# Patient Record
Sex: MALE | Employment: OTHER | URBAN - METROPOLITAN AREA
[De-identification: names, ages, dates, MRNs, and addresses within clinical notes are randomized per-mention and may not be internally consistent; named-entity substitution may affect disease eponyms.]

---

## 2017-02-19 PROBLEM — I10 ESSENTIAL HYPERTENSION: Status: ACTIVE | Noted: 2017-02-19

## 2017-02-19 PROBLEM — Z86.69 HX OF BELL'S PALSY: Status: ACTIVE | Noted: 2017-02-19

## 2017-02-20 ENCOUNTER — OFFICE VISIT (OUTPATIENT)
Dept: FAMILY MEDICINE CLINIC | Age: 67
End: 2017-02-20

## 2017-02-20 VITALS
HEART RATE: 89 BPM | BODY MASS INDEX: 33.56 KG/M2 | OXYGEN SATURATION: 96 % | DIASTOLIC BLOOD PRESSURE: 84 MMHG | HEIGHT: 66 IN | TEMPERATURE: 98.8 F | RESPIRATION RATE: 28 BRPM | WEIGHT: 208.8 LBS | SYSTOLIC BLOOD PRESSURE: 144 MMHG

## 2017-02-20 DIAGNOSIS — Z86.69 HX OF BELL'S PALSY: ICD-10-CM

## 2017-02-20 DIAGNOSIS — I10 ESSENTIAL HYPERTENSION: Primary | ICD-10-CM

## 2017-02-20 RX ORDER — CETIRIZINE HCL 10 MG
TABLET ORAL
COMMUNITY

## 2017-02-20 NOTE — PROGRESS NOTES
This is a Subsequent Medicare Annual Wellness Visit providing Personalized Prevention Plan Services (PPPS) (Performed 12 months after initial AWV and PPPS )    I have reviewed the patient's medical history in detail and updated the computerized patient record. History     Past Medical History   Diagnosis Date    Allergic rhinitis, cause unspecified 12/11/2013    Environmental allergies       Past Surgical History   Procedure Laterality Date    Hx wisdom teeth extraction      Hx tonsillectomy       Current Outpatient Prescriptions   Medication Sig Dispense Refill    cetirizine (ZYRTEC) 10 mg tablet Take  by mouth.  naproxen sodium (ALEVE) 220 mg cap Take  by mouth.  CETIRIZINE HCL/PSEUDOEPHEDRINE (ZYRTEC-D PO) Take  by mouth. Allergies   Allergen Reactions    Codeine Other (comments)     Pt states unknown reaction.  Pcn [Penicillins] Rash     Family History   Problem Relation Age of Onset    Hypertension Mother     Hypertension Father     Heart Disease Father     Alcohol abuse Father     Hypertension Brother     No Known Problems Brother      Social History   Substance Use Topics    Smoking status: Former Smoker     Quit date: 12/12/1966    Smokeless tobacco: Never Used    Alcohol use No     Patient Active Problem List   Diagnosis Code    Allergic rhinitis, cause unspecified J30.9    Hx of Bell's palsy Z86.69    Essential hypertension I10       Depression Risk Factor Screening:     PHQ 2 / 9, over the last two weeks 2/20/2017   Little interest or pleasure in doing things Not at all   Feeling down, depressed or hopeless Not at all   Total Score PHQ 2 0     Alcohol Risk Factor Screening: On any occasion during the past 3 months, have you had more than 3 drinks containing alcohol? No    Do you average more than 7 drinks per week? No      Functional Ability and Level of Safety:     Hearing Loss   mild    Activities of Daily Living   Self-care.    Requires assistance with: no ADLs    Fall Risk     Fall Risk Assessment, last 12 mths 2/20/2017   Able to walk? Yes   Fall in past 12 months? No     Abuse Screen   Patient is not abused    Review of Systems   Constitutional: negative  Eyes: negative  Ears, nose, mouth, throat, and face: negative  Respiratory: negative  Cardiovascular: negative  Gastrointestinal: negative  Genitourinary:negative  Integument/breast: negative  Hematologic/lymphatic: negative    Physical Examination     Evaluation of Cognitive Function:  Mood/affect:  neutral  Appearance: age appropriate  Family member/caregiver input: 0    Visit Vitals    /84 (BP 1 Location: Left arm, BP Patient Position: Sitting)    Pulse 89    Temp 98.8 °F (37.1 °C) (Oral)    Resp 28    Ht 5' 6\" (1.676 m)    Wt 208 lb 12.8 oz (94.7 kg)    SpO2 96%    BMI 33.7 kg/m2     General:  Alert, cooperative, no distress, appears stated age. Head:  Normocephalic, without obvious abnormality, atraumatic. Eyes:  Conjunctivae/corneas clear. PERRL, EOMs intact. Fundi benign   Ears:  Normal TMs and external ear canals both ears. Nose: Nares normal. Septum midline. Mucosa normal. No drainage or sinus tenderness. Throat: Lips, mucosa, and tongue normal. Teeth and gums normal.   Neck: Supple, symmetrical, trachea midline, no adenopathy, thyroid: no enlargement/tenderness/nodules, no carotid bruit and no JVD. Lungs:   Clear to auscultation bilaterally. Heart:  Regular rate and rhythm, S1, S2 normal, no murmur, click, rub or gallop. Abdomen:   Soft, non-tender. Bowel sounds normal. No masses,  No organomegaly. Extremities: Extremities normal, atraumatic, no cyanosis or edema. Neurologic: Minimal rt facial weakness. Normal strength, sensation and reflexes throughout.        Patient Care Team:  Liza Arredondo MD as PCP - [facility] [place])    Advice/Referrals/Counseling   Education and counseling provided:  Are appropriate based on today's review and evaluation    Assessment/Plan   Diagnoses and all orders for this visit:    Essential hypertension  -     Cancel: LIPID PANEL    Hx of Bell's palsy      Follow-up Disposition:  Return in about 3 months (around 5/20/2017). John Beck

## 2017-02-20 NOTE — MR AVS SNAPSHOT
Visit Information Date & Time Provider Department Dept. Phone Encounter #  
 2/20/2017  9:00 AM Adrián Bishop [phone] [id number] Follow-up Instructions Return in about 3 months (around 5/20/2017). Upcoming Health Maintenance Date Due FOBT Q 1 YEAR AGE 50-75 11/6/1999 ZOSTER VACCINE AGE 60> 11/6/2009 GLAUCOMA SCREENING Q2Y 11/6/2014 MEDICARE YEARLY EXAM 11/6/2014 Pneumococcal 65+ Low/Medium Risk (2 of 2 - PPSV23) 12/19/2017 DTaP/Tdap/Td series (2 - Td) 12/19/2026 Allergies as of 2/20/2017  Review Complete On: 2/20/2017 By: Koko Barakat MD  
  
 Severity Noted Reaction Type Reactions Codeine  12/11/2013    Other (comments) Pt states unknown reaction. Pcn [Penicillins]  12/11/2013    Rash Current Immunizations  Never Reviewed No immunizations on file. Not reviewed this visit You Were Diagnosed With   
  
 Codes Comments Essential hypertension    -  Primary ICD-10-CM: I10 
ICD-9-CM: 401.9 Hx of Bell's palsy     ICD-10-CM: Z86.69 
ICD-9-CM: V12.49 Vitals BP Pulse Temp Resp Height(growth percentile) Weight(growth percentile) 144/84 (BP 1 Location: Left arm, BP Patient Position: Sitting) 89 98.8 °F (37.1 °C) (Oral) 28 5' 6\" (1.676 m) 208 lb 12.8 oz (94.7 kg) SpO2 BMI Smoking Status 96% 33.7 kg/m2 Former Smoker Vitals History BMI and BSA Data Body Mass Index Body Surface Area 33.7 kg/m 2 2.1 m 2 Preferred Pharmacy Pharmacy Name Phone [facility] [address] , [address] [facility] [phone] Your Updated Medication List  
  
   
This list is accurate as of: 2/20/17  9:28 AM.  Always use your most recent med list.  
  
  
  
  
 ALEVE 220 mg Cap Generic drug:  naproxen sodium Take  by mouth. ZyrTEC 10 mg tablet Generic drug:  cetirizine Take  by mouth. ZYRTEC-D PO Take  by mouth. Follow-up Instructions Return in about 3 months (around 5/20/2017). Introducing [facility]! Justina Mullins introduces [employer]el patient portal. Now you can access parts of your medical record, email your doctor's office, and request medication refills online. 1. In your internet browser, go to [email or web address] 2. Click on the First Time User? Click Here link in the Sign In box. You will see the New Member Sign Up page. 3. Enter your P[employer]el Access Code exactly as it appears below. You will not need to use this code after youve completed the sign-up process. If you do not sign up before the expiration date, you must request a new code. · P[employer]el Access Code: [email or web address] Expires: 3/12/2017  2:43 PM 
 
4. Enter the last four digits of your Social Security Number (xxxx) and Date of Birth (mm/dd/yyyy) as indicated and click Submit. You will be taken to the next sign-up page. 5. Create a Poxel ID. This will be your Poxel login ID and cannot be changed, so think of one that is secure and easy to remember. 6. Create a Poxel password. You can change your password at any time. 7. Enter your Password Reset Question and Answer. This can be used at a later time if you forget your password. 8. Enter your e-mail address. You will receive e-mail notification when new information is available in [address] 9. Click Sign Up. You can now view and download portions of your medical record. 10. Click the Download Summary menu link to download a portable copy of your medical information. If you have questions, please visit the Frequently Asked Questions section of the P[employer]el website. Remember, [employer]el is NOT to be used for urgent needs. For medical emergencies, dial 911. Now available from your iPhone and Android! Please provide this summary of care documentation to your next provider. Your primary care clinician is listed as Ray Grande. If you have any questions after today's visit, please call [phone].

## 2020-05-18 ENCOUNTER — TELEPHONE (OUTPATIENT)
Dept: FAMILY MEDICINE CLINIC | Age: 70
End: 2020-05-18

## 2020-05-18 DIAGNOSIS — I10 ESSENTIAL HYPERTENSION: Primary | ICD-10-CM

## 2020-05-18 RX ORDER — HYDROCHLOROTHIAZIDE 25 MG/1
25 TABLET ORAL DAILY
Qty: 30 TAB | Refills: 0 | Status: SHIPPED | OUTPATIENT
Start: 2020-05-18 | End: 2020-06-19

## 2020-05-18 NOTE — TELEPHONE ENCOUNTER
Patient wife states that his face is flushed, and he got dizzy when he was on the step stool looking up, his BP is 175/88 and 172/82.  Please call to advise [phone]

## 2020-05-18 NOTE — TELEPHONE ENCOUNTER
----- Message from mAbrose Hernandes sent at 5/18/2020  3:28 PM EDT -----  Regarding: Dr. Odilia Bennett Message/Vendor Calls    Caller's first and last name:Lidia [address] [place] [address] wife      Reason for call:blood pressure readings      Callback required yes/no and why:yes      Best contact number(s):[phone]      Details to clarify the request:Patient's wife wants to give the b/p readings.  175/88 172-82      Ambrose Hernandes

## 2020-05-19 NOTE — TELEPHONE ENCOUNTER
Pt wife wants another call back     States he may need another note for work due to strenuous activity     [phone]

## 2020-05-19 NOTE — TELEPHONE ENCOUNTER
Patient is requesting another work note she have concerns about his BP, age and obesity and his strenuous activity.  [phone]

## 2020-06-19 ENCOUNTER — OFFICE VISIT (OUTPATIENT)
Dept: FAMILY MEDICINE CLINIC | Age: 70
End: 2020-06-19

## 2020-06-19 ENCOUNTER — TELEPHONE (OUTPATIENT)
Dept: FAMILY MEDICINE CLINIC | Age: 70
End: 2020-06-19

## 2020-06-19 VITALS
TEMPERATURE: 98 F | HEIGHT: 66 IN | RESPIRATION RATE: 18 BRPM | SYSTOLIC BLOOD PRESSURE: 156 MMHG | WEIGHT: 196.4 LBS | DIASTOLIC BLOOD PRESSURE: 90 MMHG | HEART RATE: 86 BPM | BODY MASS INDEX: 31.57 KG/M2 | OXYGEN SATURATION: 98 %

## 2020-06-19 DIAGNOSIS — I10 ESSENTIAL HYPERTENSION: Primary | ICD-10-CM

## 2020-06-19 DIAGNOSIS — R35.1 NOCTURIA: ICD-10-CM

## 2020-06-19 RX ORDER — AMLODIPINE BESYLATE 2.5 MG/1
2.5 TABLET ORAL DAILY
Qty: 30 TAB | Refills: 6 | Status: SHIPPED | OUTPATIENT
Start: 2020-06-19 | End: 2020-07-17 | Stop reason: DRUGHIGH

## 2020-06-19 NOTE — PROGRESS NOTES
HISTORY OF PRESENT ILLNESS  Andrew Short is a 78 y.o. male. f/u hbp started on hctz 2 weeks ago. Feeling well. Only taking zyrtec d and hctz    Hypertension    The history is provided by the patient. This is a new problem. The current episode started more than 1 week ago. The problem has been gradually improving. Pertinent negatives include no chest pain, no orthopnea, no malaise/fatigue, no headaches, no peripheral edema, no dizziness and no nausea. Review of Systems   Constitutional: Negative for fever and malaise/fatigue. Respiratory: Negative for cough, hemoptysis and sputum production. Cardiovascular: Negative for chest pain, orthopnea and claudication. Gastrointestinal: Negative for blood in stool, constipation, heartburn, melena and nausea. Musculoskeletal: Positive for joint pain. Skin: Negative for rash. Neurological: Negative for dizziness and headaches. Endo/Heme/Allergies: Does not bruise/bleed easily. Psychiatric/Behavioral: Negative for depression. Physical Exam  Vitals signs reviewed. Constitutional:       Appearance: He is well-developed. HENT:      Head: Normocephalic and atraumatic. Right Ear: External ear normal.      Left Ear: External ear normal.      Nose: Nose normal.   Eyes:      Conjunctiva/sclera: Conjunctivae normal.      Pupils: Pupils are equal, round, and reactive to light. Neck:      Musculoskeletal: Normal range of motion and neck supple. Thyroid: No thyromegaly. Trachea: No tracheal deviation. Cardiovascular:      Rate and Rhythm: Normal rate and regular rhythm. Heart sounds: Normal heart sounds. No murmur. No gallop. Pulmonary:      Effort: Pulmonary effort is normal. No respiratory distress. Breath sounds: Normal breath sounds. Abdominal:      General: Bowel sounds are normal.      Palpations: Abdomen is soft. Genitourinary:     Prostate: Normal.      Rectum: Normal. Guaiac result negative.    Musculoskeletal: Normal range of motion. Skin:     General: Skin is warm and dry. Neurological:      General: No focal deficit present. Mental Status: He is alert. Psychiatric:         Mood and Affect: Mood normal.         ASSESSMENT and PLAN  Diagnoses and all orders for this visit:    1. Essential hypertension  -     amLODIPine (NORVASC) 2.5 mg tablet; Take 1 Tab by mouth daily.  -     METABOLIC PANEL, COMPREHENSIVE  -     LIPID PANEL  -     CBC WITH AUTOMATED DIFF    2. Nocturia  -     PSA, DIAGNOSTIC (PROSTATE SPECIFIC AG)      Follow-up and Dispositions    · Return in about 4 weeks (around 7/17/2020).

## 2020-06-19 NOTE — TELEPHONE ENCOUNTER
----- Message from Magdalene Carlin sent at 6/19/2020 11:50 AM EDT -----  Regarding: Dr. Mary Ann Larson  Per wife, Skyler Cao, stated pts blood pressure is being monitored per Dr. Brock Opitz and pt needs in office appt. 07/17.     Preference: Mornings    Clement Hopping best contact  [phone]

## 2020-06-19 NOTE — PROGRESS NOTES
Chief Complaint   Patient presents with    Hypertension     F/U on BP. 1. Have you been to the ER, urgent care clinic since your last visit? Hospitalized since your last visit? No    2. Have you seen or consulted any other health care providers outside of the [address] [place] [address] since your last visit? Include any pap smears or colon screening.  No

## 2020-06-23 LAB
ALBUMIN SERPL-MCNC: 4.6 G/DL (ref 3.8–4.8)
ALBUMIN/GLOB SERPL: 1.7 {RATIO} (ref 1.2–2.2)
ALP SERPL-CCNC: 76 IU/L (ref 39–117)
ALT SERPL-CCNC: 20 IU/L (ref 0–44)
AST SERPL-CCNC: 19 IU/L (ref 0–40)
BASOPHILS # BLD AUTO: 0 X10E3/UL (ref 0–0.2)
BASOPHILS NFR BLD AUTO: 1 %
BILIRUB SERPL-MCNC: 0.3 MG/DL (ref 0–1.2)
BUN SERPL-MCNC: 21 MG/DL (ref 8–27)
BUN/CREAT SERPL: 18 (ref 10–24)
CALCIUM SERPL-MCNC: 9.8 MG/DL (ref 8.6–10.2)
CHLORIDE SERPL-SCNC: 95 MMOL/L (ref 96–106)
CHOLEST SERPL-MCNC: 216 MG/DL (ref 100–199)
CO2 SERPL-SCNC: 25 MMOL/L (ref 20–29)
CREAT SERPL-MCNC: 1.17 MG/DL (ref 0.76–1.27)
EOSINOPHIL # BLD AUTO: 0.2 X10E3/UL (ref 0–0.4)
EOSINOPHIL NFR BLD AUTO: 3 %
ERYTHROCYTE [DISTWIDTH] IN BLOOD BY AUTOMATED COUNT: 13.1 % (ref 11.6–15.4)
GLOBULIN SER CALC-MCNC: 2.7 G/DL (ref 1.5–4.5)
GLUCOSE SERPL-MCNC: 124 MG/DL (ref 65–99)
HCT VFR BLD AUTO: 38.6 % (ref 37.5–51)
HDLC SERPL-MCNC: 45 MG/DL
HGB BLD-MCNC: 12.9 G/DL (ref 13–17.7)
IMM GRANULOCYTES # BLD AUTO: 0 X10E3/UL (ref 0–0.1)
IMM GRANULOCYTES NFR BLD AUTO: 0 %
INTERPRETATION, 910389: NORMAL
LDLC SERPL CALC-MCNC: 138 MG/DL (ref 0–99)
LYMPHOCYTES # BLD AUTO: 1.6 X10E3/UL (ref 0.7–3.1)
LYMPHOCYTES NFR BLD AUTO: 23 %
MCH RBC QN AUTO: 30 PG (ref 26.6–33)
MCHC RBC AUTO-ENTMCNC: 33.4 G/DL (ref 31.5–35.7)
MCV RBC AUTO: 90 FL (ref 79–97)
MONOCYTES # BLD AUTO: 0.5 X10E3/UL (ref 0.1–0.9)
MONOCYTES NFR BLD AUTO: 7 %
NEUTROPHILS # BLD AUTO: 4.6 X10E3/UL (ref 1.4–7)
NEUTROPHILS NFR BLD AUTO: 66 %
PLATELET # BLD AUTO: 288 X10E3/UL (ref 150–450)
POTASSIUM SERPL-SCNC: 4 MMOL/L (ref 3.5–5.2)
PROT SERPL-MCNC: 7.3 G/DL (ref 6–8.5)
PSA SERPL-MCNC: 0.4 NG/ML (ref 0–4)
RBC # BLD AUTO: 4.3 X10E6/UL (ref 4.14–5.8)
SODIUM SERPL-SCNC: 137 MMOL/L (ref 134–144)
TRIGL SERPL-MCNC: 163 MG/DL (ref 0–149)
VLDLC SERPL CALC-MCNC: 33 MG/DL (ref 5–40)
WBC # BLD AUTO: 7.1 X10E3/UL (ref 3.4–10.8)

## 2020-07-17 ENCOUNTER — OFFICE VISIT (OUTPATIENT)
Dept: FAMILY MEDICINE CLINIC | Age: 70
End: 2020-07-17

## 2020-07-17 VITALS
OXYGEN SATURATION: 98 % | BODY MASS INDEX: 31.63 KG/M2 | TEMPERATURE: 98.9 F | WEIGHT: 196.8 LBS | SYSTOLIC BLOOD PRESSURE: 160 MMHG | DIASTOLIC BLOOD PRESSURE: 80 MMHG | RESPIRATION RATE: 18 BRPM | HEART RATE: 90 BPM | HEIGHT: 66 IN

## 2020-07-17 DIAGNOSIS — I10 ESSENTIAL HYPERTENSION: ICD-10-CM

## 2020-07-17 DIAGNOSIS — Z00.00 MEDICARE ANNUAL WELLNESS VISIT, SUBSEQUENT: Primary | ICD-10-CM

## 2020-07-17 RX ORDER — AMLODIPINE BESYLATE 5 MG/1
5 TABLET ORAL DAILY
Qty: 30 TAB | Refills: 11 | Status: SHIPPED | OUTPATIENT
Start: 2020-07-17 | End: 2021-06-08 | Stop reason: SDUPTHER

## 2020-07-17 NOTE — PROGRESS NOTES
This is the Subsequent Medicare Annual Wellness Exam, performed 12 months or more after the Initial AWV or the last Subsequent AWV    I have reviewed the patient's medical history in detail and updated the computerized patient record. History     Patient Active Problem List   Diagnosis Code    Allergic rhinitis, cause unspecified J30.9    Hx of Bell's palsy Z86.69    Essential hypertension I10     Past Medical History:   Diagnosis Date    Allergic rhinitis, cause unspecified 2013    Environmental allergies       Past Surgical History:   Procedure Laterality Date    HX TONSILLECTOMY      HX WISDOM TEETH EXTRACTION       Current Outpatient Medications   Medication Sig Dispense Refill    amLODIPine (NORVASC) 2.5 mg tablet Take 1 Tab by mouth daily. 30 Tab 6    hydroCHLOROthiazide (HYDRODIURIL) 25 mg tablet TAKE ONE TABLET BY MOUTH ONE TIME DAILY 30 Tab 5    CETIRIZINE HCL/PSEUDOEPHEDRINE (ZYRTEC-D PO) Take  by mouth.  cetirizine (ZYRTEC) 10 mg tablet Take  by mouth.  naproxen sodium (ALEVE) 220 mg cap Take  by mouth. Allergies   Allergen Reactions    Codeine Other (comments)     Pt states unknown reaction.  Pcn [Penicillins] Rash       Family History   Problem Relation Age of Onset    Hypertension Mother     Hypertension Father     Heart Disease Father     Alcohol abuse Father     Hypertension Brother     No Known Problems Brother      Social History     Tobacco Use    Smoking status: Former Smoker     Last attempt to quit: 1966     Years since quittin.6    Smokeless tobacco: Never Used   Substance Use Topics    Alcohol use: No       Depression Risk Factor Screening:     3 most recent PHQ Screens 2020   Little interest or pleasure in doing things Not at all   Feeling down, depressed, irritable, or hopeless Not at all   Total Score PHQ 2 0       Alcohol Risk Factor Screening (MALE > 65):    Do you average more 1 drink per night or more than 7 drinks a week: No    In the past three months have you have had more than 4 drinks containing alcohol on one occasion: No      Functional Ability and Level of Safety:   Hearing: The patient needs further evaluation. Activities of Daily Living: The home contains: handrails and smoke alarm  Patient does total self care     Ambulation: with no difficulty     Fall Risk:  Fall Risk Assessment, last 12 mths 2020   Able to walk? Yes   Fall in past 12 months? No     Abuse Screen:  Patient is not abused       Cognitive Screening   Has your family/caregiver stated any concerns about your memory: no     Cognitive Screenin    Patient Care Team   Patient Care Team:  Mehdi Turner MD as PCP - General (Family Practice)  Mehdi Turner MD as PCP - [facility] Provider    Assessment/Plan   Education and counseling provided:  Are appropriate based on today's review and evaluation    Diagnoses and all orders for this visit:    1. Medicare annual wellness visit, subsequent    2.  Essential hypertension        Health Maintenance Due   Topic Date Due    Shingrix Vaccine Age 49> (1 of 2) 1999    FOBT Q1Y Age 50-75  1999    GLAUCOMA SCREENING Q2Y  2014    Pneumococcal 65+ years (1 of 1 - PPSV23) 2014    Medicare Yearly Exam  2020

## 2020-07-17 NOTE — PROGRESS NOTES
Chief Complaint   Patient presents with   [facility] Annual Wellness Visit     medicare wellness     1. Have you been to the ER, urgent care clinic since your last visit? Hospitalized since your last visit?no    2. Have you seen or consulted any other health care providers outside of the [address] [place] [address] since your last visit? Include any pap smears or colon screening.  no

## 2020-07-17 NOTE — PROGRESS NOTES
HISTORY OF PRESENT ILLNESS  Joanna Howell is a 78 y.o. male. f/u hbp,tolerating hctz,amlodipine 2. 5. Feeling well. Labs reviewed  Hypertension    The history is provided by the patient. This is a chronic problem. The problem has been gradually improving. Pertinent negatives include no chest pain, no peripheral edema and no dizziness. Review of Systems   HENT: Positive for congestion. Cardiovascular: Negative for chest pain. Neurological: Negative for dizziness. Physical Exam  Constitutional:       Appearance: Normal appearance. HENT:      Head: Normocephalic and atraumatic. Right Ear: Tympanic membrane normal.      Left Ear: Tympanic membrane normal.      Nose: Nose normal.      Mouth/Throat:      Mouth: Mucous membranes are moist.   Cardiovascular:      Rate and Rhythm: Normal rate and regular rhythm. Pulses: Normal pulses. Heart sounds: Normal heart sounds. Pulmonary:      Effort: Pulmonary effort is normal.      Breath sounds: Normal breath sounds. Abdominal:      General: Abdomen is flat. Palpations: Abdomen is soft. Genitourinary:     Rectum: Normal. Guaiac result negative. Skin:     General: Skin is warm and dry. Neurological:      General: No focal deficit present. Mental Status: He is alert and oriented to person, place, and time. ASSESSMENT and PLAN  Diagnoses and all orders for this visit:    1. Medicare annual wellness visit, subsequent    2. Essential hypertension,inadequately controlled,will increase amlodipine,rtc 1 mo    Other orders  -     amLODIPine (NORVASC) 5 mg tablet; Take 1 Tab by mouth daily. Follow-up and Dispositions    · Return in about 4 weeks (around 8/14/2020).

## 2020-07-21 ENCOUNTER — TELEPHONE (OUTPATIENT)
Dept: FAMILY MEDICINE CLINIC | Age: 70
End: 2020-07-21
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
## 2020-07-21 DIAGNOSIS — S33.5XXA LUMBAR SPRAIN, INITIAL ENCOUNTER: ICD-10-CM

## 2020-07-21 RX ORDER — CYCLOBENZAPRINE HCL 10 MG
10 TABLET ORAL
Qty: 30 TAB | Refills: 1 | Status: SHIPPED | OUTPATIENT
Start: 2020-07-21

## 2020-07-21 RX ORDER — DOXYCYCLINE 100 MG/1
100 CAPSULE ORAL 2 TIMES DAILY WITH MEALS
Qty: 14 CAP | Refills: 0 | Status: SHIPPED | OUTPATIENT
Start: 2020-07-21 | End: 2020-07-28

## 2020-07-21 NOTE — TELEPHONE ENCOUNTER
----- Message from Naomi Acharya sent at 7/21/2020 10:54 AM EDT -----  Regarding: Dr. Murphy Schreiber  General Message/Vendor Calls    Caller's first and last name: Ant Paez      Reason for call:tick bite on Friday      Callback required yes/no and why: yes      Best contact number(s):[phone]      Details to clarify the request:Patient's wife needs to know what to do with the tick bite she removed from his leg and he is having severe problems with his back.     Naomi Acharya

## 2020-07-21 NOTE — TELEPHONE ENCOUNTER
Patient states he pulled a tick off and now area is red and a streak is going down his leg    Also pulled muscle in back, in pain, can't move around much.

## 2020-08-14 ENCOUNTER — OFFICE VISIT (OUTPATIENT)
Dept: FAMILY MEDICINE CLINIC | Age: 70
End: 2020-08-14
Payer: MEDICARE

## 2020-08-14 VITALS
OXYGEN SATURATION: 98 % | HEART RATE: 75 BPM | HEIGHT: 66 IN | TEMPERATURE: 97.1 F | WEIGHT: 200 LBS | BODY MASS INDEX: 32.14 KG/M2 | RESPIRATION RATE: 20 BRPM | SYSTOLIC BLOOD PRESSURE: 138 MMHG | DIASTOLIC BLOOD PRESSURE: 78 MMHG

## 2020-08-14 DIAGNOSIS — I10 ESSENTIAL HYPERTENSION: Primary | ICD-10-CM

## 2020-08-14 PROCEDURE — G8754 DIAS BP LESS 90: HCPCS | Performed by: FAMILY MEDICINE

## 2020-08-14 PROCEDURE — 99212 OFFICE O/P EST SF 10 MIN: CPT | Performed by: FAMILY MEDICINE

## 2020-08-14 PROCEDURE — G8417 CALC BMI ABV UP PARAM F/U: HCPCS | Performed by: FAMILY MEDICINE

## 2020-08-14 PROCEDURE — G8427 DOCREV CUR MEDS BY ELIG CLIN: HCPCS | Performed by: FAMILY MEDICINE

## 2020-08-14 PROCEDURE — G8510 SCR DEP NEG, NO PLAN REQD: HCPCS | Performed by: FAMILY MEDICINE

## 2020-08-14 PROCEDURE — 1101F PT FALLS ASSESS-DOCD LE1/YR: CPT | Performed by: FAMILY MEDICINE

## 2020-08-14 PROCEDURE — G8536 NO DOC ELDER MAL SCRN: HCPCS | Performed by: FAMILY MEDICINE

## 2020-08-14 PROCEDURE — G8752 SYS BP LESS 140: HCPCS | Performed by: FAMILY MEDICINE

## 2020-08-14 PROCEDURE — 3017F COLORECTAL CA SCREEN DOC REV: CPT | Performed by: FAMILY MEDICINE

## 2020-08-14 RX ORDER — CETIRIZINE HYDROCHLORIDE, PSEUDOEPHEDRINE HYDROCHLORIDE 5; 120 MG/1; MG/1
1 TABLET, FILM COATED, EXTENDED RELEASE ORAL 2 TIMES DAILY
Qty: 60 TAB | Refills: 1 | Status: SHIPPED | OUTPATIENT
Start: 2020-08-14

## 2020-08-14 NOTE — PROGRESS NOTES
HISTORY OF PRESENT ILLNESS  Gera Sarkar is a 78 y.o. male. f/u hbp,tolerating amlodipine 5 mg well,no c/o. Declined pneumovax  Hypertension    The history is provided by the patient. This is a chronic problem. The problem has been resolved. Pertinent negatives include no chest pain, no palpitations, no PND, no peripheral edema and no dizziness. Review of Systems   Respiratory: Negative for cough. Cardiovascular: Negative for chest pain, palpitations and PND. Genitourinary: Negative for frequency. Musculoskeletal: Negative for myalgias. Skin: Negative for rash. Neurological: Negative for dizziness. Physical Exam  Constitutional:       Appearance: Normal appearance. He is obese. HENT:      Head: Normocephalic. Right Ear: Tympanic membrane normal.      Left Ear: Tympanic membrane normal.      Nose: Nose normal.   Cardiovascular:      Rate and Rhythm: Normal rate and regular rhythm. Heart sounds: Normal heart sounds. Pulmonary:      Effort: Pulmonary effort is normal.      Breath sounds: Normal breath sounds. Neurological:      Mental Status: He is alert. ASSESSMENT and PLAN  Diagnoses and all orders for this visit:    1. Essential hypertension,controlled . Continue current meds and treatments. Follow-up and Dispositions    · Return in about 1 year (around 8/14/2021).

## 2020-08-14 NOTE — PROGRESS NOTES
Chief Complaint   Patient presents with    Hypertension     follow up     1. Have you been to the ER, urgent care clinic since your last visit? Hospitalized since your last visit?no    2. Have you seen or consulted any other health care providers outside of the [address] [place] [address] since your last visit? Include any pap smears or colon screening.  no

## 2020-08-14 NOTE — TELEPHONE ENCOUNTER
Patient wants to get Zyrtec d called in.  If any questions please give him a call @  Phone [phone]

## 2020-12-10 DIAGNOSIS — J32.0 MAXILLARY SINUSITIS, UNSPECIFIED CHRONICITY: Primary | ICD-10-CM

## 2020-12-10 RX ORDER — AZITHROMYCIN 250 MG/1
TABLET, FILM COATED ORAL
Qty: 6 TAB | Refills: 0 | Status: SHIPPED | OUTPATIENT
Start: 2020-12-10 | End: 2021-06-08 | Stop reason: ALTCHOICE

## 2021-05-14 RX ORDER — SULFAMETHOXAZOLE AND TRIMETHOPRIM 800; 160 MG/1; MG/1
1 TABLET ORAL 2 TIMES DAILY
Qty: 14 TAB | Refills: 0 | Status: SHIPPED | OUTPATIENT
Start: 2021-05-14 | End: 2021-05-21

## 2021-05-14 NOTE — TELEPHONE ENCOUNTER
Patient is requesting medication for right ear pain, states that a message was left on yesterday. He has used Zithromax and bactrim.  [phone]

## 2021-05-14 NOTE — TELEPHONE ENCOUNTER
----- Message from Ashley Romero sent at 5/14/2021 10:00 AM EDT -----  Regarding: Dr Shila San  Contact: [phone]  General Message/Vendor Calls    Caller's first and last name:Lidia/wife      Reason for call:wife wants to discuss getting medication for a painful ear infection in his right ear. Pt has been taking Motrin but not working.       Callback required yes/no and why:yes      Best contact number(s):[phone]      Details to clarify the request:2nd request      Ashley Romero

## 2021-06-08 ENCOUNTER — OFFICE VISIT (OUTPATIENT)
Dept: FAMILY MEDICINE CLINIC | Age: 71
End: 2021-06-08
Payer: MEDICARE

## 2021-06-08 VITALS
BODY MASS INDEX: 30.67 KG/M2 | DIASTOLIC BLOOD PRESSURE: 78 MMHG | SYSTOLIC BLOOD PRESSURE: 126 MMHG | TEMPERATURE: 98 F | RESPIRATION RATE: 18 BRPM | HEART RATE: 75 BPM | OXYGEN SATURATION: 99 % | HEIGHT: 66 IN | WEIGHT: 190.8 LBS

## 2021-06-08 DIAGNOSIS — R35.1 NOCTURIA: ICD-10-CM

## 2021-06-08 DIAGNOSIS — Z00.00 MEDICARE ANNUAL WELLNESS VISIT, SUBSEQUENT: Primary | ICD-10-CM

## 2021-06-08 DIAGNOSIS — I10 ESSENTIAL HYPERTENSION: ICD-10-CM

## 2021-06-08 LAB
ALBUMIN SERPL-MCNC: 4.2 G/DL (ref 3.5–5)
ALBUMIN/GLOB SERPL: 1 {RATIO} (ref 1.1–2.2)
ALP SERPL-CCNC: 85 U/L (ref 45–117)
ALT SERPL-CCNC: 30 U/L (ref 12–78)
ANION GAP SERPL CALC-SCNC: 6 MMOL/L (ref 5–15)
AST SERPL-CCNC: 19 U/L (ref 15–37)
BASOPHILS # BLD: 0.1 K/UL (ref 0–0.1)
BASOPHILS NFR BLD: 1 % (ref 0–1)
BILIRUB SERPL-MCNC: 0.3 MG/DL (ref 0.2–1)
BILIRUB UR QL STRIP: NEGATIVE
BUN SERPL-MCNC: 24 MG/DL (ref 6–20)
BUN/CREAT SERPL: 23 (ref 12–20)
CALCIUM SERPL-MCNC: 9.8 MG/DL (ref 8.5–10.1)
CHLORIDE SERPL-SCNC: 100 MMOL/L (ref 97–108)
CHOLEST SERPL-MCNC: 220 MG/DL
CO2 SERPL-SCNC: 31 MMOL/L (ref 21–32)
CREAT SERPL-MCNC: 1.06 MG/DL (ref 0.7–1.3)
DIFFERENTIAL METHOD BLD: ABNORMAL
EOSINOPHIL # BLD: 0.5 K/UL (ref 0–0.4)
EOSINOPHIL NFR BLD: 6 % (ref 0–7)
ERYTHROCYTE [DISTWIDTH] IN BLOOD BY AUTOMATED COUNT: 13.5 % (ref 11.5–14.5)
GLOBULIN SER CALC-MCNC: 4.2 G/DL (ref 2–4)
GLUCOSE SERPL-MCNC: 112 MG/DL (ref 65–100)
GLUCOSE UR-MCNC: NEGATIVE MG/DL
HCT VFR BLD AUTO: 36.4 % (ref 36.6–50.3)
HDLC SERPL-MCNC: 49 MG/DL
HDLC SERPL: 4.5 {RATIO} (ref 0–5)
HGB BLD-MCNC: 11.6 G/DL (ref 12.1–17)
IMM GRANULOCYTES # BLD AUTO: 0 K/UL (ref 0–0.04)
IMM GRANULOCYTES NFR BLD AUTO: 0 % (ref 0–0.5)
KETONES P FAST UR STRIP-MCNC: NEGATIVE MG/DL
LDLC SERPL CALC-MCNC: 139 MG/DL (ref 0–100)
LYMPHOCYTES # BLD: 1.8 K/UL (ref 0.8–3.5)
LYMPHOCYTES NFR BLD: 25 % (ref 12–49)
MCH RBC QN AUTO: 28.8 PG (ref 26–34)
MCHC RBC AUTO-ENTMCNC: 31.9 G/DL (ref 30–36.5)
MCV RBC AUTO: 90.3 FL (ref 80–99)
MONOCYTES # BLD: 0.8 K/UL (ref 0–1)
MONOCYTES NFR BLD: 10 % (ref 5–13)
NEUTS SEG # BLD: 4.4 K/UL (ref 1.8–8)
NEUTS SEG NFR BLD: 58 % (ref 32–75)
NRBC # BLD: 0 K/UL (ref 0–0.01)
NRBC BLD-RTO: 0 PER 100 WBC
PH UR STRIP: 5.5 [PH] (ref 4.6–8)
PLATELET # BLD AUTO: 321 K/UL (ref 150–400)
PMV BLD AUTO: 9.5 FL (ref 8.9–12.9)
POTASSIUM SERPL-SCNC: 3.7 MMOL/L (ref 3.5–5.1)
PROT SERPL-MCNC: 8.4 G/DL (ref 6.4–8.2)
PROT UR QL STRIP: NEGATIVE
PSA SERPL-MCNC: 0.5 NG/ML (ref 0.01–4)
RBC # BLD AUTO: 4.03 M/UL (ref 4.1–5.7)
SODIUM SERPL-SCNC: 137 MMOL/L (ref 136–145)
SP GR UR STRIP: 1.02 (ref 1–1.03)
TRIGL SERPL-MCNC: 160 MG/DL (ref ?–150)
UA UROBILINOGEN AMB POC: NORMAL (ref 0.2–1)
URINALYSIS CLARITY POC: CLEAR
URINALYSIS COLOR POC: YELLOW
URINE BLOOD POC: NEGATIVE
URINE LEUKOCYTES POC: NEGATIVE
URINE NITRITES POC: NEGATIVE
VLDLC SERPL CALC-MCNC: 32 MG/DL
WBC # BLD AUTO: 7.5 K/UL (ref 4.1–11.1)

## 2021-06-08 PROCEDURE — G8417 CALC BMI ABV UP PARAM F/U: HCPCS | Performed by: FAMILY MEDICINE

## 2021-06-08 PROCEDURE — G8510 SCR DEP NEG, NO PLAN REQD: HCPCS | Performed by: FAMILY MEDICINE

## 2021-06-08 PROCEDURE — 3017F COLORECTAL CA SCREEN DOC REV: CPT | Performed by: FAMILY MEDICINE

## 2021-06-08 PROCEDURE — 81003 URINALYSIS AUTO W/O SCOPE: CPT | Performed by: FAMILY MEDICINE

## 2021-06-08 PROCEDURE — G8752 SYS BP LESS 140: HCPCS | Performed by: FAMILY MEDICINE

## 2021-06-08 PROCEDURE — 1101F PT FALLS ASSESS-DOCD LE1/YR: CPT | Performed by: FAMILY MEDICINE

## 2021-06-08 PROCEDURE — G8427 DOCREV CUR MEDS BY ELIG CLIN: HCPCS | Performed by: FAMILY MEDICINE

## 2021-06-08 PROCEDURE — G8536 NO DOC ELDER MAL SCRN: HCPCS | Performed by: FAMILY MEDICINE

## 2021-06-08 PROCEDURE — G0439 PPPS, SUBSEQ VISIT: HCPCS | Performed by: FAMILY MEDICINE

## 2021-06-08 PROCEDURE — G8754 DIAS BP LESS 90: HCPCS | Performed by: FAMILY MEDICINE

## 2021-06-08 PROCEDURE — 99212 OFFICE O/P EST SF 10 MIN: CPT | Performed by: FAMILY MEDICINE

## 2021-06-08 RX ORDER — AMLODIPINE BESYLATE 5 MG/1
5 TABLET ORAL DAILY
Qty: 90 TABLET | Refills: 3 | Status: SHIPPED | OUTPATIENT
Start: 2021-06-08 | End: 2022-07-24

## 2021-06-08 RX ORDER — HYDROCHLOROTHIAZIDE 25 MG/1
25 TABLET ORAL DAILY
Qty: 90 TABLET | Refills: 3 | Status: SHIPPED | OUTPATIENT
Start: 2021-06-08

## 2021-06-08 NOTE — PROGRESS NOTES
Chief Complaint   Patient presents with    Hypertension     F/U on BP.  Cholesterol Problem     F/U on cholesterol. 1. Have you been to the ER, urgent care clinic since your last visit? Hospitalized since your last visit? No    2. Have you seen or consulted any other health care providers outside of the [address] [place] [address] since your last visit? Include any pap smears or colon screening.  No

## 2021-06-08 NOTE — PROGRESS NOTES
This is the Subsequent Medicare Annual Wellness Exam, performed 12 months or more after the Initial AWV or the last Subsequent AWV    I have reviewed the patient's medical history in detail and updated the computerized patient record. Assessment/Plan   Education and counseling provided:  Are appropriate based on today's review and evaluation    1. Medicare annual wellness visit, subsequent  2. Essential hypertension  -     METABOLIC PANEL, COMPREHENSIVE; Future  -     LIPID PANEL; Future  -     CBC WITH AUTOMATED DIFF; Future  -     AMB POC URINALYSIS DIP STICK AUTO W/O MICRO  -     hydroCHLOROthiazide (HYDRODIURIL) 25 mg tablet; Take 1 Tablet by mouth daily. , Normal, Disp-90 Tablet, R-3  3. Nocturia  -     PSA, DIAGNOSTIC (PROSTATE SPECIFIC AG); Future       Depression Risk Factor Screening     3 most recent PHQ Screens 2021   Little interest or pleasure in doing things Not at all   Feeling down, depressed, irritable, or hopeless Not at all   Total Score PHQ 2 0       Alcohol Risk Screen    Do you average more than 1 drink per night or more than 7 drinks a week: No    In the past three months have you have had more than 4 drinks containing alcohol on one occasion: No        Functional Ability and Level of Safety    Hearing: Hearing is good. Activities of Daily Living: The home contains: handrails and grab bars  Patient does total self care      Ambulation: with no difficulty     Fall Risk:  Fall Risk Assessment, last 12 mths 2021   Able to walk? Yes   Fall in past 12 months? 0   Do you feel unsteady?  0   Are you worried about falling 0      Abuse Screen:  Patient is not abused       Cognitive Screening    Has your family/caregiver stated any concerns about your memory: no     Cognitive Screenin    Health Maintenance Due     Health Maintenance Due   Topic Date Due    COVID-19 Vaccine (1) Never done    Shingrix Vaccine Age 50> (1 of 2) Never done    Colorectal Cancer Screening Combo  Never done       Review of Systems - General ROS: negative  Ophthalmic ROS: negative  Respiratory ROS: no cough, shortness of breath, or wheezing  Cardiovascular ROS: no chest pain or dyspnea on exertion  Gastrointestinal ROS: no abdominal pain, change in bowel habits, or black or bloody stools  Genito-Urinary ROS: no dysuria, trouble voiding, or hematuria  Musculoskeletal ROS: negative  Neurological ROS: no TIA or stroke symptoms      General appearance - alert, well appearing, and in no distress  Mental status - alert, oriented to person, place, and time  Eyes - pupils equal and reactive, extraocular eye movements intact  Ears - bilateral TM's and external ear canals normal  Nose - normal and patent, no erythema, discharge or polyps  Mouth - mucous membranes moist, pharynx normal without lesions  Neck - supple, no significant adenopathy, carotids upstroke normal bilaterally, no bruits, thyroid exam: thyroid is normal in size without nodules or tenderness  Chest - clear to auscultation, no wheezes, rales or rhonchi, symmetric air entry  Heart - normal rate, regular rhythm, normal S1, S2, no murmurs, rubs, clicks or gallops  Abdomen - soft, nontender, nondistended, no masses or organomegaly  Rectal - negative without mass, lesions or tenderness,stool guiac negative,prostate wnl  Musculoskeletal - no joint tenderness, deformity or swelling  Extremities - peripheral pulses normal, no pedal edema, no clubbing or cyanosis  Skin - normal coloration and turgor, no rashes, no suspicious skin lesions noted        Patient Care Team   Patient Care Team:  Galina Mendez MD as PCP - General (Family Medicine)  Galina Mendez MD as PCP - REHABILITATION [facility] Empaneled Provider    History     Patient Active Problem List   Diagnosis Code    Allergic rhinitis, cause unspecified J30.9    Hx of Bell's palsy Z86.69    Essential hypertension I10     Past Medical History:   Diagnosis Date    Allergic rhinitis, cause unspecified 2013    Environmental allergies       Past Surgical History:   Procedure Laterality Date    HX TONSILLECTOMY      HX WISDOM TEETH EXTRACTION       Current Outpatient Medications   Medication Sig Dispense Refill    amLODIPine (NORVASC) 5 mg tablet Take 1 Tablet by mouth daily. 90 Tablet 3    hydroCHLOROthiazide (HYDRODIURIL) 25 mg tablet Take 1 Tablet by mouth daily. 90 Tablet 3    cetirizine-pseudoePHEDrine (ZyrTEC-D) 5-120 mg Tb12 Take 1 Tab by mouth two (2) times a day. (Patient taking differently: Take 1 Tablet by mouth daily.) 60 Tab 1    cyclobenzaprine (FLEXERIL) 10 mg tablet Take 1 Tab by mouth three (3) times daily as needed for Muscle Spasm(s). (Patient not taking: Reported on 2021) 30 Tab 1    cetirizine (ZYRTEC) 10 mg tablet Take  by mouth. (Patient not taking: Reported on 2021)      naproxen sodium (ALEVE) 220 mg cap Take  by mouth. (Patient not taking: Reported on 2021)       Allergies   Allergen Reactions    Codeine Other (comments)     Pt states unknown reaction.     Pcn [Penicillins] Rash       Family History   Problem Relation Age of Onset   Buren Neer Hypertension Mother     Hypertension Father     Heart Disease Father     Alcohol abuse Father     Hypertension Brother     No Known Problems Brother      Social History     Tobacco Use    Smoking status: Former Smoker     Quit date: 1966     Years since quittin.6    Smokeless tobacco: Never Used   Substance Use Topics    Alcohol use: No         June Cifuentes MD

## 2021-07-14 ENCOUNTER — OFFICE VISIT (OUTPATIENT)
Dept: FAMILY MEDICINE CLINIC | Age: 71
End: 2021-07-14
Payer: MEDICARE

## 2021-07-14 VITALS
OXYGEN SATURATION: 98 % | BODY MASS INDEX: 30.57 KG/M2 | TEMPERATURE: 98.5 F | RESPIRATION RATE: 20 BRPM | SYSTOLIC BLOOD PRESSURE: 138 MMHG | HEART RATE: 78 BPM | HEIGHT: 66 IN | DIASTOLIC BLOOD PRESSURE: 78 MMHG | WEIGHT: 190.2 LBS

## 2021-07-14 DIAGNOSIS — D64.9 NORMOCHROMIC NORMOCYTIC ANEMIA: Primary | ICD-10-CM

## 2021-07-14 DIAGNOSIS — I10 ESSENTIAL HYPERTENSION: ICD-10-CM

## 2021-07-14 DIAGNOSIS — D53.9 DEFICIENCY ANEMIA: ICD-10-CM

## 2021-07-14 PROCEDURE — G8536 NO DOC ELDER MAL SCRN: HCPCS | Performed by: FAMILY MEDICINE

## 2021-07-14 PROCEDURE — G8752 SYS BP LESS 140: HCPCS | Performed by: FAMILY MEDICINE

## 2021-07-14 PROCEDURE — 1101F PT FALLS ASSESS-DOCD LE1/YR: CPT | Performed by: FAMILY MEDICINE

## 2021-07-14 PROCEDURE — G8417 CALC BMI ABV UP PARAM F/U: HCPCS | Performed by: FAMILY MEDICINE

## 2021-07-14 PROCEDURE — G8427 DOCREV CUR MEDS BY ELIG CLIN: HCPCS | Performed by: FAMILY MEDICINE

## 2021-07-14 PROCEDURE — 99212 OFFICE O/P EST SF 10 MIN: CPT | Performed by: FAMILY MEDICINE

## 2021-07-14 PROCEDURE — G8432 DEP SCR NOT DOC, RNG: HCPCS | Performed by: FAMILY MEDICINE

## 2021-07-14 PROCEDURE — 3017F COLORECTAL CA SCREEN DOC REV: CPT | Performed by: FAMILY MEDICINE

## 2021-07-14 PROCEDURE — G8754 DIAS BP LESS 90: HCPCS | Performed by: FAMILY MEDICINE

## 2021-07-14 NOTE — PROGRESS NOTES
Chief Complaint   Patient presents with    Anemia     F/U on anemia. 1. Have you been to the ER, urgent care clinic since your last visit? Hospitalized since your last visit? No    2. Have you seen or consulted any other health care providers outside of the [address] [place] [address] since your last visit? Include any pap smears or colon screening.  No

## 2021-07-14 NOTE — PROGRESS NOTES
HISTORY OF PRESENT ILLNESS  Sam Singh is a 69 y.o. male. F/u  Mild nc,nc anemia,. Asymptomatic,no melena ,brbpr,fevers or wt loss   Anemia  The history is provided by the patient. This is a new problem. The problem has not changed since onset. Pertinent negatives include no chest pain, no abdominal pain and no shortness of breath. Review of Systems   Constitutional: Negative for malaise/fatigue. Respiratory: Negative for shortness of breath. Cardiovascular: Negative for chest pain. Gastrointestinal: Negative for abdominal pain, blood in stool and melena. Physical Exam  Constitutional:       Appearance: Normal appearance. Cardiovascular:      Rate and Rhythm: Normal rate and regular rhythm. Pulses: Normal pulses. Heart sounds: Normal heart sounds. Pulmonary:      Effort: Pulmonary effort is normal.      Breath sounds: Normal breath sounds. Abdominal:      General: Abdomen is flat. Palpations: Abdomen is soft. Skin:     General: Skin is warm and dry. Neurological:      Mental Status: He is alert. ASSESSMENT and PLAN  Diagnoses and all orders for this visit:    1. Normochromic normocytic anemia  -     CBC WITH AUTOMATED DIFF; Future  -     FERRITIN; Future  -     VITAMIN B12; Future  -     FOLATE; Future  -     RETICULOCYTE COUNT; Future    2. Essential hypertension    3. Deficiency anemia  -     VITAMIN B12; Future  -     FOLATE; Future      Follow-up and Dispositions    · Return in about 6 months (around 1/14/2022).

## 2021-07-15 LAB
BASOPHILS # BLD AUTO: 0 X10E3/UL (ref 0–0.2)
BASOPHILS NFR BLD AUTO: 1 %
EOSINOPHIL # BLD AUTO: 0.3 X10E3/UL (ref 0–0.4)
EOSINOPHIL NFR BLD AUTO: 4 %
ERYTHROCYTE [DISTWIDTH] IN BLOOD BY AUTOMATED COUNT: 13.6 % (ref 11.6–15.4)
FERRITIN SERPL-MCNC: 373 NG/ML (ref 30–400)
FOLATE SERPL-MCNC: 11.6 NG/ML
HCT VFR BLD AUTO: 34.6 % (ref 37.5–51)
HGB BLD-MCNC: 11.6 G/DL (ref 13–17.7)
IMM GRANULOCYTES # BLD AUTO: 0 X10E3/UL (ref 0–0.1)
IMM GRANULOCYTES NFR BLD AUTO: 0 %
LYMPHOCYTES # BLD AUTO: 1.4 X10E3/UL (ref 0.7–3.1)
LYMPHOCYTES NFR BLD AUTO: 18 %
MCH RBC QN AUTO: 29.2 PG (ref 26.6–33)
MCHC RBC AUTO-ENTMCNC: 33.5 G/DL (ref 31.5–35.7)
MCV RBC AUTO: 87 FL (ref 79–97)
MONOCYTES # BLD AUTO: 0.8 X10E3/UL (ref 0.1–0.9)
MONOCYTES NFR BLD AUTO: 11 %
NEUTROPHILS # BLD AUTO: 5.2 X10E3/UL (ref 1.4–7)
NEUTROPHILS NFR BLD AUTO: 66 %
PLATELET # BLD AUTO: 333 X10E3/UL (ref 150–450)
RBC # BLD AUTO: 3.97 X10E6/UL (ref 4.14–5.8)
RETICS/RBC NFR AUTO: 1.2 % (ref 0.6–2.6)
VIT B12 SERPL-MCNC: 486 PG/ML (ref 232–1245)
WBC # BLD AUTO: 7.8 X10E3/UL (ref 3.4–10.8)

## 2021-12-15 ENCOUNTER — OFFICE VISIT (OUTPATIENT)
Dept: FAMILY MEDICINE CLINIC | Age: 71
End: 2021-12-15
Payer: MEDICARE

## 2021-12-15 VITALS
HEART RATE: 103 BPM | TEMPERATURE: 98.4 F | DIASTOLIC BLOOD PRESSURE: 80 MMHG | SYSTOLIC BLOOD PRESSURE: 142 MMHG | HEIGHT: 66 IN | BODY MASS INDEX: 30.31 KG/M2 | OXYGEN SATURATION: 96 % | WEIGHT: 188.6 LBS | RESPIRATION RATE: 20 BRPM

## 2021-12-15 DIAGNOSIS — L23.9 ALLERGIC DERMATITIS: Primary | ICD-10-CM

## 2021-12-15 DIAGNOSIS — I10 ESSENTIAL HYPERTENSION: ICD-10-CM

## 2021-12-15 PROCEDURE — G8417 CALC BMI ABV UP PARAM F/U: HCPCS | Performed by: FAMILY MEDICINE

## 2021-12-15 PROCEDURE — 99213 OFFICE O/P EST LOW 20 MIN: CPT | Performed by: FAMILY MEDICINE

## 2021-12-15 PROCEDURE — G8427 DOCREV CUR MEDS BY ELIG CLIN: HCPCS | Performed by: FAMILY MEDICINE

## 2021-12-15 PROCEDURE — G8753 SYS BP > OR = 140: HCPCS | Performed by: FAMILY MEDICINE

## 2021-12-15 PROCEDURE — G8536 NO DOC ELDER MAL SCRN: HCPCS | Performed by: FAMILY MEDICINE

## 2021-12-15 PROCEDURE — G8510 SCR DEP NEG, NO PLAN REQD: HCPCS | Performed by: FAMILY MEDICINE

## 2021-12-15 PROCEDURE — 1101F PT FALLS ASSESS-DOCD LE1/YR: CPT | Performed by: FAMILY MEDICINE

## 2021-12-15 PROCEDURE — G8754 DIAS BP LESS 90: HCPCS | Performed by: FAMILY MEDICINE

## 2021-12-15 PROCEDURE — 3017F COLORECTAL CA SCREEN DOC REV: CPT | Performed by: FAMILY MEDICINE

## 2021-12-15 RX ORDER — PREDNISONE 10 MG/1
10 TABLET ORAL 2 TIMES DAILY
Qty: 14 TABLET | Refills: 0 | Status: SHIPPED | OUTPATIENT
Start: 2021-12-15 | End: 2022-01-12 | Stop reason: ALTCHOICE

## 2021-12-15 RX ORDER — TRIAMCINOLONE ACETONIDE 1 MG/G
OINTMENT TOPICAL 2 TIMES DAILY
Qty: 80 G | Refills: 2 | Status: SHIPPED | OUTPATIENT
Start: 2021-12-15 | End: 2022-07-07 | Stop reason: SDUPTHER

## 2021-12-15 NOTE — PROGRESS NOTES
Chief Complaint   Patient presents with    Rash     arms,legs, feet      1. Have you been to the ER, urgent care clinic since your last visit? Hospitalized since your last visit?no    2. Have you seen or consulted any other health care providers outside of the [address] [place] [address] since your last visit? Include any pap smears or colon screening.  no

## 2021-12-16 NOTE — PROGRESS NOTES
HISTORY OF PRESENT ILLNESS  Damian Paredes is a 66 y.o. male. pruritic rash bilateral extensor forearms onset 3 days ago after gutter cleaning and yard work  Rash   The history is provided by the patient. This is a new problem. The current episode started more than 2 days ago. The problem has been gradually worsening. The problem is associated with plant contact. The rash is present on the right arm, right wrist, left wrist and left arm. Associated symptoms include blisters and itching. Review of Systems   Skin: Positive for itching and rash. Physical Exam  Constitutional:       Appearance: Normal appearance. HENT:      Head: Normocephalic. Right Ear: Tympanic membrane normal.      Left Ear: Tympanic membrane normal.   Cardiovascular:      Rate and Rhythm: Normal rate and regular rhythm. Pulses: Normal pulses. Heart sounds: Normal heart sounds. Skin:     General: Skin is warm and dry. Comments: Multiple maculopapules over forearmsand wrist,excoriated and ulcerated   Neurological:      Mental Status: He is alert. ASSESSMENT and PLAN  Diagnoses and all orders for this visit:    1. Allergic dermatitis cleanse with soap and water,apply trian=mcinalone,call prn    2. Essential hypertension  -     predniSONE (DELTASONE) 10 mg tablet; Take 10 mg by mouth two (2) times a day. -     triamcinolone acetonide (KENALOG) 0.1 % ointment; Apply  to affected area two (2) times a day.  use thin layer

## 2022-01-12 ENCOUNTER — OFFICE VISIT (OUTPATIENT)
Dept: FAMILY MEDICINE CLINIC | Age: 72
End: 2022-01-12
Payer: MEDICARE

## 2022-01-12 VITALS
DIASTOLIC BLOOD PRESSURE: 72 MMHG | HEIGHT: 66 IN | RESPIRATION RATE: 18 BRPM | OXYGEN SATURATION: 96 % | HEART RATE: 105 BPM | SYSTOLIC BLOOD PRESSURE: 138 MMHG | BODY MASS INDEX: 30.79 KG/M2 | TEMPERATURE: 97.8 F | WEIGHT: 191.6 LBS

## 2022-01-12 DIAGNOSIS — L02.92 FURUNCULOSIS: Primary | ICD-10-CM

## 2022-01-12 PROCEDURE — G8754 DIAS BP LESS 90: HCPCS | Performed by: FAMILY MEDICINE

## 2022-01-12 PROCEDURE — G8417 CALC BMI ABV UP PARAM F/U: HCPCS | Performed by: FAMILY MEDICINE

## 2022-01-12 PROCEDURE — G8427 DOCREV CUR MEDS BY ELIG CLIN: HCPCS | Performed by: FAMILY MEDICINE

## 2022-01-12 PROCEDURE — G8510 SCR DEP NEG, NO PLAN REQD: HCPCS | Performed by: FAMILY MEDICINE

## 2022-01-12 PROCEDURE — 99213 OFFICE O/P EST LOW 20 MIN: CPT | Performed by: FAMILY MEDICINE

## 2022-01-12 PROCEDURE — 3017F COLORECTAL CA SCREEN DOC REV: CPT | Performed by: FAMILY MEDICINE

## 2022-01-12 PROCEDURE — G8536 NO DOC ELDER MAL SCRN: HCPCS | Performed by: FAMILY MEDICINE

## 2022-01-12 PROCEDURE — 1101F PT FALLS ASSESS-DOCD LE1/YR: CPT | Performed by: FAMILY MEDICINE

## 2022-01-12 PROCEDURE — G8752 SYS BP LESS 140: HCPCS | Performed by: FAMILY MEDICINE

## 2022-01-12 RX ORDER — MUPIROCIN 20 MG/G
OINTMENT TOPICAL DAILY
Qty: 22 G | Refills: 2 | Status: SHIPPED | OUTPATIENT
Start: 2022-01-12

## 2022-01-12 RX ORDER — DOXYCYCLINE 100 MG/1
100 CAPSULE ORAL 2 TIMES DAILY
Qty: 14 CAPSULE | Refills: 0 | Status: SHIPPED | OUTPATIENT
Start: 2022-01-12 | End: 2022-01-19

## 2022-01-12 NOTE — PROGRESS NOTES
Chief Complaint   Patient presents with    Rash     Pt has rash on R arm and L leg. 1. Have you been to the ER, urgent care clinic since your last visit? Hospitalized since your last visit? No    2. Have you seen or consulted any other health care providers outside of the [address] [place] [address] since your last visit? Include any pap smears or colon screening.  No

## 2022-01-12 NOTE — PROGRESS NOTES
HISTORY OF PRESENT ILLNESS  Stephen Ojeda is a 66 y.o. male. Rash much improved though pt has small pimple like lesion rt forearm and reddened patch below l knee  Skin Problem  The history is provided by the patient. This is a new problem. The problem occurs daily. The problem has been gradually worsening. Review of Systems   Constitutional: Negative for chills and fever. Skin: Positive for itching and rash. Physical Exam  Constitutional:       Appearance: Normal appearance. HENT:      Head: Normocephalic and atraumatic. Right Ear: Tympanic membrane normal.      Left Ear: Tympanic membrane normal.   Skin:     General: Skin is warm and dry. Comments: 2x2 mpustule rt forearm with surrounding erythema,5 mm pustule l anterior leg with 6 cm diameter erythma surrounding;scant drainage from ether    Wounds cleaned and dressed   Neurological:      Mental Status: He is alert and oriented to person, place, and time. ASSESSMENT and PLAN  Diagnoses and all orders for this visit:    1. Furunculosisarm and leg,wound care discussed,call prn  -     doxycycline (VIBRAMYCIN) 100 mg capsule; Take 1 Capsule by mouth two (2) times a day for 7 days.  -     mupirocin (BACTROBAN) 2 % ointment; Apply  to affected area daily.

## 2022-03-18 PROBLEM — Z86.69 HX OF BELL'S PALSY: Status: ACTIVE | Noted: 2017-02-19

## 2022-03-18 PROBLEM — I10 ESSENTIAL HYPERTENSION: Status: ACTIVE | Noted: 2017-02-19

## 2022-03-19 PROBLEM — L02.92 FURUNCULOSIS: Status: ACTIVE | Noted: 2022-01-12

## 2022-07-05 ENCOUNTER — NURSE TRIAGE (OUTPATIENT)
Dept: OTHER | Facility: CLINIC | Age: 72
End: 2022-07-05

## 2022-07-05 NOTE — TELEPHONE ENCOUNTER
Received call from [facility] with Red Flag Complaint. Subjective: Caller states \"Fatigue\"     Current Symptoms: Tired and pale; Takes b/p meds and HCTZ; Denies N/V/D or headache;  Sleeping now;  Memory issues; Onset: several days ago; worsening    Associated Symptoms: reduced activity    Pain Severity: 0/10; Temperature: denies     What has been tried: takes b/p meds and antihistamines    Recommended disposition: Go to [facility]    Care advice provided, patient verbalizes understanding; denies any other questions or concerns; instructed to call back for any new or worsening symptoms. Patient/Caller agrees with recommended disposition; writer provided warm transfer to [employer] [facility] for appointment scheduling    Attention Provider: Thank you for allowing me to participate in the care of your patient. The patient was connected to triage in response to information provided to the [facility]. Please do not respond through this encounter as the response is not directed to a shared pool.     Reason for Disposition   Pale skin (pallor)    Protocols used: WEAKNESS (GENERALIZED) AND FATIGUE-ADULT-OH

## 2022-07-07 ENCOUNTER — OFFICE VISIT (OUTPATIENT)
Dept: FAMILY MEDICINE CLINIC | Age: 72
End: 2022-07-07
Payer: MEDICARE

## 2022-07-07 VITALS
BODY MASS INDEX: 29.8 KG/M2 | SYSTOLIC BLOOD PRESSURE: 143 MMHG | HEART RATE: 106 BPM | DIASTOLIC BLOOD PRESSURE: 76 MMHG | WEIGHT: 184.6 LBS | OXYGEN SATURATION: 96 %

## 2022-07-07 DIAGNOSIS — Z00.00 MEDICARE ANNUAL WELLNESS VISIT, SUBSEQUENT: Primary | ICD-10-CM

## 2022-07-07 DIAGNOSIS — Z12.11 SCREEN FOR COLON CANCER: ICD-10-CM

## 2022-07-07 DIAGNOSIS — D64.9 NORMOCHROMIC NORMOCYTIC ANEMIA: ICD-10-CM

## 2022-07-07 DIAGNOSIS — I10 ESSENTIAL HYPERTENSION: ICD-10-CM

## 2022-07-07 DIAGNOSIS — R53.83 OTHER FATIGUE: ICD-10-CM

## 2022-07-07 DIAGNOSIS — L23.9 ALLERGIC DERMATITIS: ICD-10-CM

## 2022-07-07 DIAGNOSIS — R35.1 NOCTURIA: ICD-10-CM

## 2022-07-07 PROCEDURE — G8536 NO DOC ELDER MAL SCRN: HCPCS | Performed by: FAMILY MEDICINE

## 2022-07-07 PROCEDURE — G8417 CALC BMI ABV UP PARAM F/U: HCPCS | Performed by: FAMILY MEDICINE

## 2022-07-07 PROCEDURE — G8754 DIAS BP LESS 90: HCPCS | Performed by: FAMILY MEDICINE

## 2022-07-07 PROCEDURE — 99213 OFFICE O/P EST LOW 20 MIN: CPT | Performed by: FAMILY MEDICINE

## 2022-07-07 PROCEDURE — 3017F COLORECTAL CA SCREEN DOC REV: CPT | Performed by: FAMILY MEDICINE

## 2022-07-07 PROCEDURE — G8753 SYS BP > OR = 140: HCPCS | Performed by: FAMILY MEDICINE

## 2022-07-07 PROCEDURE — 1101F PT FALLS ASSESS-DOCD LE1/YR: CPT | Performed by: FAMILY MEDICINE

## 2022-07-07 PROCEDURE — G8432 DEP SCR NOT DOC, RNG: HCPCS | Performed by: FAMILY MEDICINE

## 2022-07-07 PROCEDURE — G8427 DOCREV CUR MEDS BY ELIG CLIN: HCPCS | Performed by: FAMILY MEDICINE

## 2022-07-07 PROCEDURE — G0439 PPPS, SUBSEQ VISIT: HCPCS | Performed by: FAMILY MEDICINE

## 2022-07-07 PROCEDURE — 1123F ACP DISCUSS/DSCN MKR DOCD: CPT | Performed by: FAMILY MEDICINE

## 2022-07-07 RX ORDER — TRIAMCINOLONE ACETONIDE 1 MG/G
OINTMENT TOPICAL 2 TIMES DAILY
Qty: 80 G | Refills: 2 | Status: SHIPPED | OUTPATIENT
Start: 2022-07-07

## 2022-07-07 NOTE — PROGRESS NOTES
This is the Subsequent Medicare Annual Wellness Exam, performed 12 months or more after the Initial AWV or the last Subsequent AWV    I have reviewed the patient's medical history in detail and updated the computerized patient record. Assessment/Plan   Education and counseling provided:  Are appropriate based on today's review and evaluation    1. Medicare annual wellness visit, subsequent  2. Essential hypertension  3. Other fatigue  4. Screen for colon cancer  5. Normochromic normocytic anemia  6. Nocturia       Depression Risk Factor Screening     3 most recent PHQ Screens 2022   Little interest or pleasure in doing things Not at all   Feeling down, depressed, irritable, or hopeless Not at all   Total Score PHQ 2 0       Alcohol & Drug Abuse Risk Screen    Do you average more than 1 drink per night or more than 7 drinks a week: No    In the past three months have you have had more than 4 drinks containing alcohol on one occasion: No          Functional Ability and Level of Safety    Hearing: The patient wears hearing aids. Activities of Daily Living: The home contains: handrails  Patient does total self care      Ambulation: with no difficulty     Fall Risk:  Fall Risk Assessment, last 12 mths 2022   Able to walk? Yes   Fall in past 12 months? 0   Do you feel unsteady?  0   Are you worried about falling 0      Abuse Screen:  Patient is not abused       Cognitive Screening    Has your family/caregiver stated any concerns about your memory: no     Cognitive Screenin    Health Maintenance Due     Health Maintenance Due   Topic Date Due    COVID-19 Vaccine (1) Never done    Colorectal Cancer Screening Combo  Never done    Shingrix Vaccine Age 50> (1 of 2) Never done    Pneumococcal 65+ years (1 - PCV) Never done    Medicare Yearly Exam  2022       Patient Care Team   Patient Care Team:  Marshal Tomlin MD as PCP - General (Family Medicine)  Marshal Tomlin MD as PCP [facility] Empaneled Provider    History     Patient Active Problem List   Diagnosis Code    Allergic rhinitis, cause unspecified J30.9    Hx of Bell's palsy Z86.69    Essential hypertension I10    Furunculosis L02.92     Past Medical History:   Diagnosis Date    Allergic rhinitis, cause unspecified 2013    Environmental allergies       Past Surgical History:   Procedure Laterality Date    HX TONSILLECTOMY      HX WISDOM TEETH EXTRACTION       Current Outpatient Medications   Medication Sig Dispense Refill    mupirocin (BACTROBAN) 2 % ointment Apply  to affected area daily. 22 g 2    triamcinolone acetonide (KENALOG) 0.1 % ointment Apply  to affected area two (2) times a day. use thin layer (Patient not taking: Reported on 2022) 80 g 2    amLODIPine (NORVASC) 5 mg tablet Take 1 Tablet by mouth daily. 90 Tablet 3    hydroCHLOROthiazide (HYDRODIURIL) 25 mg tablet Take 1 Tablet by mouth daily. 90 Tablet 3    cetirizine-pseudoePHEDrine (ZyrTEC-D) 5-120 mg Tb12 Take 1 Tab by mouth two (2) times a day. (Patient taking differently: Take 1 Tablet by mouth daily.) 60 Tab 1    cyclobenzaprine (FLEXERIL) 10 mg tablet Take 1 Tab by mouth three (3) times daily as needed for Muscle Spasm(s). (Patient not taking: Reported on 2021) 30 Tab 1    cetirizine (ZYRTEC) 10 mg tablet Take  by mouth. (Patient not taking: Reported on 2021)      naproxen sodium (ALEVE) 220 mg cap Take  by mouth. (Patient not taking: Reported on 2021)       Allergies   Allergen Reactions    Codeine Other (comments)     Pt states unknown reaction.     Pcn [Penicillins] Rash       Family History   Problem Relation Age of Onset   Chaya Hdz Hypertension Mother     Hypertension Father     Heart Disease Father     Alcohol abuse Father     Hypertension Brother     No Known Problems Brother      Social History     Tobacco Use    Smoking status: Former Smoker     Quit date: 1966     Years since quittin.8    Smokeless tobacco: Never Used   Substance Use Topics    Alcohol use: No         Kait Sanchez MD

## 2022-07-07 NOTE — PROGRESS NOTES
Patient is accompanied by patient I have received verbal consent from Mirna Mckenzie to discuss any/all medical information while they are present in the room    Chief Complaint   Patient presents with    Blood Pressure Check     Vitals:    07/07/22 1308   BP: (!) 141/74   BP 1 Location: Right arm   BP Patient Position: Sitting   BP Cuff Size: Adult   Pulse: (!) 106   Weight: 184 lb 9.6 oz (83.7 kg)   SpO2: 96%   .

## 2022-07-07 NOTE — PROGRESS NOTES
Subjective:     Marilyn Fltecher is a 66 y.o. male presenting for annual exam and complete physical.Having worsening insomnia,fatigue and anxiety secondary to unemployment benefits being requested to be returned    Patient Active Problem List    Diagnosis Date Noted    Furunculosis 01/12/2022    Hx of Bell's palsy 02/19/2017    Essential hypertension 02/19/2017    Allergic rhinitis, cause unspecified 12/11/2013     Current Outpatient Medications   Medication Sig Dispense Refill    mupirocin (BACTROBAN) 2 % ointment Apply  to affected area daily. 22 g 2    triamcinolone acetonide (KENALOG) 0.1 % ointment Apply  to affected area two (2) times a day. use thin layer (Patient not taking: Reported on 1/12/2022) 80 g 2    amLODIPine (NORVASC) 5 mg tablet Take 1 Tablet by mouth daily. 90 Tablet 3    hydroCHLOROthiazide (HYDRODIURIL) 25 mg tablet Take 1 Tablet by mouth daily. 90 Tablet 3    cetirizine-pseudoePHEDrine (ZyrTEC-D) 5-120 mg Tb12 Take 1 Tab by mouth two (2) times a day. (Patient taking differently: Take 1 Tablet by mouth daily.) 60 Tab 1    cyclobenzaprine (FLEXERIL) 10 mg tablet Take 1 Tab by mouth three (3) times daily as needed for Muscle Spasm(s). (Patient not taking: Reported on 6/8/2021) 30 Tab 1    cetirizine (ZYRTEC) 10 mg tablet Take  by mouth. (Patient not taking: Reported on 6/8/2021)      naproxen sodium (ALEVE) 220 mg cap Take  by mouth. (Patient not taking: Reported on 6/8/2021)       Allergies   Allergen Reactions    Codeine Other (comments)     Pt states unknown reaction.     Pcn [Penicillins] Rash     Past Medical History:   Diagnosis Date    Allergic rhinitis, cause unspecified 12/11/2013    Environmental allergies      Past Surgical History:   Procedure Laterality Date    HX TONSILLECTOMY      HX WISDOM TEETH EXTRACTION       Family History   Problem Relation Age of Onset    Hypertension Mother     Hypertension Father     Heart Disease Father     Alcohol abuse Father    Leonor Roy Hypertension Brother     No Known Problems Brother      Social History     Tobacco Use    Smoking status: Former Smoker     Quit date: 1966     Years since quittin.8    Smokeless tobacco: Never Used   Substance Use Topics    Alcohol use:  No             Review of Systems  Constitutional: negative  Eyes: negative  Ears, nose, mouth, throat, and face: negative  Respiratory: positive for cough  Cardiovascular: negative  Gastrointestinal: negative  Genitourinary:negative  Integument/breast: positive for rash  Musculoskeletal:negative  Neurological: negative  Behavioral/Psych: negative    Objective:     Visit Vitals  BP (!) 143/76 (BP 1 Location: Left arm, BP Patient Position: Sitting, BP Cuff Size: Adult)   Pulse (!) 106   Wt 184 lb 9.6 oz (83.7 kg)   SpO2 96%   BMI 29.80 kg/m²     Physical exam:   General appearance - alert, well appearing, and in no distress  Mental status - alert, oriented to person, place, and time  Eyes - pupils equal and reactive, extraocular eye movements intact  Ears - bilateral TM's and external ear canals normal  Nose - normal and patent, no erythema, discharge or polyps  Mouth - mucous membranes moist, pharynx normal without lesions  Neck - supple, no significant adenopathy, carotids upstroke normal bilaterally, no bruits, thyroid exam: thyroid is normal in size without nodules or tenderness  Chest - clear to auscultation, no wheezes, rales or rhonchi, symmetric air entry  Heart - normal rate, regular rhythm, normal S1, S2, no murmurs, rubs, clicks or gallops  Abdomen - soft, nontender, nondistended, no masses or organomegaly  Rectal - negative without mass, lesions or tenderness, stool guaiac negative  Neurological - alert, oriented, normal speech, no focal findings or movement disorder noted  Musculoskeletal - no joint tenderness, deformity or swelling  Skin - scattered patches of dry scaly erythemetous skin on trunk and limbs     Assessment/Plan:       continue present plan, routine labs ordered. Diagnoses and all orders for this visit:    1. Medicare annual wellness visit, subsequent    2. Essential hypertension  -     METABOLIC PANEL, COMPREHENSIVE; Future  -     LIPID PANEL; Future  -     CBC WITH AUTOMATED DIFF; Future    3. Other fatigue    4. Screen for colon cancer  -     OCCULT BLOOD IMMUNOASSAY,DIAGNOSTIC; Future    5. Normochromic normocytic anemia    6. Nocturia  -     PSA, DIAGNOSTIC (PROSTATE SPECIFIC AG); Future    7. Allergic dermatitis  -     triamcinolone acetonide (KENALOG) 0.1 % ointment; Apply  to affected area two (2) times a day. use thin layer      Follow-up and Dispositions    · Return in about 4 weeks (around 8/4/2022).      Ambreen York

## 2022-07-08 LAB
ALBUMIN SERPL-MCNC: 3.6 G/DL (ref 3.5–5)
ALBUMIN/GLOB SERPL: 0.8 {RATIO} (ref 1.1–2.2)
ALP SERPL-CCNC: 119 U/L (ref 45–117)
ALT SERPL-CCNC: 18 U/L (ref 12–78)
ANION GAP SERPL CALC-SCNC: 7 MMOL/L (ref 5–15)
AST SERPL-CCNC: 15 U/L (ref 15–37)
BASOPHILS # BLD: 0.1 K/UL (ref 0–0.1)
BASOPHILS NFR BLD: 1 % (ref 0–1)
BILIRUB SERPL-MCNC: 0.3 MG/DL (ref 0.2–1)
BUN SERPL-MCNC: 24 MG/DL (ref 6–20)
BUN/CREAT SERPL: 22 (ref 12–20)
CALCIUM SERPL-MCNC: 9.6 MG/DL (ref 8.5–10.1)
CHLORIDE SERPL-SCNC: 105 MMOL/L (ref 97–108)
CHOLEST SERPL-MCNC: 167 MG/DL
CO2 SERPL-SCNC: 26 MMOL/L (ref 21–32)
CREAT SERPL-MCNC: 1.1 MG/DL (ref 0.7–1.3)
DIFFERENTIAL METHOD BLD: ABNORMAL
EOSINOPHIL # BLD: 0.3 K/UL (ref 0–0.4)
EOSINOPHIL NFR BLD: 5 % (ref 0–7)
ERYTHROCYTE [DISTWIDTH] IN BLOOD BY AUTOMATED COUNT: 14.9 % (ref 11.5–14.5)
GLOBULIN SER CALC-MCNC: 4.3 G/DL (ref 2–4)
GLUCOSE SERPL-MCNC: 110 MG/DL (ref 65–100)
HCT VFR BLD AUTO: 31.9 % (ref 36.6–50.3)
HDLC SERPL-MCNC: 41 MG/DL
HDLC SERPL: 4.1 {RATIO} (ref 0–5)
HGB BLD-MCNC: 9.9 G/DL (ref 12.1–17)
IMM GRANULOCYTES # BLD AUTO: 0 K/UL (ref 0–0.04)
IMM GRANULOCYTES NFR BLD AUTO: 0 % (ref 0–0.5)
LDLC SERPL CALC-MCNC: 104 MG/DL (ref 0–100)
LYMPHOCYTES # BLD: 1.2 K/UL (ref 0.8–3.5)
LYMPHOCYTES NFR BLD: 19 % (ref 12–49)
MCH RBC QN AUTO: 27.3 PG (ref 26–34)
MCHC RBC AUTO-ENTMCNC: 31 G/DL (ref 30–36.5)
MCV RBC AUTO: 88.1 FL (ref 80–99)
MONOCYTES # BLD: 0.8 K/UL (ref 0–1)
MONOCYTES NFR BLD: 12 % (ref 5–13)
NEUTS SEG # BLD: 4.2 K/UL (ref 1.8–8)
NEUTS SEG NFR BLD: 63 % (ref 32–75)
NRBC # BLD: 0 K/UL (ref 0–0.01)
NRBC BLD-RTO: 0 PER 100 WBC
PLATELET # BLD AUTO: 287 K/UL (ref 150–400)
PMV BLD AUTO: 9.6 FL (ref 8.9–12.9)
POTASSIUM SERPL-SCNC: 3.9 MMOL/L (ref 3.5–5.1)
PROT SERPL-MCNC: 7.9 G/DL (ref 6.4–8.2)
PSA SERPL-MCNC: 0.2 NG/ML (ref 0.01–4)
RBC # BLD AUTO: 3.62 M/UL (ref 4.1–5.7)
SODIUM SERPL-SCNC: 138 MMOL/L (ref 136–145)
TRIGL SERPL-MCNC: 110 MG/DL (ref ?–150)
VLDLC SERPL CALC-MCNC: 22 MG/DL
WBC # BLD AUTO: 6.5 K/UL (ref 4.1–11.1)

## 2022-07-12 ENCOUNTER — OFFICE VISIT (OUTPATIENT)
Dept: FAMILY MEDICINE CLINIC | Age: 72
End: 2022-07-12
Payer: MEDICARE

## 2022-07-12 VITALS
SYSTOLIC BLOOD PRESSURE: 138 MMHG | HEART RATE: 102 BPM | OXYGEN SATURATION: 97 % | RESPIRATION RATE: 20 BRPM | DIASTOLIC BLOOD PRESSURE: 78 MMHG | HEIGHT: 66 IN | BODY MASS INDEX: 29.6 KG/M2 | TEMPERATURE: 99.1 F | WEIGHT: 184.2 LBS

## 2022-07-12 DIAGNOSIS — E56.9 VITAMIN DEFICIENCY: ICD-10-CM

## 2022-07-12 DIAGNOSIS — D53.9 DEFICIENCY ANEMIA: ICD-10-CM

## 2022-07-12 DIAGNOSIS — D64.9 NORMOCHROMIC NORMOCYTIC ANEMIA: Primary | ICD-10-CM

## 2022-07-12 PROCEDURE — G8510 SCR DEP NEG, NO PLAN REQD: HCPCS | Performed by: FAMILY MEDICINE

## 2022-07-12 PROCEDURE — G8417 CALC BMI ABV UP PARAM F/U: HCPCS | Performed by: FAMILY MEDICINE

## 2022-07-12 PROCEDURE — 99213 OFFICE O/P EST LOW 20 MIN: CPT | Performed by: FAMILY MEDICINE

## 2022-07-12 PROCEDURE — 3017F COLORECTAL CA SCREEN DOC REV: CPT | Performed by: FAMILY MEDICINE

## 2022-07-12 PROCEDURE — G8752 SYS BP LESS 140: HCPCS | Performed by: FAMILY MEDICINE

## 2022-07-12 PROCEDURE — 1123F ACP DISCUSS/DSCN MKR DOCD: CPT | Performed by: FAMILY MEDICINE

## 2022-07-12 PROCEDURE — G8427 DOCREV CUR MEDS BY ELIG CLIN: HCPCS | Performed by: FAMILY MEDICINE

## 2022-07-12 PROCEDURE — G8754 DIAS BP LESS 90: HCPCS | Performed by: FAMILY MEDICINE

## 2022-07-12 PROCEDURE — G8536 NO DOC ELDER MAL SCRN: HCPCS | Performed by: FAMILY MEDICINE

## 2022-07-12 PROCEDURE — 1101F PT FALLS ASSESS-DOCD LE1/YR: CPT | Performed by: FAMILY MEDICINE

## 2022-07-12 NOTE — PROGRESS NOTES
HISTORY OF PRESENT ILLNESS  David Gorman is a 66 y.o. male. f/u anemia,nc,cc. Stool guic negative 1 week ago,no melena or brbpr  Follow-up  The history is provided by the patient. This is a new problem. The problem occurs daily. The problem has not changed since onset. Pertinent negatives include no abdominal pain and no shortness of breath. Abnormal Lab Results  The history is provided by the patient. This is a new problem. The current episode started more than 1 week ago. The problem occurs daily. The problem has not changed since onset. Pertinent negatives include no abdominal pain and no shortness of breath. Review of Systems   Constitutional: Positive for malaise/fatigue. Respiratory: Negative for shortness of breath. Gastrointestinal: Negative for abdominal pain, blood in stool and melena. Endo/Heme/Allergies: Does not bruise/bleed easily. Physical Exam  Constitutional:       Appearance: Normal appearance. HENT:      Head: Normocephalic and atraumatic. Right Ear: Tympanic membrane normal.      Left Ear: Tympanic membrane normal.      Nose: Nose normal.      Mouth/Throat:      Mouth: Mucous membranes are moist.   Cardiovascular:      Rate and Rhythm: Normal rate and regular rhythm. Pulses: Normal pulses. Heart sounds: Normal heart sounds. Pulmonary:      Effort: Pulmonary effort is normal.      Breath sounds: Normal breath sounds. Abdominal:      Palpations: Abdomen is soft. Neurological:      Mental Status: He is alert and oriented to person, place, and time. ASSESSMENT and PLAN  Diagnoses and all orders for this visit:    1. Normochromic normocytic anemia  -     CBC WITH AUTOMATED DIFF; Future  -     RETICULOCYTE COUNT; Future  -     FERRITIN; Future    2. Vitamin deficiency    3. Deficiency anemia  -     VITAMIN B12; Future  -     FOLATE;  Future

## 2022-07-12 NOTE — PROGRESS NOTES
Patient is accompanied by patient I have received verbal consent from [place] to discuss any/all medical information while they are present in the room. Chief Complaint   Patient presents with    Follow-up    Labs     Visit Vitals  /78   Pulse (!) 102   Temp 99.1 °F (37.3 °C) (Oral)   Resp 20   Ht 5' 6\" (1.676 m)   Wt 184 lb 3.2 oz (83.6 kg)   SpO2 97%   BMI 29.73 kg/m²     1. Have you been to the ER, urgent care clinic since your last visit? Hospitalized since your last visit? No     2. Have you seen or consulted any other health care providers outside of the [address] [place] [address] since your last visit? Include any pap smears or colon screening.  No

## 2022-07-13 LAB
BASOPHILS # BLD: 0 K/UL (ref 0–0.1)
BASOPHILS NFR BLD: 1 % (ref 0–1)
DIFFERENTIAL METHOD BLD: ABNORMAL
EOSINOPHIL # BLD: 0.3 K/UL (ref 0–0.4)
EOSINOPHIL NFR BLD: 4 % (ref 0–7)
ERYTHROCYTE [DISTWIDTH] IN BLOOD BY AUTOMATED COUNT: 15.4 % (ref 11.5–14.5)
FERRITIN SERPL-MCNC: 295 NG/ML (ref 26–388)
FOLATE SERPL-MCNC: 12.1 NG/ML (ref 5–21)
HCT VFR BLD AUTO: 32.1 % (ref 36.6–50.3)
HGB BLD-MCNC: 9.8 G/DL (ref 12.1–17)
IMM GRANULOCYTES # BLD AUTO: 0 K/UL (ref 0–0.04)
IMM GRANULOCYTES NFR BLD AUTO: 0 % (ref 0–0.5)
LYMPHOCYTES # BLD: 1.6 K/UL (ref 0.8–3.5)
LYMPHOCYTES NFR BLD: 24 % (ref 12–49)
MCH RBC QN AUTO: 27.3 PG (ref 26–34)
MCHC RBC AUTO-ENTMCNC: 30.5 G/DL (ref 30–36.5)
MCV RBC AUTO: 89.4 FL (ref 80–99)
MONOCYTES # BLD: 0.7 K/UL (ref 0–1)
MONOCYTES NFR BLD: 11 % (ref 5–13)
NEUTS SEG # BLD: 4 K/UL (ref 1.8–8)
NEUTS SEG NFR BLD: 60 % (ref 32–75)
NRBC # BLD: 0 K/UL (ref 0–0.01)
NRBC BLD-RTO: 0 PER 100 WBC
PLATELET # BLD AUTO: 287 K/UL (ref 150–400)
PMV BLD AUTO: 9.5 FL (ref 8.9–12.9)
RBC # BLD AUTO: 3.59 M/UL (ref 4.1–5.7)
RETICS # AUTO: 0.05 M/UL (ref 0.03–0.1)
RETICS/RBC NFR AUTO: 1.4 % (ref 0.7–2.1)
VIT B12 SERPL-MCNC: 532 PG/ML (ref 193–986)
WBC # BLD AUTO: 6.7 K/UL (ref 4.1–11.1)

## 2022-07-18 DIAGNOSIS — D64.9 NORMOCHROMIC NORMOCYTIC ANEMIA: Primary | ICD-10-CM

## 2022-07-20 ENCOUNTER — TELEPHONE (OUTPATIENT)
Dept: ONCOLOGY | Age: 72
End: 2022-07-20

## 2022-07-20 NOTE — TELEPHONE ENCOUNTER
Called pt and left him a VM to let him know we received his referral from Dr. Kwasi Montes office and we were able to add him on the schedule for 7/26/22 at 1pm per Jeni Yañez. I asked him to give us a call back to let us know if this day and time worked for him as well.

## 2022-07-24 RX ORDER — AMLODIPINE BESYLATE 5 MG/1
TABLET ORAL
Qty: 90 TABLET | Refills: 3 | Status: SHIPPED | OUTPATIENT
Start: 2022-07-24

## 2022-07-25 ENCOUNTER — TELEPHONE (OUTPATIENT)
Dept: FAMILY MEDICINE CLINIC | Age: 72
End: 2022-07-25

## 2022-07-25 NOTE — TELEPHONE ENCOUNTER
Patient requesting that Dr. Jt Mcfarland removes the order for the Occult Blood Immunoassay that was ordered in error on 07/07/22, and enter the FOBT that was done in office on 07/07/22 so that when he goes to his appointment tomorrow with hematology they will see that it has been done and was negative.

## 2022-07-25 NOTE — TELEPHONE ENCOUNTER
----- Message from Hailey Carl sent at 7/25/2022 10:58 AM EDT -----  Subject: Results Request    QUESTIONS  Results: OCCULT BLOOD IMMUNOASSAY(need for appt on 7/26 for hemotologist); Ordered by:  Mazin Hendricks   Date Performed: 2022-07-07  ---------------------------------------------------------------------------  --------------  [facility] INFO    [phone]; OK to leave message on voicemail  ---------------------------------------------------------------------------  --------------

## 2022-07-26 ENCOUNTER — OFFICE VISIT (OUTPATIENT)
Dept: ONCOLOGY | Age: 72
End: 2022-07-26
Payer: MEDICARE

## 2022-07-26 VITALS
WEIGHT: 182 LBS | HEART RATE: 102 BPM | OXYGEN SATURATION: 98 % | HEIGHT: 66 IN | BODY MASS INDEX: 29.25 KG/M2 | TEMPERATURE: 98.4 F | RESPIRATION RATE: 17 BRPM | SYSTOLIC BLOOD PRESSURE: 154 MMHG | DIASTOLIC BLOOD PRESSURE: 82 MMHG

## 2022-07-26 DIAGNOSIS — D64.9 NORMOCYTIC ANEMIA: Primary | ICD-10-CM

## 2022-07-26 PROCEDURE — G8427 DOCREV CUR MEDS BY ELIG CLIN: HCPCS | Performed by: STUDENT IN AN ORGANIZED HEALTH CARE EDUCATION/TRAINING PROGRAM

## 2022-07-26 PROCEDURE — G8753 SYS BP > OR = 140: HCPCS | Performed by: STUDENT IN AN ORGANIZED HEALTH CARE EDUCATION/TRAINING PROGRAM

## 2022-07-26 PROCEDURE — G8432 DEP SCR NOT DOC, RNG: HCPCS | Performed by: STUDENT IN AN ORGANIZED HEALTH CARE EDUCATION/TRAINING PROGRAM

## 2022-07-26 PROCEDURE — 1101F PT FALLS ASSESS-DOCD LE1/YR: CPT | Performed by: STUDENT IN AN ORGANIZED HEALTH CARE EDUCATION/TRAINING PROGRAM

## 2022-07-26 PROCEDURE — 99205 OFFICE O/P NEW HI 60 MIN: CPT | Performed by: STUDENT IN AN ORGANIZED HEALTH CARE EDUCATION/TRAINING PROGRAM

## 2022-07-26 PROCEDURE — G8536 NO DOC ELDER MAL SCRN: HCPCS | Performed by: STUDENT IN AN ORGANIZED HEALTH CARE EDUCATION/TRAINING PROGRAM

## 2022-07-26 PROCEDURE — 1123F ACP DISCUSS/DSCN MKR DOCD: CPT | Performed by: STUDENT IN AN ORGANIZED HEALTH CARE EDUCATION/TRAINING PROGRAM

## 2022-07-26 PROCEDURE — 3017F COLORECTAL CA SCREEN DOC REV: CPT | Performed by: STUDENT IN AN ORGANIZED HEALTH CARE EDUCATION/TRAINING PROGRAM

## 2022-07-26 PROCEDURE — G8754 DIAS BP LESS 90: HCPCS | Performed by: STUDENT IN AN ORGANIZED HEALTH CARE EDUCATION/TRAINING PROGRAM

## 2022-07-26 PROCEDURE — G8417 CALC BMI ABV UP PARAM F/U: HCPCS | Performed by: STUDENT IN AN ORGANIZED HEALTH CARE EDUCATION/TRAINING PROGRAM

## 2022-07-26 NOTE — PROGRESS NOTES
[facility] [place] at [address] [facility] [address] [facility]  W: [phone] F: [phone]      Reason for Visit:   Parviz Appiah is a 66 y.o. male who is seen in consultation at the request of Dr. Anette Ponce for evaluation of normocytic anemia. Hematology / Oncology Treatment History:     Hematological/Oncological Diagnosis: Normocytic Anemia    Date of Diagnosis:     Treatment course: surveillance       History of Present Illness:     66year old with hx of hypertension, seasonal allergies, presents with worsening normocytic aneima of unclear etiology. Hg trend below:       No other cytopenias. MCV is 89. Iron studies are normal.  B12, folate are normal. Reticulocyte count is low/normal at 1.4. Creatinine is normal at 1.10. Calcium is normal at 9.6. LFTs are normal. Per notes from PCP, stool guiac we negative in July. Alkaline phosphatase is elevated. Constitutional symptoms positive for easy bruising only. No night sweats, bone pain, unintentional weight loss. Family history reviewed, non contributory  Social history reviewed, non contributory. No alcohol use      Review of Systems: A complete review of systems was obtained, negative except as described above.     Past Medical History:   Diagnosis Date    Allergic rhinitis, cause unspecified 2013    Environmental allergies       Past Surgical History:   Procedure Laterality Date    HX TONSILLECTOMY      HX WISDOM TEETH EXTRACTION        Social History     Tobacco Use    Smoking status: Former     Types: Cigarettes     Quit date: 1966     Years since quittin.6    Smokeless tobacco: Never   Substance Use Topics    Alcohol use: No      Family History   Problem Relation Age of Onset    Hypertension Mother     Hypertension Father     Heart Disease Father     Alcohol abuse Father     Hypertension Brother     No Known Problems Brother      Current Outpatient Medications   Medication Sig    amLODIPine (NORVASC) 5 mg tablet TAKE ONE TABLET BY MOUTH ONE TIME DAILY    triamcinolone acetonide (KENALOG) 0.1 % ointment Apply  to affected area two (2) times a day. use thin layer    mupirocin (BACTROBAN) 2 % ointment Apply  to affected area daily. hydroCHLOROthiazide (HYDRODIURIL) 25 mg tablet Take 1 Tablet by mouth daily. cetirizine-pseudoePHEDrine (ZyrTEC-D) 5-120 mg Tb12 Take 1 Tab by mouth two (2) times a day. (Patient taking differently: Take 1 Tablet by mouth daily.)    cyclobenzaprine (FLEXERIL) 10 mg tablet Take 1 Tab by mouth three (3) times daily as needed for Muscle Spasm(s). cetirizine (ZYRTEC) 10 mg tablet Take  by mouth. naproxen sodium (ALEVE) 220 mg cap Take  by mouth. (Patient not taking: Reported on 6/8/2021)     No current facility-administered medications for this visit. Allergies   Allergen Reactions    Codeine Other (comments)     Pt states unknown reaction. Pcn [Penicillins] Rash            Physical Exam:   Visit Vitals  BP (!) 154/82 (BP 1 Location: Left upper arm, BP Patient Position: Sitting)   Pulse (!) 102   Temp 98.4 °F (36.9 °C) (Oral)   Resp 17   Ht 5' 6\" (1.676 m)   Wt 182 lb (82.6 kg)   SpO2 98%   BMI 29.38 kg/m²     ECOG PS: 0  General: No distress  Eyes: PERRLA, anicteric sclerae  HENT: Atraumatic with normal appearance of ears and nose; OP clear  Neck: Supple; no visualized JVD  Lymphatic: No cervical, or supraclavicular lymphadenopathy. Respiratory: CTAB, normal respiratory effort  CV: Normal rate, regular rhythm, no murmurs, no peripheral edema  GI: Soft, nontender, nondistended, no palpable masses, no hepatomegaly, no splenomegaly  MS: Normal gait and station. Digits without clubbing or cyanosis. Skin: No rashes, ecchymoses, or petechiae. Normal temperature, turgor, and texture.   Neuro/Psych: Alert, oriented, appropriate affect, normal judgment/insight      Results:     Lab Results   Component Value Date/Time    WBC 6.7 07/12/2022 01:10 PM    HGB 9.8 (L) 07/12/2022 01:10 PM    HCT 32.1 (L) 07/12/2022 01:10 PM    PLATELET 258 75/39/0501 01:10 PM    MCV 89.4 07/12/2022 01:10 PM    ABS. NEUTROPHILS 4.0 07/12/2022 01:10 PM     Lab Results   Component Value Date/Time    Sodium 138 07/07/2022 01:36 PM    Potassium 3.9 07/07/2022 01:36 PM    Chloride 105 07/07/2022 01:36 PM    CO2 26 07/07/2022 01:36 PM    Glucose 110 (H) 07/07/2022 01:36 PM    BUN 24 (H) 07/07/2022 01:36 PM    Creatinine 1.10 07/07/2022 01:36 PM    GFR est AA >60 07/07/2022 01:36 PM    GFR est non-AA >60 07/07/2022 01:36 PM    Calcium 9.6 07/07/2022 01:36 PM    Glucose (POC) 109 (H) 12/12/2016 11:58 AM     Lab Results   Component Value Date/Time    Bilirubin, total 0.3 07/07/2022 01:36 PM    ALT (SGPT) 18 07/07/2022 01:36 PM    Alk. phosphatase 119 (H) 07/07/2022 01:36 PM    Protein, total 7.9 07/07/2022 01:36 PM    Albumin 3.6 07/07/2022 01:36 PM    Globulin 4.3 (H) 07/07/2022 01:36 PM     CT Results (most recent):  Results from Hospital Encounter encounter on 12/12/16    CT HEAD WO CONT    Narrative  EXAM:  CT HEAD WO CONT  INDICATION:  facial droop, patient presents with right-sided facial droop since  Thursday 4 days ago. Also history of headache the day before. TECHNIQUE:  Thin axial images were obtained through the calvarium and saved with standard  and bone window algorithm. Coronal and sagittal reconstructions were generated. CT dose reduction was achieved through use of a standardized protocol tailored  for this examination and automatic exposure control for dose modulation. COMPARISON: None available. FINDINGS:  The ventricular size and configuration are normal.  The cerebral density is normal throughout. No evidence of intracranial hemorrhage, infarct, mass or abnormal extra-axial  fluid collections.   Visualized osseous structures of the calvarium and skull base including  paranasal sinuses are unremarkable. Impression  IMPRESSION: Normal head CT. No imaging of spleen    Assessment and Recommendations:     # Normocytic Anemia     - The differential diagnosis for a normocytic anemia is broad, and includes blood loss, anemia of chronic disease, chronic renal insufficiency, iron deficiency, hypothyroidism, hemolysis, and bone marrow suppression. MDS, B12 deficiency, folate deficiency, and alcohol abuse can also lead to anemia, though it is generally macrocytic. Workup thus far shows normal B12, normal Folate, normal TSH, normal ferritin, low/normal reticulocyte count    - Low clinical suspicion for malignancy      - I will obtain some additional labwork today to further investigate, including CBC with differential, peripheral smear review, iron profile, haptoglobin, LDH, SPEP,      Plan for follow in about 2 weeks to review results. He may require a bone marrow biopsy if the above workup does not yield an explanation.          Signed By: Odilia Lara MD      Attending Medical Oncologist   [facility]

## 2022-07-26 NOTE — PROGRESS NOTES
Karolina Ring is a 66 y.o. male new pt here today for anemia. pt reports easily being bruised but denies fatigue, visual changes sob or weight loss. pt denies constipation or bloody stools.         Chief Complaint   Patient presents with    New Patient     Anemia

## 2022-08-02 ENCOUNTER — TELEPHONE (OUTPATIENT)
Dept: ONCOLOGY | Age: 72
End: 2022-08-02

## 2022-08-02 NOTE — TELEPHONE ENCOUNTER
Patients wife Donald Sayjonathan called. Donald Bonilla stated that she has received pts results & is very concerned if pt needs to be seen sooner. Donald Bonilla wants to know if there is anything you need to prepare her for before talking with pt. Please call Donald Bonilla @ [phone].

## 2022-08-03 ENCOUNTER — TELEPHONE (OUTPATIENT)
Dept: ONCOLOGY | Age: 72
End: 2022-08-03

## 2022-08-03 NOTE — TELEPHONE ENCOUNTER
Patient's wife is calling. Said she sees that his results have come back. Noticed that there was a note that there could be an underlying infection. Patient was treated for a tick bite last year and wanted to make sure the provider was aware of this information.

## 2022-08-03 NOTE — TELEPHONE ENCOUNTER
Return call placed to ; number not in service. Return call placed to [phone]; V/M message left.

## 2022-08-05 LAB — SPECIMEN STATUS REPORT, ROLRST: NORMAL

## 2022-08-08 PROBLEM — D50.9 IRON DEFICIENCY ANEMIA: Status: ACTIVE | Noted: 2022-08-08

## 2022-08-08 NOTE — TELEPHONE ENCOUNTER
Return call placed to pt. HIPAA verified by two patient identifiers. Pt's spouse informed pt needs IV iron per Dr. Mateusz Miguel. Pt's spouse also informed scheduling will call her to schedule IV iron.

## 2022-08-09 ENCOUNTER — OFFICE VISIT (OUTPATIENT)
Dept: ONCOLOGY | Age: 72
End: 2022-08-09
Payer: MEDICARE

## 2022-08-09 VITALS
HEART RATE: 88 BPM | OXYGEN SATURATION: 96 % | TEMPERATURE: 98.1 F | RESPIRATION RATE: 16 BRPM | SYSTOLIC BLOOD PRESSURE: 130 MMHG | BODY MASS INDEX: 29.09 KG/M2 | WEIGHT: 181 LBS | HEIGHT: 66 IN | DIASTOLIC BLOOD PRESSURE: 68 MMHG

## 2022-08-09 DIAGNOSIS — D64.9 NORMOCYTIC ANEMIA: Primary | ICD-10-CM

## 2022-08-09 PROCEDURE — G8752 SYS BP LESS 140: HCPCS | Performed by: STUDENT IN AN ORGANIZED HEALTH CARE EDUCATION/TRAINING PROGRAM

## 2022-08-09 PROCEDURE — G8427 DOCREV CUR MEDS BY ELIG CLIN: HCPCS | Performed by: STUDENT IN AN ORGANIZED HEALTH CARE EDUCATION/TRAINING PROGRAM

## 2022-08-09 PROCEDURE — 1101F PT FALLS ASSESS-DOCD LE1/YR: CPT | Performed by: STUDENT IN AN ORGANIZED HEALTH CARE EDUCATION/TRAINING PROGRAM

## 2022-08-09 PROCEDURE — G8754 DIAS BP LESS 90: HCPCS | Performed by: STUDENT IN AN ORGANIZED HEALTH CARE EDUCATION/TRAINING PROGRAM

## 2022-08-09 PROCEDURE — 99214 OFFICE O/P EST MOD 30 MIN: CPT | Performed by: STUDENT IN AN ORGANIZED HEALTH CARE EDUCATION/TRAINING PROGRAM

## 2022-08-09 PROCEDURE — G8417 CALC BMI ABV UP PARAM F/U: HCPCS | Performed by: STUDENT IN AN ORGANIZED HEALTH CARE EDUCATION/TRAINING PROGRAM

## 2022-08-09 PROCEDURE — G8536 NO DOC ELDER MAL SCRN: HCPCS | Performed by: STUDENT IN AN ORGANIZED HEALTH CARE EDUCATION/TRAINING PROGRAM

## 2022-08-09 PROCEDURE — G8510 SCR DEP NEG, NO PLAN REQD: HCPCS | Performed by: STUDENT IN AN ORGANIZED HEALTH CARE EDUCATION/TRAINING PROGRAM

## 2022-08-09 PROCEDURE — 3017F COLORECTAL CA SCREEN DOC REV: CPT | Performed by: STUDENT IN AN ORGANIZED HEALTH CARE EDUCATION/TRAINING PROGRAM

## 2022-08-09 PROCEDURE — 1123F ACP DISCUSS/DSCN MKR DOCD: CPT | Performed by: STUDENT IN AN ORGANIZED HEALTH CARE EDUCATION/TRAINING PROGRAM

## 2022-08-09 NOTE — PROGRESS NOTES
Called pt regarding orders for her upcoming pre-op visit. Pt states she has a copy of the orders and will bring them in at the time of her visit. Erick Benitez is a 66 y.o. male   Here today for anemia follow up pt reports rash on abdomen legs and under arms pt also reports easy bruising. Chief Complaint   Patient presents with    Follow-up    Anemia     1. Have you been to the ER, urgent care clinic since your last visit? Hospitalized since your last visit? No    2. Have you seen or consulted any other health care providers outside of the [address] [place] [address] since your last visit? Include any pap smears or colon screening.  No

## 2022-08-09 NOTE — PROGRESS NOTES
[facility] [place] at [address] [facility], [address] [facility]  W: [phone] F: [phone]      Reason for Visit:   Carey Medina is a 66 y.o. male who is seen in consultation at the request of  for evaluation of normocytic anemia. Hematology / Oncology Treatment History:     Hematological/Oncological Diagnosis: Normocytic Anemia    Date of Diagnosis:     Treatment course: surveillance       History of Present Illness:     66year old with hx of hypertension, seasonal allergies, presents with worsening normocytic aneima of unclear etiology. Hg trend below:       No other cytopenias. MCV is 89. Iron studies are normal.  B12, folate are normal. Reticulocyte count is low/normal at 1.4. Creatinine is normal at 1.10. Calcium is normal at 9.6. LFTs are normal. Per notes from PCP, stool guiac we negative in July. Alkaline phosphatase is elevated. Constitutional symptoms positive for easy bruising only. No night sweats, bone pain, unintentional weight loss. Family history reviewed, non contributory  Social history reviewed, non contributory. No alcohol use      Review of Systems: A complete review of systems was obtained, negative except as described above.     Past Medical History:   Diagnosis Date    Allergic rhinitis, cause unspecified 2013    Environmental allergies       Past Surgical History:   Procedure Laterality Date    HX TONSILLECTOMY      HX WISDOM TEETH EXTRACTION        Social History     Tobacco Use    Smoking status: Former     Types: Cigarettes     Quit date: 1966     Years since quittin.6    Smokeless tobacco: Never   Substance Use Topics    Alcohol use: No      Family History   Problem Relation Age of Onset    Hypertension Mother     Hypertension Father     Heart Disease Father     Alcohol abuse Father     Hypertension Brother     No Known Problems Brother      Current Outpatient Medications   Medication Sig    amLODIPine (NORVASC) 5 mg tablet TAKE ONE TABLET BY MOUTH ONE TIME DAILY    triamcinolone acetonide (KENALOG) 0.1 % ointment Apply  to affected area two (2) times a day. use thin layer    mupirocin (BACTROBAN) 2 % ointment Apply  to affected area daily. cetirizine-pseudoePHEDrine (ZyrTEC-D) 5-120 mg Tb12 Take 1 Tab by mouth two (2) times a day. (Patient taking differently: Take 1 Tablet by mouth in the morning.)    cetirizine (ZYRTEC) 10 mg tablet Take  by mouth. hydroCHLOROthiazide (HYDRODIURIL) 25 mg tablet Take 1 Tablet by mouth daily. (Patient not taking: Reported on 8/9/2022)    cyclobenzaprine (FLEXERIL) 10 mg tablet Take 1 Tab by mouth three (3) times daily as needed for Muscle Spasm(s). (Patient not taking: No sig reported)    naproxen sodium 220 mg cap Take  by mouth. (Patient not taking: Reported on 8/9/2022)     No current facility-administered medications for this visit. Allergies   Allergen Reactions    Codeine Other (comments)     Pt states unknown reaction. Pcn [Penicillins] Rash            Physical Exam:   Visit Vitals  /68 (BP 1 Location: Left upper arm, BP Patient Position: Sitting)   Pulse 88   Temp 98.1 °F (36.7 °C) (Oral)   Resp 16   Ht 5' 6\" (1.676 m)   Wt 181 lb (82.1 kg)   SpO2 96%   BMI 29.21 kg/m²     ECOG PS: 0  General: No distress  Eyes: PERRLA, anicteric sclerae  HENT: Atraumatic with normal appearance of ears and nose; OP clear  Neck: Supple; no visualized JVD  Lymphatic: No cervical, or supraclavicular lymphadenopathy. Respiratory: CTAB, normal respiratory effort  CV: Normal rate, regular rhythm, no murmurs, no peripheral edema  GI: Soft, nontender, nondistended, no palpable masses, no hepatomegaly, no splenomegaly  MS: Normal gait and station. Digits without clubbing or cyanosis. Skin: No rashes, ecchymoses, or petechiae. Normal temperature, turgor, and texture.   Neuro/Psych: Alert, oriented, appropriate affect, normal judgment/insight      Results:     Lab Results   Component Value Date/Time    WBC 7.5 07/26/2022 01:45 PM    HGB 10.0 (L) 07/26/2022 01:45 PM    HCT 32.6 (L) 07/26/2022 01:45 PM    PLATELET 728 06/67/1507 01:45 PM    MCV 90.1 07/26/2022 01:45 PM    ABS. NEUTROPHILS 4.8 07/26/2022 01:45 PM     Lab Results   Component Value Date/Time    Sodium 139 07/26/2022 01:45 PM    Potassium 3.9 07/26/2022 01:45 PM    Chloride 104 07/26/2022 01:45 PM    CO2 27 07/26/2022 01:45 PM    Glucose 122 (H) 07/26/2022 01:45 PM    BUN 22 (H) 07/26/2022 01:45 PM    Creatinine 1.15 07/26/2022 01:45 PM    GFR est AA >60 07/26/2022 01:45 PM    GFR est non-AA >60 07/26/2022 01:45 PM    Calcium 10.1 07/26/2022 01:45 PM    Glucose (POC) 109 (H) 12/12/2016 11:58 AM     Lab Results   Component Value Date/Time    Bilirubin, total 0.3 07/26/2022 01:45 PM    ALT (SGPT) 26 07/26/2022 01:45 PM    Alk. phosphatase 152 (H) 07/26/2022 01:45 PM    Protein, total 8.0 07/26/2022 01:45 PM    Protein, total 8.2 07/26/2022 01:45 PM    Albumin 3.8 07/26/2022 01:45 PM    Globulin 4.4 (H) 07/26/2022 01:45 PM     CT Results (most recent):  Results from [place] encounter on 12/12/16    CT HEAD WO CONT    Narrative  EXAM:  CT HEAD WO CONT  INDICATION:  facial droop, patient presents with right-sided facial droop since  Thursday 4 days ago. Also history of headache the day before. TECHNIQUE:  Thin axial images were obtained through the calvarium and saved with standard  and bone window algorithm. Coronal and sagittal reconstructions were generated. CT dose reduction was achieved through use of a standardized protocol tailored  for this examination and automatic exposure control for dose modulation. COMPARISON: None available. FINDINGS:  The ventricular size and configuration are normal.  The cerebral density is normal throughout. No evidence of intracranial hemorrhage, infarct, mass or abnormal extra-axial  fluid collections.   Visualized osseous structures of the calvarium and skull base including  paranasal sinuses are unremarkable. Impression  IMPRESSION: Normal head CT. No imaging of spleen    Assessment and Recommendations:     # Normocytic Anemia     - The differential diagnosis for a normocytic anemia is broad, and includes blood loss, anemia of chronic disease, chronic renal insufficiency, iron deficiency, hypothyroidism, hemolysis, and bone marrow suppression. MDS, B12 deficiency, folate deficiency, and alcohol abuse can also lead to anemia, though it is generally macrocytic. Workup thus far shows normal B12, normal Folate, normal TSH, normal ferritin, low/normal reticulocyte count    - Labs shows concern for both anemia of inflammation as well as iron deficiency anemia.      Plan for IV iron with venofer for treatment of iron deficiency anemia      Signed By: Belen Proctor MD      Attending Medical Oncologist   [facility]

## 2022-08-16 ENCOUNTER — OFFICE VISIT (OUTPATIENT)
Dept: FAMILY MEDICINE CLINIC | Age: 72
End: 2022-08-16
Payer: MEDICARE

## 2022-08-16 VITALS
DIASTOLIC BLOOD PRESSURE: 76 MMHG | HEART RATE: 113 BPM | OXYGEN SATURATION: 98 % | TEMPERATURE: 98.7 F | BODY MASS INDEX: 28.9 KG/M2 | WEIGHT: 179.8 LBS | SYSTOLIC BLOOD PRESSURE: 129 MMHG | RESPIRATION RATE: 18 BRPM | HEIGHT: 66 IN

## 2022-08-16 DIAGNOSIS — L30.9 ECZEMA, UNSPECIFIED TYPE: ICD-10-CM

## 2022-08-16 DIAGNOSIS — I10 ESSENTIAL HYPERTENSION: Primary | ICD-10-CM

## 2022-08-16 DIAGNOSIS — D64.9 NORMOCHROMIC NORMOCYTIC ANEMIA: ICD-10-CM

## 2022-08-16 PROCEDURE — 3017F COLORECTAL CA SCREEN DOC REV: CPT | Performed by: FAMILY MEDICINE

## 2022-08-16 PROCEDURE — 1101F PT FALLS ASSESS-DOCD LE1/YR: CPT | Performed by: FAMILY MEDICINE

## 2022-08-16 PROCEDURE — G8427 DOCREV CUR MEDS BY ELIG CLIN: HCPCS | Performed by: FAMILY MEDICINE

## 2022-08-16 PROCEDURE — G8536 NO DOC ELDER MAL SCRN: HCPCS | Performed by: FAMILY MEDICINE

## 2022-08-16 PROCEDURE — 1123F ACP DISCUSS/DSCN MKR DOCD: CPT | Performed by: FAMILY MEDICINE

## 2022-08-16 PROCEDURE — G8752 SYS BP LESS 140: HCPCS | Performed by: FAMILY MEDICINE

## 2022-08-16 PROCEDURE — G8510 SCR DEP NEG, NO PLAN REQD: HCPCS | Performed by: FAMILY MEDICINE

## 2022-08-16 PROCEDURE — G8417 CALC BMI ABV UP PARAM F/U: HCPCS | Performed by: FAMILY MEDICINE

## 2022-08-16 PROCEDURE — G8754 DIAS BP LESS 90: HCPCS | Performed by: FAMILY MEDICINE

## 2022-08-16 PROCEDURE — 99213 OFFICE O/P EST LOW 20 MIN: CPT | Performed by: FAMILY MEDICINE

## 2022-08-16 RX ORDER — TRIAMCINOLONE ACETONIDE 1 MG/G
CREAM TOPICAL
Qty: 85 G | Refills: 5 | Status: SHIPPED | OUTPATIENT
Start: 2022-08-16 | End: 2022-10-11 | Stop reason: SDUPTHER

## 2022-08-16 NOTE — PROGRESS NOTES
HISTORY OF PRESENT ILLNESS  Landon Sharma is a 66 y.o. male. f/u ncnc anemia,seen by Hematology,benny to start parenteral iron . Feeling somewhat fatigued,bruising easy. ,no melena or brbpr  Anemia  The history is provided by the Patient. This is a chronic problem. The problem has not changed since onset. Pertinent negatives include no abdominal pain. Fatigue  The history is provided by the Patient. This is a chronic problem. The problem occurs daily. Pertinent negatives include no abdominal pain. Bleeding/Bruising  The history is provided by the Patient. This is a chronic problem. The problem occurs daily. The problem has not changed since onset. Pertinent negatives include no abdominal pain. Review of Systems   Constitutional:  Positive for fatigue and malaise/fatigue. Negative for chills and fever. Respiratory:  Negative for cough. Gastrointestinal:  Negative for abdominal pain, blood in stool and melena. Endo/Heme/Allergies:  Bruises/bleeds easily. Physical Exam  Constitutional:       Appearance: Normal appearance. He is normal weight. HENT:      Right Ear: Tympanic membrane normal.      Left Ear: Tympanic membrane normal.      Nose: Nose normal.      Mouth/Throat:      Mouth: Mucous membranes are moist.   Cardiovascular:      Rate and Rhythm: Normal rate and regular rhythm. Pulses: Normal pulses. Heart sounds: Normal heart sounds. Pulmonary:      Effort: Pulmonary effort is normal.      Breath sounds: Normal breath sounds. Abdominal:      General: There is no distension. Palpations: Abdomen is soft. There is no mass. Tenderness: There is no abdominal tenderness. Skin:     General: Skin is warm and dry. Findings: Bruising present. Neurological:      Mental Status: He is alert. Psychiatric:         Mood and Affect: Mood normal.     ASSESSMENT and PLAN  Diagnoses and all orders for this visit:    1. Essential hypertension,controlled    2.  Normochromic normocytic anemia,stable,to start iron replacement    3. Eczema, unspecified type  -     triamcinolone acetonide (KENALOG) 0.1 % topical cream; Apply  to affected area two (2) times daily as needed for Skin Irritation.  use thin layer

## 2022-08-16 NOTE — PROGRESS NOTES
Chief Complaint   Patient presents with    Anemia     Follow up       1. \"Have you been to the ER, urgent care clinic since your last visit? Hospitalized since your last visit? \" No    2. \"Have you seen or consulted any other health care providers outside of the [address] [place] [address] since your last visit? \" Hematologist    3. For patients aged 39-70: Has the patient had a colonoscopy / FIT/ Cologuard? Yes - no Care Gap present      If the patient is female:    4. For patients aged 41-77: Has the patient had a mammogram within the past 2 years? NA - based on age or sex      11. For patients aged 21-65: Has the patient had a pap smear?  NA - based on age or sex    Visit Vitals  /76 (BP 1 Location: Right arm, BP Patient Position: Sitting)   Pulse (!) 113   Temp 98.7 °F (37.1 °C) (Oral)   Resp 18   Ht 5' 6\" (1.676 m)   Wt 179 lb 12.8 oz (81.6 kg)   SpO2 98%   BMI 29.02 kg/m²

## 2022-08-19 ENCOUNTER — HOSPITAL ENCOUNTER (OUTPATIENT)
Dept: INFUSION THERAPY | Age: 72
Discharge: HOME | End: 2022-08-19
Payer: MEDICARE

## 2022-08-19 VITALS
DIASTOLIC BLOOD PRESSURE: 72 MMHG | RESPIRATION RATE: 18 BRPM | HEART RATE: 99 BPM | SYSTOLIC BLOOD PRESSURE: 129 MMHG | TEMPERATURE: 97.3 F

## 2022-08-19 DIAGNOSIS — D50.9 IRON DEFICIENCY ANEMIA, UNSPECIFIED IRON DEFICIENCY ANEMIA TYPE: Primary | ICD-10-CM

## 2022-08-19 PROCEDURE — 74011250636 HC RX REV CODE- 250/636

## 2022-08-19 PROCEDURE — 96374 THER/PROPH/DIAG INJ IV PUSH: CPT

## 2022-08-19 RX ORDER — SODIUM CHLORIDE 9 MG/ML
5-250 INJECTION, SOLUTION INTRAVENOUS AS NEEDED
Status: DISPENSED | OUTPATIENT
Start: 2022-08-19 | End: 2022-08-19

## 2022-08-19 RX ORDER — DIPHENHYDRAMINE HYDROCHLORIDE 50 MG/ML
25 INJECTION, SOLUTION INTRAMUSCULAR; INTRAVENOUS AS NEEDED
Status: ACTIVE | OUTPATIENT
Start: 2022-08-19 | End: 2022-08-19

## 2022-08-19 RX ORDER — SODIUM CHLORIDE 9 MG/ML
5-40 INJECTION INTRAMUSCULAR; INTRAVENOUS; SUBCUTANEOUS AS NEEDED
Status: ACTIVE | OUTPATIENT
Start: 2022-08-19 | End: 2022-08-19

## 2022-08-19 RX ORDER — SODIUM CHLORIDE 0.9 % (FLUSH) 0.9 %
5-40 SYRINGE (ML) INJECTION AS NEEDED
Status: DISPENSED | OUTPATIENT
Start: 2022-08-19 | End: 2022-08-19

## 2022-08-19 RX ORDER — ONDANSETRON 2 MG/ML
8 INJECTION INTRAMUSCULAR; INTRAVENOUS AS NEEDED
Status: ACTIVE | OUTPATIENT
Start: 2022-08-19 | End: 2022-08-19

## 2022-08-19 RX ORDER — ACETAMINOPHEN 325 MG/1
650 TABLET ORAL AS NEEDED
Status: ACTIVE | OUTPATIENT
Start: 2022-08-19 | End: 2022-08-19

## 2022-08-19 RX ORDER — HEPARIN 100 UNIT/ML
500 SYRINGE INTRAVENOUS AS NEEDED
Status: ACTIVE | OUTPATIENT
Start: 2022-08-19 | End: 2022-08-19

## 2022-08-19 RX ADMIN — IRON SUCROSE 200 MG: 20 INJECTION, SOLUTION INTRAVENOUS at 12:40

## 2022-08-19 RX ADMIN — SODIUM CHLORIDE 25 ML/HR: 9 INJECTION, SOLUTION INTRAVENOUS at 12:42

## 2022-08-19 NOTE — PROGRESS NOTES
OPIC Short Note                       Date: 2022    Name: Marilyn Fletcher    MRN: [id number]         : 1950    1130 Pt admit to [facility] for Venofer ambulatory in stable condition. Assessment completed. No new concerns voiced. Mr. Linette Villasenor vitals were reviewed prior to treatment. Patient Vitals for the past 12 hrs:   Temp Pulse Resp BP   22 1337 -- 99 -- 129/72   22 1132 97.3 °F (36.3 °C) 92 18 (!) 143/72       PIV with positive blood return flushed, heparinized and de-accessed per protocol. Medications given:   Medications Administered       0.9% sodium chloride infusion       Admin Date  2022 Action  New Bag Dose  25 mL/hr Rate  25 mL/hr Route  IntraVENous Administered By  Emmanuel Jolley, STARR              iron sucrose (VENOFER) injection 200 mg       Admin Date  2022 Action  Given Dose  200 mg Route  IntraVENous Administered By  Emmanuel Jolley, STARR                       1340 Pt tolerated treatment well. Patient stayed post infusion with no signs of reaction noted. D/c home ambulatory in no distress.    Future Appointments   Date Time Provider Missael Foster   2022  9:00 AM H2 [facility]IELA FASTRACK [facility]   9/3/2022 10:00 AM H2 [facility]IELA FASTRACK [facility]   9/10/2022 10:00 AM H2 [facility]ELA FASTRACK [facility]   2022  9:15 AM H2 [facility]ACK [facility]   11/15/2022 10:00 AM Marcos Zabala MD [facility] Jeong RN  2022  3:08 PM

## 2022-08-26 ENCOUNTER — HOSPITAL ENCOUNTER (OUTPATIENT)
Dept: INFUSION THERAPY | Age: 72
Discharge: HOME | End: 2022-08-26
Payer: MEDICARE

## 2022-08-26 VITALS
SYSTOLIC BLOOD PRESSURE: 111 MMHG | RESPIRATION RATE: 18 BRPM | WEIGHT: 179 LBS | BODY MASS INDEX: 28.89 KG/M2 | HEART RATE: 81 BPM | TEMPERATURE: 97.2 F | DIASTOLIC BLOOD PRESSURE: 70 MMHG

## 2022-08-26 DIAGNOSIS — D50.9 IRON DEFICIENCY ANEMIA, UNSPECIFIED IRON DEFICIENCY ANEMIA TYPE: Primary | ICD-10-CM

## 2022-08-26 PROCEDURE — 96374 THER/PROPH/DIAG INJ IV PUSH: CPT

## 2022-08-26 PROCEDURE — 74011250636 HC RX REV CODE- 250/636: Performed by: STUDENT IN AN ORGANIZED HEALTH CARE EDUCATION/TRAINING PROGRAM

## 2022-08-26 PROCEDURE — 74011250636 HC RX REV CODE- 250/636

## 2022-08-26 PROCEDURE — 74011000250 HC RX REV CODE- 250

## 2022-08-26 RX ORDER — EPINEPHRINE 1 MG/ML
0.3 INJECTION, SOLUTION, CONCENTRATE INTRAVENOUS AS NEEDED
Status: ACTIVE | OUTPATIENT
Start: 2022-08-26 | End: 2022-08-26

## 2022-08-26 RX ORDER — SODIUM CHLORIDE 9 MG/ML
5-40 INJECTION INTRAMUSCULAR; INTRAVENOUS; SUBCUTANEOUS AS NEEDED
Status: ACTIVE | OUTPATIENT
Start: 2022-08-26 | End: 2022-08-26

## 2022-08-26 RX ORDER — DIPHENHYDRAMINE HYDROCHLORIDE 50 MG/ML
50 INJECTION, SOLUTION INTRAMUSCULAR; INTRAVENOUS AS NEEDED
Status: ACTIVE | OUTPATIENT
Start: 2022-08-26 | End: 2022-08-26

## 2022-08-26 RX ORDER — HYDROCORTISONE SODIUM SUCCINATE 100 MG/2ML
100 INJECTION, POWDER, FOR SOLUTION INTRAMUSCULAR; INTRAVENOUS AS NEEDED
Status: ACTIVE | OUTPATIENT
Start: 2022-08-26 | End: 2022-08-26

## 2022-08-26 RX ORDER — HEPARIN 100 UNIT/ML
500 SYRINGE INTRAVENOUS AS NEEDED
Status: ACTIVE | OUTPATIENT
Start: 2022-08-26 | End: 2022-08-26

## 2022-08-26 RX ORDER — ONDANSETRON 2 MG/ML
8 INJECTION INTRAMUSCULAR; INTRAVENOUS AS NEEDED
Status: ACTIVE | OUTPATIENT
Start: 2022-08-26 | End: 2022-08-26

## 2022-08-26 RX ORDER — SODIUM CHLORIDE 0.9 % (FLUSH) 0.9 %
5-40 SYRINGE (ML) INJECTION AS NEEDED
Status: DISPENSED | OUTPATIENT
Start: 2022-08-26 | End: 2022-08-26

## 2022-08-26 RX ORDER — SODIUM CHLORIDE 9 MG/ML
5-250 INJECTION, SOLUTION INTRAVENOUS AS NEEDED
Status: DISPENSED | OUTPATIENT
Start: 2022-08-26 | End: 2022-08-26

## 2022-08-26 RX ORDER — ALBUTEROL SULFATE 0.83 MG/ML
2.5 SOLUTION RESPIRATORY (INHALATION) AS NEEDED
Status: ACTIVE | OUTPATIENT
Start: 2022-08-26 | End: 2022-08-26

## 2022-08-26 RX ORDER — ACETAMINOPHEN 325 MG/1
650 TABLET ORAL AS NEEDED
Status: ACTIVE | OUTPATIENT
Start: 2022-08-26 | End: 2022-08-26

## 2022-08-26 RX ORDER — DIPHENHYDRAMINE HYDROCHLORIDE 50 MG/ML
25 INJECTION, SOLUTION INTRAMUSCULAR; INTRAVENOUS AS NEEDED
Status: ACTIVE | OUTPATIENT
Start: 2022-08-26 | End: 2022-08-26

## 2022-08-26 RX ADMIN — SODIUM CHLORIDE 25 ML/HR: 9 INJECTION, SOLUTION INTRAVENOUS at 11:20

## 2022-08-26 RX ADMIN — SODIUM CHLORIDE, PRESERVATIVE FREE 10 ML: 5 INJECTION INTRAVENOUS at 12:00

## 2022-08-26 RX ADMIN — IRON SUCROSE 200 MG: 20 INJECTION, SOLUTION INTRAVENOUS at 11:19

## 2022-08-26 NOTE — PROGRESS NOTES
[facility] Short Note                       Date: 2022    Name: Zhane Santillan    MRN: [id number]         : 1950      Pt admit to [facility] for [address] ambulatory in stable condition. Assessment completed and documented in flowsheets. No acute concerns at this time. Mr. Jacob Hall vitals were reviewed prior to and after treatment. Patient Vitals for the past 12 hrs:   Temp Pulse Resp BP   22 1202 97.2 °F (36.2 °C) 81 18 111/70   22 1042 97.8 °F (36.6 °C) (!) 112 18 (!) 141/85         Medications given:   Medications Administered       0.9% sodium chloride infusion       Admin Date  2022 Action  New Bag Dose  25 mL/hr Rate  25 mL/hr Route  IntraVENous Administered By  Cherelle Erazo RN              iron sucrose (VENOFER) injection 200 mg       Admin Date  2022 Action  Given Dose  200 mg Route  IntraVENous Administered By  Cherelle Erazo RN                    PIV successfully placed, + BR. Medication infused and PIV removed prior to discharge. Patient waited in [facility] for 30 minutes after venofer completed per protocol. VS stable. Mr. Marybel Pinon tolerated the infusion, and had no complaints. Mr. Marybel Pinon was discharged from [place] [address] in stable condition. Patient is aware if future appointments.     Future Appointments   Date Time Provider Missael Foster   9/3/2022 10:00 AM H2 [facility]ELA FASTRACK [facility]   9/10/2022 10:00 AM H2 [facility]EL[facility]ACK [facility]   2022  9:15 AM H2 [facility]ELA [facility]ACK [facility]   11/15/2022 10:00 AM Pili Rebollar MD [facility] Chan RN  2022  12:03 PM    Problem: Knowledge Deficit  Goal: *Verbalizes understanding of procedures and medications  Outcome: Progressing Towards Goal

## 2022-09-02 ENCOUNTER — APPOINTMENT (OUTPATIENT)
Dept: INFUSION THERAPY | Age: 72
End: 2022-09-02

## 2022-09-03 ENCOUNTER — HOSPITAL ENCOUNTER (OUTPATIENT)
Dept: INFUSION THERAPY | Age: 72
Discharge: HOME | End: 2022-09-03
Payer: MEDICARE

## 2022-09-03 VITALS
TEMPERATURE: 98 F | OXYGEN SATURATION: 99 % | RESPIRATION RATE: 16 BRPM | SYSTOLIC BLOOD PRESSURE: 104 MMHG | HEART RATE: 71 BPM | DIASTOLIC BLOOD PRESSURE: 61 MMHG

## 2022-09-03 DIAGNOSIS — D50.9 IRON DEFICIENCY ANEMIA, UNSPECIFIED IRON DEFICIENCY ANEMIA TYPE: Primary | ICD-10-CM

## 2022-09-03 PROCEDURE — 74011250636 HC RX REV CODE- 250/636: Performed by: STUDENT IN AN ORGANIZED HEALTH CARE EDUCATION/TRAINING PROGRAM

## 2022-09-03 PROCEDURE — 96374 THER/PROPH/DIAG INJ IV PUSH: CPT

## 2022-09-03 RX ADMIN — IRON SUCROSE 200 MG: 20 INJECTION, SOLUTION INTRAVENOUS at 10:04

## 2022-09-03 NOTE — PROGRESS NOTES
Outpatient Infusion Center Short Visit Progress Note    1000 Patient admitted to [facility] for Venofer 3/5 ambulatory in stable condition. Assessment completed. No new concerns voiced. Covid Screening      1. Do you have any symptoms of COVID-19? SOB, coughing, fever, or generally not feeling well ? NO  2. Have you been exposed to COVID-19 recently? NO  3. Have you had any recent contact with family/friend that has a pending COVID test? NO    Vital Signs:  Patient Vitals for the past 12 hrs:   Temp Pulse Resp BP SpO2   09/03/22 1049 -- 71 16 104/61 --   09/03/22 1000 98 °F (36.7 °C) 86 16 136/71 99 %             Peripheral IV 09/03/22 Left Antecubital (Active)   Site Assessment Clean, dry, & intact 09/03/22 1002   Phlebitis Assessment 0 09/03/22 1002   Infiltration Assessment 0 09/03/22 1002   Dressing Status New 09/03/22 1002   Dressing Type Transparent 09/03/22 1002   Hub Color/Line Status Yellow; Flushed; Infusing 09/03/22 1002   Alcohol Cap Used Yes 09/03/22 1002       Medications:  Medications Administered       iron sucrose (VENOFER) injection 200 mg       Admin Date  09/03/2022 Action  Given Dose  200 mg Route  IntraVENous Administered By  Berta PAIGE                      Patient monitored for 30 mins post infusion. No adverse reactions. Patient educated on infusion reaction and verbalized understanding. Patient PIV flushed and removed, bandage placed over site. Patient tolerated treatment well. Patient discharged from [facility] ambulatory in no distress at 1100. Patient aware of next appointment.     Future Appointments   Date Time Provider Missael Foster   9/12/2022  8:30 AM H2 MARIELA FASTRACK [facility]   9/16/2022  9:00 AM G1 MARIELA FASTRACK [facility]   11/15/2022 10:00 AM Asia Liriano MD [facility]

## 2022-09-06 ENCOUNTER — TELEPHONE (OUTPATIENT)
Dept: FAMILY MEDICINE CLINIC | Age: 72
End: 2022-09-06

## 2022-09-06 NOTE — TELEPHONE ENCOUNTER
----- Message from Ramez Joseph sent at 9/6/2022  8:34 AM EDT -----  Subject: Message to Provider    QUESTIONS  Information for Provider? Wife states patient seems to not be with it. Wants to know if maybe it is because of the infusion treatments he is   getting. Please call Ron Blanca Nidhi  ---------------------------------------------------------------------------  --------------  Harpreet Regalado INFO  [phone]; OK to leave message on voicemail  ---------------------------------------------------------------------------  --------------  SCRIPT ANSWERS  Relationship to Patient? Other  Representative Name? Wife Laury Conde)  Is the Representative on the appropriate HIPAA document in Epic?  Yes

## 2022-09-06 NOTE — TELEPHONE ENCOUNTER
----- Message from Neha Singh sent at 9/6/2022 12:30 PM EDT -----  Subject: Message to Provider    QUESTIONS  Information for Provider? Pt wife called in and stated pt has a   appointment for 9/7 at 12:00 that needs to be canceled but i did not see a   appointment with pcp .   ---------------------------------------------------------------------------  --------------  [id number] [employer]  [phone]; OK to leave message on voicemail  ---------------------------------------------------------------------------  --------------  SCRIPT ANSWERS  Relationship to Patient? Other  Representative Name? Anabela Doan  Is the Representative on the appropriate HIPAA document in Epic?  Yes

## 2022-09-06 NOTE — TELEPHONE ENCOUNTER
I scheduled the pt per Dr. Nnamdi Ozuna but the pt cancelled the appointment.      ___________________________________________        ----- Message from Brett Pope sent at 9/6/2022 12:30 PM EDT -----  Subject: Message to Provider    QUESTIONS  Information for Provider? Pt wife called in and stated pt has a   appointment for 9/7 at 12:00 that needs to be canceled but i did not see a   appointment with pcp .   ---------------------------------------------------------------------------  --------------  [employer] [employer]  [phone]; OK to leave message on voicemail  ---------------------------------------------------------------------------  --------------  SCRIPT ANSWERS  Relationship to Patient? Other  Representative Name? Marguerite Shirley  Is the Representative on the appropriate HIPAA document in Epic?  Yes

## 2022-09-09 ENCOUNTER — APPOINTMENT (OUTPATIENT)
Dept: INFUSION THERAPY | Age: 72
End: 2022-09-09

## 2022-09-10 ENCOUNTER — APPOINTMENT (OUTPATIENT)
Dept: INFUSION THERAPY | Age: 72
End: 2022-09-10

## 2022-09-12 ENCOUNTER — HOSPITAL ENCOUNTER (OUTPATIENT)
Dept: INFUSION THERAPY | Age: 72
Discharge: HOME | End: 2022-09-12
Payer: MEDICARE

## 2022-09-12 VITALS
OXYGEN SATURATION: 96 % | SYSTOLIC BLOOD PRESSURE: 128 MMHG | RESPIRATION RATE: 18 BRPM | DIASTOLIC BLOOD PRESSURE: 72 MMHG | TEMPERATURE: 97.9 F | HEART RATE: 86 BPM

## 2022-09-12 DIAGNOSIS — D50.9 IRON DEFICIENCY ANEMIA, UNSPECIFIED IRON DEFICIENCY ANEMIA TYPE: Primary | ICD-10-CM

## 2022-09-12 PROCEDURE — 96374 THER/PROPH/DIAG INJ IV PUSH: CPT

## 2022-09-12 PROCEDURE — 74011250636 HC RX REV CODE- 250/636: Performed by: STUDENT IN AN ORGANIZED HEALTH CARE EDUCATION/TRAINING PROGRAM

## 2022-09-12 RX ADMIN — IRON SUCROSE 200 MG: 20 INJECTION, SOLUTION INTRAVENOUS at 09:05

## 2022-09-12 NOTE — PROGRESS NOTES
[facility] Progress Note    Date: September 12, 2022        0830: Pt arrived ambulatory to [facility] for Venofer in stable condition. Assessment completed. PIV placed to the left antecubital with positive blood return. Patient Vitals for the past 12 hrs:   Temp Pulse Resp BP SpO2   09/12/22 0828 97.9 °F (36.6 °C) 86 18 128/72 96 %         Medications Administered       iron sucrose (VENOFER) injection 200 mg       Admin Date  09/12/2022 Action  Given Dose  200 mg Route  IntraVENous Administered By  Viviane Lieberman RN                       Mr. Ambrose Greene tolerated the infusion, and had no complaints. PIV flushed and removed. 2x2 and coban placed    Mr. Ambrose Greene was discharged from [place] [address] in stable condition. Patient is aware of next scheduled [facility] appointment.          Future Appointments   Date Time Provider Missael Foster   9/16/2022  9:00 AM G1 MARIELA [address]   11/15/2022 10:00 AM Amisha Mathur MD [facility]/SOLEDAD Hester RN  September 12, 2022

## 2022-09-16 ENCOUNTER — HOSPITAL ENCOUNTER (OUTPATIENT)
Dept: INFUSION THERAPY | Age: 72
Discharge: HOME | End: 2022-09-16
Payer: MEDICARE

## 2022-09-16 ENCOUNTER — APPOINTMENT (OUTPATIENT)
Dept: INFUSION THERAPY | Age: 72
End: 2022-09-16

## 2022-09-16 VITALS
SYSTOLIC BLOOD PRESSURE: 104 MMHG | TEMPERATURE: 97.5 F | RESPIRATION RATE: 16 BRPM | DIASTOLIC BLOOD PRESSURE: 68 MMHG | HEART RATE: 100 BPM

## 2022-09-16 DIAGNOSIS — D50.9 IRON DEFICIENCY ANEMIA, UNSPECIFIED IRON DEFICIENCY ANEMIA TYPE: Primary | ICD-10-CM

## 2022-09-16 PROCEDURE — 74011250636 HC RX REV CODE- 250/636: Performed by: STUDENT IN AN ORGANIZED HEALTH CARE EDUCATION/TRAINING PROGRAM

## 2022-09-16 PROCEDURE — 96374 THER/PROPH/DIAG INJ IV PUSH: CPT

## 2022-09-16 RX ORDER — SODIUM CHLORIDE 9 MG/ML
25 INJECTION, SOLUTION INTRAVENOUS AS NEEDED
Status: DISCONTINUED | OUTPATIENT
Start: 2022-09-16 | End: 2022-09-17 | Stop reason: HOSPADM

## 2022-09-16 RX ORDER — SODIUM CHLORIDE 9 MG/ML
5-250 INJECTION, SOLUTION INTRAVENOUS AS NEEDED
Status: DISPENSED | OUTPATIENT
Start: 2022-09-16 | End: 2022-09-16

## 2022-09-16 RX ADMIN — IRON SUCROSE 200 MG: 20 INJECTION, SOLUTION INTRAVENOUS at 09:42

## 2022-09-16 RX ADMIN — SODIUM CHLORIDE 25 ML/HR: 900 INJECTION, SOLUTION INTRAVENOUS at 09:30

## 2022-09-16 NOTE — PROGRESS NOTES
Outpatient Infusion Center - Chemotherapy Progress Note    0900 Pt admit to [facility] for Venofer IV push 5/5 ambulatory in stable condition accompanied by spouse. Assessment completed. No new concerns voiced. PIV access established in R arm with positive blood return. Line flushed, NS infusing. Visit Vitals  /68   Pulse 100   Temp 97.5 °F (36.4 °C)   Resp 16       Medications Administered       0.9% sodium chloride infusion       Admin Date  09/16/2022 Action  New Bag Dose  25 mL/hr Rate  25 mL/hr Route  IntraVENous Administered By  Meggan Radford RN              iron sucrose (VENOFER) injection 200 mg       Admin Date  09/16/2022 Action  Given Dose  200 mg Route  IntraVENous Administered By  Meggan Radford, RN                        [id number] Pt tolerated treatment well. Pt remained in [facility] x20 minutes for observation; remaining stay uneventful. PIV removed at discharge.  Pt aware to follow up with MD.

## 2022-09-17 ENCOUNTER — APPOINTMENT (OUTPATIENT)
Dept: INFUSION THERAPY | Age: 72
End: 2022-09-17
Payer: MEDICARE

## 2022-09-23 ENCOUNTER — APPOINTMENT (OUTPATIENT)
Dept: INFUSION THERAPY | Age: 72
End: 2022-09-23

## 2022-09-30 ENCOUNTER — APPOINTMENT (OUTPATIENT)
Dept: INFUSION THERAPY | Age: 72
End: 2022-09-30

## 2022-10-06 ENCOUNTER — NURSE TRIAGE (OUTPATIENT)
Dept: OTHER | Facility: CLINIC | Age: 72
End: 2022-10-06

## 2022-10-06 ENCOUNTER — TELEPHONE (OUTPATIENT)
Dept: FAMILY MEDICINE CLINIC | Age: 72
End: 2022-10-06

## 2022-10-06 NOTE — TELEPHONE ENCOUNTER
Received call from [place] at [facility] with Red Flag Complaint. Subjective: Caller states \"he looks pale and jaundiced. He is confused.-sometimes unable to comprehend what is being said. Has been more forgetful I. He is very fatigued, sleeping more than usual. He is short of breath with exertion. Stomach feels unsettled. Complaining of pain under right shoulder    It has taken his wife 2 weeks to get him to agree to go to PCP but he will not go to [facility] or ER  Wife would like for him to be evaluated, EKG  Has gotten 5 iron infusions for anemia\"   Wife would also like him to have his cognizance evaluated but without stating it is being done because it will upset him  Current Symptoms:     Other symptoms 3 weeks  Onset:  shortness of breath 3 days ago; worsening    Associated Symptoms:     Pain Severity: KAN ; He will not say how bad pain is    Temperature: denies fever     What has been tried:     LMP: NA Pregnant: NA    Recommended disposition: See in Office Today or Tomorrow    Care advice provided, patient verbalizes understanding; denies any other questions or concerns; instructed to call back for any new or worsening symptoms. Patient/Caller agrees with recommended disposition; writer provided warm transfer to [place] at [facility] for appointment scheduling    Attention Provider: Thank you for allowing me to participate in the care of your patient. The patient was connected to triage in response to information provided to the [facility]. Please do not respond through this encounter as the response is not directed to a shared pool.       Reason for Disposition   Longstanding confusion (e.g., dementia, stroke) and worsening    Protocols used: Confusion - Delirium-ADULT-[place]

## 2022-10-06 NOTE — TELEPHONE ENCOUNTER
----- Message from Brad Stevenson sent at 10/6/2022  2:38 PM EDT -----  Subject: Message to Provider    QUESTIONS  Information for Provider? Patient wife wanted Dr. Jannie Maldonado to know the   patient is going to ER at [facility] tomorrow morning.  ---------------------------------------------------------------------------  --------------  Shellie RUSSO  [phone]; OK to leave message on voicemail  ---------------------------------------------------------------------------  --------------  SCRIPT ANSWERS  Relationship to Patient? Other  Representative Name?  Jodi eLonard  Is the Representative on the appropriate HIPAA document in Epic?  Yes

## 2022-10-07 ENCOUNTER — APPOINTMENT (OUTPATIENT)
Dept: CT IMAGING | Age: 72
End: 2022-10-07
Attending: STUDENT IN AN ORGANIZED HEALTH CARE EDUCATION/TRAINING PROGRAM
Payer: MEDICARE

## 2022-10-07 ENCOUNTER — HOSPITAL ENCOUNTER (EMERGENCY)
Age: 72
Discharge: HOME | End: 2022-10-07
Attending: STUDENT IN AN ORGANIZED HEALTH CARE EDUCATION/TRAINING PROGRAM
Payer: MEDICARE

## 2022-10-07 ENCOUNTER — APPOINTMENT (OUTPATIENT)
Dept: GENERAL RADIOLOGY | Age: 72
End: 2022-10-07
Attending: STUDENT IN AN ORGANIZED HEALTH CARE EDUCATION/TRAINING PROGRAM
Payer: MEDICARE

## 2022-10-07 VITALS
DIASTOLIC BLOOD PRESSURE: 66 MMHG | WEIGHT: 166.45 LBS | SYSTOLIC BLOOD PRESSURE: 129 MMHG | BODY MASS INDEX: 26.75 KG/M2 | RESPIRATION RATE: 20 BRPM | HEART RATE: 91 BPM | HEIGHT: 66 IN | TEMPERATURE: 98.2 F | OXYGEN SATURATION: 100 %

## 2022-10-07 DIAGNOSIS — D50.9 IRON DEFICIENCY ANEMIA, UNSPECIFIED IRON DEFICIENCY ANEMIA TYPE: ICD-10-CM

## 2022-10-07 DIAGNOSIS — R06.02 SOB (SHORTNESS OF BREATH): Primary | ICD-10-CM

## 2022-10-07 DIAGNOSIS — R53.83 FATIGUE, UNSPECIFIED TYPE: ICD-10-CM

## 2022-10-07 LAB
ABO + RH BLD: NORMAL
ALBUMIN SERPL-MCNC: 3.4 G/DL (ref 3.5–5)
ALBUMIN/GLOB SERPL: 0.6 {RATIO} (ref 1.1–2.2)
ALP SERPL-CCNC: 254 U/L (ref 45–117)
ALT SERPL-CCNC: 27 U/L (ref 12–78)
ANION GAP SERPL CALC-SCNC: 6 MMOL/L (ref 5–15)
APPEARANCE UR: CLEAR
AST SERPL-CCNC: 37 U/L (ref 15–37)
BACTERIA URNS QL MICRO: NEGATIVE /HPF
BASE EXCESS BLD CALC-SCNC: 1.9 MMOL/L
BASOPHILS # BLD: 0.1 K/UL (ref 0–0.1)
BASOPHILS NFR BLD: 1 % (ref 0–1)
BILIRUB SERPL-MCNC: 0.6 MG/DL (ref 0.2–1)
BILIRUB UR QL: NEGATIVE
BLOOD GROUP ANTIBODIES SERPL: NORMAL
BNP SERPL-MCNC: 246 PG/ML
BUN SERPL-MCNC: 19 MG/DL (ref 6–20)
BUN/CREAT SERPL: 15 (ref 12–20)
CA-I BLD-MCNC: 1.22 MMOL/L (ref 1.12–1.32)
CALCIUM SERPL-MCNC: 9.9 MG/DL (ref 8.5–10.1)
CHLORIDE BLD-SCNC: 102 MMOL/L (ref 100–108)
CHLORIDE SERPL-SCNC: 102 MMOL/L (ref 97–108)
CO2 BLD-SCNC: 28 MMOL/L (ref 19–24)
CO2 SERPL-SCNC: 28 MMOL/L (ref 21–32)
COLOR UR: ABNORMAL
CREAT SERPL-MCNC: 1.31 MG/DL (ref 0.7–1.3)
CREAT UR-MCNC: 1.1 MG/DL (ref 0.6–1.3)
DIFFERENTIAL METHOD BLD: ABNORMAL
EOSINOPHIL # BLD: 0.5 K/UL (ref 0–0.4)
EOSINOPHIL NFR BLD: 7 % (ref 0–7)
EPITH CASTS URNS QL MICRO: ABNORMAL /LPF
ERYTHROCYTE [DISTWIDTH] IN BLOOD BY AUTOMATED COUNT: 16.1 % (ref 11.5–14.5)
GLOBULIN SER CALC-MCNC: 5.3 G/DL (ref 2–4)
GLUCOSE BLD STRIP.AUTO-MCNC: 134 MG/DL (ref 74–106)
GLUCOSE SERPL-MCNC: 131 MG/DL (ref 65–100)
GLUCOSE UR STRIP.AUTO-MCNC: NEGATIVE MG/DL
HCO3 BLDA-SCNC: 27 MMOL/L
HCT VFR BLD AUTO: 32.3 % (ref 36.6–50.3)
HGB BLD-MCNC: 10.2 G/DL (ref 12.1–17)
HGB UR QL STRIP: NEGATIVE
HYALINE CASTS URNS QL MICRO: ABNORMAL /LPF (ref 0–2)
IMM GRANULOCYTES # BLD AUTO: 0 K/UL (ref 0–0.04)
IMM GRANULOCYTES NFR BLD AUTO: 0 % (ref 0–0.5)
KETONES UR QL STRIP.AUTO: ABNORMAL MG/DL
LACTATE BLD-SCNC: 1.21 MMOL/L (ref 0.4–2)
LEUKOCYTE ESTERASE UR QL STRIP.AUTO: NEGATIVE
LYMPHOCYTES # BLD: 1.5 K/UL (ref 0.8–3.5)
LYMPHOCYTES NFR BLD: 20 % (ref 12–49)
MAGNESIUM SERPL-MCNC: 2.1 MG/DL (ref 1.6–2.4)
MCH RBC QN AUTO: 28.7 PG (ref 26–34)
MCHC RBC AUTO-ENTMCNC: 31.6 G/DL (ref 30–36.5)
MCV RBC AUTO: 90.7 FL (ref 80–99)
MONOCYTES # BLD: 0.9 K/UL (ref 0–1)
MONOCYTES NFR BLD: 11 % (ref 5–13)
NEUTS SEG # BLD: 4.6 K/UL (ref 1.8–8)
NEUTS SEG NFR BLD: 61 % (ref 32–75)
NITRITE UR QL STRIP.AUTO: NEGATIVE
NRBC # BLD: 0 K/UL (ref 0–0.01)
NRBC BLD-RTO: 0 PER 100 WBC
PCO2 BLDV: 45.4 MMHG (ref 41–51)
PH BLDV: 7.39 [PH] (ref 7.32–7.42)
PH UR STRIP: 5.5 [PH] (ref 5–8)
PLATELET # BLD AUTO: 189 K/UL (ref 150–400)
PMV BLD AUTO: 9.7 FL (ref 8.9–12.9)
PO2 BLDV: 37 MMHG (ref 25–40)
POTASSIUM BLD-SCNC: 3.9 MMOL/L (ref 3.5–5.5)
POTASSIUM SERPL-SCNC: 4.2 MMOL/L (ref 3.5–5.1)
PROT SERPL-MCNC: 8.7 G/DL (ref 6.4–8.2)
PROT UR STRIP-MCNC: 30 MG/DL
RBC # BLD AUTO: 3.56 M/UL (ref 4.1–5.7)
RBC #/AREA URNS HPF: ABNORMAL /HPF (ref 0–5)
SODIUM BLD-SCNC: 141 MMOL/L (ref 136–145)
SODIUM SERPL-SCNC: 136 MMOL/L (ref 136–145)
SP GR UR REFRACTOMETRY: 1.02
SPECIMEN EXP DATE BLD: NORMAL
SPECIMEN SITE: ABNORMAL
TROPONIN-HIGH SENSITIVITY: 6 NG/L (ref 0–76)
UA: UC IF INDICATED,UAUC: ABNORMAL
UROBILINOGEN UR QL STRIP.AUTO: 1 EU/DL (ref 0.2–1)
WBC # BLD AUTO: 7.5 K/UL (ref 4.1–11.1)
WBC URNS QL MICRO: ABNORMAL /HPF (ref 0–4)

## 2022-10-07 PROCEDURE — 86900 BLOOD TYPING SEROLOGIC ABO: CPT

## 2022-10-07 PROCEDURE — 70450 CT HEAD/BRAIN W/O DYE: CPT

## 2022-10-07 PROCEDURE — 82947 ASSAY GLUCOSE BLOOD QUANT: CPT

## 2022-10-07 PROCEDURE — 84484 ASSAY OF TROPONIN QUANT: CPT

## 2022-10-07 PROCEDURE — 71045 X-RAY EXAM CHEST 1 VIEW: CPT

## 2022-10-07 PROCEDURE — 93005 ELECTROCARDIOGRAM TRACING: CPT

## 2022-10-07 PROCEDURE — 83880 ASSAY OF NATRIURETIC PEPTIDE: CPT

## 2022-10-07 PROCEDURE — 85025 COMPLETE CBC W/AUTO DIFF WBC: CPT

## 2022-10-07 PROCEDURE — 80053 COMPREHEN METABOLIC PANEL: CPT

## 2022-10-07 PROCEDURE — 83735 ASSAY OF MAGNESIUM: CPT

## 2022-10-07 PROCEDURE — 36415 COLL VENOUS BLD VENIPUNCTURE: CPT

## 2022-10-07 PROCEDURE — 81001 URINALYSIS AUTO W/SCOPE: CPT

## 2022-10-07 PROCEDURE — 99285 EMERGENCY DEPT VISIT HI MDM: CPT

## 2022-10-07 NOTE — DISCHARGE INSTRUCTIONS
Your lab results for today were mostly unremarkable. Your head CT and chest x-ray were normal.  Your BNP was very mildly elevated. Is important follow-up with your primary care to schedule an ultrasound of the heart.

## 2022-10-07 NOTE — ED PROVIDER NOTES
EMERGENCY DEPARTMENT HISTORY AND PHYSICAL EXAM      Date: 10/7/2022  Patient Name: Juvencio Pizano    History of Presenting Illness     Chief Complaint   Patient presents with    Shortness of Breath     Pt ambulatory into triage with a cc of shortness of breath, weakness and confusion x 3-4 days; PCP referred pt to ED for further evaluation        History Provided By: Patient and Patient's Wife    HPI: Juvencio Pizano, 66 y.o. male with a past medical history significant for htn presents to the ED with cc of shortness of breath. He notes for the past 3 to 4 days he has been more dyspneic with exertion. He went to the primary care doctor yesterday and notes he is more referred to the ED. They were unable to get to the ED last night and came this morning. History is provided by patient and his wife no notably patient is quite stoic about his situation and denies any issues. His only complaint is that he feels \"weird\" because he does not like being in hospitals. He denies any chest pain, shortness of breath, confusion, nausea, vomiting. For his wife he has been much more tired recently and more pale than normal.  He has dyspnea with exertion and is not able to walk as far as he normally can. He has been sleeping more and even falling asleep at the dinner table. She notes a large weight loss over the past few weeks as well as a rash across his chest that he has been treated with steroid cream he does have a history of a tick bite a while back and was treated with antibiotics. She notes his stomach hurts after he eats too much. He has a history of anemia that is being worked up by hematologist.  He does get iron transfusions    There are no associated symptoms. No other exacerbating or ameliorating factors. PCP: Arsenio Snider MD    No current facility-administered medications on file prior to encounter.      Current Outpatient Medications on File Prior to Encounter   Medication Sig Dispense Refill    triamcinolone acetonide (KENALOG) 0.1 % topical cream Apply  to affected area two (2) times daily as needed for Skin Irritation. use thin layer 85 g 5    amLODIPine (NORVASC) 5 mg tablet TAKE ONE TABLET BY MOUTH ONE TIME DAILY 90 Tablet 3    triamcinolone acetonide (KENALOG) 0.1 % ointment Apply  to affected area two (2) times a day. use thin layer (Patient not taking: Reported on 2022) 80 g 2    mupirocin (BACTROBAN) 2 % ointment Apply  to affected area daily. 22 g 2    hydroCHLOROthiazide (HYDRODIURIL) 25 mg tablet Take 1 Tablet by mouth daily. (Patient not taking: No sig reported) 90 Tablet 3    cetirizine-pseudoePHEDrine (ZyrTEC-D) 5-120 mg Tb12 Take 1 Tab by mouth two (2) times a day. (Patient taking differently: Take 1 Tablet by mouth in the morning.) 60 Tab 1    cyclobenzaprine (FLEXERIL) 10 mg tablet Take 1 Tab by mouth three (3) times daily as needed for Muscle Spasm(s). (Patient not taking: No sig reported) 30 Tab 1    cetirizine (ZYRTEC) 10 mg tablet Take  by mouth. naproxen sodium 220 mg cap Take  by mouth. (Patient not taking: No sig reported)         Past History     Past Medical History:  Past Medical History:   Diagnosis Date    Allergic rhinitis, cause unspecified 2013    Environmental allergies        Past Surgical History:  Past Surgical History:   Procedure Laterality Date    HX TONSILLECTOMY      HX WISDOM TEETH EXTRACTION         Family History:  Family History   Problem Relation Age of Onset    Hypertension Mother     Hypertension Father     Heart Disease Father     Alcohol abuse Father     Hypertension Brother     No Known Problems Brother        Social History:  Social History     Tobacco Use    Smoking status: Former     Types: Cigarettes     Quit date: 1966     Years since quittin.8    Smokeless tobacco: Never   Vaping Use    Vaping Use: Never used   Substance Use Topics    Alcohol use: No    Drug use: No       Allergies:   Allergies   Allergen Reactions Codeine Other (comments)     Pt states unknown reaction. Pcn [Penicillins] Rash         Review of Systems   Review of Systems   Constitutional:  Positive for unexpected weight change. Negative for chills and fever. HENT:  Negative for congestion and trouble swallowing. Eyes:  Negative for visual disturbance. Respiratory:  Positive for shortness of breath. Negative for cough and wheezing. Cardiovascular:  Negative for chest pain, palpitations and leg swelling. Gastrointestinal:  Negative for abdominal distention, abdominal pain, constipation, diarrhea, nausea and vomiting. Genitourinary:  Negative for dysuria. Musculoskeletal:  Negative for back pain. Skin:  Positive for rash. Negative for wound. Neurological:  Negative for dizziness and headaches. Psychiatric/Behavioral:  Positive for confusion. Physical Exam   Physical Exam  Constitutional:       Appearance: He is well-developed. HENT:      Head: Normocephalic and atraumatic. Eyes:      Extraocular Movements: Extraocular movements intact. Pupils: Pupils are equal, round, and reactive to light. Cardiovascular:      Rate and Rhythm: Normal rate and regular rhythm. Pulmonary:      Effort: Pulmonary effort is normal.      Breath sounds: Normal breath sounds. Chest:      Chest wall: No mass, deformity, tenderness or crepitus. Abdominal:      Palpations: Abdomen is soft. There is no splenomegaly or mass. Tenderness: There is no abdominal tenderness. There is no guarding or rebound. Musculoskeletal:      Right lower leg: Edema present. Left lower leg: Edema present. Skin:     Capillary Refill: Capillary refill takes less than 2 seconds. Comments: Scattered maculopapular rash across chest   Neurological:      General: No focal deficit present. Mental Status: He is alert and oriented to person, place, and time.    Psychiatric:         Mood and Affect: Mood normal.         Behavior: Behavior normal. Diagnostic Study Results     Labs -     Recent Results (from the past 24 hour(s))   EKG, 12 LEAD, INITIAL    Collection Time: 10/07/22  9:54 AM   Result Value Ref Range    Ventricular Rate 102 BPM    Atrial Rate 102 BPM    P-R Interval 142 ms    QRS Duration 98 ms    Q-T Interval 350 ms    QTC Calculation (Bezet) 456 ms    Calculated P Axis 24 degrees    Calculated R Axis -30 degrees    Calculated T Axis 94 degrees    Diagnosis       Sinus tachycardia  Left axis deviation  Possible Anterior infarct , age undetermined  T wave abnormality, consider lateral ischemia  When compared with ECG of 12-DEC-2016 13:50,  Nonspecific T wave abnormality no longer evident in Inferior leads  T wave inversion now evident in Lateral leads     CBC WITH AUTOMATED DIFF    Collection Time: 10/07/22 10:10 AM   Result Value Ref Range    WBC 7.5 4.1 - 11.1 K/uL    RBC 3.56 (L) 4.10 - 5.70 M/uL    HGB 10.2 (L) 12.1 - 17.0 g/dL    HCT 32.3 (L) 36.6 - 50.3 %    MCV 90.7 80.0 - 99.0 FL    MCH 28.7 26.0 - 34.0 PG    MCHC 31.6 30.0 - 36.5 g/dL    RDW 16.1 (H) 11.5 - 14.5 %    PLATELET 176 409 - 433 K/uL    MPV 9.7 8.9 - 12.9 FL    NRBC 0.0 0  WBC    ABSOLUTE NRBC 0.00 0.00 - 0.01 K/uL    NEUTROPHILS 61 32 - 75 %    LYMPHOCYTES 20 12 - 49 %    MONOCYTES 11 5 - 13 %    EOSINOPHILS 7 0 - 7 %    BASOPHILS 1 0 - 1 %    IMMATURE GRANULOCYTES 0 0.0 - 0.5 %    ABS. NEUTROPHILS 4.6 1.8 - 8.0 K/UL    ABS. LYMPHOCYTES 1.5 0.8 - 3.5 K/UL    ABS. MONOCYTES 0.9 0.0 - 1.0 K/UL    ABS. EOSINOPHILS 0.5 (H) 0.0 - 0.4 K/UL    ABS. BASOPHILS 0.1 0.0 - 0.1 K/UL    ABS. IMM.  GRANS. 0.0 0.00 - 0.04 K/UL    DF AUTOMATED     METABOLIC PANEL, COMPREHENSIVE    Collection Time: 10/07/22 10:10 AM   Result Value Ref Range    Sodium 136 136 - 145 mmol/L    Potassium 4.2 3.5 - 5.1 mmol/L    Chloride 102 97 - 108 mmol/L    CO2 28 21 - 32 mmol/L    Anion gap 6 5 - 15 mmol/L    Glucose 131 (H) 65 - 100 mg/dL    BUN 19 6 - 20 MG/DL    Creatinine 1.31 (H) 0.70 - 1.30 MG/DL    BUN/Creatinine ratio 15 12 - 20      eGFR 58 (L) >60 ml/min/1.73m2    Calcium 9.9 8.5 - 10.1 MG/DL    Bilirubin, total 0.6 0.2 - 1.0 MG/DL    ALT (SGPT) 27 12 - 78 U/L    AST (SGOT) 37 15 - 37 U/L    Alk.  phosphatase 254 (H) 45 - 117 U/L    Protein, total 8.7 (H) 6.4 - 8.2 g/dL    Albumin 3.4 (L) 3.5 - 5.0 g/dL    Globulin 5.3 (H) 2.0 - 4.0 g/dL    A-G Ratio 0.6 (L) 1.1 - 2.2     TYPE & SCREEN    Collection Time: 10/07/22 10:10 AM   Result Value Ref Range    Crossmatch Expiration 10/10/2022,2359     ABO/Rh(D) A POSITIVE     Antibody screen NEG    MAGNESIUM    Collection Time: 10/07/22 10:10 AM   Result Value Ref Range    Magnesium 2.1 1.6 - 2.4 mg/dL   NT-PRO BNP    Collection Time: 10/07/22 10:10 AM   Result Value Ref Range    NT pro- (H) <125 PG/ML   TROPONIN-HIGH SENSITIVITY    Collection Time: 10/07/22 10:14 AM   Result Value Ref Range    Troponin-High Sensitivity 6 0 - 76 ng/L   BLOOD GAS,CHEM8,LACTIC ACID POC    Collection Time: 10/07/22 10:16 AM   Result Value Ref Range    Calcium, ionized (POC) 1.22 1.12 - 1.32 mmol/L    BICARBONATE 27 mmol/L    Base excess (POC) 1.9 mmol/L    Sample source VENOUS BLOOD      CO2, POC 28 (H) 19 - 24 MMOL/L    Sodium,  136 - 145 MMOL/L    Potassium, POC 3.9 3.5 - 5.5 MMOL/L    Chloride,  100 - 108 MMOL/L    Glucose,  (H) 74 - 106 MG/DL    Creatinine, POC 1.1 0.6 - 1.3 MG/DL    Lactic Acid (POC) 1.21 0.40 - 2.00 mmol/L    pH, venous (POC) 7.39 7.32 - 7.42      pCO2, venous (POC) 45.4 41 - 51 MMHG    pO2, venous (POC) 37 25 - 40 mmHg   URINALYSIS W/ REFLEX CULTURE    Collection Time: 10/07/22 12:58 PM    Specimen: Urine   Result Value Ref Range    Color DARK YELLOW      Appearance CLEAR CLEAR      Specific gravity 1.023      pH (UA) 5.5 5.0 - 8.0      Protein 30 (A) NEG mg/dL    Glucose Negative NEG mg/dL    Ketone TRACE (A) NEG mg/dL    Bilirubin Negative NEG      Blood Negative NEG      Urobilinogen 1.0 0.2 - 1.0 EU/dL    Nitrites Negative NEG      Leukocyte Esterase Negative NEG      UA:UC IF INDICATED CULTURE NOT INDICATED BY UA RESULT      WBC 0-4 0 - 4 /hpf    RBC 0-5 0 - 5 /hpf    Epithelial cells FEW FEW /lpf    Bacteria Negative NEG /hpf    Hyaline cast 2-5 0 - 2 /lpf       Radiologic Studies -   CT HEAD WO CONT   Final Result         No change or acute abnormality      XR CHEST PORT   Final Result   1. No acute disease            CT Results  (Last 48 hours)                 10/07/22 1054  CT HEAD WO CONT Final result    Impression:          No change or acute abnormality       Narrative:  EXAM: CT HEAD WO CONT       INDICATION: ams       COMPARISON: 12/12/2016. CONTRAST: None. TECHNIQUE: Unenhanced CT of the head was performed using 5 mm images. Brain and   bone windows were generated. Coronal and sagittal reformats. CT dose reduction   was achieved through use of a standardized protocol tailored for this   examination and automatic exposure control for dose modulation. FINDINGS:   The ventricles and sulci are normal in size, shape and configuration. . Chronic   low density in the right basal ganglia unchanged. . There is no intracranial   hemorrhage, extra-axial collection, or mass effect. The basilar cisterns are   open. No CT evidence of acute infarct. The bone windows demonstrate no abnormalities. The visualized portions of the   paranasal sinuses and mastoid air cells are clear. CXR Results  (Last 48 hours)                 10/07/22 1031  XR CHEST PORT Final result    Impression:  1. No acute disease           Narrative:  INDICATION:  sob        Exam: Portable chest 1028. Comparison: None. Findings: Cardiomediastinal silhouette is within normal limits. Pulmonary   vasculature is not engorged. There are no focal parenchymal opacities,   effusions, or pneumothorax. Medical Decision Making   I am the first provider for this patient.     I reviewed the vital signs, available nursing notes, past medical history, past surgical history, family history and social history. Vital Signs-Reviewed the patient's vital signs. Patient Vitals for the past 12 hrs:   Temp Pulse Resp BP SpO2   10/07/22 0949 98.2 °F (36.8 °C) 91 20 129/66 100 %       Records Reviewed: Nursing records and medical records reviewed    MDM:  DDx includes heart failure, ACS, pneumonia, MI    Provider Notes (Medical Decision Making):   69-year-old male with history of hypertension presents with shortness of breath. Vital signs are stable upon arrival.  He appears well on exam mild 1+ lower extremity edema and maculopapular rash across his chest.  His lower extremity edema as well as shortness of breath concerning for heart failure and will work-up with BNP and chest x-ray for fluid overload. He is also been more confused recently. Together with the shortness of breath possible hypercapnia so obtain venous blood gas. Will obtain head CT for his confusion as well as basic labs. Does have a history of anemia so perhaps he could be more anemic than normal.    ED Course:   Initial assessment performed. The patients presenting problems have been discussed, and they are in agreement with the care plan formulated and outlined with them. I have encouraged them to ask questions as they arise throughout their visit. Disposition:  Discharge Note:  1:55 PM  The patient has been re-evaluated and is ready for discharge. Reviewed available results with patient. Counseled patient on diagnosis and care plan. Patient has expressed understanding, and all questions have been answered. Patient agrees with plan and agrees to follow up as recommended, or to return to the ED if their symptoms worsen. Discharge instructions have been provided and explained to the patient, along with reasons to return to the ED. DISCHARGE PLAN:  1. Current Discharge Medication List        2.    Follow-up Information       Follow up With Specialties Details Why Contact Info    Colleen Gilliland MD Family Medicine In 1 week  [address] [place] [address] [place] [address]  [phone]      [facility] EMERGENCY DEPT Emergency Medicine  If symptoms worsen [address]  [address]   [address] [address] [phone]          3. Return to ED if worse     Diagnosis     Clinical Impression:   1. SOB (shortness of breath)    2. Iron deficiency anemia, unspecified iron deficiency anemia type    3. Fatigue, unspecified type        Attestations:    Antonio Rodriguez MD    Please note that this dictation was completed with [employer], the 3Touch voice recognition software. Quite often unanticipated grammatical, syntax, homophones, and other interpretive errors are inadvertently transcribed by the computer software. Please disregard these errors. Please excuse any errors that have escaped final proofreading. Thank you.

## 2022-10-08 LAB
ATRIAL RATE: 102 BPM
CALCULATED P AXIS, ECG09: 24 DEGREES
CALCULATED R AXIS, ECG10: -30 DEGREES
CALCULATED T AXIS, ECG11: 94 DEGREES
DIAGNOSIS, 93000: NORMAL
P-R INTERVAL, ECG05: 142 MS
Q-T INTERVAL, ECG07: 350 MS
QRS DURATION, ECG06: 98 MS
QTC CALCULATION (BEZET), ECG08: 456 MS
VENTRICULAR RATE, ECG03: 102 BPM

## 2022-10-10 ENCOUNTER — TELEPHONE (OUTPATIENT)
Dept: FAMILY MEDICINE CLINIC | Age: 72
End: 2022-10-10

## 2022-10-10 NOTE — TELEPHONE ENCOUNTER
----- Message from Victoria Antoni sent at 10/7/2022  4:00 PM EDT -----  Subject: Appointment Request    Reason for Call: Established Patient Appointment needed: Urgent (Patient   Request) ED Follow Up Visit    QUESTIONS    Reason for appointment request? Available appointments did not meet   patient need     Additional Information for Provider? Pt calling as needs to schedule an   appt for ED to be seen in 1 week due to early stages of heart failure. Pt   needs referral for a cardiologist per the advise of the ED. Wife has an   appt on 10/21 and is willing to give pt that appt and schedule another   appt for herself.  Please call with instructions.  ---------------------------------------------------------------------------  --------------  Elva RUSSO  [phone]; OK to leave message on voicemail  ---------------------------------------------------------------------------  --------------  SCRIPT ANSWERS  COVID Screen: Lavinia Morris

## 2022-10-11 ENCOUNTER — OFFICE VISIT (OUTPATIENT)
Dept: FAMILY MEDICINE CLINIC | Age: 72
End: 2022-10-11
Payer: MEDICARE

## 2022-10-11 ENCOUNTER — TELEPHONE (OUTPATIENT)
Dept: FAMILY MEDICINE CLINIC | Age: 72
End: 2022-10-11

## 2022-10-11 VITALS
RESPIRATION RATE: 18 BRPM | WEIGHT: 165.4 LBS | BODY MASS INDEX: 26.58 KG/M2 | DIASTOLIC BLOOD PRESSURE: 77 MMHG | SYSTOLIC BLOOD PRESSURE: 129 MMHG | HEART RATE: 92 BPM | TEMPERATURE: 98 F | OXYGEN SATURATION: 99 % | HEIGHT: 66 IN

## 2022-10-11 DIAGNOSIS — I10 ESSENTIAL HYPERTENSION: ICD-10-CM

## 2022-10-11 DIAGNOSIS — D64.9 NORMOCHROMIC NORMOCYTIC ANEMIA: ICD-10-CM

## 2022-10-11 DIAGNOSIS — R06.09 DYSPNEA ON EXERTION: Primary | ICD-10-CM

## 2022-10-11 DIAGNOSIS — F32.0 CURRENT MILD EPISODE OF MAJOR DEPRESSIVE DISORDER WITHOUT PRIOR EPISODE (HCC): ICD-10-CM

## 2022-10-11 DIAGNOSIS — L30.9 ECZEMA, UNSPECIFIED TYPE: ICD-10-CM

## 2022-10-11 DIAGNOSIS — D50.8 OTHER IRON DEFICIENCY ANEMIA: ICD-10-CM

## 2022-10-11 PROCEDURE — 1123F ACP DISCUSS/DSCN MKR DOCD: CPT | Performed by: FAMILY MEDICINE

## 2022-10-11 PROCEDURE — G8510 SCR DEP NEG, NO PLAN REQD: HCPCS | Performed by: FAMILY MEDICINE

## 2022-10-11 PROCEDURE — G8754 DIAS BP LESS 90: HCPCS | Performed by: FAMILY MEDICINE

## 2022-10-11 PROCEDURE — 1101F PT FALLS ASSESS-DOCD LE1/YR: CPT | Performed by: FAMILY MEDICINE

## 2022-10-11 PROCEDURE — G8427 DOCREV CUR MEDS BY ELIG CLIN: HCPCS | Performed by: FAMILY MEDICINE

## 2022-10-11 PROCEDURE — 99213 OFFICE O/P EST LOW 20 MIN: CPT | Performed by: FAMILY MEDICINE

## 2022-10-11 PROCEDURE — G8752 SYS BP LESS 140: HCPCS | Performed by: FAMILY MEDICINE

## 2022-10-11 PROCEDURE — G8417 CALC BMI ABV UP PARAM F/U: HCPCS | Performed by: FAMILY MEDICINE

## 2022-10-11 PROCEDURE — G8536 NO DOC ELDER MAL SCRN: HCPCS | Performed by: FAMILY MEDICINE

## 2022-10-11 PROCEDURE — 3017F COLORECTAL CA SCREEN DOC REV: CPT | Performed by: FAMILY MEDICINE

## 2022-10-11 RX ORDER — TRIAMCINOLONE ACETONIDE 1 MG/G
CREAM TOPICAL
Qty: 453 G | Refills: 1 | Status: SHIPPED | OUTPATIENT
Start: 2022-10-11

## 2022-10-11 RX ORDER — CITALOPRAM 10 MG/1
10 TABLET ORAL DAILY
Qty: 30 TABLET | Refills: 3 | Status: SHIPPED | OUTPATIENT
Start: 2022-10-11

## 2022-10-11 NOTE — TELEPHONE ENCOUNTER
Spoke to the pt wife and she was given the number to central scheduling to call and schedule the Echo for her .

## 2022-10-11 NOTE — TELEPHONE ENCOUNTER
----- Message from Mauri Nesbitt sent at 10/11/2022  2:21 PM EDT -----  Subject: Message to Provider    QUESTIONS  Information for Provider? Patients wife Rock Will is returning a phone call   that they had missed, they did not get VM. She would like a call back. ---------------------------------------------------------------------------  --------------  Cassie PUGH  [phone];  Do not leave any message, patient will call back for answer  ---------------------------------------------------------------------------  --------------  SCRIPT ANSWERS  undefined

## 2022-10-11 NOTE — PROGRESS NOTES
HISTORY OF PRESENT ILLNESS  Henrique Machado is a 66 y.o. male. f/u ncnc anemia,seen by Hematology,has started parenteral iron . Feeling somewhat fatigued,dyspneic with exertion,having improving  diffuse eczemetous rash Wife notes mild memory impairment,forgetting appts,dates,phone no,etc.Increasingly preoccupied with [facility] efforts to make him repay covid dolores  Anemia  The history is provided by the Patient. This is a chronic problem. The problem has been gradually improving. Pertinent negatives include no chest pain. Fatigue  The history is provided by the Patient. This is a chronic problem. The problem occurs daily. Pertinent negatives include no chest pain. Breathing Problem  The history is provided by the Patient. This is a chronic problem. The problem occurs frequently. The problem has not changed since onset. Pertinent negatives include no fever, no rhinorrhea, no cough, no PND, no orthopnea, no chest pain, no syncope and no leg swelling. Depression  This is a chronic problem. The problem occurs daily. The problem has been gradually worsening. Pertinent negatives include no chest pain. Review of Systems   Constitutional:  Positive for malaise/fatigue. Negative for chills and fever. HENT:  Positive for hearing loss. Negative for rhinorrhea. Respiratory:  Negative for cough. Cardiovascular:  Negative for chest pain, orthopnea, leg swelling, syncope and PND. Gastrointestinal:  Negative for blood in stool, heartburn, melena and nausea. Genitourinary:  Negative for frequency. Musculoskeletal:  Negative for back pain and joint pain. Endo/Heme/Allergies:  Bruises/bleeds easily. Psychiatric/Behavioral:  Positive for depression. Physical Exam  Constitutional:       Appearance: Normal appearance. He is normal weight.    HENT:      Right Ear: Tympanic membrane normal.      Left Ear: Tympanic membrane normal.      Nose: Nose normal.      Mouth/Throat:      Mouth: Mucous membranes are moist. Cardiovascular:      Rate and Rhythm: Normal rate and regular rhythm. Pulses: Normal pulses. Heart sounds: Normal heart sounds. Pulmonary:      Effort: Pulmonary effort is normal.      Breath sounds: Normal breath sounds. Abdominal:      General: There is no distension. Palpations: Abdomen is soft. There is no mass. Tenderness: There is no abdominal tenderness. Musculoskeletal:      Cervical back: Normal range of motion and neck supple. Skin:     General: Skin is warm and dry. Findings: No bruising. Comments: Scattered excemetous patches,healing   Neurological:      Mental Status: He is alert. Psychiatric:         Mood and Affect: Mood normal.     Diagnoses and all orders for this visit:    1. Dyspnea on exertion  -     ECHO ADULT COMPLETE; Future    2. Essential hypertension    3. Normochromic normocytic anemia  -     SED RATE (ESR); Future  -     KILEY, DIRECT, W/REFLEX; Future    4. Other iron deficiency anemia    5. Eczema, unspecified type  -     triamcinolone acetonide (KENALOG) 0.1 % topical cream; Apply  to affected area two (2) times daily as needed for Skin Irritation. use thin layer    6. Current mild episode of major depressive disorder without prior episode (HCC)  -     citalopram (CELEXA) 10 mg tablet; Take 1 Tablet by mouth daily.

## 2022-10-11 NOTE — PROGRESS NOTES
Patient identified with two identification factors, Name and Date of Birth. Chief Complaint   Patient presents with    Hospital Follow Up     Pt c/o of muscle cramps in both legs and feet. Awaken out of sleep of pain. Rash present on legs, back, abdomen and waist.       Visit Vitals  /77 (BP 1 Location: Right arm, BP Patient Position: Sitting, BP Cuff Size: Adult)   Pulse 92   Temp 98 °F (36.7 °C) (Oral)   Resp 18   Ht 5' 6\" (1.676 m)   Wt 165 lb 6.4 oz (75 kg)   SpO2 99%   BMI 26.70 kg/m²       Health Maintenance Due   Topic    COVID-19 Vaccine (1)    Colorectal Cancer Screening Combo     Shingrix Vaccine Age 50> (1 of 2)    Pneumococcal 65+ years (1 - PCV)    Flu Vaccine (1)        1. \"Have you been to the ER, urgent care clinic since your last visit? Hospitalized since your last visit? \"  Yes. ED [facility] ED   10/07/22 ( fatigue, Shortness of breath)    2. \"Have you seen or consulted any other health care providers outside of the [address] [place] [address] since your last visit? \" No

## 2022-10-12 LAB — ERYTHROCYTE [SEDIMENTATION RATE] IN BLOOD: 64 MM/HR (ref 0–20)

## 2022-10-13 LAB
ANA SER QL: POSITIVE
CENTROMERE B AB SER-ACNC: <0.2 AI (ref 0–0.9)
CHROMATIN AB SERPL-ACNC: <0.2 AI (ref 0–0.9)
DSDNA AB SER-ACNC: 1 IU/ML (ref 0–9)
ENA JO1 AB SER-ACNC: <0.2 AI (ref 0–0.9)
ENA RNP AB SER-ACNC: <0.2 AI (ref 0–0.9)
ENA SCL70 AB SER-ACNC: <0.2 AI (ref 0–0.9)
ENA SM AB SER-ACNC: <0.2 AI (ref 0–0.9)
ENA SS-A AB SER-ACNC: 1 AI (ref 0–0.9)
ENA SS-B AB SER-ACNC: <0.2 AI (ref 0–0.9)
SEE BELOW, 164869: ABNORMAL

## 2022-10-14 ENCOUNTER — TELEPHONE (OUTPATIENT)
Dept: FAMILY MEDICINE CLINIC | Age: 72
End: 2022-10-14

## 2022-10-14 DIAGNOSIS — R06.09 DYSPNEA ON EXERTION: ICD-10-CM

## 2022-10-14 DIAGNOSIS — M32.19 SYSTEMIC LUPUS ERYTHEMATOSUS WITH OTHER ORGAN INVOLVEMENT, UNSPECIFIED SLE TYPE (HCC): Primary | ICD-10-CM

## 2022-10-14 DIAGNOSIS — D64.9 NORMOCHROMIC NORMOCYTIC ANEMIA: ICD-10-CM

## 2022-10-14 NOTE — TELEPHONE ENCOUNTER
----- Message from Beata Almodovar sent at 10/13/2022  4:46 PM EDT -----  Regarding: Autoimmune panel results   The [facility] results indicate there is an autoimmune disease present, and if so, what disease?     [phone]

## 2022-10-14 NOTE — TELEPHONE ENCOUNTER
----- Message from Jimbo Mohan sent at 10/14/2022  3:07 PM EDT -----  Regarding: Autoimmune panel results   [facility]  needs an authorization from Dr Marilou Mckeon as soon as possible for Jimbo Mohan. OrHayde says, if necessary additional blood tests can be performed at [facility] instead. [facility]  needs an authorization from Dr Marilou Mckeon as soon as possible for Jimbo Mohan. Or, Hayde says, if necessary additional blood tests can be performed at [facility] instead.      [phone]

## 2022-11-02 ENCOUNTER — HOSPITAL ENCOUNTER (OUTPATIENT)
Dept: NON INVASIVE DIAGNOSTICS | Age: 72
Discharge: HOME | End: 2022-11-02
Attending: FAMILY MEDICINE
Payer: MEDICARE

## 2022-11-02 ENCOUNTER — TELEPHONE (OUTPATIENT)
Dept: FAMILY MEDICINE CLINIC | Age: 72
End: 2022-11-02

## 2022-11-02 VITALS
WEIGHT: 165.34 LBS | BODY MASS INDEX: 26.57 KG/M2 | DIASTOLIC BLOOD PRESSURE: 77 MMHG | SYSTOLIC BLOOD PRESSURE: 129 MMHG | HEIGHT: 66 IN

## 2022-11-02 DIAGNOSIS — R06.09 DYSPNEA ON EXERTION: ICD-10-CM

## 2022-11-02 LAB
ECHO AV AREA PEAK VELOCITY: 2.8 CM2
ECHO AV AREA/BSA PEAK VELOCITY: 1.5 CM2/M2
ECHO AV PEAK GRADIENT: 7 MMHG
ECHO AV PEAK VELOCITY: 1.3 M/S
ECHO AV VELOCITY RATIO: 0.77
ECHO LA DIAMETER INDEX: 1.96 CM/M2
ECHO LA DIAMETER: 3.6 CM
ECHO LA VOL 2C: 44 ML (ref 18–58)
ECHO LA VOL 4C: 46 ML (ref 18–58)
ECHO LA VOL BP: 45 ML (ref 18–58)
ECHO LA VOL/BSA BIPLANE: 24 ML/M2 (ref 16–34)
ECHO LA VOLUME AREA LENGTH: 48 ML
ECHO LA VOLUME INDEX A2C: 24 ML/M2 (ref 16–34)
ECHO LA VOLUME INDEX A4C: 25 ML/M2 (ref 16–34)
ECHO LA VOLUME INDEX AREA LENGTH: 26 ML/M2 (ref 16–34)
ECHO LV E' LATERAL VELOCITY: 9 CM/S
ECHO LV E' SEPTAL VELOCITY: 8 CM/S
ECHO LV FRACTIONAL SHORTENING: 31 % (ref 28–44)
ECHO LV INTERNAL DIMENSION DIASTOLE INDEX: 1.9 CM/M2
ECHO LV INTERNAL DIMENSION DIASTOLIC: 3.5 CM (ref 4.2–5.9)
ECHO LV INTERNAL DIMENSION SYSTOLIC INDEX: 1.3 CM/M2
ECHO LV INTERNAL DIMENSION SYSTOLIC: 2.4 CM
ECHO LV IVSD: 1.4 CM (ref 0.6–1)
ECHO LV MASS 2D: 173 G (ref 88–224)
ECHO LV MASS INDEX 2D: 94 G/M2 (ref 49–115)
ECHO LV POSTERIOR WALL DIASTOLIC: 1.4 CM (ref 0.6–1)
ECHO LV RELATIVE WALL THICKNESS RATIO: 0.8
ECHO LVOT AREA: 3.5 CM2
ECHO LVOT DIAM: 2.1 CM
ECHO LVOT PEAK GRADIENT: 4 MMHG
ECHO LVOT PEAK VELOCITY: 1 M/S
ECHO MV A VELOCITY: 0.97 M/S
ECHO MV AREA PHT: 4.6 CM2
ECHO MV E DECELERATION TIME (DT): 163.3 MS
ECHO MV E VELOCITY: 0.74 M/S
ECHO MV E/A RATIO: 0.76
ECHO MV E/E' LATERAL: 8.22
ECHO MV E/E' RATIO (AVERAGED): 8.74
ECHO MV E/E' SEPTAL: 9.25
ECHO MV PRESSURE HALF TIME (PHT): 47.4 MS
ECHO PV MAX VELOCITY: 0.9 M/S
ECHO PV PEAK GRADIENT: 3 MMHG
ECHO RV FREE WALL PEAK S': 13 CM/S
ECHO RV TAPSE: 2 CM (ref 1.7–?)
ECHO TV REGURGITANT MAX VELOCITY: 2.36 M/S
ECHO TV REGURGITANT PEAK GRADIENT: 22 MMHG

## 2022-11-02 PROCEDURE — 93306 TTE W/DOPPLER COMPLETE: CPT

## 2022-11-02 NOTE — TELEPHONE ENCOUNTER
----- Message from Titus Michele sent at 11/2/2022  1:31 PM EDT -----  Regarding: Autoimmune panel results   Dr. Dominique Fletcher please let us know the results of Gosia Lindsay Echocardiogram and if he will need to see a Cardiologist.  His Dermatologist appointment is November 14. If I should need to change or add appointments, please let me know. Im  very concerned about his changing taste! Nothing is appealing to him! Hes eating very little. He says nothing tastes good and hes sleeping  even more.       Thank you for all of your help and understanding!!    [phone]

## 2022-11-08 ENCOUNTER — TELEPHONE (OUTPATIENT)
Dept: FAMILY MEDICINE CLINIC | Age: 72
End: 2022-11-08

## 2022-11-08 NOTE — TELEPHONE ENCOUNTER
----- Message from Marimar Raines sent at 11/8/2022 12:34 PM EST -----  Regarding: Autoimmune panel results   Hi, Sarahy. Thank you for sending us the copies. We so appreciate it. Could you please forward  a  message to Dr Katie Albert: In any or all of Rosi Mullet test results ; were there any abnormalities or any indications of Cancer in his Liver, Kidneys, Stomach or Pancreas? He is still losing weight and still so weak. His autoimmune panel is back. We have not, as yet, made an  appointment to see Dr Nolan Yeung for those results ! If you see any concerns with those ; please let us know.     Thank you for the letter and for all of your continued help and understanding!!   [phone]

## 2022-11-23 ENCOUNTER — TELEPHONE (OUTPATIENT)
Dept: FAMILY MEDICINE CLINIC | Age: 72
End: 2022-11-23

## 2022-11-23 NOTE — TELEPHONE ENCOUNTER
----- Message from Chery Sandoval sent at 11/23/2022  9:46 AM EST -----  Subject: Message to Provider    QUESTIONS  Information for Provider? Dr. Cesario Duverney, [facility] called this am and wants to   speak with Dr. Hedy Soto, Hematologists. Can Dr. Marlyn Andersen call and find out what   is going on. They will not tell patient what is going on. Wife is very   upset and crying.   ---------------------------------------------------------------------------  --------------  Tiana VERGARA  [phone]; OK to leave message on voicemail  ---------------------------------------------------------------------------  --------------  SCRIPT ANSWERS  Relationship to Patient? Other  Representative Name? Janet Colvin Wife  Is the Representative on the appropriate HIPAA document in Epic?  Yes

## 2022-11-30 ENCOUNTER — TELEPHONE (OUTPATIENT)
Dept: FAMILY MEDICINE CLINIC | Age: 72
End: 2022-11-30

## 2022-11-30 NOTE — TELEPHONE ENCOUNTER
----- Message from Izabel Sierra sent at 11/30/2022  3:38 PM EST -----  Subject: Message to Provider    QUESTIONS  Information for Provider? Dr Delia Hickey Rheumatology Legs feet, & ankles are   swelling due to being placed on prednisone. Patient has an appointment   already 12/2 with the dermatologist & Rheumatolgist. Patient does not need   a call back unless provider wants to discuss biopsy results. ---------------------------------------------------------------------------  --------------  Cleopatra ALFRED  [phone]; OK to leave message on voicemail  ---------------------------------------------------------------------------  --------------  SCRIPT ANSWERS  Relationship to Patient? Other  Representative Name? Valentin Valle  Is the Representative on the appropriate HIPAA document in Epic?  Yes

## 2022-12-05 ENCOUNTER — TELEPHONE (OUTPATIENT)
Dept: FAMILY MEDICINE CLINIC | Age: 72
End: 2022-12-05

## 2022-12-05 DIAGNOSIS — N81.89 OTHER FEMALE GENITAL PROLAPSE: Primary | ICD-10-CM

## 2022-12-05 NOTE — TELEPHONE ENCOUNTER
Patients wife, Israel George called stating patient has been experiencing swelling in his feet and legs and has purple spots on his ankles. She requested a call back in regards to what to do. She can be reached at [phone].

## 2022-12-05 NOTE — TELEPHONE ENCOUNTER
Pt wife state he has been weening off the prednisone and he now has swelling in isabela legs and ankles with purple spots on legs, abdominal area and genitals. She is requesting a call back at [phone].

## 2022-12-09 ENCOUNTER — NURSE TRIAGE (OUTPATIENT)
Dept: OTHER | Facility: CLINIC | Age: 72
End: 2022-12-09

## 2022-12-09 ENCOUNTER — TELEPHONE (OUTPATIENT)
Dept: FAMILY MEDICINE CLINIC | Age: 72
End: 2022-12-09

## 2022-12-09 NOTE — TELEPHONE ENCOUNTER
Location of patient: [place]     Received call from Shiva at [facility] with Red Flag Complaint. Subjective: Caller(wife) states left leg feels warmer today  referred to derm and rheumatology for leg swelling,   started on prednisone Nov 15th had more swelling,after prednisone, open areas on lower legs,derm prescribed clobetasol cream,right leg looking better, left looks rough and red, feels warmer     Tapering prednisone,was down to 2.5mg every other day,had few doses left but did not finish, was due for dose yesterday    Current Symptoms: leg swelling same. swelling is from knees down and equal, red spots on left calf,raw areas top of left foot,right leg looks like healing better than left, left lower leg feels warmer than right     Onset: this morning     Associated Symptoms: NA    Pain Severity: Denies at present     Temperature: Denies    What has been tried:     Recommended disposition: See in Office Today    Care advice provided, patient verbalizes understanding; denies any other questions or concerns; instructed to call back for any new or worsening symptoms. Patient/Caller agrees with recommended disposition; writer provided warm transfer to Malachi Duarte at [facility] for appointment scheduling    Attention Provider: Thank you for allowing me to participate in the care of your patient. The patient was connected to triage in response to information provided to the [facility]. Please do not respond through this encounter as the response is not directed to a shared pool.     Reason for Disposition   Patient wants to be seen    Protocols used: Leg Swelling and Edema-ADULT-[place]

## 2022-12-09 NOTE — TELEPHONE ENCOUNTER
Spoke to the pt wife and he was scheduled for Monday, 12/12/22 at 12 noon with Dr. Heidi Echols.   Confirmed by Katelynn Treviño

## 2022-12-09 NOTE — TELEPHONE ENCOUNTER
Patients wife, Arminda Elena (listed on permission to release information) called stating the patients left leg is warmer than the right and there are some red patches. She requested a call back and can be reached at [phone].

## 2022-12-12 ENCOUNTER — OFFICE VISIT (OUTPATIENT)
Dept: FAMILY MEDICINE CLINIC | Age: 72
End: 2022-12-12
Payer: MEDICARE

## 2022-12-12 VITALS
OXYGEN SATURATION: 100 % | DIASTOLIC BLOOD PRESSURE: 75 MMHG | TEMPERATURE: 97.8 F | SYSTOLIC BLOOD PRESSURE: 129 MMHG | HEIGHT: 66 IN | RESPIRATION RATE: 16 BRPM | WEIGHT: 175.8 LBS | HEART RATE: 90 BPM | BODY MASS INDEX: 28.25 KG/M2

## 2022-12-12 DIAGNOSIS — E11.9 CONTROLLED TYPE 2 DIABETES MELLITUS WITHOUT COMPLICATION, WITHOUT LONG-TERM CURRENT USE OF INSULIN (HCC): ICD-10-CM

## 2022-12-12 DIAGNOSIS — R60.1 ANASARCA: Primary | ICD-10-CM

## 2022-12-12 PROCEDURE — 99213 OFFICE O/P EST LOW 20 MIN: CPT | Performed by: FAMILY MEDICINE

## 2022-12-12 PROCEDURE — 2022F DILAT RTA XM EVC RTNOPTHY: CPT | Performed by: FAMILY MEDICINE

## 2022-12-12 PROCEDURE — 1101F PT FALLS ASSESS-DOCD LE1/YR: CPT | Performed by: FAMILY MEDICINE

## 2022-12-12 PROCEDURE — 3074F SYST BP LT 130 MM HG: CPT | Performed by: FAMILY MEDICINE

## 2022-12-12 PROCEDURE — G8536 NO DOC ELDER MAL SCRN: HCPCS | Performed by: FAMILY MEDICINE

## 2022-12-12 PROCEDURE — 1123F ACP DISCUSS/DSCN MKR DOCD: CPT | Performed by: FAMILY MEDICINE

## 2022-12-12 PROCEDURE — 3017F COLORECTAL CA SCREEN DOC REV: CPT | Performed by: FAMILY MEDICINE

## 2022-12-12 PROCEDURE — G8427 DOCREV CUR MEDS BY ELIG CLIN: HCPCS | Performed by: FAMILY MEDICINE

## 2022-12-12 PROCEDURE — G8752 SYS BP LESS 140: HCPCS | Performed by: FAMILY MEDICINE

## 2022-12-12 PROCEDURE — 3046F HEMOGLOBIN A1C LEVEL >9.0%: CPT | Performed by: FAMILY MEDICINE

## 2022-12-12 PROCEDURE — G8510 SCR DEP NEG, NO PLAN REQD: HCPCS | Performed by: FAMILY MEDICINE

## 2022-12-12 PROCEDURE — G8754 DIAS BP LESS 90: HCPCS | Performed by: FAMILY MEDICINE

## 2022-12-12 PROCEDURE — G8417 CALC BMI ABV UP PARAM F/U: HCPCS | Performed by: FAMILY MEDICINE

## 2022-12-12 PROCEDURE — 3078F DIAST BP <80 MM HG: CPT | Performed by: FAMILY MEDICINE

## 2022-12-12 RX ORDER — BUMETANIDE 2 MG/1
TABLET ORAL
Qty: 33 TABLET | Refills: 2 | Status: SHIPPED | OUTPATIENT
Start: 2022-12-12

## 2022-12-12 RX ORDER — CLOBETASOL PROPIONATE 0.5 MG/G
OINTMENT TOPICAL
COMMUNITY
Start: 2022-12-03

## 2022-12-12 NOTE — PROGRESS NOTES
HISTORY OF PRESENT ILLNESS  Tavon Ross is a 67 y.o. male. HPI has been on prednisone 20 mg once daily. for arthritis from his rheumatologist, Dr. Taniya Landeros, 11-15. Has had swelling since then. Has had this tapered off , but swelling has persisted. Has gained 10 lbs. No co dyspnea. Thinks that the swelling has gone down a bit. Voiding ok. Some weak stream. No prior hx similar problem. No hx chf, renal or hepatic disease. Has a CT of chest and abdomen scheduled for  later this week. ROS    Physical Exam  Vitals and nursing note reviewed. Constitutional:       Appearance: He is well-developed. HENT:      Right Ear: External ear normal.      Left Ear: External ear normal.   Neck:      Thyroid: No thyromegaly. Cardiovascular:      Rate and Rhythm: Normal rate and regular rhythm. Heart sounds: Normal heart sounds. Pulmonary:      Effort: Pulmonary effort is normal. No respiratory distress. Breath sounds: Normal breath sounds. No wheezing. Abdominal:      General: Bowel sounds are normal. There is no distension. Palpations: Abdomen is soft. There is no mass. Tenderness: There is no abdominal tenderness. There is no guarding. Musculoskeletal:         General: Normal range of motion. Comments: 3+ edema to groin bilaterally. Mild edema of penis   Lymphadenopathy:      Cervical: No cervical adenopathy. ASSESSMENT and PLAN  Orders Placed This Encounter    CBC WITH AUTOMATED DIFF    TSH 3RD GENERATION    T4, FREE    METABOLIC PANEL, COMPREHENSIVE    HEMOGLOBIN A1C WITH EAG    URINALYSIS W/MICROSCOPIC    bumetanide (BUMEX) 2 mg tablet     Diagnoses and all orders for this visit:    1. Anasarca  -     CBC WITH AUTOMATED DIFF; Future  -     TSH 3RD GENERATION; Future  -     T4, FREE; Future  -     METABOLIC PANEL, COMPREHENSIVE; Future  -     URINALYSIS W/MICROSCOPIC; Future    2.  Controlled type 2 diabetes mellitus without complication, without long-term current use of insulin (HCC)  -     HEMOGLOBIN A1C WITH EAG; Future    Other orders  -     bumetanide (BUMEX) 2 mg tablet; 2 tabs po daily for 3 days, then decrease to one daily after that. For fluid      Follow-up and Dispositions    Return in about 8 days (around 12/20/2022), or with Dr. Con Montenegro.

## 2022-12-12 NOTE — PROGRESS NOTES
Patient identified with two identification factors, Name and Date of Birth. Chief Complaint   Patient presents with    Leg Swelling     Pt is accompanied by wife stated since pt started Prednisone 20mg once a day is when the swelling started. Abdomen and groin area swollen. Visit Vitals  /75 (BP 1 Location: Left arm, BP Patient Position: Sitting, BP Cuff Size: Adult)   Pulse 90   Temp 97.8 °F (36.6 °C) (Oral)   Resp 16   Ht 5' 6\" (1.676 m)   Wt 175 lb 12.8 oz (79.7 kg)   SpO2 100%   BMI 28.37 kg/m²            1. \"Have you been to the ER, urgent care clinic since your last visit? Hospitalized since your last visit? \"  Yes. MRMR October 2022 for heavy breathing. 2. \"Have you seen or consulted any other health care providers outside of the [address] [place] [address] since your last visit? \" No

## 2022-12-13 LAB
ALBUMIN SERPL-MCNC: 3.3 G/DL (ref 3.5–5)
ALBUMIN/GLOB SERPL: 0.8 {RATIO} (ref 1.1–2.2)
ALP SERPL-CCNC: 525 U/L (ref 45–117)
ALT SERPL-CCNC: 53 U/L (ref 12–78)
ANION GAP SERPL CALC-SCNC: 7 MMOL/L (ref 5–15)
APPEARANCE UR: ABNORMAL
AST SERPL-CCNC: 38 U/L (ref 15–37)
BACTERIA URNS QL MICRO: ABNORMAL /HPF
BASOPHILS # BLD: 0.1 K/UL (ref 0–0.1)
BASOPHILS NFR BLD: 2 % (ref 0–1)
BILIRUB SERPL-MCNC: 0.7 MG/DL (ref 0.2–1)
BILIRUB UR QL CFM: NEGATIVE
BUN SERPL-MCNC: 21 MG/DL (ref 6–20)
BUN/CREAT SERPL: 19 (ref 12–20)
CALCIUM SERPL-MCNC: 9.2 MG/DL (ref 8.5–10.1)
CHLORIDE SERPL-SCNC: 101 MMOL/L (ref 97–108)
CO2 SERPL-SCNC: 28 MMOL/L (ref 21–32)
COLOR UR: ABNORMAL
CREAT SERPL-MCNC: 1.11 MG/DL (ref 0.7–1.3)
DIFFERENTIAL METHOD BLD: ABNORMAL
EOSINOPHIL # BLD: 0.3 K/UL (ref 0–0.4)
EOSINOPHIL NFR BLD: 4 % (ref 0–7)
EPITH CASTS URNS QL MICRO: ABNORMAL /LPF
ERYTHROCYTE [DISTWIDTH] IN BLOOD BY AUTOMATED COUNT: 17.2 % (ref 11.5–14.5)
EST. AVERAGE GLUCOSE BLD GHB EST-MCNC: 91 MG/DL
GLOBULIN SER CALC-MCNC: 4.3 G/DL (ref 2–4)
GLUCOSE SERPL-MCNC: 106 MG/DL (ref 65–100)
GLUCOSE UR STRIP.AUTO-MCNC: NEGATIVE MG/DL
HBA1C MFR BLD: 4.8 % (ref 4–5.6)
HCT VFR BLD AUTO: 34.8 % (ref 36.6–50.3)
HGB BLD-MCNC: 10.4 G/DL (ref 12.1–17)
HGB UR QL STRIP: NEGATIVE
HYALINE CASTS URNS QL MICRO: ABNORMAL /LPF (ref 0–5)
IMM GRANULOCYTES # BLD AUTO: 0 K/UL
IMM GRANULOCYTES NFR BLD AUTO: 0 %
KETONES UR QL STRIP.AUTO: ABNORMAL MG/DL
LEUKOCYTE ESTERASE UR QL STRIP.AUTO: NEGATIVE
LYMPHOCYTES # BLD: 1.3 K/UL (ref 0.8–3.5)
LYMPHOCYTES NFR BLD: 19 % (ref 12–49)
MCH RBC QN AUTO: 29.5 PG (ref 26–34)
MCHC RBC AUTO-ENTMCNC: 29.9 G/DL (ref 30–36.5)
MCV RBC AUTO: 98.9 FL (ref 80–99)
MONOCYTES # BLD: 0.7 K/UL (ref 0–1)
MONOCYTES NFR BLD: 11 % (ref 5–13)
MYELOCYTES NFR BLD MANUAL: 1 %
NEUTS SEG # BLD: 4.3 K/UL (ref 1.8–8)
NEUTS SEG NFR BLD: 63 % (ref 32–75)
NITRITE UR QL STRIP.AUTO: NEGATIVE
NRBC # BLD: 0 K/UL (ref 0–0.01)
NRBC BLD-RTO: 0 PER 100 WBC
PH UR STRIP: 5 [PH] (ref 5–8)
PLATELET # BLD AUTO: 206 K/UL (ref 150–400)
PMV BLD AUTO: 9.5 FL (ref 8.9–12.9)
POTASSIUM SERPL-SCNC: 3.7 MMOL/L (ref 3.5–5.1)
PROT SERPL-MCNC: 7.6 G/DL (ref 6.4–8.2)
PROT UR STRIP-MCNC: 30 MG/DL
RBC # BLD AUTO: 3.52 M/UL (ref 4.1–5.7)
RBC #/AREA URNS HPF: ABNORMAL /HPF (ref 0–5)
RBC MORPH BLD: ABNORMAL
RBC MORPH BLD: ABNORMAL
SODIUM SERPL-SCNC: 136 MMOL/L (ref 136–145)
SP GR UR REFRACTOMETRY: 1.02 (ref 1–1.03)
T4 FREE SERPL-MCNC: 1.2 NG/DL (ref 0.8–1.5)
TSH SERPL DL<=0.05 MIU/L-ACNC: 3.59 UIU/ML (ref 0.36–3.74)
UROBILINOGEN UR QL STRIP.AUTO: 0.2 EU/DL (ref 0.2–1)
WBC # BLD AUTO: 6.8 K/UL (ref 4.1–11.1)
WBC URNS QL MICRO: ABNORMAL /HPF (ref 0–4)

## 2022-12-16 ENCOUNTER — TELEPHONE (OUTPATIENT)
Dept: FAMILY MEDICINE CLINIC | Age: 72
End: 2022-12-16

## 2022-12-16 NOTE — TELEPHONE ENCOUNTER
----- Message from Raul Carrasco sent at 12/16/2022  1:27 PM EST -----  Subject: Message to Provider    QUESTIONS  Information for Provider? Pts spouse Susie Wade called to let provider know that   both CT scans were completed today 12/16/22 on the chest and   abdominal/pelvic. She also states that blood work was completed on   12/12/2022. She would like to know if provider can review all of this and   contact them to let them know if he needs to schedule an appt to come in   or give results over the phone. Please call Susie Wade to discuss as she is very   concerned about the labs and results. ---------------------------------------------------------------------------  --------------  Tiana Paniagua NWVX  [phone]; OK to leave message on voicemail  ---------------------------------------------------------------------------  --------------  SCRIPT ANSWERS  Relationship to Patient? Other  Representative Name? Susie Sheri  Is the Representative on the appropriate HIPAA document in Epic?  Yes

## 2022-12-20 ENCOUNTER — TELEPHONE (OUTPATIENT)
Dept: FAMILY MEDICINE CLINIC | Age: 72
End: 2022-12-20

## 2022-12-20 ENCOUNTER — TELEPHONE (OUTPATIENT)
Dept: ONCOLOGY | Age: 72
End: 2022-12-20

## 2022-12-20 NOTE — TELEPHONE ENCOUNTER
----- Message from Samia Little sent at 12/20/2022  2:49 PM EST -----  Subject: Message to Provider    QUESTIONS  Information for Provider? Patient is calling with the contact number the   doctor asked for earlier today 12/20/2022 [phone] member services   number and other number provider service [phone] and the patient is   seeing doctor Eddie Kowalski on 12/28/2022 @11 am, and doctor [phone] [facility] surgeon biopsy.   ---------------------------------------------------------------------------  --------------  Ludwig RUSSO  [phone]; OK to leave message on voicemail  ---------------------------------------------------------------------------  --------------  SCRIPT ANSWERS  Relationship to Patient? Other  Representative Name? Susan Castro  Is the Representative on the appropriate HIPAA document in Epic?  Yes

## 2022-12-20 NOTE — TELEPHONE ENCOUNTER
PHI verified and HIPPA verified by two patient identifiers. Return call placed to pt's spouse. Pt's spouse advised to call her insurance company d/t her concerns of if a doctors visit or procedure will be paid by [employer].     Pt's spouse wants to keep the appt for 12/28 at 11am w/ Dr. German Fisher and will also see the surgeon Dr. Yohana Sawant on 12/28 at 11:40am.

## 2022-12-20 NOTE — TELEPHONE ENCOUNTER
Patient was told by Dr. Katie Albert that the patient needs to be seen next week by Dr. Summer Pinzon and Dr. Coreen Taveras. The wife is distraught because she said they have met their benefit for the year.

## 2022-12-20 NOTE — TELEPHONE ENCOUNTER
----- Message from Rachel Dimas sent at 12/20/2022  9:12 AM EST -----  Subject: Message to Provider    QUESTIONS  Information for Provider? the results of MRI was faxed on 12/19/22 by   provider Leona Nicholas and if provider Melanie Hartmann has not received them please call   Meghann Goodman at [phone]. Also provider Mario Sousa would like provider Melanie Hartmann to   sent most recent labs done on 12/12/22 results over  Relationship to Patient? Other  Representative Name? Fariha Perez  Is the Representative on the appropriate HIPAA document in Epic? Yes  Pt wife can be reached at [phone]    ----------------------------------------------------------------------------  The labs from 12/12/22 will be faxed over to Dr. Mario Sousa at fax number [phone].

## 2022-12-22 ENCOUNTER — TELEPHONE (OUTPATIENT)
Dept: FAMILY MEDICINE CLINIC | Age: 72
End: 2022-12-22

## 2022-12-22 NOTE — TELEPHONE ENCOUNTER
----- Message from Riki Chhaya sent at 12/22/2022  1:03 PM EST -----  Regarding: Update on Riki Chhaya  fluid retention   Alexy Smith has  been off of Prednisone since 12/6/22 and on Bumetanide since 12/13/22. The fluid has decreased,  but he still is retaining fluid  and his legs and knees are very sensitive to the touch . His 3 middle toes, on each foot,  are far more red that the surrounding toes. His skin is warm,  but not hot. He has a lot of difficulty in walking. He is weak and  eating , but not a normal amount. He is always tired   He falls asleep at any time. Im afraid  of his falling (with all the rain) or I would  had asked for an appointment . My phone is [phone], if you need to call. Thank you .

## 2022-12-22 NOTE — TELEPHONE ENCOUNTER
----- Message from Odette Flores sent at 12/22/2022  1:03 PM EST -----  Regarding: Update on [employer]  fluid retention   Darrion Bran has  been off of Prednisone since 12/6/22 and on Bumetanide since 12/13/22. The fluid has decreased,  but he still is retaining fluid  and his legs and knees are very sensitive to the touch . His 3 middle toes, on each foot,  are far more red that the surrounding toes. His skin is warm,  but not hot. He has a lot of difficulty in walking. He is weak and  eating , but not a normal amount. He is always tired   He falls asleep at any time. Im afraid  of his falling (with all the rain) or I would  had asked for an appointment . My phone is [phone], if you need to call. Thank you .

## 2022-12-23 ENCOUNTER — OFFICE VISIT (OUTPATIENT)
Dept: FAMILY MEDICINE CLINIC | Age: 72
End: 2022-12-23
Payer: MEDICARE

## 2022-12-23 VITALS
HEIGHT: 66 IN | DIASTOLIC BLOOD PRESSURE: 65 MMHG | SYSTOLIC BLOOD PRESSURE: 101 MMHG | BODY MASS INDEX: 25.65 KG/M2 | HEART RATE: 106 BPM | WEIGHT: 159.6 LBS

## 2022-12-23 DIAGNOSIS — R59.1 LYMPHADENOPATHY: Primary | ICD-10-CM

## 2022-12-23 DIAGNOSIS — E11.9 CONTROLLED TYPE 2 DIABETES MELLITUS WITHOUT COMPLICATION, WITHOUT LONG-TERM CURRENT USE OF INSULIN (HCC): ICD-10-CM

## 2022-12-23 DIAGNOSIS — R60.1 ANASARCA: ICD-10-CM

## 2022-12-23 DIAGNOSIS — I10 ESSENTIAL HYPERTENSION: ICD-10-CM

## 2022-12-23 NOTE — PROGRESS NOTES
Chief Complaint   Patient presents with    Follow-up     Swelling in legs     1. \"Have you been to the ER, urgent care clinic since your last visit? Hospitalized since your last visit? \" No    2. \"Have you seen or consulted any other health care providers outside of the [address] [place] [address] since your last visit? \" No     3. For patients aged 39-70: Has the patient had a colonoscopy / FIT/ Cologuard? No ordered on 7/7/2022      If the patient is female:    4. For patients aged 41-77: Has the patient had a mammogram within the past 2 years? NA - based on age or sex      11. For patients aged 21-65: Has the patient had a pap smear?  NA - based on age or sex

## 2022-12-24 LAB
ALBUMIN SERPL-MCNC: 3.4 G/DL (ref 3.5–5)
ALBUMIN/GLOB SERPL: 0.8 {RATIO} (ref 1.1–2.2)
ALP SERPL-CCNC: 589 U/L (ref 45–117)
ALT SERPL-CCNC: 27 U/L (ref 12–78)
ANION GAP SERPL CALC-SCNC: 6 MMOL/L (ref 5–15)
AST SERPL-CCNC: 37 U/L (ref 15–37)
BASOPHILS # BLD: 0.1 K/UL (ref 0–0.1)
BASOPHILS NFR BLD: 1 % (ref 0–1)
BILIRUB SERPL-MCNC: 1.1 MG/DL (ref 0.2–1)
BUN SERPL-MCNC: 25 MG/DL (ref 6–20)
BUN/CREAT SERPL: 15 (ref 12–20)
CALCIUM SERPL-MCNC: 10 MG/DL (ref 8.5–10.1)
CHLORIDE SERPL-SCNC: 96 MMOL/L (ref 97–108)
CO2 SERPL-SCNC: 32 MMOL/L (ref 21–32)
CREAT SERPL-MCNC: 1.62 MG/DL (ref 0.7–1.3)
DIFFERENTIAL METHOD BLD: ABNORMAL
EOSINOPHIL # BLD: 0.5 K/UL (ref 0–0.4)
EOSINOPHIL NFR BLD: 4 % (ref 0–7)
ERYTHROCYTE [DISTWIDTH] IN BLOOD BY AUTOMATED COUNT: 16.4 % (ref 11.5–14.5)
GLOBULIN SER CALC-MCNC: 4.3 G/DL (ref 2–4)
GLUCOSE SERPL-MCNC: 134 MG/DL (ref 65–100)
HCT VFR BLD AUTO: 35.7 % (ref 36.6–50.3)
HGB BLD-MCNC: 10.7 G/DL (ref 12.1–17)
IMM GRANULOCYTES # BLD AUTO: 0.1 K/UL (ref 0–0.04)
IMM GRANULOCYTES NFR BLD AUTO: 1 % (ref 0–0.5)
LYMPHOCYTES # BLD: 2.6 K/UL (ref 0.8–3.5)
LYMPHOCYTES NFR BLD: 20 % (ref 12–49)
MCH RBC QN AUTO: 29 PG (ref 26–34)
MCHC RBC AUTO-ENTMCNC: 30 G/DL (ref 30–36.5)
MCV RBC AUTO: 96.7 FL (ref 80–99)
MONOCYTES # BLD: 1.3 K/UL (ref 0–1)
MONOCYTES NFR BLD: 11 % (ref 5–13)
NEUTS SEG # BLD: 8.1 K/UL (ref 1.8–8)
NEUTS SEG NFR BLD: 63 % (ref 32–75)
NRBC # BLD: 0 K/UL (ref 0–0.01)
NRBC BLD-RTO: 0 PER 100 WBC
PLATELET # BLD AUTO: 231 K/UL (ref 150–400)
PMV BLD AUTO: 10 FL (ref 8.9–12.9)
POTASSIUM SERPL-SCNC: 4.3 MMOL/L (ref 3.5–5.1)
PROT SERPL-MCNC: 7.7 G/DL (ref 6.4–8.2)
RBC # BLD AUTO: 3.69 M/UL (ref 4.1–5.7)
SODIUM SERPL-SCNC: 134 MMOL/L (ref 136–145)
WBC # BLD AUTO: 12.6 K/UL (ref 4.1–11.1)

## 2022-12-27 ENCOUNTER — TELEPHONE (OUTPATIENT)
Dept: ONCOLOGY | Age: 72
End: 2022-12-27

## 2022-12-27 DIAGNOSIS — R11.2 NAUSEA AND VOMITING, UNSPECIFIED VOMITING TYPE: Primary | ICD-10-CM

## 2022-12-27 RX ORDER — PROCHLORPERAZINE MALEATE 10 MG
10 TABLET ORAL
Qty: 30 TABLET | Refills: 1 | Status: SHIPPED | OUTPATIENT
Start: 2022-12-27 | End: 2022-12-29 | Stop reason: CLARIF

## 2022-12-27 RX ORDER — ONDANSETRON 4 MG/1
4 TABLET, ORALLY DISINTEGRATING ORAL
Qty: 30 TABLET | Refills: 1 | Status: SHIPPED | OUTPATIENT
Start: 2022-12-27

## 2022-12-27 NOTE — TELEPHONE ENCOUNTER
12/27/22 at 1:09 pm - Received a call from the patient's wife, David Rangel, and verified the patient's ID x 2. Janice Marshall stated that the patient sees Dr. Jeff Garland tomorrow, 12/28/22, but is eating so little and she is extremely concerned and that the patient had labs drawn on Friday 12/23/22 and some counts were low. Janice Marshall also stated that the patient was started on Prednisone and gained fluid in his body and some skin is cracked. Asked if the patient is drinking any fluids and Janice Marshall stated that he sips on Pepsi, dinks OJ mostly and his urine is very dark and that his stomach feels unsettled if he eats. Asked about any other symptoms and Janice Marshall stated that the patient is \"cold all the time, his hands are cold\" and his legs \"ache because of the fluid\" and that he is taking a diuretic that was started on 12/15/22. Janice Marshall stated she is worried that he will \"waste away. \"  Informed Janice Marshall that per Dr. Jeff Garland, prescriptions for     Requested Prescriptions     Signed Prescriptions Disp Refills    ondansetron (ZOFRAN ODT) 4 mg disintegrating tablet 30 Tablet 1     Sig: Take 1 Tablet by mouth every eight (8) hours as needed for Nausea or Vomiting. Authorizing Provider: Crow Ngo     Ordering User: Mirtha Sherman    prochlorperazine (Compazine) 10 mg tablet 30 Tablet 1     Sig: Take 1 Tablet by mouth every six (6) hours as needed for Nausea or Vomiting. Authorizing Provider: Crow Ngo     Ordering User: Mirtha Sherman     will be electronically sent to his listed [facility] in [facility]. Janice Marshall verified that is the correct pharmacy and that she will call them to verify that they have the prescriptions and to notify her as soon as they are available.   Informed Janice Marshall that the patient is able to take one and if there is no relief he is able to take the other one and then repeat as instructed on the directions (zofran every 8 hours and Compazine every 6 hours) and that hopefully this will ease the nausea so he can eat and any other concerns can be discussed at his scheduled appointment tomorrow 12/28/22. Zan was very appreciative of the call and denied any further questions or concerns.

## 2022-12-28 ENCOUNTER — OFFICE VISIT (OUTPATIENT)
Dept: SURGERY | Age: 72
End: 2022-12-28
Payer: MEDICARE

## 2022-12-28 ENCOUNTER — OFFICE VISIT (OUTPATIENT)
Dept: ONCOLOGY | Age: 72
End: 2022-12-28

## 2022-12-28 VITALS
OXYGEN SATURATION: 96 % | SYSTOLIC BLOOD PRESSURE: 103 MMHG | RESPIRATION RATE: 20 BRPM | BODY MASS INDEX: 24.65 KG/M2 | TEMPERATURE: 97.8 F | HEIGHT: 66 IN | HEART RATE: 100 BPM | WEIGHT: 153.4 LBS | DIASTOLIC BLOOD PRESSURE: 67 MMHG

## 2022-12-28 VITALS
HEIGHT: 66 IN | RESPIRATION RATE: 18 BRPM | SYSTOLIC BLOOD PRESSURE: 98 MMHG | BODY MASS INDEX: 24.33 KG/M2 | DIASTOLIC BLOOD PRESSURE: 67 MMHG | WEIGHT: 151.4 LBS | OXYGEN SATURATION: 98 % | TEMPERATURE: 99 F | HEART RATE: 103 BPM

## 2022-12-28 DIAGNOSIS — R68.89 OTHER GENERAL SYMPTOMS AND SIGNS: ICD-10-CM

## 2022-12-28 DIAGNOSIS — R79.9 ABNORMAL FINDING OF BLOOD CHEMISTRY, UNSPECIFIED: ICD-10-CM

## 2022-12-28 DIAGNOSIS — R41.0 CONFUSION: ICD-10-CM

## 2022-12-28 DIAGNOSIS — R59.1 LYMPHADENOPATHY: Primary | ICD-10-CM

## 2022-12-28 DIAGNOSIS — R91.8 OTHER NONSPECIFIC ABNORMAL FINDING OF LUNG FIELD: ICD-10-CM

## 2022-12-28 PROBLEM — E11.9 TYPE 2 DIABETES MELLITUS (HCC): Status: ACTIVE | Noted: 2022-12-28

## 2022-12-28 PROCEDURE — 3074F SYST BP LT 130 MM HG: CPT | Performed by: STUDENT IN AN ORGANIZED HEALTH CARE EDUCATION/TRAINING PROGRAM

## 2022-12-28 PROCEDURE — 3078F DIAST BP <80 MM HG: CPT | Performed by: STUDENT IN AN ORGANIZED HEALTH CARE EDUCATION/TRAINING PROGRAM

## 2022-12-28 PROCEDURE — G8420 CALC BMI NORM PARAMETERS: HCPCS | Performed by: STUDENT IN AN ORGANIZED HEALTH CARE EDUCATION/TRAINING PROGRAM

## 2022-12-28 PROCEDURE — 1101F PT FALLS ASSESS-DOCD LE1/YR: CPT | Performed by: STUDENT IN AN ORGANIZED HEALTH CARE EDUCATION/TRAINING PROGRAM

## 2022-12-28 PROCEDURE — G8510 SCR DEP NEG, NO PLAN REQD: HCPCS | Performed by: STUDENT IN AN ORGANIZED HEALTH CARE EDUCATION/TRAINING PROGRAM

## 2022-12-28 PROCEDURE — 3017F COLORECTAL CA SCREEN DOC REV: CPT | Performed by: STUDENT IN AN ORGANIZED HEALTH CARE EDUCATION/TRAINING PROGRAM

## 2022-12-28 PROCEDURE — 1123F ACP DISCUSS/DSCN MKR DOCD: CPT | Performed by: STUDENT IN AN ORGANIZED HEALTH CARE EDUCATION/TRAINING PROGRAM

## 2022-12-28 PROCEDURE — G8427 DOCREV CUR MEDS BY ELIG CLIN: HCPCS | Performed by: STUDENT IN AN ORGANIZED HEALTH CARE EDUCATION/TRAINING PROGRAM

## 2022-12-28 PROCEDURE — 99215 OFFICE O/P EST HI 40 MIN: CPT | Performed by: STUDENT IN AN ORGANIZED HEALTH CARE EDUCATION/TRAINING PROGRAM

## 2022-12-28 PROCEDURE — G8536 NO DOC ELDER MAL SCRN: HCPCS | Performed by: STUDENT IN AN ORGANIZED HEALTH CARE EDUCATION/TRAINING PROGRAM

## 2022-12-28 NOTE — PROGRESS NOTES
Zack Vann is a 67 y.o. male  Chief Complaint   Patient presents with    Follow-up     New diagnosis lymphoma     1. Have you been to the ER, urgent care clinic since your last visit? Hospitalized since your last visit? No    2. Have you seen or consulted any other health care providers outside of the [address] [place] [address] since your last visit? Include any pap smears or colon screening.  No

## 2022-12-28 NOTE — LETTER
12/28/2022    Patient: Titus Michele   YOB: 1950   Date of Visit: 12/28/2022     Amber Ballesteros, [address] [address]  [place] [address]    Dear Amber Ballesteros MD,      Thank you for referring Mr. Titus Michele to [address] [place] [address] for evaluation. My notes for this consultation are attached. If you have questions, please do not hesitate to call me. I look forward to following your patient along with you.       Sincerely,    Ny Harding MD

## 2022-12-28 NOTE — PROGRESS NOTES
Identified pt with two pt identifiers(name and ). Reviewed record in preparation for visit and have obtained necessary documentation. All patient medications has been reviewed. Chief Complaint   Patient presents with    Mass     Seen at the request of Regan Dunlap MD for evaluation of possible lymphoma. Health Maintenance Due   Topic    COVID-19 Vaccine (1)    Pneumococcal 65+ years (1 - PCV)    Foot Exam Q1     Eye Exam Retinal or Dilated     Diabetic Alb to Cr ratio (uACR) test     Colorectal Cancer Screening Combo     Shingles Vaccine (1 of 2)    Flu Vaccine (1)       Vitals:    22 1201   BP: 103/67   Pulse: 100   Resp: 20   Temp: 97.8 °F (36.6 °C)   TempSrc: Temporal   SpO2: 96%   Weight: 153 lb 6.4 oz (69.6 kg)   Height: 5' 6\" (1.676 m)   PainSc:   0 - No pain       4. Have you been to the ER, urgent care clinic since your last visit? Hospitalized since your last visit? No    5. Have you seen or consulted any other health care providers outside of the [address] [place] [address] since your last visit? Include any pap smears or colon screening. No      Patient is accompanied by wife I have received verbal consent from Collin Mccain to discuss any/all medical information while they are present in the room.

## 2022-12-28 NOTE — PROGRESS NOTES
[facility] [place] at [address] [facility] [address] [facility]  W: [phone] F: [phone]      Reason for Visit:   Glory Fitzpatrick is a 67 y.o. male who is seen in consultation at the request of Dr. Thomas Pinzon for evaluation of normocytic anemia. Hematology / Oncology Treatment History:     Hematological/Oncological Diagnosis: Normocytic Anemia    Date of Diagnosis: 2021    Treatment course: surveillance       History of Present Illness:     66year old with hx of hypertension, seasonal allergies, presents with worsening normocytic aneima of unclear etiology. Hg trend below:       No other cytopenias. MCV is 89. Iron studies are normal.  B12, folate are normal. Reticulocyte count is low/normal at 1.4. Creatinine is normal at 1.10. Calcium is normal at 9.6. LFTs are normal. Per notes from PCP, stool guiac we negative in July. Alkaline phosphatase is elevated. Constitutional symptoms positive for easy bruising only. No night sweats, bone pain, unintentional weight loss. Family history reviewed, non contributory  Social history reviewed, non contributory. No alcohol use    Interval History:     12/28/22:      Marked clinical worsening since last evaluation, patient has had anasarca, severe fatigue, weight loss, nausea, vomiting that is worsened over the last few months. The patient was evaluated by his primary care physician who ordered CT chest abdomen pelvis that showed diffuse lymphadenopathy. Results from imaging shown below        Radiographic findings are highly suspicious for lymphoma. Patient is planned for surgical evaluation for excisional lymph node biopsy later on today. Patient's family also reports that he has been confused intermittently over the last few weeks. They are concerned that there might be a lymphoma infiltrating into the CNS.   No focal deficits reported or seizure activity noted.        Review of Systems: A complete review of systems was obtained, negative except as described above. Past Medical History:   Diagnosis Date    Allergic rhinitis, cause unspecified 2013    Environmental allergies       Past Surgical History:   Procedure Laterality Date    HX TONSILLECTOMY      HX WISDOM TEETH EXTRACTION        Social History     Tobacco Use    Smoking status: Former     Packs/day: 1.00     Years: 2.00     Pack years: 2.00     Types: Cigarettes     Quit date: 1966     Years since quittin.0    Smokeless tobacco: Never   Substance Use Topics    Alcohol use: No      Family History   Problem Relation Age of Onset    Hypertension Mother     Hypertension Father     Heart Disease Father     Alcohol abuse Father     Hypertension Brother     No Known Problems Brother      Current Outpatient Medications   Medication Sig    ondansetron (ZOFRAN ODT) 4 mg disintegrating tablet Take 1 Tablet by mouth every eight (8) hours as needed for Nausea or Vomiting. prochlorperazine (Compazine) 10 mg tablet Take 1 Tablet by mouth every six (6) hours as needed for Nausea or Vomiting. (Patient not taking: Reported on 2022)    bumetanide (BUMEX) 2 mg tablet 2 tabs po daily for 3 days, then decrease to one daily after that. For fluid    cetirizine-pseudoePHEDrine (ZyrTEC-D) 5-120 mg Tb12 Take 1 Tab by mouth two (2) times a day. No current facility-administered medications for this visit. Allergies   Allergen Reactions    Codeine Other (comments)     Pt states unknown reaction.     Pcn [Penicillins] Rash            Physical Exam:   Visit Vitals  BP 98/67 (BP 1 Location: Left upper arm, BP Patient Position: Sitting)   Pulse (!) 103   Temp 99 °F (37.2 °C) (Oral)   Resp 18   Ht 5' 6\" (1.676 m)   Wt 151 lb 6.4 oz (68.7 kg)   SpO2 98%   BMI 24.44 kg/m²     ECOG PS: 0  General: alert, cooperative, no distress, appears fatigued   Mental  status: normal mood, behavior, speech, dress, motor activity, and thought processes, able to follow commands   HENT: NCAT   Neck: no visualized mass   Resp: no respiratory distress   Neuro: no gross deficits   Skin: no discoloration or lesions of concern on visible areas   Psychiatric: normal affect, consistent with stated mood, no evidence of hallucinations           Results:     Lab Results   Component Value Date/Time    WBC 12.6 (H) 12/23/2022 10:31 AM    HGB 10.7 (L) 12/23/2022 10:31 AM    HCT 35.7 (L) 12/23/2022 10:31 AM    PLATELET 932 40/69/9516 10:31 AM    MCV 96.7 12/23/2022 10:31 AM    ABS. NEUTROPHILS 8.1 (H) 12/23/2022 10:31 AM     Lab Results   Component Value Date/Time    Sodium 134 (L) 12/23/2022 10:31 AM    Potassium 4.3 12/23/2022 10:31 AM    Chloride 96 (L) 12/23/2022 10:31 AM    CO2 32 12/23/2022 10:31 AM    Glucose 134 (H) 12/23/2022 10:31 AM    BUN 25 (H) 12/23/2022 10:31 AM    Creatinine 1.62 (H) 12/23/2022 10:31 AM    GFR est AA >60 07/26/2022 01:45 PM    GFR est non-AA >60 07/26/2022 01:45 PM    Calcium 10.0 12/23/2022 10:31 AM    Sodium,  10/07/2022 10:16 AM    Potassium, POC 3.9 10/07/2022 10:16 AM    Chloride,  10/07/2022 10:16 AM    Glucose (POC) 109 (H) 12/12/2016 11:58 AM    Glucose,  (H) 10/07/2022 10:16 AM    Creatinine, POC 1.1 10/07/2022 10:16 AM    Calcium, ionized (POC) 1.22 10/07/2022 10:16 AM     Lab Results   Component Value Date/Time    Bilirubin, total 1.1 (H) 12/23/2022 10:31 AM    ALT (SGPT) 27 12/23/2022 10:31 AM    Alk. phosphatase 589 (H) 12/23/2022 10:31 AM    Protein, total 7.7 12/23/2022 10:31 AM    Albumin 3.4 (L) 12/23/2022 10:31 AM    Globulin 4.3 (H) 12/23/2022 10:31 AM     CT Results (most recent):  Results from Hospital Encounter encounter on 10/07/22    CT HEAD WO CONT    Narrative  EXAM: CT HEAD WO CONT    INDICATION: ams    COMPARISON: 12/12/2016. CONTRAST: None. TECHNIQUE: Unenhanced CT of the head was performed using 5 mm images. Brain and  bone windows were generated.  Coronal and sagittal reformats. CT dose reduction  was achieved through use of a standardized protocol tailored for this  examination and automatic exposure control for dose modulation. FINDINGS:  The ventricles and sulci are normal in size, shape and configuration. . Chronic  low density in the right basal ganglia unchanged. . There is no intracranial  hemorrhage, extra-axial collection, or mass effect. The basilar cisterns are  open. No CT evidence of acute infarct. The bone windows demonstrate no abnormalities. The visualized portions of the  paranasal sinuses and mastoid air cells are clear. Impression  No change or acute abnormality    No imaging of spleen    Assessment and Recommendations:     # Widely distributed lymphadenopathy highly concerning for advanced lymphoma    -Today I had a long discussion about treatment options and the likelihood that this patient has advanced lymphoproliferative disease. Plan for tumor marker testing as well as flow cytometry to evaluate for the possibility of CLL. Clinically, the patient is fatigued, has anemia, and reports decreased appetite, nausea, vomiting. We will plan for the following work-up and biopsy    Orders Placed This Encounter    PET/CT TUMOR IMAGE SKULL THIGH W (INI)    MRI BRAIN W WO CONT    CBC WITH AUTOMATED DIFF    PERIPHERAL SMEAR    METABOLIC PANEL, COMPREHENSIVE    IRON PROFILE    FERRITIN    VITAMIN B12    FLOW CYTOMETRY, PERIPHERAL BLD    SED RATE (ESR)    C REACTIVE PROTEIN, QT     - PET scan is required for lymphoma staging    #Nausea, vomiting  -Antiemetics were prescribed prior to visit, these are slowly helping. Follow-up and Dispositions    Return in about 3 weeks (around 1/18/2023) for image review.            Signed By: Jacob Irby MD      Attending Medical Oncologist   [facility]

## 2022-12-28 NOTE — PROGRESS NOTES
Surgery History and Physical    Subjective:      Karyn Samaniego is a 67 y.o. male who presents for evaluation of lymphadenopathy. He lost 46 lbs. She has dyspnea on exertion. . No night sweats. He reports decreased appetite. He also has fatigue. He is accompanied by his wife. He sees Dr. Carlos Dunlap with hematology/oncology. CT Chest: enlarged left supraclavicular node (2.4 x 1.5 cm), left internal mammary, mediastinal ,upper abdominal, and retroperitoneal nodes, splenomegaly. Past Medical History:   Diagnosis Date    Allergic rhinitis, cause unspecified 2013    Environmental allergies      Past Surgical History:   Procedure Laterality Date    HX TONSILLECTOMY      HX WISDOM TEETH EXTRACTION        Family History   Problem Relation Age of Onset    Hypertension Mother     Hypertension Father     Heart Disease Father     Alcohol abuse Father     Hypertension Brother     No Known Problems Brother      Social History     Tobacco Use    Smoking status: Former     Packs/day: 1.00     Years: 2.00     Pack years: 2.00     Types: Cigarettes     Quit date: 1966     Years since quittin.0    Smokeless tobacco: Never   Substance Use Topics    Alcohol use: No      Prior to Admission medications    Medication Sig Start Date End Date Taking? Authorizing Provider   ondansetron (ZOFRAN ODT) 4 mg disintegrating tablet Take 1 Tablet by mouth every eight (8) hours as needed for Nausea or Vomiting. 22  Yes Mike Holder MD   bumetanide (BUMEX) 2 mg tablet 2 tabs po daily for 3 days, then decrease to one daily after that. For fluid 22  Yes Indira Wynne MD   cetirizine-pseudoePHEDrine (ZyrTEC-D) 5-120 mg Tb12 Take 1 Tab by mouth two (2) times a day. 20  Yes May Wharton MD   prochlorperazine (Compazine) 10 mg tablet Take 1 Tablet by mouth every six (6) hours as needed for Nausea or Vomiting.   Patient not taking: Reported on 2022   Mike Holder MD   clobetasoL ([place]) 0.05 % ointment APPLY TO AFFECTED AREA(S) OF LEG ON DAMP SKIN TWO TIMES A DAY AS NEEDED FOR 2 WEEKS MAXIMUM. THEN AS NEEDED FOR FLARES. APPLY THIN LAYER OF  Patient not taking: Reported on 12/28/2022 12/3/22   Alphonse Jacobs   triamcinolone acetonide (KENALOG) 0.1 % topical cream Apply  to affected area two (2) times daily as needed for Skin Irritation. use thin layer  Patient not taking: No sig reported 10/11/22   Colleen Gilliland MD   citalopram (CELEXA) 10 mg tablet Take 1 Tablet by mouth daily. Patient not taking: Reported on 12/23/2022 10/11/22   Colleen Gilliland MD   amLODIPine (NORVASC) 5 mg tablet TAKE ONE TABLET BY MOUTH ONE TIME DAILY  Patient not taking: Reported on 12/28/2022 7/24/22   Colleen Gilliland MD   triamcinolone acetonide (KENALOG) 0.1 % ointment Apply  to affected area two (2) times a day. use thin layer  Patient not taking: Reported on 12/23/2022 7/7/22   Colleen Gilliland MD   mupirocin [facility]) 2 % ointment Apply  to affected area daily. Patient not taking: No sig reported 1/12/22   Colleen Gilliland MD   hydroCHLOROthiazide (HYDRODIURIL) 25 mg tablet Take 1 Tablet by mouth daily. Patient not taking: No sig reported 6/8/21   Colleen Gilliland MD   cyclobenzaprine (FLEXERIL) 10 mg tablet Take 1 Tab by mouth three (3) times daily as needed for Muscle Spasm(s). Patient not taking: No sig reported 7/21/20   Colleen Gilliland MD   naproxen sodium 220 mg cap Take  by mouth. Patient not taking: No sig reported    Other, MD Sugar      Allergies   Allergen Reactions    Codeine Other (comments)     Pt states unknown reaction. Pcn [Penicillins] Rash       Review of Systems:  A comprehensive review of systems was negative except for that written in the History of Present Illness.     Objective:     Visit Vitals  /67 (BP 1 Location: Left upper arm, BP Patient Position: Sitting, BP Cuff Size: Adult)   Pulse 100   Temp 97.8 °F (36.6 °C) (Temporal)   Resp 20   Ht 5' 6\" (1.676 m)   Wt 69.6 kg (153 lb 6.4 oz)   SpO2 96%   BMI 24.76 kg/m²         Physical Exam:  Physical Exam:  General:  Alert, cooperative, no distress, in a wheelchair   Eyes:  Conjunctivae/corneas clear. Ears:  Normal external ear canals both ears. Nose: Nares normal. Septum midline. Mouth/Throat: Lips, mucosa, and tongue normal. Teeth and gums normal.   Neck: Palpable left supraclavicular lymphadenopathy   Back:   Symmetric, no curvature. ROM normal.    Lungs:   Clear to auscultation bilaterally. Heart:  Regular rate and rhythm   Abdomen:   Soft, non-tender. Bowel sounds normal. No masses,  No organomegaly. Extremities: Extremities normal, atraumatic, no cyanosis or edema. Skin: Skin color, texture, turgor normal. No rashes or lesions         Assessment:     67year old male with lymphadenopathy    Plan:     Discussed the risk of surgery excisional biopsy of left supraclavicular lymph node including bleeding, infection, seroma, and lymph leak,  and the risks of general anesthetic. The patient understands the risks; any and all questions were answered to the patient's satisfaction.   -will schedule the patient at their earliest convenience

## 2022-12-29 RX ORDER — TRIAMCINOLONE ACETONIDE 1 MG/G
CREAM TOPICAL
COMMUNITY

## 2022-12-29 NOTE — PERIOP NOTES
Wife instructed that if rash on patients waistband area on patients back gets worse or is around his surgical site, she is to notify Dr Neeru Palomino office immediately. She verbalized understanding. She states that he saw a dermatologist and it was biopsied and came back that is was eczema and pt is taking topical cream as prescribed.

## 2022-12-29 NOTE — PERIOP NOTES
[facility]  Preoperative Instructions        Surgery Date 1/6/23          Time of Arrival will call 01/05/23 between 2-5pm     1. On the day of your surgery, please report to the Surgical Services Registration Desk and sign in at your designated time. The Surgery Center is located to the right of the Emergency Room. 2. You must have someone with you to drive you home. You should not drive a car for 24 hours following surgery. Please make arrangements for a friend or family member to stay with you for the first 24 hours after your surgery. 3. Do not have anything to eat or drink (including water, gum, mints, coffee, juice) after midnight ?01/05/23? Chip Perkins ? This may not apply to medications prescribed by your physician. ?(Please note below the special instructions with medications to take the morning of your procedure.)    4. We recommend you do not drink any alcoholic beverages for 24 hours before and after your surgery. 5. Contact your surgeons office for instructions on the following medications: non-steroidal anti-inflammatory drugs (i.e. Advil, Aleve), vitamins, and supplements. (Some surgeons will want you to stop these medications prior to surgery and others may allow you to take them)  **If you are currently taking Plavix, Coumadin, Aspirin and/or other blood-thinning agents, contact your surgeon for instructions. ** Your surgeon will partner with the physician prescribing these medications to determine if it is safe to stop or if you need to continue taking. Please do not stop taking these medications without instructions from your surgeon    6. Wear comfortable clothes. Wear glasses instead of contacts. Do not bring any money or jewelry. Please bring picture ID, insurance card, and any prearranged co-payment or hospital payment. Do not wear make-up, particularly mascara the morning of your surgery.   Do not wear nail polish, particularly if you are having foot /hand surgery. Wear your hair loose or down, no ponytails, buns, luh pins or clips. All body piercings must be removed. Please shower with antibacterial soap for three consecutive days before and on the morning of surgery, but do not apply any lotions, powders or deodorants after the shower on the day of surgery. Please use a fresh towels after each shower. Please sleep in clean clothes and change bed linens the night before surgery. Please do not shave for 48 hours prior to surgery. Shaving of the face is acceptable. 7. You should understand that if you do not follow these instructions your surgery may be cancelled. If your physical condition changes (I.e. fever, cold or flu) please contact your surgeon as soon as possible. 8. It is important that you be on time. If a situation occurs where you may be late, please call [phone] (OR Holding Area). 9. If you have any questions and or problems, please call [phone] (Pre-admission Testing). 10. Your surgery time may be subject to change. You will receive a phone call the evening prior if your time changes. 11.  If having outpatient surgery, you must have someone to drive you here, stay with you during the duration of your stay, and to drive you home at time of discharge. TAKE ALL MEDICATIONS DAY OF SURGERY EXCEPT: zyrtec D and can take bumex if needed DOS      I understand a pre-operative phone call will be made to verify my surgery time. In the event that I am not available, I give permission for a message to be left on my answering service and/or with another person?   Yes [phone]         ___________________      __________   _________    (Signature of Patient)             (Witness)                (Date and Time)

## 2023-01-03 ENCOUNTER — TELEPHONE (OUTPATIENT)
Dept: ONCOLOGY | Age: 73
End: 2023-01-03

## 2023-01-03 NOTE — TELEPHONE ENCOUNTER
VM left requesting if something can be done to make pt comfortable as possible before surgery. Please call @ [phone].

## 2023-01-05 ENCOUNTER — ANESTHESIA EVENT (OUTPATIENT)
Dept: SURGERY | Age: 73
End: 2023-01-05
Payer: MEDICARE

## 2023-01-06 ENCOUNTER — ANESTHESIA (OUTPATIENT)
Dept: SURGERY | Age: 73
End: 2023-01-06
Payer: MEDICARE

## 2023-01-06 ENCOUNTER — TELEPHONE (OUTPATIENT)
Dept: ONCOLOGY | Age: 73
End: 2023-01-06

## 2023-01-06 ENCOUNTER — TELEPHONE (OUTPATIENT)
Dept: FAMILY MEDICINE CLINIC | Age: 73
End: 2023-01-06

## 2023-01-06 ENCOUNTER — HOSPITAL ENCOUNTER (OUTPATIENT)
Age: 73
Setting detail: OUTPATIENT SURGERY
Discharge: HOME | End: 2023-01-06
Attending: SURGERY | Admitting: SURGERY
Payer: MEDICARE

## 2023-01-06 VITALS
HEIGHT: 65 IN | SYSTOLIC BLOOD PRESSURE: 106 MMHG | DIASTOLIC BLOOD PRESSURE: 66 MMHG | OXYGEN SATURATION: 99 % | RESPIRATION RATE: 16 BRPM | TEMPERATURE: 98 F | HEART RATE: 96 BPM | WEIGHT: 142.2 LBS | BODY MASS INDEX: 23.69 KG/M2

## 2023-01-06 DIAGNOSIS — R59.1 LYMPHADENOPATHY: Primary | ICD-10-CM

## 2023-01-06 LAB
GLUCOSE BLD STRIP.AUTO-MCNC: 94 MG/DL (ref 65–117)
SERVICE CMNT-IMP: NORMAL

## 2023-01-06 PROCEDURE — 74011000250 HC RX REV CODE- 250: Performed by: ANESTHESIOLOGY

## 2023-01-06 PROCEDURE — 88365 INSITU HYBRIDIZATION (FISH): CPT

## 2023-01-06 PROCEDURE — 2709999900 HC NON-CHARGEABLE SUPPLY: Performed by: SURGERY

## 2023-01-06 PROCEDURE — 74011250636 HC RX REV CODE- 250/636: Performed by: ANESTHESIOLOGY

## 2023-01-06 PROCEDURE — 88305 TISSUE EXAM BY PATHOLOGIST: CPT

## 2023-01-06 PROCEDURE — 74011000250 HC RX REV CODE- 250: Performed by: SURGERY

## 2023-01-06 PROCEDURE — 74011250636 HC RX REV CODE- 250/636: Performed by: SURGERY

## 2023-01-06 PROCEDURE — 88342 IMHCHEM/IMCYTCHM 1ST ANTB: CPT

## 2023-01-06 PROCEDURE — 38510 BIOPSY/REMOVAL LYMPH NODES: CPT | Performed by: SURGERY

## 2023-01-06 PROCEDURE — 88184 FLOWCYTOMETRY/ TC 1 MARKER: CPT

## 2023-01-06 PROCEDURE — 77030010507 HC ADH SKN DERMBND J&J -B: Performed by: SURGERY

## 2023-01-06 PROCEDURE — 88185 FLOWCYTOMETRY/TC ADD-ON: CPT

## 2023-01-06 PROCEDURE — 77030002933 HC SUT MCRYL J&J -A: Performed by: SURGERY

## 2023-01-06 PROCEDURE — 77030010509 HC AIRWY LMA MSK TELE -A: Performed by: ANESTHESIOLOGY

## 2023-01-06 PROCEDURE — 76210000063 HC OR PH I REC FIRST 0.5 HR: Performed by: SURGERY

## 2023-01-06 PROCEDURE — 77030010938 HC CLP LIG TELE -A: Performed by: SURGERY

## 2023-01-06 PROCEDURE — 77030011267 HC ELECTRD BLD COVD -A: Performed by: SURGERY

## 2023-01-06 PROCEDURE — 82962 GLUCOSE BLOOD TEST: CPT

## 2023-01-06 PROCEDURE — 88333 PATH CONSLTJ SURG CYTO XM 1: CPT

## 2023-01-06 PROCEDURE — 76060000033 HC ANESTHESIA 1 TO 1.5 HR: Performed by: SURGERY

## 2023-01-06 PROCEDURE — 76210000020 HC REC RM PH II FIRST 0.5 HR: Performed by: SURGERY

## 2023-01-06 PROCEDURE — 76010000149 HC OR TIME 1 TO 1.5 HR: Performed by: SURGERY

## 2023-01-06 PROCEDURE — 88341 IMHCHEM/IMCYTCHM EA ADD ANTB: CPT

## 2023-01-06 PROCEDURE — 74011250637 HC RX REV CODE- 250/637: Performed by: SURGERY

## 2023-01-06 RX ORDER — ONDANSETRON 2 MG/ML
INJECTION INTRAMUSCULAR; INTRAVENOUS AS NEEDED
Status: DISCONTINUED | OUTPATIENT
Start: 2023-01-06 | End: 2023-01-06 | Stop reason: HOSPADM

## 2023-01-06 RX ORDER — SODIUM CHLORIDE, SODIUM LACTATE, POTASSIUM CHLORIDE, CALCIUM CHLORIDE 600; 310; 30; 20 MG/100ML; MG/100ML; MG/100ML; MG/100ML
25 INJECTION, SOLUTION INTRAVENOUS CONTINUOUS
Status: DISCONTINUED | OUTPATIENT
Start: 2023-01-06 | End: 2023-01-06 | Stop reason: HOSPADM

## 2023-01-06 RX ORDER — ESMOLOL HYDROCHLORIDE 10 MG/ML
INJECTION INTRAVENOUS AS NEEDED
Status: DISCONTINUED | OUTPATIENT
Start: 2023-01-06 | End: 2023-01-06 | Stop reason: HOSPADM

## 2023-01-06 RX ORDER — DEXAMETHASONE SODIUM PHOSPHATE 4 MG/ML
INJECTION, SOLUTION INTRA-ARTICULAR; INTRALESIONAL; INTRAMUSCULAR; INTRAVENOUS; SOFT TISSUE AS NEEDED
Status: DISCONTINUED | OUTPATIENT
Start: 2023-01-06 | End: 2023-01-06 | Stop reason: HOSPADM

## 2023-01-06 RX ORDER — FENTANYL CITRATE 50 UG/ML
INJECTION, SOLUTION INTRAMUSCULAR; INTRAVENOUS AS NEEDED
Status: DISCONTINUED | OUTPATIENT
Start: 2023-01-06 | End: 2023-01-06 | Stop reason: HOSPADM

## 2023-01-06 RX ORDER — FENTANYL CITRATE 50 UG/ML
50 INJECTION, SOLUTION INTRAMUSCULAR; INTRAVENOUS AS NEEDED
Status: DISCONTINUED | OUTPATIENT
Start: 2023-01-06 | End: 2023-01-06 | Stop reason: HOSPADM

## 2023-01-06 RX ORDER — MIDAZOLAM HYDROCHLORIDE 1 MG/ML
1 INJECTION, SOLUTION INTRAMUSCULAR; INTRAVENOUS AS NEEDED
Status: DISCONTINUED | OUTPATIENT
Start: 2023-01-06 | End: 2023-01-06 | Stop reason: HOSPADM

## 2023-01-06 RX ORDER — PROPOFOL 10 MG/ML
INJECTION, EMULSION INTRAVENOUS AS NEEDED
Status: DISCONTINUED | OUTPATIENT
Start: 2023-01-06 | End: 2023-01-06 | Stop reason: HOSPADM

## 2023-01-06 RX ORDER — PREDNISONE 10 MG/1
10 TABLET ORAL DAILY
Qty: 7 TABLET | Refills: 0 | Status: SHIPPED | OUTPATIENT
Start: 2023-01-06

## 2023-01-06 RX ORDER — ONDANSETRON 2 MG/ML
4 INJECTION INTRAMUSCULAR; INTRAVENOUS AS NEEDED
Status: DISCONTINUED | OUTPATIENT
Start: 2023-01-06 | End: 2023-01-06 | Stop reason: HOSPADM

## 2023-01-06 RX ORDER — LIDOCAINE HYDROCHLORIDE 10 MG/ML
0.1 INJECTION, SOLUTION EPIDURAL; INFILTRATION; INTRACAUDAL; PERINEURAL AS NEEDED
Status: DISCONTINUED | OUTPATIENT
Start: 2023-01-06 | End: 2023-01-06 | Stop reason: HOSPADM

## 2023-01-06 RX ORDER — HYDROMORPHONE HYDROCHLORIDE 1 MG/ML
.2-.5 INJECTION, SOLUTION INTRAMUSCULAR; INTRAVENOUS; SUBCUTANEOUS
Status: DISCONTINUED | OUTPATIENT
Start: 2023-01-06 | End: 2023-01-06 | Stop reason: HOSPADM

## 2023-01-06 RX ORDER — BUPIVACAINE HYDROCHLORIDE 5 MG/ML
INJECTION, SOLUTION EPIDURAL; INTRACAUDAL AS NEEDED
Status: DISCONTINUED | OUTPATIENT
Start: 2023-01-06 | End: 2023-01-06 | Stop reason: HOSPADM

## 2023-01-06 RX ORDER — FENTANYL CITRATE 50 UG/ML
25 INJECTION, SOLUTION INTRAMUSCULAR; INTRAVENOUS
Status: DISCONTINUED | OUTPATIENT
Start: 2023-01-06 | End: 2023-01-06 | Stop reason: HOSPADM

## 2023-01-06 RX ADMIN — VANCOMYCIN HYDROCHLORIDE 1000 MG: 1 INJECTION, POWDER, LYOPHILIZED, FOR SOLUTION INTRAVENOUS at 06:43

## 2023-01-06 RX ADMIN — DEXAMETHASONE SODIUM PHOSPHATE 4 MG: 4 INJECTION, SOLUTION INTRAMUSCULAR; INTRAVENOUS at 07:41

## 2023-01-06 RX ADMIN — PROPOFOL 50 MCG/KG/MIN: 10 INJECTION, EMULSION INTRAVENOUS at 07:34

## 2023-01-06 RX ADMIN — Medication 3 AMPULE: at 06:42

## 2023-01-06 RX ADMIN — FENTANYL CITRATE 25 MCG: 50 INJECTION, SOLUTION INTRAMUSCULAR; INTRAVENOUS at 07:50

## 2023-01-06 RX ADMIN — ESMOLOL HYDROCHLORIDE 20 MG: 10 INJECTION, SOLUTION INTRAVENOUS at 08:17

## 2023-01-06 RX ADMIN — FENTANYL CITRATE 25 MCG: 50 INJECTION, SOLUTION INTRAMUSCULAR; INTRAVENOUS at 07:41

## 2023-01-06 RX ADMIN — PROPOFOL 80 MG: 10 INJECTION, EMULSION INTRAVENOUS at 07:36

## 2023-01-06 RX ADMIN — SODIUM CHLORIDE, POTASSIUM CHLORIDE, SODIUM LACTATE AND CALCIUM CHLORIDE 25 ML/HR: 600; 310; 30; 20 INJECTION, SOLUTION INTRAVENOUS at 06:40

## 2023-01-06 RX ADMIN — FENTANYL CITRATE 25 MCG: 50 INJECTION, SOLUTION INTRAMUSCULAR; INTRAVENOUS at 07:36

## 2023-01-06 RX ADMIN — ESMOLOL HYDROCHLORIDE 10 MG: 10 INJECTION, SOLUTION INTRAVENOUS at 08:11

## 2023-01-06 RX ADMIN — SODIUM CHLORIDE, POTASSIUM CHLORIDE, SODIUM LACTATE AND CALCIUM CHLORIDE: 600; 310; 30; 20 INJECTION, SOLUTION INTRAVENOUS at 07:30

## 2023-01-06 RX ADMIN — FENTANYL CITRATE 25 MCG: 50 INJECTION, SOLUTION INTRAMUSCULAR; INTRAVENOUS at 07:43

## 2023-01-06 RX ADMIN — PHENYLEPHRINE HYDROCHLORIDE 80 MCG: 10 INJECTION INTRAVENOUS at 07:36

## 2023-01-06 RX ADMIN — ONDANSETRON HYDROCHLORIDE 4 MG: 2 INJECTION, SOLUTION INTRAMUSCULAR; INTRAVENOUS at 07:41

## 2023-01-06 NOTE — DISCHARGE INSTRUCTIONS
Dr. Lance Campos Discharge Instructions for:  Levi Hernandez    MRN: [id number] : 1950    Admitted: 2023  Discharged: 2023       What to do at Home    Recommended diet: Resume your normal diet    Recommended activity: as tolerated    Follow-up with Dr. Lance Campos in 1-2 weeks. Call [phone] for an appointment. Wound Care:  Replace outer gauze with new dry gauze daily starting today to protect the \"glue\". See additional instructions below: How to Care for Your Wound After Its Treated With DERMABOND* Topical Skin Adhesive    DERMABOND* Topical Skin Adhesive (2-octyl cyanoacrylate) is a sterile, liquid skin adhesivethat holds wound edges together. The film will usually remain in place for 5 to 10 days, thennaturally fall off your skin. The following will answer some of your questions and provide instructions for proper care for yourwound while it is healing:    CHECK WOUND APPEARANCE   Some swelling, redness, and pain are common with all wounds and normally will go away as the wound heals. If swelling, redness, or pain increases or if the wound feels warm to the touch, contact a doctor. Also contact a doctor if the wound edges reopen or separate. REPLACE BANDAGES   If your wound is bandaged, keep the bandage dry. Replace the dressing daily until the adhesive film has fallen off or if the bandage should become wet, unless otherwise instructed by your physician. When changing the dressing, do not place tape directly over the DERMABOND adhesive film, because removing the tape later may also remove the film. AVOID TOPICAL MEDICATIONS   Do not apply liquid or ointment medications or any other product to your wound while the DERMABOND adhesive film is in place. These may loosen the film before your wound is healed. KEEP WOUND DRY AND PROTECTED   You may occasionally and briefly wet your wound in the shower or bath.  Do not soak or scrub your wound, do not swim, and avoid periods of heavy perspiration until the DERMABOND adhesive has naturally fallen off. After showering or bathing, gently blot your wound dry with a soft towel. If a protective dressing is being used, apply a fresh, dry bandage, being sure to keep the tape off the DERMABOND adhesive film. Apply a clean, dry bandage over the wound if necessary to protect it. Protect your wound from injury until the skin has had sufficient time to heal.   Do not scratch, rub, or pick at the DERMABOND adhesive film. This may loosen the film before your wound is healed. Protect the wound from prolonged exposure to sunlight or tanning lamps while the film is in place. DISCHARGE SUMMARY from Nurse    PATIENT INSTRUCTIONS:    After general anesthesia or intravenous sedation, for 24 hours or while taking prescription narcotics:    Have someone responsible help you with your care  Limit your activities  Do not drive and operate hazardous machinery  Do not make important personal, legal or business decisions  Do not drink alcoholic beverages  If you have not urinated within 8 hours after discharge, please contact your surgeon on call  Resume your medications unless otherwise instructed    From general anesthesia, intravenous sedation, or while taking prescription narcotics, you may experience:    Drowsiness, dizziness, sleepiness, or confusion  Difficulty remembering or delayed reaction times  Difficulty with your balance, especially while walking, move slowly and carefully, do not make sudden position changes  Difficulty focusing or blurred vision    You may not be aware of slight changes in your behavior and/or your reaction time because of the medication used during and after your procedure.     Report the following to your surgeon:  Excessive pain, swelling, redness or odor of or around the surgical area  Temperature over 100.5  Nausea and vomiting lasting longer than 4 hours or if unable to take medications  Any signs of decreased circulation or nerve impairment to extremity: change in color, persistent numbness, tingling, coldness or increase pain  Any questions or concerns         IF YOU REPORT TO AN EMERGENCY ROOM, DOCTOR'S OFFICE OR HOSPITAL WITHIN 24 HOURS AFTER YOUR PROCEDURE, BRING THIS SHEET AND YOUR AFTER VISIT SUMMARY WITH YOU AND GIVE IT TO THE PHYSICIAN OR NURSE ATTENDING YOU. These are general instructions for a healthy lifestyle (if applicable): No smoking/ No tobacco products/ Avoid exposure to secondhand smoke  Surgeon General's Warning:  Quitting smoking now greatly reduces serious risk to your health. Obesity, smoking, and sedentary lifestyle greatly increases your risk for illness    A healthy diet, regular physical exercise & weight monitoring are important for maintaining a healthy lifestyle    You may be retaining fluid if you have a history of heart failure or if you experience any of the following symptoms:  Weight gain of 3 pounds or more overnight or 5 pounds in a week, increased swelling in our hands or feet or shortness of breath while lying flat in bed. Please call your doctor as soon as you notice any of these symptoms; do not wait until your next office visit. A common side effect of anesthesia following surgery is nausea and/or vomiting. In order to decrease symptoms, it is wise to avoid foods that are high in fat, greasy foods, milk products, and spicy foods for the first 24 hours. Acceptable foods for the first 24 hours following surgery include but are not limited to:    soup  broth  toast   crackers   applesauce  bananas   mashed potatoes,  soft or scrambled eggs  oatmeal  jello    It is important to eat when taking your pain medication. This will help to prevent nausea. If possible, please try to time your meals with your medications. It is very important to stay hydrated following surgery. Sip fluids frequently while awake. Avoid acidic drinks such as citrus juices and soda for 24 hours.  Carbonated beverages may cause bloating and gas. Acceptable fluids include:    water (flavor packets may add variety)  coffee or tea (in moderation)  Gatorade  Riky-Aid  apple juice  cranberry juice    You are encouraged to cough and deep breathe every hour when awake. This will help to prevent respiratory complications following anesthesia. You may want to hug a pillow when coughing and sneezing to add additional support to the surgical area and to decrease discomfort if you had abdominal or chest surgery. If you are discharged home with support stockings, you may remove them after 24 hours. Support stockings are used to help prevent blood clots in the legs following surgery. TO PREVENT AN INFECTION      [address] YOUR HANDS    To prevent infection, good handwashing is the most important thing you or your caregiver can do. Wash your hands with soap and water or use the hand  we gave you before you touch any wounds. SHOWER    Use the antibacterial soap we gave you when you take a shower. Shower with this soap until your wounds are healed. To reach all areas of your body, you may need someone to help you. Dont forget to clean your belly button with every shower. USE CLEAN SHEETS    Use freshly cleaned sheets on your bed after surgery. To keep the surgery site clean, do not allow pets to sleep with you while your wound is still healing. STOP SMOKING    Stop smoking, or at least cut back on smoking    Smoking slows your healing. CONTROL YOUR BLOOD SUGAR    High blood sugars slow wound healing. If you are diabetic, control your blood sugar levels before and after your surgery. Please take time to review all of your Home Care Instructions and Medication Information sheets provided in your discharge packet. If you have any questions, please contact your surgeon's office. Thank you. The discharge information has been reviewed with the patient and instruction recipient.   The patient and instruction recipient verbalized understanding. Discharge medications reviewed with the patient and instruction recipient and appropriate educational materials and side effects teaching were provided. Please provide this summary of care documentation to your next provider. Statement Selected

## 2023-01-06 NOTE — TELEPHONE ENCOUNTER
Patients wife, Skyler Cao called wanting to inform Dr. Brock Opitz that the patient has had an outpatient biopsy surgery to have some lymph nodes removed due to him experiencing jaundice, loss of appetite, red feet, fatigue, and discoloration of urine. She requested a call back in regards to what she should do moving forward. She can be reached at [phone].

## 2023-01-06 NOTE — TELEPHONE ENCOUNTER
Return call placed to pt spouse. PHI verified and HIPPA verified by two patient identifiers. Nurse spoke w/ pt's spouse. Spouse advised to call office 1-2 days prior to appt next Thursday to see if pt's Bx has resulted. Pt's spouse advised to keep next Thursday's appt.

## 2023-01-06 NOTE — ANESTHESIA POSTPROCEDURE EVALUATION
Procedure(s):  EXCISIONAL BIOSPY OF LEFT SUPRA CLAVICULAR LYMPH NODE.    general    Anesthesia Post Evaluation      Multimodal analgesia: multimodal analgesia used between 6 hours prior to anesthesia start to PACU discharge  Patient location during evaluation: PACU  Level of consciousness: sleepy but conscious  Pain management: adequate  Airway patency: patent  Anesthetic complications: no  Cardiovascular status: acceptable  Respiratory status: acceptable  Hydration status: acceptable  Comments: +Post-Anesthesia Evaluation and Assessment    Patient: Evetet Brasher MRN: [id number]  SSN: [id number]   YOB: 1950  Age: 67 y.o. Sex: male      Cardiovascular Function/Vital Signs    BP (!) 109/55 (BP 1 Location: Left upper arm, BP Patient Position: At rest)   Pulse 96   Temp 36.6 °C (97.9 °F)   Resp 15   Ht 5' 5\" (1.651 m)   Wt 64.5 kg (142 lb 3.2 oz)   SpO2 98%   BMI 23.66 kg/m²     Patient is status post Procedure(s):  EXCISIONAL BIOSPY OF LEFT SUPRA CLAVICULAR LYMPH NODE. Nausea/Vomiting: Controlled. Postoperative hydration reviewed and adequate. Pain:  Pain Scale 1: Numeric (0 - 10) (01/06/23 0845)  Pain Intensity 1: 0 (01/06/23 0845)   Managed. Neurological Status:   Neuro (WDL): Exceptions to WDL (01/06/23 0840)   At baseline. Mental Status and Level of Consciousness: Arousable. Pulmonary Status:   O2 Device: None (Room air) (01/06/23 0845)   Adequate oxygenation and airway patent. Complications related to anesthesia: None    Post-anesthesia assessment completed. No concerns.     Signed By: Salima Lopez MD    1/6/2023  Post anesthesia nausea and vomiting:  controlled  Final Post Anesthesia Temperature Assessment:  Normothermia (36.0-37.5 degrees C)      INITIAL Post-op Vital signs:   Vitals Value Taken Time   /55 01/06/23 0845   Temp 36.6 °C (97.9 °F) 01/06/23 0835   Pulse 98 01/06/23 0849   Resp 21 01/06/23 0849   SpO2 98 % 01/06/23 0849   Vitals shown include unvalidated device data.

## 2023-01-06 NOTE — PERIOP NOTES
TRANSFER - OUT REPORT:    Verbal report given to Emiliano(name) on Ghazal Meza  being transferred to Phase2(unit) for routine post - op       Report consisted of patients Situation, Background, Assessment and   Recommendations(SBAR). Information from the following report(s) SBAR, OR Summary, Procedure Summary, Intake/Output, MAR, and Cardiac Rhythm Sinus Tach  was reviewed with the receiving nurse. Opportunity for questions and clarification was provided. Patient transported with:   Registered Nurse    Handoff Report from Operating Room to PACU    Report received from [address] [place] [address] and THOMAS Dodson MD regarding Ghazal Meza. Surgeon(s):  Kaia Rodriguez MD  And Procedure(s) (LRB):  EXCISIONAL BIOSPY OF LEFT SUPRA CLAVICULAR LYMPH NODE (Left)  confirmed   with allergies and dressings discussed. Anesthesia type, drugs, patient history, complications, estimated blood loss, vital signs, intake and output, and last pain medication, lines, and temperature were reviewed.

## 2023-01-06 NOTE — ANESTHESIA PREPROCEDURE EVALUATION
Relevant Problems   CARDIOVASCULAR   (+) Essential hypertension      ENDOCRINE   (+) Type 2 diabetes mellitus      HEMATOLOGY   (+) Iron deficiency anemia       Anesthetic History     PONV          Review of Systems / Medical History  Patient summary reviewed, nursing notes reviewed and pertinent labs reviewed    Pulmonary            Asthma : well controlled       Neuro/Psych         Psychiatric history     Cardiovascular    Hypertension              Exercise tolerance: <4 METS     GI/Hepatic/Renal     GERD: well controlled           Endo/Other    Diabetes: type 2    Arthritis and anemia     Other Findings              Physical Exam    Airway  Mallampati: II  TM Distance: 4 - 6 cm  Neck ROM: normal range of motion   Mouth opening: Normal     Cardiovascular  Regular rate and rhythm,  S1 and S2 normal,  no murmur, click, rub, or gallop  Rhythm: regular  Rate: normal         Dental    Dentition: Upper partial plate     Pulmonary  Breath sounds clear to auscultation               Abdominal  GI exam deferred       Other Findings            Anesthetic Plan    ASA: 3  Anesthesia type: general    Monitoring Plan: BIS      Induction: Intravenous  Anesthetic plan and risks discussed with: Patient
Breath sounds clear and equal bilaterally.

## 2023-01-06 NOTE — OP NOTES
Operative Report    Patient: Jimbo Mohan MRN: [id number]  SSN: [id number]    YOB: 1950  Age: 67 y.o. Sex: male       Date of Surgery: 1/6/2023     Preoperative Diagnosis: LYMPHADENOPATHY     Postoperative Diagnosis: LYMPHADENOPATHY     Surgeon(s) and Role:     * Mohini Lopez MD - Primary    Anesthesia: General     Procedure: Procedure(s):  EXCISIONAL BIOSPY OF LEFT SUPRA CLAVICULAR LYMPH NODE     Procedure in Detail: The patient was brought to the operating room and placed in supine position. General anesthesia was induced. The area was prepped and draped in the usual sterile fashion. A time-out was performed. An incision was made overlying the enlarged left supraclavicular lymph node. The skin was dissected down to the subcutaneous tissue. The sternocleidomastoid was retracted. The large lymph node was identified. This was circumferentially dissected. Lymphatic channels were clipped or ligated with 3-0 silk. The lymph node was sent fresh to pathology. The wound was copiously irrigated. Hemostasis was achieved. Surgical was placed in the dissection bed. The incision was closed in layers with interrupted 3-0 vicryl followed by 4-0 monocryl. Local was injected. Dermabond was applied. The patient tolerated the procedure well. The patient was extubated and transferred to PACU in stable condition. Estimated Blood Loss:  Minimal    Tourniquet Time: * No tourniquets in log *      Implants: * No implants in log *            Specimens:   ID Type Source Tests Collected by Time Destination   1 : left supraclavicular lymph node Fresh Lymph Node  Mohini Lopez MD 1/6/2023 9430 Pathology           Drains: None                Complications: None    Counts: Sponge and needle counts were correct times two.     Signed By:  Sierra Arellano MD     January 6, 2023

## 2023-01-06 NOTE — TELEPHONE ENCOUNTER
Patients wife, Keenan Dallas called wanting to inform Dr. Colby Olivas that the patient has had an outpatient biopsy surgery to have some lymph nodes removed due to him experiencing jaundice, loss of appetite, red feet, fatigue, and discoloration of urine. She requested a call back in regards to what she should do moving forward. She can be reached at [phone].  ------------------------------------------------------------------------  Spoke to the pt wife and offered her an appointment but she declined and wanted to talk to Dr. Colby Olivas.   [phone]

## 2023-01-06 NOTE — PERIOP NOTES
No covid test   no    s/s//        no vaccine     mepiex scrum border preventatively appied  skin intact. Pt noted to have   numerous   scabs   on   trunk  and arms and hands   she  md for this.

## 2023-01-06 NOTE — TELEPHONE ENCOUNTER
Remigio Poisson called & left a vm stating that pt had OP surgery today & everything went ok. Stated that Biopsy results wont be back for a week, asking if apt should be rescheduled next Thursday. Pt requesting to have steroids for itching, increase appetite & help sleep @ night. Please call Remigio Senra @ [phone].

## 2023-01-09 ENCOUNTER — OFFICE VISIT (OUTPATIENT)
Dept: FAMILY MEDICINE CLINIC | Age: 73
End: 2023-01-09

## 2023-01-09 ENCOUNTER — TELEPHONE (OUTPATIENT)
Dept: ONCOLOGY | Age: 73
End: 2023-01-09

## 2023-01-09 ENCOUNTER — TELEPHONE (OUTPATIENT)
Dept: FAMILY MEDICINE CLINIC | Age: 73
End: 2023-01-09

## 2023-01-09 DIAGNOSIS — R59.1 LYMPHADENOPATHY: Primary | ICD-10-CM

## 2023-01-09 DIAGNOSIS — D53.9 DEFICIENCY ANEMIA: ICD-10-CM

## 2023-01-09 DIAGNOSIS — C85.99 LYMPHOMA INVOLVING LUNG (HCC): ICD-10-CM

## 2023-01-09 NOTE — TELEPHONE ENCOUNTER
PHI verified and HIPPA verified by two patient identifiers. Return call placed to pt's spouse. Spouse advised ok to give pt OTC Benadryl. Nurse will call pt's spouse back once she confirm w/ pharmacist if it's ok to take Benadryl and Cetirizine.

## 2023-01-09 NOTE — PROGRESS NOTES
Lab only for Hematology,awaitng recent lymph node bx results. Remains weak,fatigued,anorectic ,Wt 149#

## 2023-01-09 NOTE — TELEPHONE ENCOUNTER
Pt scheduled for today for at 2:20 for lab only per . Pt wife will be bringing lab orders with them.

## 2023-01-09 NOTE — TELEPHONE ENCOUNTER
Patients wife called & left a vm stating that pt has an apt on Thursday & is asking if pt can take Dutorphanol for itching. Please call pt Apurva Laws [phone]. Pharmacy to use is [facility].

## 2023-01-09 NOTE — TELEPHONE ENCOUNTER
----- Message from Beka Rivera sent at 1/9/2023  9:18 AM EST -----  Subject: Message to Provider    QUESTIONS  Information for Provider? Dr. Caleb Renee stated that he could come in   to have labs done that was ordered by his Oncologist; Wife Tyler Hoffman is   checking to making sure that it is ok because Dr. Chela Weeks stated it would be   for them to have the labs done at the office; please call to schedule an   appt   ---------------------------------------------------------------------------  --------------  [phone]  [phone]; OK to leave message on voicemail  ---------------------------------------------------------------------------  --------------  SCRIPT ANSWERS  Relationship to Patient? Other  Representative Name? Tyler Hoffman  Is the Representative on the appropriate HIPAA document in Epic?  Yes

## 2023-01-10 LAB
ALBUMIN SERPL-MCNC: 3 G/DL (ref 3.5–5)
ALBUMIN/GLOB SERPL: 0.6 (ref 1.1–2.2)
ALP SERPL-CCNC: 624 U/L (ref 45–117)
ALT SERPL-CCNC: 27 U/L (ref 12–78)
ANION GAP SERPL CALC-SCNC: 5 MMOL/L (ref 5–15)
AST SERPL-CCNC: 54 U/L (ref 15–37)
BASOPHILS # BLD: 0.1 K/UL (ref 0–0.1)
BASOPHILS NFR BLD: 1 % (ref 0–1)
BILIRUB SERPL-MCNC: 4.6 MG/DL (ref 0.2–1)
BUN SERPL-MCNC: 36 MG/DL (ref 6–20)
BUN/CREAT SERPL: 17 (ref 12–20)
CALCIUM SERPL-MCNC: 10.3 MG/DL (ref 8.5–10.1)
CHLORIDE SERPL-SCNC: 95 MMOL/L (ref 97–108)
CO2 SERPL-SCNC: 32 MMOL/L (ref 21–32)
CREAT SERPL-MCNC: 2.16 MG/DL (ref 0.7–1.3)
CRP SERPL-MCNC: 10.2 MG/DL (ref 0–0.6)
DIFFERENTIAL METHOD BLD: ABNORMAL
EOSINOPHIL # BLD: 0.6 K/UL (ref 0–0.4)
EOSINOPHIL NFR BLD: 4 % (ref 0–7)
ERYTHROCYTE [DISTWIDTH] IN BLOOD BY AUTOMATED COUNT: 19 % (ref 11.5–14.5)
FERRITIN SERPL-MCNC: 1912 NG/ML (ref 26–388)
GLOBULIN SER CALC-MCNC: 4.9 G/DL (ref 2–4)
GLUCOSE SERPL-MCNC: 129 MG/DL (ref 65–100)
HCT VFR BLD AUTO: 39.8 % (ref 36.6–50.3)
HGB BLD-MCNC: 12.3 G/DL (ref 12.1–17)
IMM GRANULOCYTES # BLD AUTO: 0.1 K/UL (ref 0–0.04)
IMM GRANULOCYTES NFR BLD AUTO: 0 % (ref 0–0.5)
IRON SATN MFR SERPL: 21 % (ref 20–50)
IRON SERPL-MCNC: 43 UG/DL (ref 35–150)
LYMPHOCYTES # BLD: 3.4 K/UL (ref 0.8–3.5)
LYMPHOCYTES NFR BLD: 25 % (ref 12–49)
MCH RBC QN AUTO: 29.6 PG (ref 26–34)
MCHC RBC AUTO-ENTMCNC: 30.9 G/DL (ref 30–36.5)
MCV RBC AUTO: 95.9 FL (ref 80–99)
MONOCYTES # BLD: 1.1 K/UL (ref 0–1)
MONOCYTES NFR BLD: 8 % (ref 5–13)
NEUTS SEG # BLD: 8.6 K/UL (ref 1.8–8)
NEUTS SEG NFR BLD: 62 % (ref 32–75)
NRBC # BLD: 0 K/UL (ref 0–0.01)
NRBC BLD-RTO: 0 PER 100 WBC
PLATELET # BLD AUTO: 163 K/UL (ref 150–400)
PMV BLD AUTO: 10.2 FL (ref 8.9–12.9)
POTASSIUM SERPL-SCNC: 4.5 MMOL/L (ref 3.5–5.1)
PROT SERPL-MCNC: 7.9 G/DL (ref 6.4–8.2)
RBC # BLD AUTO: 4.15 M/UL (ref 4.1–5.7)
SODIUM SERPL-SCNC: 132 MMOL/L (ref 136–145)
TIBC SERPL-MCNC: 207 UG/DL (ref 250–450)
VIT B12 SERPL-MCNC: >2000 PG/ML (ref 193–986)
WBC # BLD AUTO: 13.8 K/UL (ref 4.1–11.1)

## 2023-01-11 ENCOUNTER — TELEPHONE (OUTPATIENT)
Dept: ONCOLOGY | Age: 73
End: 2023-01-11

## 2023-01-11 NOTE — TELEPHONE ENCOUNTER
Called HIPAA verified by two patient identifiers. To speak to pt's wife about appointment times for scans along with instructions and confirm appt with provider for tomorrow. Wife verbalized understanding even though she told me it was very hard for her to hear me. Wife was very thankful and looks forward to appts.

## 2023-01-12 ENCOUNTER — OFFICE VISIT (OUTPATIENT)
Dept: ONCOLOGY | Age: 73
End: 2023-01-12
Payer: MEDICARE

## 2023-01-12 ENCOUNTER — TELEPHONE (OUTPATIENT)
Dept: ONCOLOGY | Age: 73
End: 2023-01-12

## 2023-01-12 VITALS
RESPIRATION RATE: 18 BRPM | HEIGHT: 65 IN | OXYGEN SATURATION: 99 % | BODY MASS INDEX: 23.66 KG/M2 | TEMPERATURE: 98.3 F | SYSTOLIC BLOOD PRESSURE: 112 MMHG | DIASTOLIC BLOOD PRESSURE: 65 MMHG | HEART RATE: 98 BPM

## 2023-01-12 DIAGNOSIS — J98.4 CHEMOTHERAPY INDUCED PULMONARY TOXICITY: ICD-10-CM

## 2023-01-12 DIAGNOSIS — C81.90 HODGKIN LYMPHOMA, UNSPECIFIED HODGKIN LYMPHOMA TYPE, UNSPECIFIED BODY REGION (HCC): Primary | ICD-10-CM

## 2023-01-12 DIAGNOSIS — R11.0 CHEMOTHERAPY-INDUCED NAUSEA: ICD-10-CM

## 2023-01-12 DIAGNOSIS — T45.1X5A CHEMOTHERAPY-INDUCED NAUSEA: ICD-10-CM

## 2023-01-12 DIAGNOSIS — T45.1X5A CHEMOTHERAPY INDUCED PULMONARY TOXICITY: ICD-10-CM

## 2023-01-12 PROCEDURE — G8536 NO DOC ELDER MAL SCRN: HCPCS | Performed by: STUDENT IN AN ORGANIZED HEALTH CARE EDUCATION/TRAINING PROGRAM

## 2023-01-12 PROCEDURE — 99215 OFFICE O/P EST HI 40 MIN: CPT | Performed by: STUDENT IN AN ORGANIZED HEALTH CARE EDUCATION/TRAINING PROGRAM

## 2023-01-12 PROCEDURE — G8510 SCR DEP NEG, NO PLAN REQD: HCPCS | Performed by: STUDENT IN AN ORGANIZED HEALTH CARE EDUCATION/TRAINING PROGRAM

## 2023-01-12 PROCEDURE — G8427 DOCREV CUR MEDS BY ELIG CLIN: HCPCS | Performed by: STUDENT IN AN ORGANIZED HEALTH CARE EDUCATION/TRAINING PROGRAM

## 2023-01-12 PROCEDURE — 1101F PT FALLS ASSESS-DOCD LE1/YR: CPT | Performed by: STUDENT IN AN ORGANIZED HEALTH CARE EDUCATION/TRAINING PROGRAM

## 2023-01-12 PROCEDURE — 1123F ACP DISCUSS/DSCN MKR DOCD: CPT | Performed by: STUDENT IN AN ORGANIZED HEALTH CARE EDUCATION/TRAINING PROGRAM

## 2023-01-12 PROCEDURE — G8420 CALC BMI NORM PARAMETERS: HCPCS | Performed by: STUDENT IN AN ORGANIZED HEALTH CARE EDUCATION/TRAINING PROGRAM

## 2023-01-12 PROCEDURE — 3074F SYST BP LT 130 MM HG: CPT | Performed by: STUDENT IN AN ORGANIZED HEALTH CARE EDUCATION/TRAINING PROGRAM

## 2023-01-12 PROCEDURE — 3078F DIAST BP <80 MM HG: CPT | Performed by: STUDENT IN AN ORGANIZED HEALTH CARE EDUCATION/TRAINING PROGRAM

## 2023-01-12 PROCEDURE — 3017F COLORECTAL CA SCREEN DOC REV: CPT | Performed by: STUDENT IN AN ORGANIZED HEALTH CARE EDUCATION/TRAINING PROGRAM

## 2023-01-12 RX ORDER — LIDOCAINE AND PRILOCAINE 25; 25 MG/G; MG/G
CREAM TOPICAL AS NEEDED
Qty: 30 G | Refills: 0 | Status: CANCELLED | OUTPATIENT
Start: 2023-01-12

## 2023-01-12 RX ORDER — PROCHLORPERAZINE MALEATE 10 MG
5 TABLET ORAL
Qty: 60 TABLET | Refills: 3 | Status: CANCELLED | OUTPATIENT
Start: 2023-01-12

## 2023-01-12 NOTE — PROGRESS NOTES
Ayana Lake is a 67 y.o. male  Chief Complaint   Patient presents with    Follow-up    Lymphoma     1. Have you been to the ER, urgent care clinic since your last visit? Hospitalized since your last visit? No    2. Have you seen or consulted any other health care providers outside of the [address] [place] [address] since your last visit? Include any pap smears or colon screening.  No

## 2023-01-12 NOTE — PROGRESS NOTES
[address] [facility] at [address] [facility], [place], [address]  W: [phone] F: [phone]      Reason for Visit:   Collin Mccain is a 67 y.o. male who is seen in consultation at the request of Dr. Saira Schmid for evaluation of Hodgkin lymphoma      Hematology / Oncology Treatment History:     Hematological/Oncological Diagnosis: Classic Hodgkin lymphoma    Date of Diagnosis: 2021    Treatment course: Plan for ABVD chemotherapy      History of Present Illness:     66year old with hx of hypertension, seasonal allergies, presents with worsening normocytic aneima of unclear etiology. Hg trend below:       No other cytopenias. MCV is 89. Iron studies are normal.  B12, folate are normal. Reticulocyte count is low/normal at 1.4. Creatinine is normal at 1.10. Calcium is normal at 9.6. LFTs are normal. Per notes from PCP, stool guiac we negative in July. Alkaline phosphatase is elevated. Constitutional symptoms positive for easy bruising only. No night sweats, bone pain, unintentional weight loss. Family history reviewed, non contributory  Social history reviewed, non contributory. No alcohol use  12/28/22:      Marked clinical worsening since last evaluation, patient has had anasarca, severe fatigue, weight loss, nausea, vomiting that is worsened over the last few months. The patient was evaluated by his primary care physician who ordered CT chest abdomen pelvis that showed diffuse lymphadenopathy. Results from imaging shown below        Radiographic findings are highly suspicious for lymphoma. Patient is planned for surgical evaluation for excisional lymph node biopsy later on today. Patient's family also reports that he has been confused intermittently over the last few weeks. They are concerned that there might be a lymphoma infiltrating into the CNS. No focal deficits reported or seizure activity noted.   Interval History:     23: Since last evaluation, the patient has had clinical deterioration. He reports that he has discoloration of his lower extremities that appears to be vascular in nature. Pulses are intact bilaterally however he has redness and some discoloration of his left foot. No evidence of cellulitis or infection. The patient reports that he has decreased appetite, weight loss, as well as darkening of his urine. He does have scleral icterus. Review of Systems: A complete review of systems was obtained, negative except as described above. Past Medical History:   Diagnosis Date    Allergic rhinitis, cause unspecified 2013    Arthritis     knees    Asthma     as a child    Eczema     on lower back waistline on the back    Environmental allergies     GERD (gastroesophageal reflux disease)     occastionally but resolved since 60 pound weight loss    Hypertension     Psychiatric disorder     anxiety and depression      Past Surgical History:   Procedure Laterality Date    HX TONSILLECTOMY      HX WISDOM TEETH EXTRACTION        Social History     Tobacco Use    Smoking status: Former     Packs/day: 1.00     Years: 2.00     Pack years: 2.00     Types: Cigarettes     Quit date: 1966     Years since quittin.1    Smokeless tobacco: Never   Substance Use Topics    Alcohol use: No      Family History   Problem Relation Age of Onset    Hypertension Mother     Hypertension Father     Heart Disease Father     Alcohol abuse Father     Hypertension Brother     No Known Problems Brother      Current Outpatient Medications   Medication Sig    triamcinolone acetonide (KENALOG) 0.1 % topical cream Apply  to affected area two (2) times daily as needed for Skin Irritation. use thin layer    ondansetron (ZOFRAN ODT) 4 mg disintegrating tablet Take 1 Tablet by mouth every eight (8) hours as needed for Nausea or Vomiting.     cetirizine-pseudoePHEDrine (ZyrTEC-D) 5-120 mg Tb12 Take 1 Tab by mouth two (2) times a day. predniSONE (DELTASONE) 10 mg tablet Take 10 mg by mouth daily. (Patient not taking: Reported on 1/12/2023)    bumetanide (BUMEX) 2 mg tablet 2 tabs po daily for 3 days, then decrease to one daily after that. For fluid (Patient not taking: Reported on 1/12/2023)     No current facility-administered medications for this visit. Allergies   Allergen Reactions    Codeine Other (comments)     Pt states unknown reaction. Pcn [Penicillins] Rash            Physical Exam:   Visit Vitals  /65 (BP 1 Location: Right arm, BP Patient Position: Sitting)   Pulse 98   Temp 98.3 °F (36.8 °C) (Oral)   Resp 18   Ht 5' 5\" (1.651 m)   SpO2 99%   BMI 23.66 kg/m²     ECOG PS: 0  General: alert, cooperative, no distress, appears fatigued   Mental  status: normal mood, behavior, speech, dress, motor activity, and thought processes, able to follow commands   HENT: NCAT   Neck: no visualized mass   Resp: no respiratory distress   Neuro: no gross deficits   Skin: no discoloration or lesions of concern on visible areas   Psychiatric: normal affect, consistent with stated mood, no evidence of hallucinations           Results:     Lab Results   Component Value Date/Time    WBC 13.8 (H) 01/09/2023 02:32 PM    HGB 12.3 01/09/2023 02:32 PM    HCT 39.8 01/09/2023 02:32 PM    PLATELET 789 96/38/5805 02:32 PM    MCV 95.9 01/09/2023 02:32 PM    ABS.  NEUTROPHILS 8.6 (H) 01/09/2023 02:32 PM     Lab Results   Component Value Date/Time    Sodium 132 (L) 01/09/2023 02:32 PM    Potassium 4.5 01/09/2023 02:32 PM    Chloride 95 (L) 01/09/2023 02:32 PM    CO2 32 01/09/2023 02:32 PM    Glucose 129 (H) 01/09/2023 02:32 PM    BUN 36 (H) 01/09/2023 02:32 PM    Creatinine 2.16 (H) 01/09/2023 02:32 PM    GFR est AA >60 07/26/2022 01:45 PM    GFR est non-AA >60 07/26/2022 01:45 PM    Calcium 10.3 (H) 01/09/2023 02:32 PM    Sodium,  10/07/2022 10:16 AM    Potassium, POC 3.9 10/07/2022 10:16 AM    Chloride,  10/07/2022 10:16 AM    Glucose (POC) 94 01/06/2023 07:07 AM    Creatinine, POC 1.1 10/07/2022 10:16 AM    Calcium, ionized (POC) 1.22 10/07/2022 10:16 AM     Lab Results   Component Value Date/Time    Bilirubin, total 4.6 (H) 01/09/2023 02:32 PM    ALT (SGPT) 27 01/09/2023 02:32 PM    Alk. phosphatase 624 (H) 01/09/2023 02:32 PM    Protein, total 7.9 01/09/2023 02:32 PM    Albumin 3.0 (L) 01/09/2023 02:32 PM    Globulin 4.9 (H) 01/09/2023 02:32 PM     CT Results (most recent):  Results from Hospital Encounter encounter on 10/07/22    CT HEAD WO CONT    Narrative  EXAM: CT HEAD WO CONT    INDICATION: ams    COMPARISON: 12/12/2016. CONTRAST: None. TECHNIQUE: Unenhanced CT of the head was performed using 5 mm images. Brain and  bone windows were generated. Coronal and sagittal reformats. CT dose reduction  was achieved through use of a standardized protocol tailored for this  examination and automatic exposure control for dose modulation. FINDINGS:  The ventricles and sulci are normal in size, shape and configuration. . Chronic  low density in the right basal ganglia unchanged. . There is no intracranial  hemorrhage, extra-axial collection, or mass effect. The basilar cisterns are  open. No CT evidence of acute infarct. The bone windows demonstrate no abnormalities. The visualized portions of the  paranasal sinuses and mastoid air cells are clear. Impression  No change or acute abnormality    No imaging of spleen      Pathology:           ==========================================================================   * * *FINAL PATHOLOGIC DIAGNOSIS* * *     <<<<<       Lymph node, left supraclavicular lymph node, excision:        Classic Hodgkin lymphoma. See comment.     >>>>>      * * *Comment* * *     <<<<<  The histologic section has an enlarged lymph node with a thickened capsule   and effacement of normal yady architecture by a vaguely nodular   proliferation with few sclerotic bands.  Numerous Hodgkin/Levi-Esteban cells are present within a background of small lymphocytes and   eosinophils. The Hodgkin Levi-Esteban cells are positive for CD30, CD15   and PAX5 (subset, dim) and are negative for CD45, ALK, CD20, EMA,   Granzyme, MUM1, CD43 and CD3. In situ hybridization for Tania-Barr viral   RNA is negative. The small lymphocytes are comprised of CD3 and CD5   positive T cells with few scattered B cells identified. Assessment and Recommendations:     # Classic Hodgkin lymphoma, advanced    - CD30 positive  - Overall findings are highly concerning for widely distributed Hodgkin lymphoma.    -Current ECOG is a 3    -At present, the patient has clinical symptoms of diarrhea, abdominal discomfort, discoloration of his lower extremities, findings may be related to widely distributed lymphadenopathy. Splenomegaly may be causing liver dysfunction.    -Plan for urgent abdominal ultrasound. Echocardiogram was completed 2 months ago. We discussed the risks and benefits of ABVD chemotherapy. Potential side effects include, but are not limited to:  nausea, vomiting, diarrhea, constipation, taste changes, flu-like symptoms, allergic reaction, myelosuppression, infection, fatigue, alopecia, mucositis, neuropathy, cardiac toxicity, pulmonary toxicity, infertility, and rarely, death. The patient has consented to beginning chemotherapy.      PFTs with DLCO prior to first cycle if possible  Plan for chemotherapy with C#1 of ABVD (Doxorubicin 25mg/m2, Bleomycin 10 units/m2, Vinblastine 6mg/m2, Dacarbazine 375mg/m2 on days 1 and 15 every 28 days), with plans for 6 cycles  Labs: CBC prior to each treatment; CMP, Troponin and proBNP prior to day 1 with each cycle  Antiemetic Prophylaxis: Fosaprepitant, Ondansetron and dexamethasone prior to treatment, dexamethasone at home on days 2 and 3   Infusion reaction prophylaxis: acetaminophen and diphenhydramine prior to bleomycin  PRN Antiemetics: Ondansetron, Compazine  EMLA cream for port    Today I discussed that the patient's overall survival after treatment for Hodgkin lymphoma is quite good, usually greater than 90%. Staging is required with PET scan however I suspect the patient has widespread disease. If this is the case, he may benefit from first cycle of chemotherapy as an inpatient.    -We will check uric acid level today as well as recheck LDH, beta-2 microglobulin, CBC    # Hyperbilirubinemia  -The patient was advised to present to the emergency department for further work-up however after discussion the patient and his wife do not wish to have the exposures to infection that going to the emergency department would entail.    -I suspect that the etiology of his hyperbilirubinemia may be a consequence of impaired liver function as a consequence of lymphoma and splenomegaly.     # Elevated alkaline phosphatase  -The patient likely has disease involvement of the bone          Signed By: Ana Guerrero MD      Attending Medical Oncologist   [facility]

## 2023-01-12 NOTE — TELEPHONE ENCOUNTER
Called pt's son HIPAA verified by two patient identifiers. To inform him of his father's appt for his U/S. Gave pt time and prep instructions of npo 8 hrs before procedure. Son was very thankful and verbalized understanding.

## 2023-01-16 ENCOUNTER — HOSPITAL ENCOUNTER (OUTPATIENT)
Dept: ULTRASOUND IMAGING | Age: 73
Discharge: HOME | DRG: 823 | End: 2023-01-16
Attending: STUDENT IN AN ORGANIZED HEALTH CARE EDUCATION/TRAINING PROGRAM
Payer: MEDICARE

## 2023-01-16 DIAGNOSIS — C81.90 HODGKIN LYMPHOMA, UNSPECIFIED HODGKIN LYMPHOMA TYPE, UNSPECIFIED BODY REGION (HCC): ICD-10-CM

## 2023-01-16 PROCEDURE — 76700 US EXAM ABDOM COMPLETE: CPT

## 2023-01-16 RX ORDER — ALLOPURINOL 100 MG/1
300 TABLET ORAL 2 TIMES DAILY
Qty: 84 TABLET | Refills: 0 | Status: SHIPPED | OUTPATIENT
Start: 2023-01-16 | End: 2023-01-16 | Stop reason: DRUGHIGH

## 2023-01-16 RX ORDER — ALLOPURINOL 100 MG/1
100 TABLET ORAL 2 TIMES DAILY
Qty: 28 TABLET | Refills: 0 | Status: ON HOLD | OUTPATIENT
Start: 2023-01-16 | End: 2023-01-30

## 2023-01-17 ENCOUNTER — HOSPITAL ENCOUNTER (INPATIENT)
Age: 73
LOS: 21 days | Discharge: NURSING, REHAB OR LONG-TERM CARE | DRG: 823 | End: 2023-02-07
Attending: STUDENT IN AN ORGANIZED HEALTH CARE EDUCATION/TRAINING PROGRAM | Admitting: STUDENT IN AN ORGANIZED HEALTH CARE EDUCATION/TRAINING PROGRAM
Payer: MEDICARE

## 2023-01-17 ENCOUNTER — APPOINTMENT (OUTPATIENT)
Dept: CT IMAGING | Age: 73
DRG: 823 | End: 2023-01-17
Attending: NURSE PRACTITIONER
Payer: MEDICARE

## 2023-01-17 ENCOUNTER — APPOINTMENT (OUTPATIENT)
Dept: MRI IMAGING | Age: 73
DRG: 823 | End: 2023-01-17
Attending: NURSE PRACTITIONER
Payer: MEDICARE

## 2023-01-17 ENCOUNTER — APPOINTMENT (OUTPATIENT)
Dept: ULTRASOUND IMAGING | Age: 73
DRG: 823 | End: 2023-01-17
Attending: NURSE PRACTITIONER
Payer: MEDICARE

## 2023-01-17 DIAGNOSIS — E43 SEVERE PROTEIN-CALORIE MALNUTRITION (HCC): Chronic | ICD-10-CM

## 2023-01-17 DIAGNOSIS — C81.70 OTHER CLASSICAL HODGKIN LYMPHOMA, UNSPECIFIED BODY REGION (HCC): Primary | ICD-10-CM

## 2023-01-17 DIAGNOSIS — C81.90 HODGKIN LYMPHOMA, UNSPECIFIED HODGKIN LYMPHOMA TYPE, UNSPECIFIED BODY REGION (HCC): ICD-10-CM

## 2023-01-17 DIAGNOSIS — E88.3 TUMOR LYSIS SYNDROME: ICD-10-CM

## 2023-01-17 DIAGNOSIS — Z51.5 PALLIATIVE CARE ENCOUNTER: ICD-10-CM

## 2023-01-17 DIAGNOSIS — G93.40 ENCEPHALOPATHY: ICD-10-CM

## 2023-01-17 DIAGNOSIS — Z71.89 GOALS OF CARE, COUNSELING/DISCUSSION: ICD-10-CM

## 2023-01-17 DIAGNOSIS — R41.0 DELIRIUM: ICD-10-CM

## 2023-01-17 LAB
ALBUMIN SERPL-MCNC: 2.6 G/DL (ref 3.5–5)
ALBUMIN/GLOB SERPL: 0.5 (ref 1.1–2.2)
ALP SERPL-CCNC: 696 U/L (ref 45–117)
ALT SERPL-CCNC: 24 U/L (ref 12–78)
ANION GAP SERPL CALC-SCNC: 10 MMOL/L (ref 5–15)
APTT PPP: 35.4 SEC (ref 22.1–31)
AST SERPL-CCNC: 38 U/L (ref 15–37)
BASOPHILS # BLD: 0.1 K/UL (ref 0–0.1)
BASOPHILS NFR BLD: 1 % (ref 0–1)
BILIRUB SERPL-MCNC: 4.5 MG/DL (ref 0.2–1)
BUN SERPL-MCNC: 32 MG/DL (ref 6–20)
BUN/CREAT SERPL: 16 (ref 12–20)
CALCIUM SERPL-MCNC: 9.6 MG/DL (ref 8.5–10.1)
CHLORIDE SERPL-SCNC: 97 MMOL/L (ref 97–108)
CO2 SERPL-SCNC: 25 MMOL/L (ref 21–32)
CREAT SERPL-MCNC: 2.04 MG/DL (ref 0.7–1.3)
DIFFERENTIAL METHOD BLD: ABNORMAL
EOSINOPHIL # BLD: 0.5 K/UL (ref 0–0.4)
EOSINOPHIL NFR BLD: 4 % (ref 0–7)
ERYTHROCYTE [DISTWIDTH] IN BLOOD BY AUTOMATED COUNT: 19.4 % (ref 11.5–14.5)
GLOBULIN SER CALC-MCNC: 4.8 G/DL (ref 2–4)
GLUCOSE SERPL-MCNC: 95 MG/DL (ref 65–100)
HCT VFR BLD AUTO: 36.2 % (ref 36.6–50.3)
HGB BLD-MCNC: 11.3 G/DL (ref 12.1–17)
IMM GRANULOCYTES # BLD AUTO: 0.1 K/UL (ref 0–0.04)
IMM GRANULOCYTES NFR BLD AUTO: 1 % (ref 0–0.5)
INR PPP: 1.4 (ref 0.9–1.1)
LDH SERPL L TO P-CCNC: 294 U/L (ref 85–241)
LYMPHOCYTES # BLD: 2.7 K/UL (ref 0.8–3.5)
LYMPHOCYTES NFR BLD: 19 % (ref 12–49)
MCH RBC QN AUTO: 30 PG (ref 26–34)
MCHC RBC AUTO-ENTMCNC: 31.2 G/DL (ref 30–36.5)
MCV RBC AUTO: 96 FL (ref 80–99)
MONOCYTES # BLD: 1.2 K/UL (ref 0–1)
MONOCYTES NFR BLD: 9 % (ref 5–13)
NEUTS SEG # BLD: 9.7 K/UL (ref 1.8–8)
NEUTS SEG NFR BLD: 66 % (ref 32–75)
NRBC # BLD: 0 K/UL (ref 0–0.01)
NRBC BLD-RTO: 0 PER 100 WBC
PLATELET # BLD AUTO: 231 K/UL (ref 150–400)
PMV BLD AUTO: 9.3 FL (ref 8.9–12.9)
POTASSIUM SERPL-SCNC: 4.4 MMOL/L (ref 3.5–5.1)
PROT SERPL-MCNC: 7.4 G/DL (ref 6.4–8.2)
PROTHROMBIN TIME: 14.6 SEC (ref 9–11.1)
RBC # BLD AUTO: 3.77 M/UL (ref 4.1–5.7)
SODIUM SERPL-SCNC: 132 MMOL/L (ref 136–145)
THERAPEUTIC RANGE,PTTT: ABNORMAL SECS (ref 58–77)
URATE SERPL-MCNC: 10 MG/DL (ref 3.5–7.2)
WBC # BLD AUTO: 14.3 K/UL (ref 4.1–11.1)

## 2023-01-17 PROCEDURE — 71260 CT THORAX DX C+: CPT

## 2023-01-17 PROCEDURE — 74011250637 HC RX REV CODE- 250/637: Performed by: NURSE PRACTITIONER

## 2023-01-17 PROCEDURE — 36415 COLL VENOUS BLD VENIPUNCTURE: CPT

## 2023-01-17 PROCEDURE — 65270000029 HC RM PRIVATE

## 2023-01-17 PROCEDURE — 85730 THROMBOPLASTIN TIME PARTIAL: CPT

## 2023-01-17 PROCEDURE — 83615 LACTATE (LD) (LDH) ENZYME: CPT

## 2023-01-17 PROCEDURE — 99233 SBSQ HOSP IP/OBS HIGH 50: CPT | Performed by: STUDENT IN AN ORGANIZED HEALTH CARE EDUCATION/TRAINING PROGRAM

## 2023-01-17 PROCEDURE — 85610 PROTHROMBIN TIME: CPT

## 2023-01-17 PROCEDURE — 93970 EXTREMITY STUDY: CPT

## 2023-01-17 PROCEDURE — 74177 CT ABD & PELVIS W/CONTRAST: CPT

## 2023-01-17 PROCEDURE — 74011250636 HC RX REV CODE- 250/636: Performed by: STUDENT IN AN ORGANIZED HEALTH CARE EDUCATION/TRAINING PROGRAM

## 2023-01-17 PROCEDURE — 77030018786 HC NDL GD F/USND BARD -B

## 2023-01-17 PROCEDURE — C1751 CATH, INF, PER/CENT/MIDLINE: HCPCS

## 2023-01-17 PROCEDURE — 80053 COMPREHEN METABOLIC PANEL: CPT

## 2023-01-17 PROCEDURE — 76937 US GUIDE VASCULAR ACCESS: CPT

## 2023-01-17 PROCEDURE — 84550 ASSAY OF BLOOD/URIC ACID: CPT

## 2023-01-17 PROCEDURE — 85025 COMPLETE CBC W/AUTO DIFF WBC: CPT

## 2023-01-17 PROCEDURE — A9576 INJ PROHANCE MULTIPACK: HCPCS | Performed by: STUDENT IN AN ORGANIZED HEALTH CARE EDUCATION/TRAINING PROGRAM

## 2023-01-17 PROCEDURE — 74011000258 HC RX REV CODE- 258: Performed by: STUDENT IN AN ORGANIZED HEALTH CARE EDUCATION/TRAINING PROGRAM

## 2023-01-17 PROCEDURE — 70553 MRI BRAIN STEM W/O & W/DYE: CPT

## 2023-01-17 PROCEDURE — 74011000636 HC RX REV CODE- 636: Performed by: STUDENT IN AN ORGANIZED HEALTH CARE EDUCATION/TRAINING PROGRAM

## 2023-01-17 PROCEDURE — 99222 1ST HOSP IP/OBS MODERATE 55: CPT | Performed by: SURGERY

## 2023-01-17 RX ORDER — ONDANSETRON 2 MG/ML
4 INJECTION INTRAMUSCULAR; INTRAVENOUS
Status: DISCONTINUED | OUTPATIENT
Start: 2023-01-17 | End: 2023-02-07 | Stop reason: HOSPADM

## 2023-01-17 RX ORDER — POLYETHYLENE GLYCOL 3350 17 G/17G
17 POWDER, FOR SOLUTION ORAL
Status: DISCONTINUED | OUTPATIENT
Start: 2023-01-17 | End: 2023-02-07 | Stop reason: HOSPADM

## 2023-01-17 RX ORDER — ALLOPURINOL 100 MG/1
100 TABLET ORAL 2 TIMES DAILY
Status: DISCONTINUED | OUTPATIENT
Start: 2023-01-17 | End: 2023-02-07 | Stop reason: HOSPADM

## 2023-01-17 RX ORDER — HEPARIN 100 UNIT/ML
300 SYRINGE INTRAVENOUS AS NEEDED
Status: CANCELLED | OUTPATIENT
Start: 2023-01-17

## 2023-01-17 RX ORDER — ACETAMINOPHEN 325 MG/1
650 TABLET ORAL
Status: DISCONTINUED | OUTPATIENT
Start: 2023-01-17 | End: 2023-02-07 | Stop reason: HOSPADM

## 2023-01-17 RX ADMIN — ALLOPURINOL 100 MG: 100 TABLET ORAL at 18:59

## 2023-01-17 RX ADMIN — IOPAMIDOL 100 ML: 755 INJECTION, SOLUTION INTRAVENOUS at 11:40

## 2023-01-17 RX ADMIN — SODIUM CHLORIDE 6 MG: 9 INJECTION, SOLUTION INTRAVENOUS at 13:19

## 2023-01-17 RX ADMIN — GADOTERIDOL 15 ML: 279.3 INJECTION, SOLUTION INTRAVENOUS at 12:14

## 2023-01-17 NOTE — PROGRESS NOTES
Transition of Care Plan  RUR: 15%  DISPOSITION: The disposition plan is home with family assistance  F/U with PCP/Specialist    Transport: family         Reason for Admission:   Hodgkin's lymphoma                    RUR Score:   15%               PCP: First and Last name:   Raffi Villalpando MD     Name of Practice:    Are you a current patient: Yes/No:    Approximate date of last visit:    Can you participate in a virtual visit if needed:     Do you (patient/family) have any concerns for transition/discharge?     none              Plan for utilizing home health:   no recommendations     Current Advanced Directive/Advance Care Plan:  Full Code      Healthcare Decision Maker:   Click here to complete [address] including selection of the Healthcare Decision Maker Relationship (ie \"Primary\")              Transition of Care Plan:            Patient lives with his wife and requires assistance with his ADLs/IADLs. Patient ambulates with a cane. Patient uses [facility]. Care Management Interventions  PCP Verified by CM: Yes  Palliative Care Criteria Met (RRAT>21 & CHF Dx)?: No  Mode of Transport at Discharge:  Other (see comment) (family)  Transition of Care Consult (CM Consult): Discharge Planning  MyChart Signup: No  Discharge Durable Medical Equipment: No  Health Maintenance Reviewed: Yes  Physical Therapy Consult: No  Occupational Therapy Consult: No  Speech Therapy Consult: No  Support Systems: Spouse/Significant Other  Confirm Follow Up Transport: Family  [employer] Information Provided?: No  Discharge Location  Patient Expects to be Discharged to[de-identified] Home with family assistance    2:39 PM  Edmundo Hawkins, EDGAR

## 2023-01-17 NOTE — H&P
[facility] [place] at [address] [facility], [address]  W: [phone] F: [phone]    History and Physical    Patient: Domitila Lucas MRN: [id number]  SSN: [id number]    YOB: 1950  Age: 67 y.o. Sex: male      Subjective:      Domitila Lucas is a 67 y.o. male who is being admitted to [facility] for further evaluation of newly diagnosed classic Hodgkin's lymphoma with a component of tumor lysis syndrome. Plan is to start systemic therapy while inpatient for close monitoring given TLS with elevated uric acid and high risk for further deterioration after systemic therapy. While admitted we plan to obtain bone marrow biopsy, MRI of the brain, CT of the chest abdomen and pelvis, dopplers of the lower extremities, place port for systemic therapy and obtain PFTs. Past Medical History:   Diagnosis Date    Allergic rhinitis, cause unspecified 2013    Arthritis     knees    Asthma     as a child    Eczema     on lower back waistline on the back    Environmental allergies     GERD (gastroesophageal reflux disease)     occastionally but resolved since 60 pound weight loss    Hypertension     Psychiatric disorder     anxiety and depression     Past Surgical History:   Procedure Laterality Date    HX TONSILLECTOMY      HX WISDOM TEETH EXTRACTION        Family History   Problem Relation Age of Onset    Hypertension Mother     Hypertension Father     Heart Disease Father     Alcohol abuse Father     Hypertension Brother     No Known Problems Brother      Social History     Tobacco Use    Smoking status: Former     Packs/day: 1.00     Years: 2.00     Pack years: 2.00     Types: Cigarettes     Quit date: 1966     Years since quittin.1    Smokeless tobacco: Never   Substance Use Topics    Alcohol use: No      Prior to Admission medications    Medication Sig Start Date End Date Taking? Authorizing Provider   allopurinoL (ZYLOPRIM) 100 mg tablet Take 1 Tablet by mouth two (2) times a day for 14 days. Indications: an increase in uric acid due to cancer therapy, treatment to prevent acute gout attack 1/16/23 1/30/23  Yvette Calderon MD   predniSONE (DELTASONE) 10 mg tablet Take 10 mg by mouth daily. Patient not taking: Reported on 1/12/2023 1/6/23   Jane Carl MD   triamcinolone acetonide (KENALOG) 0.1 % topical cream Apply  to affected area two (2) times daily as needed for Skin Irritation. use thin layer    Provider, Historical   ondansetron (ZOFRAN ODT) 4 mg disintegrating tablet Take 1 Tablet by mouth every eight (8) hours as needed for Nausea or Vomiting. 12/27/22   Yvette Calderon MD   bumetanide (BUMEX) 2 mg tablet 2 tabs po daily for 3 days, then decrease to one daily after that. For fluid  Patient not taking: Reported on 1/12/2023 12/12/22   Carmen Villalpando MD   clobetasoL (TEMOVATE) 0.05 % ointment APPLY TO AFFECTED AREA(S) OF LEG ON DAMP SKIN TWO TIMES A DAY AS NEEDED FOR 2 WEEKS MAXIMUM. THEN AS NEEDED FOR FLARES. APPLY THIN LAYER OF  Patient not taking: Reported on 12/28/2022 12/3/22 12/28/22  Provider, Historical   cetirizine-pseudoePHEDrine (ZyrTEC-D) 5-120 mg Tb12 Take 1 Tab by mouth two (2) times a day. 8/14/20   Jane Carl MD        Allergies   Allergen Reactions    Codeine Other (comments)     Pt states unknown reaction. Pcn [Penicillins] Rash       Review of Systems:  Pertinent items are noted in the History of Present Illness. Objective:     Vitals:    01/17/23 0745 01/17/23 1048   BP: 96/71    Pulse: 100    Resp: 17    Temp: 98.6 °F (37 °C)    SpO2: 100%    Weight:  146 lb 9.7 oz (66.5 kg)        Physical Exam:  GENERAL: alert, cooperative, no distress, appears older than stated age  ABDOMEN: soft, non-tender.  Bowel sounds normal. No masses,  no organomegaly  EXTREMITIES:  atraumatic, BLE +1-2 non pitting edema with erythema to bilateral feet SKIN: Normal.  NEUROLOGIC: AOx3. Gait normal. Reflexes and motor strength normal and symmetric. Cranial nerves 2-12 and sensation grossly intact. Assessment:     Hospital Problems  Date Reviewed: 12/28/2022            Codes Class Noted POA    [facility] ICD-10-CM: C81.90  ICD-9-CM: 201.90  1/12/2023 Unknown        Type 2 diabetes mellitus ICD-10-CM: E11.9  ICD-9-CM: 250.00  12/28/2022 Yes        Iron deficiency anemia ICD-10-CM: D50.9  ICD-9-CM: 280.9  8/8/2022 Yes        Essential hypertension (Chronic) ICD-10-CM: I10  ICD-9-CM: 401.9  2/19/2017 Yes           Plan:     Classic Hodgkin lymphoma, advanced  -At present, the patient has clinical symptoms of diarrhea, abdominal discomfort, discoloration of his lower extremities, findings may be related to widely distributed lymphadenopathy. Splenomegaly may be causing liver dysfunction.     -Discussed the risks and benefits of ABVD chemotherapy in the OP setting, please see clinic note for further detail.       Plan for chemotherapy with C#1 of ABVD (Doxorubicin 25mg/m2, Bleomycin 10 units/m2, Vinblastine 6mg/m2, Dacarbazine 375mg/m2 on days 1 and 15 every 28 days), with plans for 6 cycles as IP once imaging completed and port placed, tentatively 1/19  Obtain CT Chest/abd/pelvis   Obtain MRI Brain   Obtain PFTs   Consult to General Surgery for port placement tomorrow in OR  Obtain Bone Marrow Biopsy in AM   NPO after MD      Hyperbilirubinemia  -Etiology of his hyperbilirubinemia may be a consequence of impaired liver function as a consequence of lymphoma and splenomegaly.   -Trend CMP, T bili remains elevated today     Elevated alkaline phosphatase  -The patient likely has disease involvement of the bone    Tumor Lysis Syndrome  -Component of TLS given extensive disease burden  -Continue to trend uric acid, elevated today at 10  -Continue allopurinol   -Give dose of Rasburicase  today  -Close monitoring of CMP and electrolytes for worsening of TLS with plan for systemic therapy    Mild Hypercalcemia   -Related to disease burden  -Corrected Ca 10.3  -Monitor for now     Acute Kidney Injury   -Related to TLS  -Dose of Rasburicase today   -Follow renal function     Anasarca  Bilateral LE Swelling   -Likely r/t to malignancy and malnutrition   -Rule out DVT with BLE duplex   -Consult to Nutrition Services        Signed By: Isai Thompson NP     January 17, 2023

## 2023-01-17 NOTE — PROGRESS NOTES
MRI PENDING      MRI SCREENING SHEET NEEDS TO BE COMPLETED AND SIGNED      CALL [phone] WHEN THIS IS DONE    FAX [phone]

## 2023-01-17 NOTE — PROGRESS NOTES
Problem: Chemotherapy, Day 1  Goal: Off Pathway (Use only if patient is Off Pathway)  Outcome: Progressing Towards Goal  Goal: Activity/Safety  Outcome: Progressing Towards Goal  Goal: Consults, if ordered  Outcome: Progressing Towards Goal  Goal: Diagnostic Test/Procedures  Outcome: Progressing Towards Goal  Goal: Nutrition/Diet  Outcome: Progressing Towards Goal  Goal: Discharge Planning  Outcome: Progressing Towards Goal  Goal: Medications  Outcome: Progressing Towards Goal  Goal: Respiratory  Outcome: Progressing Towards Goal  Goal: Treatments/Interventions/Procedures  Outcome: Progressing Towards Goal  Goal: Psychosocial  Outcome: Progressing Towards Goal  Goal: *Optimal pain control at patient's stated goal  Outcome: Progressing Towards Goal  Goal: *Hemodynamically stable  Outcome: Progressing Towards Goal  Goal: *Adequate oxygenation  Outcome: Progressing Towards Goal  Goal: *Chemotherapy regimen is initiated  Outcome: Progressing Towards Goal  Goal: *Patient and family verbalize understanding of plan of care  Outcome: Progressing Towards Goal

## 2023-01-17 NOTE — PROGRESS NOTES
PICC order acknowledge, Assessed patient with US no viable vessel right upper arm, basilic vein measures 96%, cephalic not compressible, unable to visual ize brachial veins. Left upper arm cephalic basilic veins are too small, brachial vein not compressible the other one is under the nerve bundle. Patient doesn't have viable vein for Midline or PICC. Discuss with primary nurse [facility] SYS WASECA regarding the assessment finding.

## 2023-01-17 NOTE — PROGRESS NOTES
PFT ordered. Talked with Linda Zaragoza RN advising that PFT lab is closed today and asked it we are able to do this test tomorrow. RN advised that tomorrow is ok.

## 2023-01-17 NOTE — CONSULTS
Surgery Consult    Subjective:      Valdemar Mercado is a 67 y.o. male recent diagnosis of Hodgkin's lymphoma. Left supraclavicular node biopsy by Dr. Pham Dao 23. Currently admitted due to worsening symptoms. Will need a Port-A-Cath for treatment. N.p.o. after midnight tonight for bone marrow biopsy. Currently tolerating p.o. In reasonably good spirits consider diagnosis and anxious to start his treatment. The patient reports a history of decreased appetite weight loss. Denies fever, chills, chest pain, shortness of breath, nausea, vomiting.     Past Medical History:   Diagnosis Date    Allergic rhinitis, cause unspecified 2013    Arthritis     knees    Asthma     as a child    Eczema     on lower back waistline on the back    Environmental allergies     GERD (gastroesophageal reflux disease)     occastionally but resolved since 60 pound weight loss    Hypertension     Psychiatric disorder     anxiety and depression     Past Surgical History:   Procedure Laterality Date    HX TONSILLECTOMY      HX WISDOM TEETH EXTRACTION        Family History   Problem Relation Age of Onset    Hypertension Mother     Hypertension Father     Heart Disease Father     Alcohol abuse Father     Hypertension Brother     No Known Problems Brother      Social History     Socioeconomic History    Marital status:    Tobacco Use    Smoking status: Former     Packs/day: 1.00     Years: 2.00     Pack years: 2.00     Types: Cigarettes     Quit date: 1966     Years since quittin.1    Smokeless tobacco: Never   Vaping Use    Vaping Use: Never used   Substance and Sexual Activity    Alcohol use: No    Drug use: No    Sexual activity: Yes     Partners: Female      Current Facility-Administered Medications   Medication Dose Route Frequency    ondansetron (ZOFRAN) injection 4 mg  4 mg IntraVENous Q4H PRN    acetaminophen (TYLENOL) tablet 650 mg  650 mg Oral Q6H PRN    polyethylene glycol (MIRALAX) packet 17 g  17 g Oral QHS PRN    rasburicase (ELITEK) 6 mg in 0.9% sodium chloride 50 mL IVPB  6 mg IntraVENous ONCE    allopurinoL (ZYLOPRIM) tablet 100 mg  100 mg Oral BID    ceFAZolin (ANCEF) 2 g in sterile water (preservative free) 20 mL IV syringe  2 g IntraVENous ON CALL TO OR      Allergies   Allergen Reactions    Codeine Other (comments)     Pt states unknown reaction. Pcn [Penicillins] Rash       Review of Systems:  A comprehensive review of systems was negative except for that written in the History of Present Illness. Objective:      Patient Vitals for the past 8 hrs:   BP Temp Pulse Resp SpO2 Weight   23 1048 -- -- -- -- -- 66.5 kg (146 lb 9.7 oz)   23 0745 96/71 98.6 °F (37 °C) 100 17 100 % --       Temp (24hrs), Av.6 °F (37 °C), Min:98.6 °F (37 °C), Max:98.6 °F (37 °C)      Physical Exam:  GENERAL: alert, cooperative, no distress, appears stated age,  , THROAT & NECK: normal and no erythema or exudates noted. Incision on left neck is healing well., LUNG: clear to auscultation bilaterally, HEART: regular rate and rhythm. ABDOMEN: soft, non-tender. Bowel sounds normal. No masses,  no organomegaly, EXTREMITIES:  extremities normal, atraumatic, no cyanosis or edema, SKIN: Normal., NEUROLOGIC: negative    Assessment:     Hodgkin's lymphoma    Plan:         I discussed with Domitila Lucas and his wife proceeding with a Port A Cath placement. Risks, Benefits, and Alternatives of the procedure wee discussed including:  the risk of the anesthesia, bleeding, infection, port malfunction, and pneumothorax. The patient understands the risks; any and all questions were answered to the patient's satisfaction. Wants to proceed. We will proceed with a Port-A-Cath placement tomorrow. Thank you for this consult.       Signed By: Terra Coffey MD     2023

## 2023-01-18 ENCOUNTER — ANESTHESIA EVENT (OUTPATIENT)
Dept: SURGERY | Age: 73
DRG: 823 | End: 2023-01-18
Payer: MEDICARE

## 2023-01-18 ENCOUNTER — APPOINTMENT (OUTPATIENT)
Dept: GENERAL RADIOLOGY | Age: 73
DRG: 823 | End: 2023-01-18
Attending: SURGERY
Payer: MEDICARE

## 2023-01-18 ENCOUNTER — ANESTHESIA (OUTPATIENT)
Dept: SURGERY | Age: 73
DRG: 823 | End: 2023-01-18
Payer: MEDICARE

## 2023-01-18 ENCOUNTER — APPOINTMENT (OUTPATIENT)
Dept: CT IMAGING | Age: 73
DRG: 823 | End: 2023-01-18
Attending: NURSE PRACTITIONER
Payer: MEDICARE

## 2023-01-18 PROBLEM — E43 SEVERE PROTEIN-CALORIE MALNUTRITION (HCC): Status: ACTIVE | Noted: 2023-01-18

## 2023-01-18 PROBLEM — E43 SEVERE PROTEIN-CALORIE MALNUTRITION (HCC): Chronic | Status: ACTIVE | Noted: 2023-01-18

## 2023-01-18 LAB
ALBUMIN SERPL-MCNC: 2.6 G/DL (ref 3.5–5)
ALBUMIN/GLOB SERPL: 0.6 (ref 1.1–2.2)
ALP SERPL-CCNC: 716 U/L (ref 45–117)
ALT SERPL-CCNC: 25 U/L (ref 12–78)
ANION GAP SERPL CALC-SCNC: 9 MMOL/L (ref 5–15)
AST SERPL-CCNC: 45 U/L (ref 15–37)
BASOPHILS # BLD: 0.1 K/UL (ref 0–0.1)
BASOPHILS NFR BLD: 1 % (ref 0–1)
BILIRUB SERPL-MCNC: 4 MG/DL (ref 0.2–1)
BUN SERPL-MCNC: 30 MG/DL (ref 6–20)
BUN/CREAT SERPL: 16 (ref 12–20)
CALCIUM SERPL-MCNC: 9.7 MG/DL (ref 8.5–10.1)
CHLORIDE SERPL-SCNC: 97 MMOL/L (ref 97–108)
CO2 SERPL-SCNC: 28 MMOL/L (ref 21–32)
CREAT SERPL-MCNC: 1.84 MG/DL (ref 0.7–1.3)
DIFFERENTIAL METHOD BLD: ABNORMAL
EOSINOPHIL # BLD: 0.5 K/UL (ref 0–0.4)
EOSINOPHIL NFR BLD: 4 % (ref 0–7)
ERYTHROCYTE [DISTWIDTH] IN BLOOD BY AUTOMATED COUNT: 19.6 % (ref 11.5–14.5)
GLOBULIN SER CALC-MCNC: 4.5 G/DL (ref 2–4)
GLUCOSE SERPL-MCNC: 95 MG/DL (ref 65–100)
HCT VFR BLD AUTO: 36.3 % (ref 36.6–50.3)
HGB BLD-MCNC: 11.3 G/DL (ref 12.1–17)
IMM GRANULOCYTES # BLD AUTO: 0.1 K/UL (ref 0–0.04)
IMM GRANULOCYTES NFR BLD AUTO: 1 % (ref 0–0.5)
LYMPHOCYTES # BLD: 2.3 K/UL (ref 0.8–3.5)
LYMPHOCYTES NFR BLD: 18 % (ref 12–49)
MCH RBC QN AUTO: 30.3 PG (ref 26–34)
MCHC RBC AUTO-ENTMCNC: 31.1 G/DL (ref 30–36.5)
MCV RBC AUTO: 97.3 FL (ref 80–99)
MONOCYTES # BLD: 1.1 K/UL (ref 0–1)
MONOCYTES NFR BLD: 9 % (ref 5–13)
NEUTS SEG # BLD: 9 K/UL (ref 1.8–8)
NEUTS SEG NFR BLD: 67 % (ref 32–75)
NRBC # BLD: 0 K/UL (ref 0–0.01)
NRBC BLD-RTO: 0 PER 100 WBC
PHOSPHATE SERPL-MCNC: 2.8 MG/DL (ref 2.6–4.7)
PLATELET # BLD AUTO: 216 K/UL (ref 150–400)
PMV BLD AUTO: 9.5 FL (ref 8.9–12.9)
POTASSIUM SERPL-SCNC: 3.9 MMOL/L (ref 3.5–5.1)
PREALB SERPL-MCNC: 3.2 MG/DL (ref 20–40)
PROT SERPL-MCNC: 7.1 G/DL (ref 6.4–8.2)
RBC # BLD AUTO: 3.73 M/UL (ref 4.1–5.7)
SODIUM SERPL-SCNC: 134 MMOL/L (ref 136–145)
URATE SERPL-MCNC: 3 MG/DL (ref 3.5–7.2)
WBC # BLD AUTO: 13 K/UL (ref 4.1–11.1)

## 2023-01-18 PROCEDURE — 76000 FLUOROSCOPY <1 HR PHYS/QHP: CPT

## 2023-01-18 PROCEDURE — 99233 SBSQ HOSP IP/OBS HIGH 50: CPT | Performed by: STUDENT IN AN ORGANIZED HEALTH CARE EDUCATION/TRAINING PROGRAM

## 2023-01-18 PROCEDURE — 97161 PT EVAL LOW COMPLEX 20 MIN: CPT

## 2023-01-18 PROCEDURE — 65270000029 HC RM PRIVATE

## 2023-01-18 PROCEDURE — 36415 COLL VENOUS BLD VENIPUNCTURE: CPT

## 2023-01-18 PROCEDURE — B518YZA FLUOROSCOPY OF SUPERIOR VENA CAVA USING OTHER CONTRAST, GUIDANCE: ICD-10-PCS | Performed by: SURGERY

## 2023-01-18 PROCEDURE — 36561 INSERT TUNNELED CV CATH: CPT | Performed by: SURGERY

## 2023-01-18 PROCEDURE — 71045 X-RAY EXAM CHEST 1 VIEW: CPT

## 2023-01-18 PROCEDURE — 84100 ASSAY OF PHOSPHORUS: CPT

## 2023-01-18 PROCEDURE — 76210000006 HC OR PH I REC 0.5 TO 1 HR: Performed by: SURGERY

## 2023-01-18 PROCEDURE — 97530 THERAPEUTIC ACTIVITIES: CPT

## 2023-01-18 PROCEDURE — 74011250636 HC RX REV CODE- 250/636: Performed by: REGISTERED NURSE

## 2023-01-18 PROCEDURE — 85025 COMPLETE CBC W/AUTO DIFF WBC: CPT

## 2023-01-18 PROCEDURE — 76010000132 HC OR TIME 2.5 TO 3 HR: Performed by: SURGERY

## 2023-01-18 PROCEDURE — 77030010507 HC ADH SKN DERMBND J&J -B: Performed by: SURGERY

## 2023-01-18 PROCEDURE — 77030002986 HC SUT PROL J&J -A: Performed by: SURGERY

## 2023-01-18 PROCEDURE — 76060000036 HC ANESTHESIA 2.5 TO 3 HR: Performed by: SURGERY

## 2023-01-18 PROCEDURE — 74011250636 HC RX REV CODE- 250/636: Performed by: SURGERY

## 2023-01-18 PROCEDURE — 84550 ASSAY OF BLOOD/URIC ACID: CPT

## 2023-01-18 PROCEDURE — 94729 DIFFUSING CAPACITY: CPT

## 2023-01-18 PROCEDURE — 74011000250 HC RX REV CODE- 250: Performed by: SURGERY

## 2023-01-18 PROCEDURE — 02HV33Z INSERTION OF INFUSION DEVICE INTO SUPERIOR VENA CAVA, PERCUTANEOUS APPROACH: ICD-10-PCS | Performed by: SURGERY

## 2023-01-18 PROCEDURE — 74011250637 HC RX REV CODE- 250/637: Performed by: NURSE PRACTITIONER

## 2023-01-18 PROCEDURE — 84134 ASSAY OF PREALBUMIN: CPT

## 2023-01-18 PROCEDURE — 97165 OT EVAL LOW COMPLEX 30 MIN: CPT

## 2023-01-18 PROCEDURE — 94375 RESPIRATORY FLOW VOLUME LOOP: CPT

## 2023-01-18 PROCEDURE — 74011000250 HC RX REV CODE- 250: Performed by: REGISTERED NURSE

## 2023-01-18 PROCEDURE — 2709999900 HC NON-CHARGEABLE SUPPLY: Performed by: SURGERY

## 2023-01-18 PROCEDURE — C1788 PORT, INDWELLING, IMP: HCPCS | Performed by: SURGERY

## 2023-01-18 PROCEDURE — 77030018673: Performed by: SURGERY

## 2023-01-18 PROCEDURE — 74011250637 HC RX REV CODE- 250/637: Performed by: SURGERY

## 2023-01-18 PROCEDURE — 77030003029 HC SUT VCRL J&J -B: Performed by: SURGERY

## 2023-01-18 PROCEDURE — 80053 COMPREHEN METABOLIC PANEL: CPT

## 2023-01-18 PROCEDURE — 0JH60WZ INSERTION OF TOTALLY IMPLANTABLE VASCULAR ACCESS DEVICE INTO CHEST SUBCUTANEOUS TISSUE AND FASCIA, OPEN APPROACH: ICD-10-PCS | Performed by: SURGERY

## 2023-01-18 DEVICE — POWERPORT ISP IMPLANTABLE PORT WITH ATTACHABLE 8F CHRONOFLEX OPEN-ENDED SINGLE-LUMEN VENOUS CATHETER INTERMEDIATE KIT (WITH SUTURE PLUGS)
Type: IMPLANTABLE DEVICE | Site: CHEST | Status: FUNCTIONAL
Brand: POWERPORT, CHRONOFLEX

## 2023-01-18 RX ORDER — PROPOFOL 10 MG/ML
INJECTION, EMULSION INTRAVENOUS AS NEEDED
Status: DISCONTINUED | OUTPATIENT
Start: 2023-01-18 | End: 2023-01-18 | Stop reason: HOSPADM

## 2023-01-18 RX ORDER — DIPHENHYDRAMINE HYDROCHLORIDE 50 MG/ML
12.5 INJECTION, SOLUTION INTRAMUSCULAR; INTRAVENOUS AS NEEDED
Status: DISCONTINUED | OUTPATIENT
Start: 2023-01-18 | End: 2023-01-18 | Stop reason: HOSPADM

## 2023-01-18 RX ORDER — BUPIVACAINE HYDROCHLORIDE AND EPINEPHRINE 5; 5 MG/ML; UG/ML
INJECTION, SOLUTION PERINEURAL AS NEEDED
Status: DISCONTINUED | OUTPATIENT
Start: 2023-01-18 | End: 2023-01-18 | Stop reason: HOSPADM

## 2023-01-18 RX ORDER — LIDOCAINE HYDROCHLORIDE 10 MG/ML
0.1 INJECTION, SOLUTION EPIDURAL; INFILTRATION; INTRACAUDAL; PERINEURAL AS NEEDED
Status: DISCONTINUED | OUTPATIENT
Start: 2023-01-18 | End: 2023-01-18 | Stop reason: HOSPADM

## 2023-01-18 RX ORDER — SODIUM CHLORIDE 0.9 % (FLUSH) 0.9 %
5-40 SYRINGE (ML) INJECTION AS NEEDED
Status: DISCONTINUED | OUTPATIENT
Start: 2023-01-18 | End: 2023-01-18 | Stop reason: HOSPADM

## 2023-01-18 RX ORDER — HYDROMORPHONE HYDROCHLORIDE 1 MG/ML
0.2 INJECTION, SOLUTION INTRAMUSCULAR; INTRAVENOUS; SUBCUTANEOUS
Status: DISCONTINUED | OUTPATIENT
Start: 2023-01-18 | End: 2023-01-18 | Stop reason: HOSPADM

## 2023-01-18 RX ORDER — SODIUM CHLORIDE, SODIUM LACTATE, POTASSIUM CHLORIDE, CALCIUM CHLORIDE 600; 310; 30; 20 MG/100ML; MG/100ML; MG/100ML; MG/100ML
25 INJECTION, SOLUTION INTRAVENOUS CONTINUOUS
Status: DISCONTINUED | OUTPATIENT
Start: 2023-01-18 | End: 2023-01-18 | Stop reason: HOSPADM

## 2023-01-18 RX ORDER — LIDOCAINE HYDROCHLORIDE 20 MG/ML
INJECTION, SOLUTION EPIDURAL; INFILTRATION; INTRACAUDAL; PERINEURAL AS NEEDED
Status: DISCONTINUED | OUTPATIENT
Start: 2023-01-18 | End: 2023-01-18 | Stop reason: HOSPADM

## 2023-01-18 RX ORDER — SODIUM CHLORIDE 0.9 % (FLUSH) 0.9 %
5-40 SYRINGE (ML) INJECTION EVERY 8 HOURS
Status: DISCONTINUED | OUTPATIENT
Start: 2023-01-18 | End: 2023-01-18 | Stop reason: HOSPADM

## 2023-01-18 RX ORDER — HYDROMORPHONE HYDROCHLORIDE 1 MG/ML
.5-1 INJECTION, SOLUTION INTRAMUSCULAR; INTRAVENOUS; SUBCUTANEOUS
Status: DISCONTINUED | OUTPATIENT
Start: 2023-01-18 | End: 2023-01-21

## 2023-01-18 RX ORDER — FENTANYL CITRATE 50 UG/ML
25 INJECTION, SOLUTION INTRAMUSCULAR; INTRAVENOUS
Status: DISCONTINUED | OUTPATIENT
Start: 2023-01-18 | End: 2023-01-18 | Stop reason: HOSPADM

## 2023-01-18 RX ADMIN — SODIUM CHLORIDE, POTASSIUM CHLORIDE, SODIUM LACTATE AND CALCIUM CHLORIDE 25 ML/HR: 600; 310; 30; 20 INJECTION, SOLUTION INTRAVENOUS at 13:17

## 2023-01-18 RX ADMIN — PROPOFOL 40 MG: 10 INJECTION, EMULSION INTRAVENOUS at 15:47

## 2023-01-18 RX ADMIN — WATER 2 G: 1 INJECTION INTRAMUSCULAR; INTRAVENOUS; SUBCUTANEOUS at 15:39

## 2023-01-18 RX ADMIN — Medication 1 AMPULE: at 20:56

## 2023-01-18 RX ADMIN — ALLOPURINOL 100 MG: 100 TABLET ORAL at 18:26

## 2023-01-18 RX ADMIN — ALLOPURINOL 100 MG: 100 TABLET ORAL at 09:20

## 2023-01-18 RX ADMIN — PROPOFOL 75 MCG/KG/MIN: 10 INJECTION, EMULSION INTRAVENOUS at 15:48

## 2023-01-18 RX ADMIN — Medication 3 AMPULE: at 13:17

## 2023-01-18 RX ADMIN — LIDOCAINE HYDROCHLORIDE 40 MG: 20 INJECTION, SOLUTION EPIDURAL; INFILTRATION; INTRACAUDAL; PERINEURAL at 15:49

## 2023-01-18 RX ADMIN — PROPOFOL 40 MG: 10 INJECTION, EMULSION INTRAVENOUS at 15:51

## 2023-01-18 RX ADMIN — PROPOFOL 30 MG: 10 INJECTION, EMULSION INTRAVENOUS at 16:02

## 2023-01-18 NOTE — PERIOP NOTES
1042 -TRANSFER - IN REPORT:    Verbal report received from [facility] RN(name) on Eddie Bolton  being received from 1139(unit) for ordered procedure      Report consisted of patients Situation, Background, Assessment and   Recommendations(SBAR). Information from the following report(s) Pre Procedure Checklist and Procedure Verification was reviewed with the receiving nurse. Opportunity for questions and clarification was provided. Assessment completed upon patients arrival to unit and care assumed. 1243 - PT INTO PRE-OP HOLDING 16. A&OX4, PETERSON SPON AND TO COMMAND. RESP EVEN AND UNLABORED. POX ON RA > 94%. BSB DIMINISHED IN BASES. PT DENIES DISCOMFORT - 0/10 ON PAIN SCALE. RESTING IN NAPS. PRE-OP TCHING DONE - PT VERBALIZES UNDERSTANDING. SR UP X3, CB IN PLACE. WAITING SURGERY.

## 2023-01-18 NOTE — PROGRESS NOTES
Problem: Self Care Deficits Care Plan (Adult)  Goal: *Acute Goals and Plan of Care (Insert Text)  Description: FUNCTIONAL STATUS PRIOR TO ADMISSION: Pt has had recent decline in function, moving to a first floor setup in his home. Patient uses [employer] for functional mobility, wife reports he has a RW but has not been using it. He receives assistance for ADLs as needed from wife. HOME SUPPORT: The patient lived with his wife who provides assistance. Occupational Therapy Goals  Initiated 1/18/2023  1. Patient will perform grooming with supervision/set-up in standing within 7 day(s). 2.  Patient will perform upper body dressing with supervision/set-up within 7 day(s). 3.  Patient will perform toilet transfers with supervision/set-up within 7 day(s). 4.  Patient will perform all aspects of toileting with supervision/set-up within 7 day(s). 5.  Patient will participate in upper extremity therapeutic exercise/activities with independence for 5 minutes within 7 day(s). 6.  Patient will utilize energy conservation techniques during functional activities with verbal cues within 7 day(s). Outcome: Progressing Towards Goal  OCCUPATIONAL THERAPY EVALUATION  Patient: Rea Avila (25 y.o. male)  Date: 1/18/2023  Primary Diagnosis: Hodgkin lymphoma ([facility] [address] [C81.90]  Procedure(s) (LRB):  INFUSAPORT INSERTION (N/A) Day of Surgery   Precautions:       ASSESSMENT  Based on the objective data described below, the patient presents with generalized weakness, decreased activity tolerance and impaired functional mobility following admission for Hodgkin lymphoma and initiation of chemo. At baseline he lives with his wife, uses [employer] for functional mobility and has assistance from wife for ADLs. He was received seated EOB, agreeable to participate, anxious about upcoming bone marrow biopsy. He required total A to don socks.  Using RW he ambulated short distance in room with CGA, then returned to bed to go with transport to procedure. Pt is functioning below his baseline and will benefit from skilled therapy intervention to address the above noted impairments. Recommend HH therapy and family assistance at discharge. Current Level of Function Impacting Discharge (ADLs/self-care): CGA transfers with RW, setup-total A ADLs    Functional Outcome Measure: The patient scored 50/100 on the Barthel Index outcome measure. Other factors to consider for discharge: supportive spouse, new CA diagnosis, below PLOF     PLAN :  Recommendations and Planned Interventions: self care training, functional mobility training, therapeutic exercise, balance training, therapeutic activities, endurance activities, patient education, home safety training, and family training/education    Frequency/Duration: Patient will be followed by occupational therapy 3 times a week to address goals. Recommendation for discharge: (in order for the patient to meet his/her long term goals)  Occupational therapy at least 2 days/week in the home AND ensure assist and/or supervision for safety with ADLs and transfers    This discharge recommendation:  Has been made in collaboration with the attending provider and/or case management    IF patient discharges home will need the following DME: TBD       SUBJECTIVE:   Patient stated I use a cane.     OBJECTIVE DATA SUMMARY:   HISTORY:   Past Medical History:   Diagnosis Date    Allergic rhinitis, cause unspecified 12/11/2013    Arthritis     knees    Asthma     as a child    Eczema     on lower back waistline on the back    Environmental allergies     GERD (gastroesophageal reflux disease)     occastionally but resolved since 60 pound weight loss    Hodgkin's lymphoma ([facility] [address] 2023    Hypertension     Psychiatric disorder     anxiety and depression     Past Surgical History:   Procedure Laterality Date    HX TONSILLECTOMY      HX WISDOM TEETH EXTRACTION         Expanded or extensive additional review of patient history: Home Situation  Home Environment: Private residence  One/Two Story Residence: Two story, live on 1st floor  Living Alone: No (lives with wife)  Support Systems: Spouse/Significant Other, Other Family Member(s)  Patient Expects to be Discharged to[de-identified] Home with family assistance  Current DME Used/Available at Home: [employer], straight, Walker, rolling    Hand dominance: Right    EXAMINATION OF PERFORMANCE DEFICITS:  Cognitive/Behavioral Status:  Neurologic State: Alert; Appropriate for age  Orientation Level: Oriented X4  Cognition: Follows commands; Appropriate decision making           Hearing: Auditory  Auditory Impairment: Hard of hearing, bilateral  Hearing Aids/Status: Does not own    Vision/Perceptual:              Acuity: Within Defined Limits         Range of Motion:  AROM: Generally decreased, functional          Strength:  Strength: Generally decreased, functional       Coordination:  Coordination: Within functional limits  Fine Motor Skills-Upper: Left Intact; Right Intact    Gross Motor Skills-Upper: Left Intact; Right Intact    Tone & Sensation:  Tone: Normal             Balance:  Sitting: Intact  Standing: Impaired  Standing - Static: Good;Constant support  Standing - Dynamic : Fair;Constant support    Functional Mobility and Transfers for ADLs:  Bed Mobility:   Received seated EOB    Transfers:  Sit to Stand: Contact guard assistance  Stand to Sit: Contact guard assistance    ADL Assessment:  Feeding: Independent    Oral Facial Hygiene/Grooming: Setup (seated)    Bathing: Moderate assistance    Type of Bath: Chlorhexidine (CHG)    Upper Body Dressing: Minimum assistance    Lower Body Dressing: Total assistance    Toileting: Maximum assistance            ADL Intervention and task modifications:             Lower Body Dressing Assistance  Socks:  Total assistance (dependent)     Functional Measure:    Barthel Index:  Bathin  Bladder: 5  Bowels: 10  Groomin  Dressin  Feeding: 10  Mobility: 0  Stairs: 0  Toilet Use: 5  Transfer (Bed to Chair and Back): 10  Total: 50/100      The Barthel ADL Index: Guidelines  1. The index should be used as a record of what a patient does, not as a record of what a patient could do. 2. The main aim is to establish degree of independence from any help, physical or verbal, however minor and for whatever reason. 3. The need for supervision renders the patient not independent. 4. A patient's performance should be established using the best available evidence. Asking the patient, friends/relatives and nurses are the usual sources, but direct observation and common sense are also important. However direct testing is not needed. 5. Usually the patient's performance over the preceding 24-48 hours is important, but occasionally longer periods will be relevant. 6. Middle categories imply that the patient supplies over 50 per cent of the effort. 7. Use of aids to be independent is allowed. Score Interpretation (from [address] [facility] 83)    Independent   60-79 Minimally independent   40-59 Partially dependent   20-39 Very dependent   <20 Totally dependent     -Miriam Cabezas., Barthel, D.W. (1965). Functional evaluation: the Barthel Index. [address] [facility] ([address] (1997). The Barthel activities of daily living index: self-reporting versus actual performance in the old (> or = 75 years). Journal of [address] [place] [address] 45(7), [facility], BIENVENIDO, Larry Heartbush., Matthew Salvador. (1999). Measuring the change in disability after inpatient rehabilitation; comparison of the responsiveness of the Barthel Index and Functional [place] Measure. Journal of Neurology, Neurosurgery, and Psychiatry, 66(4), 435-075. Kalyani Elias, N.J.A, TODD Adams, & Giovani Ribeiro, MShielaA. (2004) Assessment of post-stroke quality of life in cost-effectiveness studies: The usefulness of the Barthel Index and the EuroQoL-5D.  Quality of Life Research, 13, 379-21        Occupational Therapy Evaluation Charge Determination   History Examination Decision-Making   LOW Complexity : Brief history review  MEDIUM Complexity : 3-5 performance deficits relating to physical, cognitive , or psychosocial skils that result in activity limitations and / or participation restrictions MEDIUM Complexity : Patient may present with comorbidities that affect occupational performnce. Miniml to moderate modification of tasks or assistance (eg, physical or verbal ) with assesment(s) is necessary to enable patient to complete evaluation       Based on the above components, the patient evaluation is determined to be of the following complexity level: LOW   Pain Rating:  Pt reported no pain during session    Activity Tolerance:   Fair    After treatment patient left in no apparent distress:    Supine in bed and Caregiver / family present    COMMUNICATION/EDUCATION:   The patients plan of care was discussed with: Physical therapist and Registered nurse. Home safety education was provided and the patient/caregiver indicated understanding., Patient/family have participated as able in goal setting and plan of care. , and Patient/family agree to work toward stated goals and plan of care. This patients plan of care is appropriate for delegation to [facility].     Thank you for this referral.  Dale Brink OT  Time Calculation: 17 mins

## 2023-01-18 NOTE — PERIOP NOTES
[facility] from Operating Room to PACU    Report received from KESHAWN De Leon RN and Yue Mix CRNA regarding Mely Reason. Surgeon(s):  London Crawford MD  And Procedure(s) (LRB):  INFUSAPORT INSERTION (N/A)  confirmed   with allergies and dressings discussed. Anesthesia type, drugs, patient history, complications, estimated blood loss, vital signs, intake and output, and last pain medication, lines, and temperature were reviewed. 1721 TRANSFER - OUT REPORT:    Verbal report given to Michael Bourne RN(name) on Mely Reason  being transferred to Onc/Obs(unit) for routine post - op       Report consisted of patients Situation, Background, Assessment and   Recommendations(SBAR). Information from the following report(s) SBAR, Kardex, OR Summary, Intake/Output, MAR, and Cardiac Rhythm SR  was reviewed with the receiving nurse. Lines:   Peripheral IV 01/17/23 Posterior;Right Wrist (Active)   Site Assessment Clean, dry, & intact 01/18/23 1303   Phlebitis Assessment 0 01/18/23 1303   Infiltration Assessment 0 01/18/23 1303   Dressing Status Clean, dry, & intact 01/18/23 1303   Dressing Type Transparent;Tape 01/18/23 1303   Hub Color/Line Status Flushed; Infusing;Pink 01/18/23 1303   Alcohol Cap Used Yes 01/18/23 0814        Opportunity for questions and clarification was provided.       Patient transported with:   [facility]

## 2023-01-18 NOTE — PROGRESS NOTES
Respiratory Care Note    PFT preformed and scanned into . Patient was able to produce acceptable and reproducible DLCO data. Patient was unable to preform and produce acceptable data for Spirometry. Multiple attempts were performed.

## 2023-01-18 NOTE — PROGRESS NOTES
Comprehensive Nutrition Assessment    Type and Reason for Visit: Initial, Consult    Nutrition Recommendations/Plan:   Resume diet as able after procedures  Trying Ensure pudding and Magic cups/Thrive. Pt will not drink Ensure. Family encouraged to bring in food. If pt's appetite does not improve soon, would strongly consider nutrition support (NGT/PEG) d/t severe malnutrition status     Malnutrition Assessment:  Malnutrition Status:  Severe malnutrition (01/18/23 1450)    Context:  Chronic illness     Findings of the 6 clinical characteristics of malnutrition:   Energy Intake:  75% or less est energy requirements for 1 month or longer  Weight Loss:  Greater than 20% over 1 year     Body Fat Loss:  Severe body fat loss,     Muscle Mass Loss:  Severe muscle mass loss,    Fluid Accumulation:  Severe, Extremities   Strength:  Not performed     Nutrition Assessment:     Consult received for poor PO intake. Pt medically noted for new dx of Hodgkin lymphoma, NILTON, anasarca, severe malnutrition, DM2, iron deficiency anemia. NPO for testing today. Pt off the floor for procedure at time of visit, but RD had an extensive talk with his wife and family. He has had profound weight loss related to severely poor intake for at least several months. Wife reports significant fat and muscle wasting with bones protruding. Pt clearly meets criteria for chronic, severe malnutrition. He was already a very picky eater which has only gotten worse, along with early satiety, nausea, and taste changes. He has an increased sensitivity to sweets and absolutely refuses Ensure shakes. We will try the Ensure pudding and Magic cup/Thrive supplements, but they are also quite sweet so I don't have high expectations for him to like these either. I encouraged family to bring in foods they knows he likes. We discussed the possibility of tube feeding which the wife is very agreeable to.  She worries he will be reluctant as he is terrified of any medical procedures and has been experiencing cognitive decline. She worries his capacity to make decisions is deteriorating as well. Discussed NGT with Oncology NP, who states \"Dr. Vandana Waldron expects his appetite to improve quickly bc its Hodgkins lymphoma and will respond quickly and his appetite should improve in the short term. \" We will see how he does over the next few days and re-evaluate. Wt Readings from Last 20 Encounters:   01/18/23 66.5 kg (146 lb 9.7 oz)   01/06/23 64.5 kg (142 lb 3.2 oz)   12/28/22 69.6 kg (153 lb 6.4 oz)   12/28/22 68.7 kg (151 lb 6.4 oz)   12/23/22 72.4 kg (159 lb 9.6 oz)   12/12/22 79.7 kg (175 lb 12.8 oz)   11/02/22 75 kg (165 lb 5.5 oz)   10/11/22 75 kg (165 lb 6.4 oz)   10/07/22 75.5 kg (166 lb 7.2 oz)   08/26/22 81.2 kg (179 lb)   08/16/22 81.6 kg (179 lb 12.8 oz)   08/09/22 82.1 kg (181 lb)   07/26/22 82.6 kg (182 lb)   07/12/22 83.6 kg (184 lb 3.2 oz)   07/07/22 83.7 kg (184 lb 9.6 oz)   01/12/22 86.9 kg (191 lb 9.6 oz)   12/15/21 85.5 kg (188 lb 9.6 oz)   07/14/21 86.3 kg (190 lb 3.2 oz)   06/08/21 86.5 kg (190 lb 12.8 oz)   08/14/20 90.7 kg (200 lb)   ]    Nutrition Related Findings:    Labs: Na 134, Cr 1.84, elevated LFTs. Meds: ancef, LR. Edema: 3+ genital, 2+BLE. BM 1/16. Wound Type: None    Current Nutrition Intake & Therapies:  Average Meal Intake: NPO  Average Supplement Intake: None ordered  DIET NPO  ADULT ORAL NUTRITION SUPPLEMENT Breakfast, Lunch, Dinner, AM Snack, PM Snack; Fortified Pudding    Anthropometric Measures:  Height: 5' 5\" (165.1 cm)  Ideal Body Weight (IBW): 136 lbs (62 kg)     Current Body Wt:  66.5 kg (146 lb 9.7 oz), 107.8 % IBW.  Not specified  Current BMI (kg/m2): 24.4        Weight Adjustment: No adjustment                 BMI Category: Normal weight (BMI 22.0-24.9) age over 72    Estimated Daily Nutrient Needs:  Energy Requirements Based On: Formula  Weight Used for Energy Requirements: Current  Energy (kcal/day): 1740 kcals (BMR x 1.3AF)  Weight Used for Protein Requirements: Current  Protein (g/day): 80-93g (1.2-1.5g/kg)  Method Used for Fluid Requirements: 1 ml/kcal  Fluid (ml/day): 1700mL    Nutrition Diagnosis:   Severe malnutrition, In context of chronic illness related to catabolic illness, inadequate protein-energy intake as evidenced by Criteria as identified in malnutrition assessment, NPO or clear liquid status due to medical condition    Nutrition Interventions:   Food and/or Nutrient Delivery: Start oral diet, Start oral nutrition supplement  Nutrition Education/Counseling: No recommendations at this time  Coordination of Nutrition Care: Continue to monitor while inpatient       Goals:     Goals: Initiate PO diet, PO intake 50% or greater, by next RD assessment       Nutrition Monitoring and Evaluation:   Behavioral-Environmental Outcomes: None identified  Food/Nutrient Intake Outcomes: Diet advancement/tolerance, Food and nutrient intake, Supplement intake  Physical Signs/Symptoms Outcomes: Biochemical data, GI status, Fluid status or edema, Weight, Nutrition focused physical findings    Discharge Planning:     Too soon to determine    Ysabel Mott, [address]  Contact: [phone]

## 2023-01-18 NOTE — ANESTHESIA POSTPROCEDURE EVALUATION
Procedure(s): INFUSAPORT INSERTION. MAC, general - backup    Anesthesia Post Evaluation      Multimodal analgesia: multimodal analgesia used between 6 hours prior to anesthesia start to PACU discharge  Patient location during evaluation: bedside  Patient participation: complete - patient participated  Level of consciousness: awake  Pain management: adequate  Airway patency: patent  Anesthetic complications: no  Cardiovascular status: acceptable  Respiratory status: acceptable  Hydration status: acceptable  Post anesthesia nausea and vomiting:  controlled  Final Post Anesthesia Temperature Assessment:  Normothermia (36.0-37.5 degrees C)      INITIAL Post-op Vital signs:   Vitals Value Taken Time   /70 01/18/23 1700   Temp 36.6 °C (97.8 °F) 01/18/23 1635   Pulse 95 01/18/23 1712   Resp 20 01/18/23 1712   SpO2 97 % 01/18/23 1712   Vitals shown include unvalidated device data.

## 2023-01-18 NOTE — OP NOTES
OPERATIVE REPORT    1/18/2023    Pre-operative Diagnosis: hodgkins lymphoma     Post-operative Diagnosis: hodgkins lymphoma      OPERATIVE PROCEDURE:   Placement of left subclavian tunneled port-a-cath    Surgeon: Michele Prater MD    Anesthesia: Mac plus Local    Estimated Blood Loss: Minimal    PROCEDURE:  After satisfactory induction of IV sedation, the patient was kept in the supine position with a towel roll behind the shoulders, bilateral arms tucked to the side, with appropriate padding. The patient's bilateral neck and bilateral chest were prepped and draped sterilely including the use of Ioban. The patient was given local anesthetic. Access to the subclavian vein was easily obtained with an 18-gauge needle and, using modified Seldinger technique, a wire placed within the vein and confirmed to be in place using intraoperative fluoroscopy. A pocket was created on the chest wall. The catheter was inserted into the vein using the dilator sheath, cut to appropriate size, tunneled under the skin, attached to the port. The port was secured to the chest wall using 3-0 Vicryl suture. The port was noted to draw blood and flush easily. Final flush was performed with heparinized saline, and the final placement confirmed the tip of the port to be in the superior vena cava, without any excessive bending or kinking. The soft tissue was reapproximated with interrupted 3-0 Vicryl. This was followed by closure of the skin with Dermabond. The patient was awoken and transferred to recovery room in stable condition. A chest x-ray will be performed in the recovery room to confirm placement.       Michele Prater MD

## 2023-01-18 NOTE — PROGRESS NOTES
[facility] [place] at [address], [place], [address] [facility]  W: [phone] F: [phone]      Reason for Visit:   Misael Marin is a 67 y.o. male who is seen in consultation at the request of Dr. Bandar Cardenas for evaluation of Hodgkin lymphoma. [facility] [address]. Hematology / Oncology Treatment History:     Hematological/Oncological Diagnosis: Classic Hodgkin lymphoma    Date of Diagnosis: 2021    Treatment course: Plan for ABVD chemotherapy      History of Present Illness:     66year old with hx of hypertension, seasonal allergies, presents with worsening normocytic aneima of unclear etiology. He was diagnosed late December 2022 with Classic Hodgkin Lymphoma. Inpatient pllan is to start systemic therapy while for close monitoring given TLS with elevated uric acid and high risk for further deterioration after systemic therapy. While admitted we plan to obtain bone marrow biopsy, MRI of the brain, CT of the chest abdomen and pelvis, dopplers of the lower extremities, place port for systemic therapy and obtain PFTs. Interval History:     01/18/23 Patient seen at bedside with wife. Discussed doppler results were negative for DVT. Plan for port placement and bone marrow biopsy today. Hopefully will start chemotherapy in AM.     Review of Systems: A complete review of systems was obtained, negative except as described above.     Past Medical History:   Diagnosis Date    Allergic rhinitis, cause unspecified 12/11/2013    Arthritis     knees    Asthma     as a child    Eczema     on lower back waistline on the back    Environmental allergies     GERD (gastroesophageal reflux disease)     occastionally but resolved since 60 pound weight loss    Hodgkin's lymphoma ([facility] [address] 2023    Hypertension     Psychiatric disorder     anxiety and depression      Past Surgical History:   Procedure Laterality Date    HX TONSILLECTOMY      HX WISDOM TEETH EXTRACTION        Social History     Tobacco Use    Smoking status: Former     Packs/day: 1.00     Years: 2.00     Pack years: 2.00     Types: Cigarettes     Quit date: 1966     Years since quittin.1    Smokeless tobacco: Never   Substance Use Topics    Alcohol use: No      Family History   Problem Relation Age of Onset    Hypertension Mother     Hypertension Father     Heart Disease Father     Alcohol abuse Father     Hypertension Brother     No Known Problems Brother      Current Facility-Administered Medications   Medication Dose Route Frequency    ondansetron (ZOFRAN) injection 4 mg  4 mg IntraVENous Q4H PRN    acetaminophen (TYLENOL) tablet 650 mg  650 mg Oral Q6H PRN    polyethylene glycol (MIRALAX) packet 17 g  17 g Oral QHS PRN    allopurinoL (ZYLOPRIM) tablet 100 mg  100 mg Oral BID    ceFAZolin (ANCEF) 2 g in sterile water (preservative free) 20 mL IV syringe  2 g IntraVENous ON CALL TO OR      Allergies   Allergen Reactions    Codeine Other (comments)     Pt states unknown reaction.     Pcn [Penicillins] Rash            Physical Exam:   Visit Vitals  /67 (BP 1 Location: Right upper arm, BP Patient Position: At rest)   Pulse 86   Temp 98.1 °F (36.7 °C)   Resp 17   Wt 146 lb 9.7 oz (66.5 kg)   SpO2 96%   BMI 24.40 kg/m²     ECOG PS: 0  General: alert, cooperative, no distress, appears fatigued   Mental  status: normal mood, behavior, speech, dress, motor activity, and thought processes, able to follow commands   HENT: NCAT   Neck: no visualized mass   Resp: no respiratory distress   Neuro: no gross deficits   Skin: no discoloration or lesions of concern on visible areas   Psychiatric: normal affect, consistent with stated mood, no evidence of hallucinations           Results:     Lab Results   Component Value Date/Time    WBC 13.0 (H) 2023 03:32 AM    HGB 11.3 (L) 2023 03:32 AM    HCT 36.3 (L) 2023 03:32 AM    PLATELET 888 01/18/2023 03:32 AM    MCV 97.3 01/18/2023 03:32 AM    ABS. NEUTROPHILS 9.0 (H) 01/18/2023 03:32 AM     Lab Results   Component Value Date/Time    Sodium 134 (L) 01/18/2023 03:32 AM    Potassium 3.9 01/18/2023 03:32 AM    Chloride 97 01/18/2023 03:32 AM    CO2 28 01/18/2023 03:32 AM    Glucose 95 01/18/2023 03:32 AM    BUN 30 (H) 01/18/2023 03:32 AM    Creatinine 1.84 (H) 01/18/2023 03:32 AM    GFR est AA >60 07/26/2022 01:45 PM    GFR est non-AA >60 07/26/2022 01:45 PM    Calcium 9.7 01/18/2023 03:32 AM    Sodium,  10/07/2022 10:16 AM    Potassium, POC 3.9 10/07/2022 10:16 AM    Chloride,  10/07/2022 10:16 AM    Glucose (POC) 94 01/06/2023 07:07 AM    Creatinine, POC 1.1 10/07/2022 10:16 AM    Calcium, ionized (POC) 1.22 10/07/2022 10:16 AM     Lab Results   Component Value Date/Time    Bilirubin, total 4.0 (H) 01/18/2023 03:32 AM    ALT (SGPT) 25 01/18/2023 03:32 AM    Alk. phosphatase 716 (H) 01/18/2023 03:32 AM    Protein, total 7.1 01/18/2023 03:32 AM    Albumin 2.6 (L) 01/18/2023 03:32 AM    Globulin 4.5 (H) 01/18/2023 03:32 AM     CT Results (most recent):  Results from Hospital Encounter encounter on 01/17/23    CT CHEST W CONT    Narrative  EXAM: CT CHEST W CONT, CT ABD PELV W CONT    DATE: 1/17/2023 11:41 AM    INDICATION: Staging disease    COMPARISON: None    IV CONTRAST: 100 mL Isovue-370    ORAL CONTRAST: None    TECHNIQUE:  Postcontrast thin axial images were obtained through the chest, abdomen and  pelvis. Coronal and sagittal reformats were generated. CT dose reduction was  achieved through use of a standardized protocol tailored for this examination  and automatic exposure control for dose modulation. FINDINGS:    CHEST WALL: No mass or axillary lymphadenopathy. THYROID: No nodule.   MEDIASTINUM: Multiple mediastinal enlarged nodes are seen with representative  examples including 1.2 x 1.7 cm anterior paratracheal node, 2-18; 1.7 x 1.0 cm  AP window node, 2-20; and 2.1 x 2.9 cm subcarinal node, 2-22. 2.8 x 1.6 cm  pericardiophrenic node, 2-37. MEGAN: Nonenlarged mildly prominent hilar nodes including 7 mm left hilar node,  2-22. THORACIC AORTA: No dissection or aneurysm. MAIN PULMONARY ARTERY: Normal in caliber. TRACHEA/BRONCHI: Patent. ESOPHAGUS: No wall thickening or dilatation. HEART: Severe coronary calcifications. No cardiomegaly. PLEURA: Small-moderate left and trace right pleural effusions. No pneumothorax. LUNGS: No nodule, mass. Possible mild interlobular septal thickening. LIVER: Hepatic steatosis. Hepatomegaly. Subcentimeter hepatic hypodensities are  too small to characterize, possibly benign. 1.9 x 1.5 cm segment 6 hypodensity,  2-53, is indeterminate. BILIARY TREE: Gallbladder is within normal limits. CBD is not dilated. SPLEEN: Splenomegaly with innumerable confluent hypodense lesions. Representative examples include 3.7 x 4.3 cm splenic hypodensity, 2-55; and 3.8  x 2.7 cm inferior splenic hypodensity, 2-63. PANCREAS: No mass or ductal dilatation. ADRENALS: Unremarkable. KIDNEYS: No mass, calculus, or hydronephrosis. STOMACH: Unremarkable. SMALL BOWEL: No dilatation or wall thickening. COLON: No dilatation or wall thickening. Mild stool burden. APPENDIX: Not clearly visualized. PERITONEUM: No pneumoperitoneum. Mild ascites is simple. RETROPERITONEUM: Diffuse upper abdominal, retroperitoneal, and iliac chain  adenopathy. Representative examples include: Confluent 3.9 x 4.3 cm  gastrohepatic node, 2-48; 4.2 x 5.2 cm pericaval node, 2-63; 3.4 x 5.3 cm  aortocaval node, 2-74; 2.3 x 2.1 cm left common iliac node, 2-85; 1.9 x 1.4 cm  left external iliac node, 2-96; and 1.3 x 1.1 cm right external iliac node,  2-97. REPRODUCTIVE ORGANS: The prostate and seminal vesicles are unremarkable. URINARY BLADDER: No mass or calculus. BONES: No destructive bone lesion. ABDOMINAL WALL: No mass or hernia. ADDITIONAL COMMENTS: Anasarca. Impression  1. Mediastinal, hilar, upper abdominal, retroperitoneal, and iliac chain  adenopathy with multiple splenic lesions most consistent with lymphoma. Metastatic disease or other inflammatory/infectious process is less likely. Retroperitoneal nodes are amenable to percutaneous biopsy. 2.  Indeterminate, possibly benign segment 6 hepatic hypodensity. Consider  further evaluation with MRI. 3.  Additional findings as above. No imaging of spleen      Pathology:           ==========================================================================   * * *FINAL PATHOLOGIC DIAGNOSIS* * *     <<<<<       Lymph node, left supraclavicular lymph node, excision:        Classic Hodgkin lymphoma. See comment.     >>>>>      * * *Comment* * *     <<<<<  The histologic section has an enlarged lymph node with a thickened capsule   and effacement of normal yady architecture by a vaguely nodular   proliferation with few sclerotic bands. Numerous Hodgkin/Levi-Esteban   cells are present within a background of small lymphocytes and   eosinophils. The Hodgkin Levi-Esteban cells are positive for CD30, CD15   and PAX5 (subset, dim) and are negative for CD45, ALK, CD20, EMA,   Granzyme, MUM1, CD43 and CD3. In situ hybridization for Tania-Barr viral   RNA is negative. The small lymphocytes are comprised of CD3 and CD5   positive T cells with few scattered B cells identified. Assessment and Recommendations:     Classic Hodgkin lymphoma, advanced  -At present, the patient has clinical symptoms of diarrhea, abdominal discomfort, discoloration of his lower extremities, findings may be related to widely distributed lymphadenopathy. Splenomegaly may be causing liver dysfunction.     -Discussed the risks and benefits of ABVD chemotherapy in the OP setting, please see clinic note for further detail. -CT chest/abd/pelvis   IMPRESSION  1.   Mediastinal, hilar, upper abdominal, retroperitoneal, and iliac chain  adenopathy with multiple splenic lesions most consistent with lymphoma. Metastatic disease or other inflammatory/infectious process is less likely. Retroperitoneal nodes are amenable to percutaneous biopsy. 2.  Indeterminate, possibly benign segment 6 hepatic hypodensity. Consider  further evaluation with MRI. -MRI Brain   IMPRESSION  1. Evaluation is significantly limited by patient positioning. No evidence of  intracranial metastases. No acute intracranial abnormality. 2. Generalized parenchymal volume loss and mild chronic microvascular ischemic disease. Small chronic infarcts as above. -Appreciate General Surgery input - plan for port placement today     -Plan for Bone Marrow Biopsy tomorrow AM      Plan for chemotherapy with C#1 of ABVD (Doxorubicin 25mg/m2, Bleomycin 10 units/m2, Vinblastine 6mg/m2, Dacarbazine 375mg/m2 on days 1 and 15 every 28 days), with plans for 6 cycles as IP once imaging completed and port placed, tentatively 1/19 ---- dosing to be adjusted for liver and renal dysfunction. Obtain PFTs on Friday.       Hyperbilirubinemia  -Etiology of his hyperbilirubinemia may be a consequence of impaired liver function as a consequence of lymphoma and splenomegaly.   -Trend CMP, T bili slightly improved today      Elevated alkaline phosphatase  -The patient likely has disease involvement of the bone     Tumor Lysis Syndrome  -Component of TLS given extensive disease burden  -Continue to trend uric every 8 hours, improved today   -Consider second dose of Rasburicase if needed   -Continue allopurinol   -Close monitoring of CMP and electrolytes for worsening of TLS with plan for systemic therapy     Mild Hypercalcemia   -Related to disease burden  -Corrected Ca 10.3  -Monitor for now      Acute Kidney Injury   -Related to TLS  -S/p dose of Rasburicase 1/17  -Follow renal function, improving slighly      Anasarca  Severe Protein Malnutrition   Bilateral LE Swelling   -Likely r/t to malignancy and malnutrition -Prealbumin 3.2 - encouraged increased PO (wife reports he only eats chicken broth)  -Consult to Nutrition   -Add supplements, does not like ensure   -Dopplers r/o DVT -- no DVT on US    Generalized Weakness  -R/t malignancy and severe malnutrition   -PT/OT consults     Signed By: Krystyna Ortiz NP      I have personally seen and evaluated the patient in conjunction with Martina Schafer NP. I find the patient's history and physical exam are consistent with the NP's documentation. I agree with the above assessment and plan, which I have modified as needed. 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 Plan for chemotherapy start tomorrow with ABVD. Doses adjusted based on liver and renal function. Bone marrow biopsy planned for early AM.  PFTs planned for Friday. Plan for close monitoring for worsening tumor lysis syndrome. Visit Vitals  /73 (BP 1 Location: Right upper arm, BP Patient Position: Sitting)   Pulse 90   Temp 98.1 °F (36.7 °C)   Resp 18   Ht 5' 5\" (1.651 m)   Wt 146 lb 9.7 oz (66.5 kg)   SpO2 99%   BMI 24.40 kg/m²     General: alert, cooperative, no distress   Mental  status: normal mood, behavior, speech, dress, motor activity, and thought processes, able to follow commands   HENT: NCAT   Neck: no visualized mass   Resp: no respiratory distress   Neuro: no gross deficits   Skin: no discoloration or lesions of concern on visible areas   Psychiatric: normal affect, consistent with stated mood, no evidence of hallucinations     Plan for chemo tomorrow with ABVD. Bone marrow bx prior to chemo start.      Ashley Woody MD    Attending [address] [place] [address]

## 2023-01-18 NOTE — PROGRESS NOTES
Problem: Mobility Impaired (Adult and Pediatric)  Goal: *Acute Goals and Plan of Care (Insert Text)  Description: FUNCTIONAL STATUS PRIOR TO ADMISSION: Per wife report, pt with recent decline requiring transition to first floor set up and assist with ADLs. Wife states that pt uses SPC for amb, but would benefit from RW which he owns, but has been reluctant to use. Of note, pt diagnosed with Hodgkin's lymphoma Dec 2022 and is now starting chemo. HOME SUPPORT PRIOR TO ADMISSION: The patient lived with wife who provides assist along with children. Physical Therapy Goals  Initiated 1/18/2023  1. Patient will move from supine to sit and sit to supine  in bed with independence within 7 day(s). 2.  Patient will transfer from bed to chair and chair to bed with supervision/set-up using the least restrictive device within 7 day(s). 3.  Patient will perform sit to stand with supervision/set-up within 7 day(s). 4.  Patient will ambulate with supervision/set-up for 50 feet with the least restrictive device within 7 day(s). Outcome: Not Met   PHYSICAL THERAPY EVALUATION  Patient: Valdemar Mercaod (26 y.o. male)  Date: 1/18/2023  Primary Diagnosis: Hodgkin lymphoma ([facility] [address] [C81.90]  Procedure(s) (LRB):  INFUSAPORT INSERTION (N/A) Day of Surgery   Precautions:        ASSESSMENT  Based on the objective data described below, the patient presents with general weakness, mild balance/ gait deficits following admission for initiation of chemo. Pt received EOB with supportive wife present, anxious about bone marrow biopsy planned for today. Pt mobilized with CGA overall for transfer sit to stand and short distance amb with RW. Pt returned to supine at end of session due to arrival staff to transfer pt to procedure. Will con't to follow for activity /mobility progression as tolerated.  Anticipate pt need for continued family assist at d/c; [facility].    Current Level of Function Impacting Discharge (mobility/balance): transfers CGA, amb with RW CGA      Other factors to consider for discharge: recent CA diagnosis     Patient will benefit from skilled therapy intervention to address the above noted impairments. PLAN :  Recommendations and Planned Interventions: bed mobility training, transfer training, gait training, therapeutic exercises, patient and family training/education, and therapeutic activities      Frequency/Duration: Patient will be followed by physical therapy:  3 times a week to address goals.     Recommendation for discharge: (in order for the patient to meet his/her long term goals)  Physical therapy at least 2 days/week in the home AND ensure assist and/or supervision for safety with mobility    This discharge recommendation:  Has not yet been discussed the attending provider and/or case management    IF patient discharges home will need the following DME: to be determined (TBD)         SUBJECTIVE:   Patient stated I'm worried about that test.    OBJECTIVE DATA SUMMARY:   HISTORY:    Past Medical History:   Diagnosis Date    Allergic rhinitis, cause unspecified 12/11/2013    Arthritis     knees    Asthma     as a child    Eczema     on lower back waistline on the back    Environmental allergies     GERD (gastroesophageal reflux disease)     occastionally but resolved since 60 pound weight loss    Hodgkin's lymphoma ([facility] [address] 2023    Hypertension     Psychiatric disorder     anxiety and depression     Past Surgical History:   Procedure Laterality Date    HX TONSILLECTOMY      HX WISDOM TEETH EXTRACTION         Personal factors and/or comorbidities impacting plan of care: CA diagnosis    Home Situation  Support Systems: Spouse/Significant Other  Patient Expects to be Discharged to[de-identified] Home with family assistance    EXAMINATION/PRESENTATION/DECISION MAKING:   Critical Behavior:  Neurologic State: Alert, Appropriate for age  Orientation Level: Oriented X4  Cognition: Follows commands, Appropriate decision making     Hearing: Auditory  Auditory Impairment: Hard of hearing, bilateral  Hearing Aids/Status: Does not own    Range Of Motion:  AROM: Generally decreased, functional                       Strength:    Strength: Generally decreased, functional                    Tone & Sensation:   Tone: Normal                              Coordination:  Coordination: Within functional limits       Functional Mobility:  Bed Mobility:              Transfers:  Sit to Stand: Contact guard assistance  Stand to Sit: Contact guard assistance                       Balance:   Sitting: Intact  Standing: Impaired  Standing - Static: Good;Constant support  Standing - Dynamic : Fair;Constant support  Ambulation/Gait Training:  Distance (ft): 20 Feet (ft)  Assistive Device: Walker, rolling  Ambulation - Level of Assistance: Contact guard assistance        Gait Abnormalities: Decreased step clearance (stooped posture)              Speed/Briana: Pace decreased (<100 feet/min)  Step Length: Left shortened;Right shortened             Physical Therapy Evaluation Charge Determination   History Examination Presentation Decision-Making   HIGH Complexity :3+ comorbidities / personal factors will impact the outcome/ POC  LOW Complexity : 1-2 Standardized tests and measures addressing body structure, function, activity limitation and / or participation in recreation  LOW Complexity : Stable, uncomplicated  LOW Complexity : FOTO score of       Based on the above components, the patient evaluation is determined to be of the following complexity level: LOW     Pain Rating:  No c/o pain    Activity Tolerance:   Good    After treatment patient left in no apparent distress:   Supine in bed, Call bell within reach, Caregiver / family present, and Side rails x 3    COMMUNICATION/EDUCATION:   The patients plan of care was discussed with: Registered nurse.      Fall prevention education was provided and the patient/caregiver indicated understanding., Patient/family have participated as able in goal setting and plan of care. , and Patient/family agree to work toward stated goals and plan of care.     Thank you for this referral.  Arianne Estes, PT   Time Calculation: 16 mins

## 2023-01-18 NOTE — PROGRESS NOTES
End of Shift Note    Bedside shift change report given to Deepthi Tripp RN (oncoming nurse) by Erika Herring RN (offgoing nurse). Report included the following information SBAR, Kardex, and MAR    Shift worked:  7am-7pm     Shift summary and any significant changes:     Pt was a direct admit. Pt tolerated care fairly well. Medications were given and education was provided. Hourly rounding completed. Pt made no complaints of pain during this shift. Pt up x1 to the bathroom with the walker. Pt legs were elevated due to edema and to keep his heels off the bed. New IV placed in right wrist. Family present at bedside. PICC team attempted to place a PICC line, but were unsuccessful. Pt consent for Port a cath tomorrow was completed and placed in chart. MRI screening sheet was completed and sent with transport to MRI. CT, MRI and Duplex completed. Labs were completed. Pt has urinal at bedside. Oncoming nurse was made aware that the patient is to be NPO at midnight; patient and wife aware and educated as well.           Erika Herring RN

## 2023-01-18 NOTE — ANESTHESIA PREPROCEDURE EVALUATION
Relevant Problems   CARDIOVASCULAR   (+) Essential hypertension      ENDOCRINE   (+) Type 2 diabetes mellitus      HEMATOLOGY   (+) Iron deficiency anemia      PERSONAL HX & FAMILY HX OF CANCER   (+) Hodgkin lymphoma (HCC)   (+) Lymphoma involving lung (HCC)       Anesthetic History     PONV          Review of Systems / Medical History  Patient summary reviewed, nursing notes reviewed and pertinent labs reviewed    Pulmonary            Asthma : well controlled    Comments: Former smoker - Quit 1966 - 2 pack years    Lymphoma involving lung    Neuro/Psych         Psychiatric history (Anxiety and depression)    Comments: Hx of Bell's palsy Cardiovascular    Hypertension              Exercise tolerance: <4 METS  Comments: TTE (11/2/22):   Left Ventricle: The EF by visual approximation is 55 - 60%. Left ventricle size is normal. Increased wall thickness. Normal wall motion. Normal diastolic function.   No significant valvular abnormalities. (ECG (10/27/22):   Sinus tachycardia   Left axis deviation   Possible Anterior infarct , age undetermined   T wave abnormality, consider lateral ischemia   When compared with ECG of 12-DEC-2016 13:50,   Nonspecific T wave abnormality no longer evident in Inferior leads   T wave inversion now evident in Lateral leads      GI/Hepatic/Renal     GERD: well controlled           Endo/Other    Diabetes: type 2    Arthritis, cancer (Hodgkin's lymphoma involving lung) and anemia (Hgb = 11.3 on 1/18/23)     Other Findings              Physical Exam    Airway  Mallampati: II  TM Distance: 4 - 6 cm  Neck ROM: normal range of motion   Mouth opening: Normal     Cardiovascular  Regular rate and rhythm,  S1 and S2 normal,  no murmur, click, rub, or gallop  Rhythm: regular  Rate: normal         Dental    Dentition: Upper partial plate     Pulmonary  Breath sounds clear to auscultation               Abdominal  GI exam deferred       Other Findings            Anesthetic Plan    ASA: 3  Anesthesia type: MAC and general - backup          Induction: Intravenous  Anesthetic plan and risks discussed with: Patient

## 2023-01-19 ENCOUNTER — APPOINTMENT (OUTPATIENT)
Dept: CT IMAGING | Age: 73
DRG: 823 | End: 2023-01-19
Attending: NURSE PRACTITIONER
Payer: MEDICARE

## 2023-01-19 LAB
ALBUMIN SERPL-MCNC: 2.4 G/DL (ref 3.5–5)
ALBUMIN/GLOB SERPL: 0.5 (ref 1.1–2.2)
ALP SERPL-CCNC: 669 U/L (ref 45–117)
ALT SERPL-CCNC: 21 U/L (ref 12–78)
ANION GAP SERPL CALC-SCNC: 11 MMOL/L (ref 5–15)
AST SERPL-CCNC: 43 U/L (ref 15–37)
BASOPHILS # BLD: 0.1 K/UL (ref 0–0.1)
BASOPHILS NFR BLD: 0 % (ref 0–1)
BILIRUB SERPL-MCNC: 3.7 MG/DL (ref 0.2–1)
BUN SERPL-MCNC: 31 MG/DL (ref 6–20)
BUN/CREAT SERPL: 19 (ref 12–20)
CALCIUM SERPL-MCNC: 9.6 MG/DL (ref 8.5–10.1)
CHLORIDE SERPL-SCNC: 100 MMOL/L (ref 97–108)
CO2 SERPL-SCNC: 24 MMOL/L (ref 21–32)
CREAT SERPL-MCNC: 1.66 MG/DL (ref 0.7–1.3)
DIFFERENTIAL METHOD BLD: ABNORMAL
EOSINOPHIL # BLD: 0.5 K/UL (ref 0–0.4)
EOSINOPHIL NFR BLD: 3 % (ref 0–7)
ERYTHROCYTE [DISTWIDTH] IN BLOOD BY AUTOMATED COUNT: 19.6 % (ref 11.5–14.5)
GLOBULIN SER CALC-MCNC: 4.8 G/DL (ref 2–4)
GLUCOSE SERPL-MCNC: 109 MG/DL (ref 65–100)
HCT VFR BLD AUTO: 37.9 % (ref 36.6–50.3)
HGB BLD-MCNC: 11.5 G/DL (ref 12.1–17)
IMM GRANULOCYTES # BLD AUTO: 0.1 K/UL (ref 0–0.04)
IMM GRANULOCYTES NFR BLD AUTO: 1 % (ref 0–0.5)
LYMPHOCYTES # BLD: 3.1 K/UL (ref 0.8–3.5)
LYMPHOCYTES NFR BLD: 20 % (ref 12–49)
MCH RBC QN AUTO: 29.6 PG (ref 26–34)
MCHC RBC AUTO-ENTMCNC: 30.3 G/DL (ref 30–36.5)
MCV RBC AUTO: 97.4 FL (ref 80–99)
MONOCYTES # BLD: 1.2 K/UL (ref 0–1)
MONOCYTES NFR BLD: 8 % (ref 5–13)
NEUTS SEG # BLD: 10.3 K/UL (ref 1.8–8)
NEUTS SEG NFR BLD: 68 % (ref 32–75)
NRBC # BLD: 0 K/UL (ref 0–0.01)
NRBC BLD-RTO: 0 PER 100 WBC
PHOSPHATE SERPL-MCNC: 3.6 MG/DL (ref 2.6–4.7)
PLATELET # BLD AUTO: 227 K/UL (ref 150–400)
PMV BLD AUTO: 9.3 FL (ref 8.9–12.9)
POTASSIUM SERPL-SCNC: 4.5 MMOL/L (ref 3.5–5.1)
PROT SERPL-MCNC: 7.2 G/DL (ref 6.4–8.2)
RBC # BLD AUTO: 3.89 M/UL (ref 4.1–5.7)
SODIUM SERPL-SCNC: 135 MMOL/L (ref 136–145)
URATE SERPL-MCNC: 2.5 MG/DL (ref 3.5–7.2)
URATE SERPL-MCNC: 2.6 MG/DL (ref 3.5–7.2)
WBC # BLD AUTO: 15.3 K/UL (ref 4.1–11.1)

## 2023-01-19 PROCEDURE — 38221 DX BONE MARROW BIOPSIES: CPT

## 2023-01-19 PROCEDURE — 88311 DECALCIFY TISSUE: CPT

## 2023-01-19 PROCEDURE — 85025 COMPLETE CBC W/AUTO DIFF WBC: CPT

## 2023-01-19 PROCEDURE — 74011000258 HC RX REV CODE- 258: Performed by: STUDENT IN AN ORGANIZED HEALTH CARE EDUCATION/TRAINING PROGRAM

## 2023-01-19 PROCEDURE — 74011250637 HC RX REV CODE- 250/637: Performed by: SURGERY

## 2023-01-19 PROCEDURE — 88313 SPECIAL STAINS GROUP 2: CPT

## 2023-01-19 PROCEDURE — 88342 IMHCHEM/IMCYTCHM 1ST ANTB: CPT

## 2023-01-19 PROCEDURE — 88184 FLOWCYTOMETRY/ TC 1 MARKER: CPT

## 2023-01-19 PROCEDURE — 74011250637 HC RX REV CODE- 250/637: Performed by: NURSE PRACTITIONER

## 2023-01-19 PROCEDURE — 07DT3ZX EXTRACTION OF BONE MARROW, PERCUTANEOUS APPROACH, DIAGNOSTIC: ICD-10-PCS | Performed by: SURGERY

## 2023-01-19 PROCEDURE — 74011000250 HC RX REV CODE- 250: Performed by: STUDENT IN AN ORGANIZED HEALTH CARE EDUCATION/TRAINING PROGRAM

## 2023-01-19 PROCEDURE — 88341 IMHCHEM/IMCYTCHM EA ADD ANTB: CPT

## 2023-01-19 PROCEDURE — 65270000029 HC RM PRIVATE

## 2023-01-19 PROCEDURE — 80053 COMPREHEN METABOLIC PANEL: CPT

## 2023-01-19 PROCEDURE — 99233 SBSQ HOSP IP/OBS HIGH 50: CPT | Performed by: STUDENT IN AN ORGANIZED HEALTH CARE EDUCATION/TRAINING PROGRAM

## 2023-01-19 PROCEDURE — 74011250637 HC RX REV CODE- 250/637: Performed by: STUDENT IN AN ORGANIZED HEALTH CARE EDUCATION/TRAINING PROGRAM

## 2023-01-19 PROCEDURE — 77030018781

## 2023-01-19 PROCEDURE — 77030028872 HC BN BIOP NDL ON CNTRL TY TELE -C

## 2023-01-19 PROCEDURE — 77030014115

## 2023-01-19 PROCEDURE — 84100 ASSAY OF PHOSPHORUS: CPT

## 2023-01-19 PROCEDURE — 36415 COLL VENOUS BLD VENIPUNCTURE: CPT

## 2023-01-19 PROCEDURE — 84550 ASSAY OF BLOOD/URIC ACID: CPT

## 2023-01-19 PROCEDURE — 88185 FLOWCYTOMETRY/TC ADD-ON: CPT

## 2023-01-19 PROCEDURE — 74011250636 HC RX REV CODE- 250/636: Performed by: SURGERY

## 2023-01-19 PROCEDURE — 74011250636 HC RX REV CODE- 250/636: Performed by: RADIOLOGY

## 2023-01-19 PROCEDURE — 74011250636 HC RX REV CODE- 250/636: Performed by: STUDENT IN AN ORGANIZED HEALTH CARE EDUCATION/TRAINING PROGRAM

## 2023-01-19 PROCEDURE — 88305 TISSUE EXAM BY PATHOLOGIST: CPT

## 2023-01-19 RX ORDER — DIPHENHYDRAMINE HYDROCHLORIDE 50 MG/ML
25 INJECTION, SOLUTION INTRAMUSCULAR; INTRAVENOUS ONCE
Status: COMPLETED | OUTPATIENT
Start: 2023-01-19 | End: 2023-01-19

## 2023-01-19 RX ORDER — SODIUM CHLORIDE 0.9 % (FLUSH) 0.9 %
5-40 SYRINGE (ML) INJECTION AS NEEDED
Status: DISPENSED | OUTPATIENT
Start: 2023-01-19 | End: 2023-01-19

## 2023-01-19 RX ORDER — GRANISETRON HYDROCHLORIDE 1 MG/ML
0.7 INJECTION, SOLUTION INTRAVENOUS ONCE
Status: COMPLETED | OUTPATIENT
Start: 2023-01-19 | End: 2023-01-19

## 2023-01-19 RX ORDER — SODIUM CHLORIDE 9 MG/ML
5-40 INJECTION INTRAVENOUS AS NEEDED
Status: ACTIVE | OUTPATIENT
Start: 2023-01-19 | End: 2023-01-19

## 2023-01-19 RX ORDER — FENTANYL CITRATE 50 UG/ML
25-100 INJECTION, SOLUTION INTRAMUSCULAR; INTRAVENOUS
Status: DISCONTINUED | OUTPATIENT
Start: 2023-01-19 | End: 2023-01-19

## 2023-01-19 RX ORDER — MIDAZOLAM HYDROCHLORIDE 1 MG/ML
.25-5 INJECTION, SOLUTION INTRAMUSCULAR; INTRAVENOUS
Status: DISCONTINUED | OUTPATIENT
Start: 2023-01-19 | End: 2023-01-19

## 2023-01-19 RX ORDER — SODIUM CHLORIDE 9 MG/ML
5-250 INJECTION, SOLUTION INTRAVENOUS AS NEEDED
Status: DISPENSED | OUTPATIENT
Start: 2023-01-19 | End: 2023-01-20

## 2023-01-19 RX ORDER — HEPARIN 100 UNIT/ML
500 SYRINGE INTRAVENOUS AS NEEDED
Status: ACTIVE | OUTPATIENT
Start: 2023-01-19 | End: 2023-01-19

## 2023-01-19 RX ORDER — DOXORUBICIN HYDROCHLORIDE 2 MG/ML
6.25 INJECTION, SOLUTION INTRAVENOUS ONCE
Status: COMPLETED | OUTPATIENT
Start: 2023-01-19 | End: 2023-01-19

## 2023-01-19 RX ORDER — ALBUTEROL SULFATE 0.83 MG/ML
2.5 SOLUTION RESPIRATORY (INHALATION) AS NEEDED
Status: CANCELLED
Start: 2023-01-19

## 2023-01-19 RX ORDER — ONDANSETRON 2 MG/ML
8 INJECTION INTRAMUSCULAR; INTRAVENOUS AS NEEDED
Status: ACTIVE | OUTPATIENT
Start: 2023-01-19 | End: 2023-01-19

## 2023-01-19 RX ORDER — EPINEPHRINE 1 MG/ML
0.3 INJECTION, SOLUTION, CONCENTRATE INTRAVENOUS AS NEEDED
Status: CANCELLED | OUTPATIENT
Start: 2023-01-19

## 2023-01-19 RX ORDER — SODIUM CHLORIDE 9 MG/ML
5-250 INJECTION, SOLUTION INTRAVENOUS AS NEEDED
Status: DISPENSED | OUTPATIENT
Start: 2023-01-19 | End: 2023-01-19

## 2023-01-19 RX ORDER — HYDROCORTISONE SODIUM SUCCINATE 100 MG/2ML
100 INJECTION, POWDER, FOR SOLUTION INTRAMUSCULAR; INTRAVENOUS AS NEEDED
Status: CANCELLED | OUTPATIENT
Start: 2023-01-19

## 2023-01-19 RX ORDER — ACETAMINOPHEN 325 MG/1
650 TABLET ORAL AS NEEDED
Status: ACTIVE | OUTPATIENT
Start: 2023-01-19 | End: 2023-01-19

## 2023-01-19 RX ORDER — PALONOSETRON 0.05 MG/ML
0.25 INJECTION, SOLUTION INTRAVENOUS ONCE
Status: DISCONTINUED | OUTPATIENT
Start: 2023-01-19 | End: 2023-01-19 | Stop reason: CLARIF

## 2023-01-19 RX ORDER — DIPHENHYDRAMINE HYDROCHLORIDE 50 MG/ML
50 INJECTION, SOLUTION INTRAMUSCULAR; INTRAVENOUS AS NEEDED
Status: CANCELLED
Start: 2023-01-19

## 2023-01-19 RX ORDER — DOXORUBICIN HYDROCHLORIDE 2 MG/ML
6.25 INJECTION, SOLUTION INTRAVENOUS ONCE
Status: DISCONTINUED | OUTPATIENT
Start: 2023-01-19 | End: 2023-01-19

## 2023-01-19 RX ORDER — PANTOPRAZOLE SODIUM 40 MG/1
40 TABLET, DELAYED RELEASE ORAL
Status: DISCONTINUED | OUTPATIENT
Start: 2023-01-20 | End: 2023-01-21

## 2023-01-19 RX ORDER — IBUPROFEN 200 MG
4 TABLET ORAL AS NEEDED
Status: DISCONTINUED | OUTPATIENT
Start: 2023-01-19 | End: 2023-02-07 | Stop reason: HOSPADM

## 2023-01-19 RX ORDER — SODIUM CHLORIDE 9 MG/ML
25 INJECTION, SOLUTION INTRAVENOUS
Status: COMPLETED | OUTPATIENT
Start: 2023-01-19 | End: 2023-01-19

## 2023-01-19 RX ORDER — MAG HYDROX/ALUMINUM HYD/SIMETH 200-200-20
30 SUSPENSION, ORAL (FINAL DOSE FORM) ORAL
Status: DISCONTINUED | OUTPATIENT
Start: 2023-01-19 | End: 2023-02-07 | Stop reason: HOSPADM

## 2023-01-19 RX ORDER — DIPHENHYDRAMINE HYDROCHLORIDE 50 MG/ML
25 INJECTION, SOLUTION INTRAMUSCULAR; INTRAVENOUS AS NEEDED
Status: ACTIVE | OUTPATIENT
Start: 2023-01-19 | End: 2023-01-19

## 2023-01-19 RX ORDER — ACETAMINOPHEN 325 MG/1
650 TABLET ORAL ONCE
Status: COMPLETED | OUTPATIENT
Start: 2023-01-19 | End: 2023-01-19

## 2023-01-19 RX ADMIN — DIPHENHYDRAMINE HYDROCHLORIDE 25 MG: 50 INJECTION, SOLUTION INTRAMUSCULAR; INTRAVENOUS at 12:54

## 2023-01-19 RX ADMIN — Medication 1 AMPULE: at 21:37

## 2023-01-19 RX ADMIN — DEXAMETHASONE SODIUM PHOSPHATE 12 MG: 4 INJECTION, SOLUTION INTRAMUSCULAR; INTRAVENOUS at 13:40

## 2023-01-19 RX ADMIN — GRANISETRON HYDROCHLORIDE 0.7 MG: 1 INJECTION INTRAVENOUS at 12:54

## 2023-01-19 RX ADMIN — DOXORUBICIN HYDROCHLORIDE 10.8 MG: 2 INJECTION, SOLUTION INTRAVENOUS at 15:24

## 2023-01-19 RX ADMIN — SODIUM CHLORIDE 25 ML/HR: 9 INJECTION, SOLUTION INTRAVENOUS at 09:45

## 2023-01-19 RX ADMIN — ACETAMINOPHEN 650 MG: 325 TABLET ORAL at 12:53

## 2023-01-19 RX ADMIN — FAMOTIDINE 20 MG: 10 INJECTION, SOLUTION INTRAVENOUS at 21:37

## 2023-01-19 RX ADMIN — FENTANYL CITRATE 25 MCG: 50 INJECTION, SOLUTION INTRAMUSCULAR; INTRAVENOUS at 09:45

## 2023-01-19 RX ADMIN — ALLOPURINOL 100 MG: 100 TABLET ORAL at 12:53

## 2023-01-19 RX ADMIN — Medication 1 AMPULE: at 12:54

## 2023-01-19 RX ADMIN — HYDROMORPHONE HYDROCHLORIDE 1 MG: 1 INJECTION, SOLUTION INTRAMUSCULAR; INTRAVENOUS; SUBCUTANEOUS at 00:18

## 2023-01-19 RX ADMIN — BLEOMYCIN 1.7 UNITS: 30 INJECTION, POWDER, LYOPHILIZED, FOR SOLUTION INTRAMUSCULAR; INTRAPLEURAL; INTRAVENOUS; SUBCUTANEOUS at 14:10

## 2023-01-19 RX ADMIN — ALLOPURINOL 100 MG: 100 TABLET ORAL at 18:23

## 2023-01-19 RX ADMIN — BLEOMYCIN 8.6 UNITS: 30 INJECTION, POWDER, LYOPHILIZED, FOR SOLUTION INTRAMUSCULAR; INTRAPLEURAL; INTRAVENOUS; SUBCUTANEOUS at 15:30

## 2023-01-19 RX ADMIN — DACARBAZINE 452 MG: 10 INJECTION, POWDER, FOR SOLUTION INTRAVENOUS at 16:21

## 2023-01-19 RX ADMIN — MIDAZOLAM 1 MG: 1 INJECTION INTRAMUSCULAR; INTRAVENOUS at 09:45

## 2023-01-19 RX ADMIN — FOSAPREPITANT 150 MG: 150 INJECTION, POWDER, LYOPHILIZED, FOR SOLUTION INTRAVENOUS at 13:15

## 2023-01-19 RX ADMIN — VINBLASTINE SULFATE 5.16 MG: 1 INJECTION INTRAVENOUS at 15:54

## 2023-01-19 NOTE — PROGRESS NOTES
Hospitalist Progress Note    Subjective:   Daily Progress Note: 1/19/2023 4:46 PM    Hospital Course:  Mr. Courtney Melgoza is a 69-year-old male with PMH significant for HTN, seasonal allergies, anemia, recent 12/22 diagnosis of classic Hodgkin's lymphoma who who was admitted for [facility] [address] for initiation of systemic chemotherapy. He received port placement, bone marrow biopsy, and PFTs, and has initiated chemotherapy treatment 1/19. Oncology has asked our hospitalist group to assume attending care for patient, and Dr. Varun Ramos will remain on as consultant. Per D/W onc there is concern for ongoing tumor lysis syndrome, we will monitor Q8 uric acid levels, and electrolytes, with repletion as needed. PT/OT and nutritionist seeing patient as well to improve functional and nutritional status. Subjective:  Pt examined sitting up in bed with wife at bedside assisting, just recently completed chemo. He states \"no pain right now,\" but only able to complete a limited ROS due to slight [place] and mild confusion post-anesthesia earlier on today.      Current Facility-Administered Medications   Medication Dose Route Frequency    0.9% sodium chloride infusion  5-250 mL/hr IntraVENous PRN    dacarbazine (DTIC) 452 mg in 0.9% sodium chloride 150 mL, overfill volume 15 mL chemo infusion  262.5 mg/m2 (Treatment Plan Recorded) IntraVENous ONCE    sodium chloride (NS) flush 5-40 mL  5-40 mL IntraVENous PRN    0.9% sodium chloride injection 5-40 mL  5-40 mL IntraVENous PRN    0.9% sodium chloride infusion  5-250 mL/hr IntraVENous PRN    heparin (porcine) pf 500 Units  500 Units InterCATHeter PRN    alteplase (CATHFLO) 2 mg in sterile water (preservative free) 2 mL injection  2 mg InterCATHeter PRN    sodium chloride 0.9 % bolus infusion 500 mL  500 mL IntraVENous PRN    diphenhydrAMINE (BENADRYL) injection 25 mg  25 mg IntraVENous PRN    famotidine (PF) (PEPCID) 20 mg in 0.9% sodium chloride 10 mL injection 20 mg IntraVENous PRN    acetaminophen (TYLENOL) tablet 650 mg  650 mg Oral PRN    meperidine (DEMEROL) injection 25 mg  25 mg IntraVENous PRN    ondansetron (ZOFRAN) injection 8 mg  8 mg IntraVENous PRN    HYDROmorphone (DILAUDID) injection 0.5-1 mg  0.5-1 mg IntraVENous Q2H PRN    alcohol 62% (NOZIN) nasal  1 Ampule  1 Ampule Topical Q12H    ondansetron (ZOFRAN) injection 4 mg  4 mg IntraVENous Q4H PRN    acetaminophen (TYLENOL) tablet 650 mg  650 mg Oral Q6H PRN    polyethylene glycol (MIRALAX) packet 17 g  17 g Oral QHS PRN    allopurinoL (ZYLOPRIM) tablet 100 mg  100 mg Oral BID        Review of Systems:    Review of Systems   Constitutional:  Positive for malaise/fatigue and weight loss. Negative for chills and fever. Respiratory:  Negative for shortness of breath. Cardiovascular:  Positive for leg swelling. Negative for chest pain. Gastrointestinal:  Negative for abdominal pain, constipation, diarrhea, nausea and vomiting. Neurological:  Positive for weakness. Negative for dizziness. Objective:     Visit Vitals  /71   Pulse 91   Temp 98.1 °F (36.7 °C)   Resp 18   Ht 5' 5\" (1.651 m)   Wt 66.5 kg (146 lb 9.7 oz)   SpO2 98%   BMI 24.40 kg/m²    O2 Flow Rate (L/min): 2 l/min O2 Device: None (Room air)    Temp (24hrs), Av.1 °F (36.7 °C), Min:97.5 °F (36.4 °C), Max:98.5 °F (36.9 °C)      No intake/output data recorded.  1901 -  0700  In: 520 [I.V.:520]  Out: -     PHYSICAL EXAM:    Physical Exam  Constitutional:       General: He is not in acute distress. Appearance: He is cachectic. He is ill-appearing. HENT:      Head: Normocephalic and atraumatic. Mouth/Throat:      Mouth: Mucous membranes are dry. Eyes:      Pupils: Pupils are equal, round, and reactive to light. Cardiovascular:      Rate and Rhythm: Normal rate and regular rhythm. Pulses:           Radial pulses are 2+ on the right side and 2+ on the left side.         Popliteal pulses are 1+ on the right side and 1+ on the left side. Heart sounds: Normal heart sounds. Pulmonary:      Effort: Pulmonary effort is normal. No respiratory distress. Breath sounds: Examination of the right-lower field reveals decreased breath sounds. Examination of the left-lower field reveals decreased breath sounds. Decreased breath sounds present. Abdominal:      General: Bowel sounds are normal.      Palpations: Abdomen is soft. Tenderness: There is no abdominal tenderness. Musculoskeletal:      Right lower leg: Edema present. Left lower leg: Edema present. Skin:     General: Skin is dry. Coloration: Skin is pale. Findings: Rash present. Comments: Small area of sparse macular flat red rash on right hip, resolving per wife. BLE edema with dorsal aspect of foot redness, improving per wife   Neurological:      Mental Status: He is alert. Motor: Weakness present. Comments: Oriented to self and place, slightly disoriented to time and situation following sedation earlier today. Data Review    Recent Results (from the past 24 hour(s))   METABOLIC PANEL, COMPREHENSIVE    Collection Time: 01/19/23  4:47 AM   Result Value Ref Range    Sodium 135 (L) 136 - 145 mmol/L    Potassium 4.5 3.5 - 5.1 mmol/L    Chloride 100 97 - 108 mmol/L    CO2 24 21 - 32 mmol/L    Anion gap 11 5 - 15 mmol/L    Glucose 109 (H) 65 - 100 mg/dL    BUN 31 (H) 6 - 20 MG/DL    Creatinine 1.66 (H) 0.70 - 1.30 MG/DL    BUN/Creatinine ratio 19 12 - 20      eGFR 43 (L) >60 ml/min/1.73m2    Calcium 9.6 8.5 - 10.1 MG/DL    Bilirubin, total 3.7 (H) 0.2 - 1.0 MG/DL    ALT (SGPT) 21 12 - 78 U/L    AST (SGOT) 43 (H) 15 - 37 U/L    Alk.  phosphatase 669 (H) 45 - 117 U/L    Protein, total 7.2 6.4 - 8.2 g/dL    Albumin 2.4 (L) 3.5 - 5.0 g/dL    Globulin 4.8 (H) 2.0 - 4.0 g/dL    A-G Ratio 0.5 (L) 1.1 - 2.2     CBC WITH AUTOMATED DIFF    Collection Time: 01/19/23  4:47 AM   Result Value Ref Range    WBC 15.3 (H) 4.1 - 11.1 K/uL    RBC 3.89 (L) 4.10 - 5.70 M/uL    HGB 11.5 (L) 12.1 - 17.0 g/dL    HCT 37.9 36.6 - 50.3 %    MCV 97.4 80.0 - 99.0 FL    MCH 29.6 26.0 - 34.0 PG    MCHC 30.3 30.0 - 36.5 g/dL    RDW 19.6 (H) 11.5 - 14.5 %    PLATELET 631 944 - 320 K/uL    MPV 9.3 8.9 - 12.9 FL    NRBC 0.0 0  WBC    ABSOLUTE NRBC 0.00 0.00 - 0.01 K/uL    NEUTROPHILS 68 32 - 75 %    LYMPHOCYTES 20 12 - 49 %    MONOCYTES 8 5 - 13 %    EOSINOPHILS 3 0 - 7 %    BASOPHILS 0 0 - 1 %    IMMATURE GRANULOCYTES 1 (H) 0.0 - 0.5 %    ABS. NEUTROPHILS 10.3 (H) 1.8 - 8.0 K/UL    ABS. LYMPHOCYTES 3.1 0.8 - 3.5 K/UL    ABS. MONOCYTES 1.2 (H) 0.0 - 1.0 K/UL    ABS. EOSINOPHILS 0.5 (H) 0.0 - 0.4 K/UL    ABS. BASOPHILS 0.1 0.0 - 0.1 K/UL    ABS. IMM. GRANS. 0.1 (H) 0.00 - 0.04 K/UL    DF AUTOMATED     PHOSPHORUS    Collection Time: 01/19/23  4:47 AM   Result Value Ref Range    Phosphorus 3.6 2.6 - 4.7 MG/DL   URIC ACID    Collection Time: 01/19/23  4:47 AM   Result Value Ref Range    Uric acid 2.5 (L) 3.5 - 7.2 MG/DL   URIC ACID    Collection Time: 01/19/23  1:21 PM   Result Value Ref Range    Uric acid 2.6 (L) 3.5 - 7.2 MG/DL       CT BX BONE MARROW DIAGNOSTIC   Final Result   Technically successful CT guided bone marrow biopsy. XR CHEST PORT   Final Result   No pneumothorax. XR CHEST SNGL V   Final Result   Intraoperative views as above. See operative report for details. DUPLEX LOWER EXT VENOUS BILAT   Final Result      MRI BRAIN W WO CONT   Final Result      1. Evaluation is significantly limited by patient positioning. No evidence of   intracranial metastases. No acute intracranial abnormality. 2. Generalized parenchymal volume loss and mild chronic microvascular ischemic   disease. Small chronic infarcts as above. CT ABD PELV W CONT   Final Result   1. Mediastinal, hilar, upper abdominal, retroperitoneal, and iliac chain   adenopathy with multiple splenic lesions most consistent with lymphoma. Metastatic disease or other inflammatory/infectious process is less likely. Retroperitoneal nodes are amenable to percutaneous biopsy. 2.  Indeterminate, possibly benign segment 6 hepatic hypodensity. Consider   further evaluation with MRI. 3.  Additional findings as above. CT CHEST W CONT   Final Result   1. Mediastinal, hilar, upper abdominal, retroperitoneal, and iliac chain   adenopathy with multiple splenic lesions most consistent with lymphoma. Metastatic disease or other inflammatory/infectious process is less likely. Retroperitoneal nodes are amenable to percutaneous biopsy. 2.  Indeterminate, possibly benign segment 6 hepatic hypodensity. Consider   further evaluation with MRI. 3.  Additional findings as above.          XR FLUOROSCOPY UNDER 60 MINUTES    (Results Pending)       Active Problems:    Essential hypertension (2/19/2017)      Iron deficiency anemia (8/8/2022)      Type 2 diabetes mellitus (12/28/2022)      Hodgkin lymphoma ([facility] [address] (1/12/2023)      Severe protein-calorie malnutrition ([facility] [address] (1/18/2023)        Assessment/Plan:   Classic Hodgkin's Lymphoma  Associated splenomegaly, hyperbilirubinemia, elevated AlkPhos  Oncology following and defer to their expertise for Hodgkins' treatment  Port placed; plan for 6 cycles ABVD chemotherapy with reduced starting dose 1/19  PRN antiemetics, added PPI  Supportive care/symptom management alongside chemotherapy    Tumor Lysis Syndrome  Associated NILTON (Creat 2.04 - improving 1.66)  Secondary to extensive disease burden  Received Rasburicase x 1  Trend uric acid Q8h if elevates may need additional Rasburicase   On allopurinol  Monitoring CMP & electrolytes  Replete electrolytes as needed    Hx HTN, normotensive this admission  Not on home meds per PTA med rec  Monitor BP, treat elevated BP as needed    Hx DM2  Not on home meds per PTA med rec  Serum glucoses all below 150  HgbA1C 4.8 in 12/22  If elevated serum BS will consider addition of ACHS and SSI  Hypoglycemic protocol ordered    GERD -- added PPI    Severe protein calorie malnutrition  Hypoalbuminemia  Anasarca, BLE swelling  R/t malignancy, malnutrition  BLE dopplers (-) DVT  Generalized weakness - PT/OT  Nutritionist following, encouraging PO intake, supplements  No current plan for NGT/PEG at this time      Disposition: Pending hospital course, likely home 1/22-1/23  DVT Prophylaxis: SCDs  Code Status: Full Code  POA: [facility] - [phone]    Care Plan discussed with: Patient, Oncology, Family  _______________________________________________________________    JOSH Hackett

## 2023-01-19 NOTE — PROGRESS NOTES
Problem: Patient Education: Go to Patient Education Activity  Goal: Patient/Family Education  Outcome: Progressing Towards Goal     Problem: Chemotherapy, Day 1  Goal: Off Pathway (Use only if patient is Off Pathway)  Outcome: Progressing Towards Goal  Goal: Activity/Safety  Outcome: Progressing Towards Goal  Goal: Consults, if ordered  Outcome: Progressing Towards Goal  Goal: Diagnostic Test/Procedures  Outcome: Progressing Towards Goal  Goal: Nutrition/Diet  Outcome: Progressing Towards Goal  Goal: Discharge Planning  Outcome: Progressing Towards Goal  Goal: Medications  Outcome: Progressing Towards Goal  Goal: Respiratory  Outcome: Progressing Towards Goal  Goal: Treatments/Interventions/Procedures  Outcome: Progressing Towards Goal  Goal: Psychosocial  Outcome: Progressing Towards Goal  Goal: *Optimal pain control at patient's stated goal  Outcome: Progressing Towards Goal  Goal: *Hemodynamically stable  Outcome: Progressing Towards Goal  Goal: *Adequate oxygenation  Outcome: Progressing Towards Goal  Goal: *Chemotherapy regimen is initiated  Outcome: Progressing Towards Goal  Goal: *Patient and family verbalize understanding of plan of care  Outcome: Progressing Towards Goal     Problem: Falls - Risk of  Goal: *Absence of Falls  Description: Document Randy Fall Risk and appropriate interventions in the flowsheet. Outcome: Progressing Towards Goal  Note: Fall Risk Interventions:                                Problem: Patient Education: Go to Patient Education Activity  Goal: Patient/Family Education  Outcome: Progressing Towards Goal     Problem: Pressure Injury - Risk of  Goal: *Prevention of pressure injury  Description: Document Matthew Scale and appropriate interventions in the flowsheet. Outcome: Progressing Towards Goal  Note: Pressure Injury Interventions:             Activity Interventions: Increase time out of bed    Mobility Interventions: Float heels, HOB 30 degrees or less    Nutrition Interventions: Document food/fluid/supplement intake    Friction and Shear Interventions: Feet elevated on foot rest, HOB 30 degrees or less                Problem: Patient Education: Go to Patient Education Activity  Goal: Patient/Family Education  Outcome: Progressing Towards Goal     Problem: Patient Education: Go to Patient Education Activity  Goal: Patient/Family Education  Outcome: Progressing Towards Goal     Problem: Patient Education: Go to Patient Education Activity  Goal: Patient/Family Education  Outcome: Progressing Towards Goal Respiratory

## 2023-01-19 NOTE — H&P
INTERVENTIONAL RADIOLOGY  Preoperative History and Physical      Patient:  Hanna Ruggiero  :  1950  Age:  67 y.o. MRN:  [id number]  Today's Date:  2023      CC / HPI   Hanna Ruggiero is a 67 y.o. male with a history of Hodgkin lymphoma who presents for CT guided bone marrow biopsy.     PAST MEDICAL HISTORY  Past Medical History:   Diagnosis Date    Allergic rhinitis, cause unspecified 2013    Arthritis     knees    Asthma     as a child    Eczema     on lower back waistline on the back    Environmental allergies     GERD (gastroesophageal reflux disease)     occastionally but resolved since 60 pound weight loss    [facility]     Hypertension     Psychiatric disorder     anxiety and depression       PAST SURGICAL HISTORY  Past Surgical History:   Procedure Laterality Date    HX TONSILLECTOMY      HX WISDOM TEETH EXTRACTION         SOCIAL HISTORY  Social History     Socioeconomic History    Marital status:      Spouse name: Not on file    Number of children: Not on file    Years of education: Not on file    Highest education level: Not on file   Occupational History    Not on file   Tobacco Use    Smoking status: Former     Packs/day: 1.00     Years: 2.00     Pack years: 2.00     Types: Cigarettes     Quit date: 1966     Years since quittin.1    Smokeless tobacco: Never   Vaping Use    Vaping Use: Never used   Substance and Sexual Activity    Alcohol use: No    Drug use: No    Sexual activity: Yes     Partners: Female   Other Topics Concern    Not on file   Social History Narrative    Not on file     Social Determinants of Health     Financial Resource Strain: Not on file   Food Insecurity: Not on file   Transportation Needs: Not on file   Physical Activity: Not on file   Stress: Not on file   Social Connections: Not on file   Intimate Partner Violence: Not on file   Housing Stability: Not on file       FAMILY HISTORY  Family History   Problem Relation Age of Onset    Hypertension Mother     Hypertension Father     Heart Disease Father     Alcohol abuse Father     Hypertension Brother     No Known Problems Brother        CURRENT MEDICATIONS  Current Facility-Administered Medications   Medication Dose Route Frequency Provider Last Rate Last Admin    0.9% sodium chloride infusion  25 mL/hr IntraVENous RAD ONCE Crow Ellison MD        midazolam (VERSED) injection 0.25-5 mg  0.25-5 mg IntraVENous Multiple Crow Ellison MD        fentaNYL citrate (PF) injection  mcg   mcg IntraVENous Rad Multiple Crow Ellison MD        HYDROmorphone (DILAUDID) injection 0.5-1 mg  0.5-1 mg IntraVENous Q2H PRN Chung Harrington MD   1 mg at 01/19/23 0018    alcohol 62% (NOZIN) nasal  1 Ampule  1 Ampule Topical Q12H Chung Harrington MD   1 Ampule at 01/18/23 2056    ondansetron (ZOFRAN) injection 4 mg  4 mg IntraVENous Q4H PRN Babak Vásquez MD        acetaminophen (TYLENOL) tablet 650 mg  650 mg Oral Q6H PRN Babak Vásquez MD        polyethylene glycol (MIRALAX) packet 17 g  17 g Oral QHS PRN Babak Vásquez MD        allopurinoL (ZYLOPRIM) tablet 100 mg  100 mg Oral BID Nestor Leary NP   100 mg at 01/18/23 1826       ALLERGIES  Allergies   Allergen Reactions    Codeine Other (comments)     Pt states unknown reaction. Pcn [Penicillins] Rash     Received Ancef 1/18/2023 without issue.        DIAGNOSTIC STUDIES   IMAGING STUDIES  N/A    LABS  Lab Results   Component Value Date/Time    WBC 15.3 (H) 01/19/2023 04:47 AM    HGB 11.5 (L) 01/19/2023 04:47 AM    HCT 37.9 01/19/2023 04:47 AM    PLATELET 564 40/26/2915 04:47 AM    MCV 97.4 01/19/2023 04:47 AM     Lab Results   Component Value Date/Time    Sodium 135 (L) 01/19/2023 04:47 AM    Potassium 4.5 01/19/2023 04:47 AM    Chloride 100 01/19/2023 04:47 AM    CO2 24 01/19/2023 04:47 AM    Anion gap 11 01/19/2023 04:47 AM    Glucose 109 (H) 01/19/2023 04:47 AM    BUN 31 (H) 01/19/2023 04:47 AM    Creatinine 1.66 (H) 01/19/2023 04:47 AM    BUN/Creatinine ratio 19 01/19/2023 04:47 AM    GFR est AA >60 07/26/2022 01:45 PM    GFR est non-AA >60 07/26/2022 01:45 PM    Calcium 9.6 01/19/2023 04:47 AM     Lab Results   Component Value Date/Time    INR 1.4 (H) 01/17/2023 08:28 AM    Prothrombin time 14.6 (H) 01/17/2023 08:28 AM       PHYSICAL EXAM   /63 (BP 1 Location: Left upper arm, BP Patient Position: At rest)   Pulse 91   Temp 98.5 °F (36.9 °C)   Resp 16   Ht 5' 5\" (1.651 m)   Wt 66.5 kg (146 lb 9.7 oz)   SpO2 91%   BMI 24.40 kg/m²   General:  NAD  Heart:  RRR  Lungs:  NWOB  Neurological:  AAOX3    PLAN   Procedure to be performed:  CT guided bone marrow biopsy  Plan for sedation:  moderate  Post procedure plan:  observation per protocol  Informed consent:  risks, benefits, and alternatives reviewed with the patient / family who agree to proceed  Code status:  Full Code      Elise Jensen, [address] [facility], [facility].

## 2023-01-19 NOTE — PROGRESS NOTES
End of Shift Note    Bedside shift change report given to Ha Stout RN (oncoming nurse) by Eduardo Villasenor RN (offgoing nurse). Report included the following information SBAR, Kardex, and MAR    Shift worked:  7am-7pm     Shift summary and any significant changes:     Patient had bone marrow biopsy done this morning. Pt began his first round of chemotherapy today and tolerated it well. Pt had no complaints of pain or nausea. Pt port accessed. Pt on regular diet currently. Pt was weak and confused after biopsy therefore patient used the [facility].  Uric acid lab drawn         Eduardo Villasenor RN

## 2023-01-19 NOTE — ROUTINE PROCESS
0820:    Pt arrived to [facility] bone marrow biopsy. Pt is A/O x4 with no complaints of pain. Consent and VS to be obtained. Call bell within reach and bed in the lowest position. [id number]:    Name of procedure: Bone Marrow Biopsy    Sedation medications given:    Versed: 1 mg    Fentanyl: 25 mcg    Sedation tolerated: Yes    Sedation start: 0945  Sedation end:  5131    Vital Signs: Stable    Fluids removed: No    Samples sent to lab: Collected by Casey Mac in cytology    Any complications related to procedure: NO    Post Procedure Care Needed/order sets placed in connect care. Yes    1019:    TRANSFER - OUT REPORT:    Verbal report given to STARR Rodriguez(name) on Lucrecia Whitaker  being transferred to ONC(unit) for routine post - op       Report consisted of patients Situation, Background, Assessment and   Recommendations(SBAR). Information from the following report(s) SBAR and Procedure Summary was reviewed with the receiving nurse. Opportunity for questions and clarification was provided. Pt with skin tear on left hand. Gauze and Tegaderm applied.

## 2023-01-19 NOTE — PROGRESS NOTES
End of Shift Note    Bedside shift change report given to Julissa Li RN (oncoming nurse) by Ivelisse Granados RN (offgoing nurse). Report included the following information SBAR, Kardex, and MAR    Shift worked:  7am-7pm     Shift summary and any significant changes:     Pt tolerated care fairly well. Medications were given and education was provided. Hourly rounding completed. Pt made no complaints of pain during this shift. Family present at bedside. Pt up x1 to the bathroom with the walker. Pt completed respiratory panel today. Pt had port placed. IR bone biopsy was not completed during this shift. Nurse called IR after PACU gave report an IR had already left for the day. Nurse informed night shift nurse of this and let her know that they need to call early in the morning to see if he can be made priority as he is supposed to start chemo tomorrow AM. Pt worked well with PT/OT. Pt advanced to a regular diet after port placed; pt is to be NPO at midnight for his bone biopsy, pt and family aware. Night shift nurse was informed.             Ivelisse Granados RN

## 2023-01-19 NOTE — PROGRESS NOTES
[facility] [place] at [address]  W: [phone] F: [phone]      Reason for Visit:   Domitila Lucas is a 67 y.o. male who is seen in consultation at the request of Dr. Zuri Peralta for evaluation of Hodgkin lymphoma. He is now admitted to [facility] for initiation of systemic chemotherapy. Hematology / Oncology Treatment History:     Hematological/Oncological Diagnosis: Classic Hodgkin lymphoma    Date of Diagnosis: 2021    Treatment course: Plan for ABVD chemotherapy      History of Present Illness:     66year old with hx of hypertension, seasonal allergies, presents with worsening normocytic aneima of unclear etiology. He was diagnosed late December 2022 with Classic Hodgkin Lymphoma. Inpatient pllan is to start systemic therapy while for close monitoring given TLS with elevated uric acid and high risk for further deterioration after systemic therapy. While admitted we plan to obtain bone marrow biopsy, MRI of the brain, CT of the chest abdomen and pelvis, dopplers of the lower extremities, place port for systemic therapy and obtain PFTs. Interval History:     1/18/2023 : Patient seen at bedside with wife. Discussed doppler results were negative for DVT. Plan for port placement and bone marrow biopsy today. Hopefully will start chemotherapy in AM.     01/19/23 Patient seen at bedside today with wife. Encouraged PO intake other than chicken broth. Currently receiving chemo. Review of Systems: A complete review of systems was obtained, negative except as described above.     Past Medical History:   Diagnosis Date    Allergic rhinitis, cause unspecified 12/11/2013    Arthritis     knees    Asthma     as a child    Eczema     on lower back waistline on the back    Environmental allergies     GERD (gastroesophageal reflux disease)     occastionally but resolved since 60 pound weight loss    [facility]     Hypertension     Psychiatric disorder     anxiety and depression      Past Surgical History:   Procedure Laterality Date    HX TONSILLECTOMY      HX WISDOM TEETH EXTRACTION        Social History     Tobacco Use    Smoking status: Former     Packs/day: 1.00     Years: 2.00     Pack years: 2.00     Types: Cigarettes     Quit date: 1966     Years since quittin.1    Smokeless tobacco: Never   Substance Use Topics    Alcohol use: No      Family History   Problem Relation Age of Onset    Hypertension Mother     Hypertension Father     Heart Disease Father     Alcohol abuse Father     Hypertension Brother     No Known Problems Brother      Current Facility-Administered Medications   Medication Dose Route Frequency    0.9% sodium chloride infusion  5-250 mL/hr IntraVENous PRN    bleomycin (BLEOCIN) 8.6 Units in 0.9% sodium chloride 50 mL, overfill volume 5 mL chemo infusion  5 Units/m2 (Treatment Plan Recorded) IntraVENous ONCE    vinBLAStine (VELBAN) 5.16 mg in 0.9% sodium chloride 50 mL, overfill volume 5 mL chemo infusion  3 mg/m2 (Treatment Plan Recorded) IntraVENous ONCE    dacarbazine (DTIC) 452 mg in 0.9% sodium chloride 150 mL, overfill volume 15 mL chemo infusion  262.5 mg/m2 (Treatment Plan Recorded) IntraVENous ONCE    sodium chloride (NS) flush 5-40 mL  5-40 mL IntraVENous PRN    0.9% sodium chloride injection 5-40 mL  5-40 mL IntraVENous PRN    0.9% sodium chloride infusion  5-250 mL/hr IntraVENous PRN    heparin (porcine) pf 500 Units  500 Units InterCATHeter PRN    alteplase (CATHFLO) 2 mg in sterile water (preservative free) 2 mL injection  2 mg InterCATHeter PRN    sodium chloride 0.9 % bolus infusion 500 mL  500 mL IntraVENous PRN    diphenhydrAMINE (BENADRYL) injection 25 mg  25 mg IntraVENous PRN    famotidine (PF) (PEPCID) 20 mg in 0.9% sodium chloride 10 mL injection  20 mg IntraVENous PRN    acetaminophen (TYLENOL) tablet 650 mg  650 mg Oral PRN    meperidine (DEMEROL) injection 25 mg  25 mg IntraVENous PRN    ondansetron (ZOFRAN) injection 8 mg  8 mg IntraVENous PRN    HYDROmorphone (DILAUDID) injection 0.5-1 mg  0.5-1 mg IntraVENous Q2H PRN    alcohol 62% (NOZIN) nasal  1 Ampule  1 Ampule Topical Q12H    ondansetron (ZOFRAN) injection 4 mg  4 mg IntraVENous Q4H PRN    acetaminophen (TYLENOL) tablet 650 mg  650 mg Oral Q6H PRN    polyethylene glycol (MIRALAX) packet 17 g  17 g Oral QHS PRN    allopurinoL (ZYLOPRIM) tablet 100 mg  100 mg Oral BID      Allergies   Allergen Reactions    Codeine Other (comments)     Pt states unknown reaction. Pcn [Penicillins] Rash     Received Ancef 1/18/2023 without issue. Physical Exam:   Visit Vitals  /71   Pulse 91   Temp 98.1 °F (36.7 °C)   Resp 18   Ht 5' 5\" (1.651 m)   Wt 146 lb 9.7 oz (66.5 kg)   SpO2 98%   BMI 24.40 kg/m²     ECOG PS: 0  General: alert, cooperative, no distress, appears fatigued   Mental  status: normal mood, behavior, speech, dress, motor activity, and thought processes, able to follow commands   HENT: NCAT   Neck: no visualized mass   Resp: no respiratory distress   Neuro: no gross deficits   Skin: no discoloration or lesions of concern on visible areas   Psychiatric: normal affect, consistent with stated mood, no evidence of hallucinations           Results:     Lab Results   Component Value Date/Time    WBC 15.3 (H) 01/19/2023 04:47 AM    HGB 11.5 (L) 01/19/2023 04:47 AM    HCT 37.9 01/19/2023 04:47 AM    PLATELET 575 88/10/2442 04:47 AM    MCV 97.4 01/19/2023 04:47 AM    ABS.  NEUTROPHILS 10.3 (H) 01/19/2023 04:47 AM     Lab Results   Component Value Date/Time    Sodium 135 (L) 01/19/2023 04:47 AM    Potassium 4.5 01/19/2023 04:47 AM    Chloride 100 01/19/2023 04:47 AM    CO2 24 01/19/2023 04:47 AM    Glucose 109 (H) 01/19/2023 04:47 AM    BUN 31 (H) 01/19/2023 04:47 AM    Creatinine 1.66 (H) 01/19/2023 04:47 AM    GFR est AA >60 07/26/2022 01:45 PM    GFR est non-AA >60 2022 01:45 PM    Calcium 9.6 2023 04:47 AM    Sodium,  10/07/2022 10:16 AM    Potassium, POC 3.9 10/07/2022 10:16 AM    Chloride,  10/07/2022 10:16 AM    Glucose (POC) 94 2023 07:07 AM    Creatinine, POC 1.1 10/07/2022 10:16 AM    Calcium, ionized (POC) 1.22 10/07/2022 10:16 AM     Lab Results   Component Value Date/Time    Bilirubin, total 3.7 (H) 2023 04:47 AM    ALT (SGPT) 21 2023 04:47 AM    Alk. phosphatase 669 (H) 2023 04:47 AM    Protein, total 7.2 2023 04:47 AM    Albumin 2.4 (L) 2023 04:47 AM    Globulin 4.8 (H) 2023 04:47 AM     CT Results (most recent):  Results from Hospital Encounter encounter on 23    CT BX BONE MARROW DIAGNOSTIC    Narrative  PATIENT NAME: Mali Almanza  AGE, : 67 years, 1950  MRN: [id number]  DATE: 2023 11:25 AM      SUPERVISING PHYSICIAN: Thanh Barker MD  OPERATING PROVIDER: Matthieu Johnson PA-C    PROCEDURE:  CT GUIDED BONE MARROW BIOPSY    INDICATION:  Lymphoma    MEDICATION:  Versed: 1 mg  Fentanyl: 25 mcg  Intraprocedure time: 10 minutes    Patient was evaluated and felt to be an appropriate candidate for conscious  sedation. Moderate intravenous conscious sedation was supervised by Thanh Barker MD. The patient was independently monitored by a registered nurse  assigned to the Department of Radiology using automated blood pressure, EKG, and  pulse oximetry. The detail conscious sedation record is stored in the hospital  information system. FINDINGS: Written and verbal consent was obtained. The risks, benefits and  alternatives of the procedure were explained. The patient was placed prone on  the CT gantry. CT dose reduction was achieved through use of a standardized  protocol tailored for this examination and automatic exposure control for dose  modulation. Initial  images were obtained. Site on the lower back was  chosen.  The skin was prepped and draped in the normal sterile fashion. Skin and  subcutaneous tissues were anesthetized with lidocaine. Under CT guidance the  [facility] bone biopsy needle was advanced into the right posterior iliac  bone. Approximately 5mL of sample was aspirated and a 2.5 cm core biopsy was  obtained upon removal of the biopsy device; these samples were handed to the  cytology technologist in the [address] [place] [address]. There were no immediate complications. Patient was recovered in the recovery room without incident. SPECIMENS REMOVED: 5mL aspirated, 2.5 cm core    ESTIMATED BLOOD LOSS: Less than 5 mL    Impression  Technically successful CT guided bone marrow biopsy. No imaging of spleen      Pathology:           ==========================================================================   * * *FINAL PATHOLOGIC DIAGNOSIS* * *     <<<<<       Lymph node, left supraclavicular lymph node, excision:        Classic Hodgkin lymphoma. See comment.     >>>>>      * * *Comment* * *     <<<<<  The histologic section has an enlarged lymph node with a thickened capsule   and effacement of normal yady architecture by a vaguely nodular   proliferation with few sclerotic bands. Numerous Hodgkin/Levi-Esteban   cells are present within a background of small lymphocytes and   eosinophils. The Hodgkin Levi-Esteban cells are positive for CD30, CD15   and PAX5 (subset, dim) and are negative for CD45, ALK, CD20, EMA,   Granzyme, MUM1, CD43 and CD3. In situ hybridization for Tania-Barr viral   RNA is negative. The small lymphocytes are comprised of CD3 and CD5   positive T cells with few scattered B cells identified. Assessment and Recommendations:     Classic Hodgkin lymphoma, advanced  -At present, the patient has clinical symptoms of diarrhea, abdominal discomfort, discoloration of his lower extremities, findings may be related to widely distributed lymphadenopathy.   Splenomegaly may be causing liver dysfunction.     -Discussed the risks and benefits of ABVD chemotherapy in the OP setting, please see clinic note for further detail. -CT chest/abd/pelvis   IMPRESSION  1. Mediastinal, hilar, upper abdominal, retroperitoneal, and iliac chain  adenopathy with multiple splenic lesions most consistent with lymphoma. Metastatic disease or other inflammatory/infectious process is less likely. Retroperitoneal nodes are amenable to percutaneous biopsy. 2.  Indeterminate, possibly benign segment 6 hepatic hypodensity. Consider  further evaluation with MRI. -MRI Brain   IMPRESSION  1. Evaluation is significantly limited by patient positioning. No evidence of  intracranial metastases. No acute intracranial abnormality. 2. Generalized parenchymal volume loss and mild chronic microvascular ischemic disease. Small chronic infarcts as above. -Appreciate General Surgery input - plan for port placement today     - PFTs completed on 11/18    -S/p Bone Marrow biopsy today      Plan for chemotherapy with C#1 of ABVD (Doxorubicin 25mg/m2, Bleomycin 10 units/m2, Vinblastine 6mg/m2, Dacarbazine 375mg/m2 on days 1 and 15 every 28 days), with plans for 6 cycles (further cycles to be OP).  Receiving dose reduced chemo today     Hyperbilirubinemia  -Etiology of his hyperbilirubinemia may be a consequence of impaired liver function as a consequence of lymphoma and splenomegaly.   -Trend CMP, T bili slightly improved today      Elevated alkaline phosphatase  -The patient likely has disease involvement of the bone     Tumor Lysis Syndrome  -Component of TLS given extensive disease burden  -Continue to trend uric every 8 hours, stable  -Consider second dose of Rasburicase if needed   -Continue allopurinol   -Close monitoring of CMP and electrolytes for worsening of TLS with plan for systemic therapy  -Appreciate Hospitalist input      Mild Hypercalcemia   -Related to disease burden  -Corrected Ca 10.3  -Monitor for now      Acute Kidney Injury   -Related to TLS  -S/p dose of Rasburicase 1/17  -Follow renal function, improving     Anasarca  Severe Protein Malnutrition   Bilateral LE Swelling   -Likely r/t to malignancy and malnutrition   -Prealbumin 3.2 - encouraged increased PO (wife reports he only eats chicken broth)  -Appreciate Dietician input   -Hold off on NGT or PEG for nutrition now. Concern for refeeding syndrome in setting of TLS after chemo. Continue to re-assess nutrition status.   -Add supplements, does not like ensure   -Dopplers r/o DVT -- no DVT on US    Generalized Weakness  -R/t malignancy and severe malnutrition   -PT/OT consults     Signed By: Lonnell Heimlich, NP        I have personally seen and evaluated the patient in conjunction with Rigoberto Deras NP. I find the patient's history and physical exam are consistent with the NP's documentation. I agree with the above assessment and plan, which I have modified as needed. 67year old receiving chemotherapy ABVD today. The patient is currently receiving antineoplastic chemotherapy and/or immunotherapy. Labs reviewed, WBC count, ANC, hemoglobin, platelet counts reviewed. Creatinine and liver function reviewed, okay to proceed with antineoplastic chemotherapy/immunotherapy today. Graded toxicities from treatment detailed above. No new clinical worsening. Continue to monitor for tumor lysis syndrome closely. Can give rasburicase is uric acid > 8. Overall improving with chemotherapy. Antiemetics as needed. Can add ativan if required.         Mulugeta Tsai MD    Attending [address] [place] [address]

## 2023-01-19 NOTE — PROGRESS NOTES
Occupational Therapy    Chart reviewed in prep for OT session. Pt currently off floor for bone marrow biopsy. Will hold and follow-up later as able and medically appropriate. Thanks.     Hoa Garcia MS, OTR/L

## 2023-01-19 NOTE — PROGRESS NOTES
End of Shift Note    Bedside shift change report given to Jennifer CLEMENTS (oncoming nurse) by Luna Angel RN (offgoing nurse). Report included the following information SBAR, Kardex, Intake/Output, MAR, and Recent Results    Shift worked:  7p-7a     Shift summary and any significant changes:     Administered all medication see MAR also Dilaudid for surgery pain. I continued to ice the area. Patient family present at bedside. Patient NPO. Concerns for physician to address:      Zone phone for oncoming shift:          Activity:  Activity Level: Up with Assistance  Number times ambulated in hallways past shift: 0  Number of times OOB to chair past shift: 0    Cardiac:   Cardiac Monitoring: No      Cardiac Rhythm: Sinus Rhythm    Access:  Current line(s): PIV     Genitourinary:   Urinary status: voiding    Respiratory:   O2 Device: None (Room air)  Chronic home O2 use?: NO  Incentive spirometer at bedside: NO       GI:  Last Bowel Movement Date: 01/18/23  Current diet:  ADULT ORAL NUTRITION SUPPLEMENT Breakfast, Lunch, Dinner, AM Snack, PM Snack;  Fortified Pudding  ADULT ORAL NUTRITION SUPPLEMENT Lunch, Dinner; Frozen Supplement  DIET NPO  Passing flatus: YES  Tolerating current diet: NO       Pain Management:   Patient states pain is manageable on current regimen: YES    Skin:  Matthew Score: 18  Interventions: float heels, increase time out of bed, and PT/OT consult    Patient Safety:  Fall Score:    Interventions: bed/chair alarm, assistive device (walker, cane, etc), gripper socks, and pt to call before getting OOB       Length of Stay:  Expected LOS: 2d 14h  Actual LOS: 2      Luna Angel RN

## 2023-01-20 LAB
ALBUMIN SERPL-MCNC: 2.1 G/DL (ref 3.5–5)
ALBUMIN/GLOB SERPL: 0.5 (ref 1.1–2.2)
ALP SERPL-CCNC: 577 U/L (ref 45–117)
ALT SERPL-CCNC: 16 U/L (ref 12–78)
ANION GAP SERPL CALC-SCNC: 8 MMOL/L (ref 5–15)
AST SERPL-CCNC: 37 U/L (ref 15–37)
BASOPHILS # BLD: 0 K/UL (ref 0–0.1)
BASOPHILS NFR BLD: 0 % (ref 0–1)
BILIRUB SERPL-MCNC: 3.3 MG/DL (ref 0.2–1)
BUN SERPL-MCNC: 34 MG/DL (ref 6–20)
BUN/CREAT SERPL: 23 (ref 12–20)
CALCIUM SERPL-MCNC: 8.9 MG/DL (ref 8.5–10.1)
CHLORIDE SERPL-SCNC: 103 MMOL/L (ref 97–108)
CO2 SERPL-SCNC: 25 MMOL/L (ref 21–32)
COMMENT, HOLDF: NORMAL
CREAT SERPL-MCNC: 1.51 MG/DL (ref 0.7–1.3)
DIFFERENTIAL METHOD BLD: ABNORMAL
EOSINOPHIL # BLD: 0 K/UL (ref 0–0.4)
EOSINOPHIL NFR BLD: 0 % (ref 0–7)
ERYTHROCYTE [DISTWIDTH] IN BLOOD BY AUTOMATED COUNT: 19.4 % (ref 11.5–14.5)
GLOBULIN SER CALC-MCNC: 4.2 G/DL (ref 2–4)
GLUCOSE SERPL-MCNC: 181 MG/DL (ref 65–100)
HCT VFR BLD AUTO: 34.3 % (ref 36.6–50.3)
HGB BLD-MCNC: 10.5 G/DL (ref 12.1–17)
IMM GRANULOCYTES # BLD AUTO: 0.1 K/UL (ref 0–0.04)
IMM GRANULOCYTES NFR BLD AUTO: 1 % (ref 0–0.5)
LYMPHOCYTES # BLD: 1.5 K/UL (ref 0.8–3.5)
LYMPHOCYTES NFR BLD: 13 % (ref 12–49)
MCH RBC QN AUTO: 30.1 PG (ref 26–34)
MCHC RBC AUTO-ENTMCNC: 30.6 G/DL (ref 30–36.5)
MCV RBC AUTO: 98.3 FL (ref 80–99)
MONOCYTES # BLD: 0.6 K/UL (ref 0–1)
MONOCYTES NFR BLD: 5 % (ref 5–13)
NEUTS SEG # BLD: 9.1 K/UL (ref 1.8–8)
NEUTS SEG NFR BLD: 81 % (ref 32–75)
NRBC # BLD: 0 K/UL (ref 0–0.01)
NRBC BLD-RTO: 0 PER 100 WBC
PHOSPHATE SERPL-MCNC: 3.8 MG/DL (ref 2.6–4.7)
PLATELET # BLD AUTO: 201 K/UL (ref 150–400)
PMV BLD AUTO: 9.7 FL (ref 8.9–12.9)
POTASSIUM SERPL-SCNC: 4.5 MMOL/L (ref 3.5–5.1)
PROT SERPL-MCNC: 6.3 G/DL (ref 6.4–8.2)
RBC # BLD AUTO: 3.49 M/UL (ref 4.1–5.7)
SAMPLES BEING HELD,HOLD: NORMAL
SODIUM SERPL-SCNC: 136 MMOL/L (ref 136–145)
URATE SERPL-MCNC: 2.7 MG/DL (ref 3.5–7.2)
URATE SERPL-MCNC: 3.1 MG/DL (ref 3.5–7.2)
WBC # BLD AUTO: 11.3 K/UL (ref 4.1–11.1)

## 2023-01-20 PROCEDURE — 74011250636 HC RX REV CODE- 250/636: Performed by: STUDENT IN AN ORGANIZED HEALTH CARE EDUCATION/TRAINING PROGRAM

## 2023-01-20 PROCEDURE — 80053 COMPREHEN METABOLIC PANEL: CPT

## 2023-01-20 PROCEDURE — 97530 THERAPEUTIC ACTIVITIES: CPT

## 2023-01-20 PROCEDURE — 65270000029 HC RM PRIVATE

## 2023-01-20 PROCEDURE — 97116 GAIT TRAINING THERAPY: CPT

## 2023-01-20 PROCEDURE — 36415 COLL VENOUS BLD VENIPUNCTURE: CPT

## 2023-01-20 PROCEDURE — 74011250637 HC RX REV CODE- 250/637

## 2023-01-20 PROCEDURE — 74011250637 HC RX REV CODE- 250/637: Performed by: NURSE PRACTITIONER

## 2023-01-20 PROCEDURE — 84100 ASSAY OF PHOSPHORUS: CPT

## 2023-01-20 PROCEDURE — 93005 ELECTROCARDIOGRAM TRACING: CPT

## 2023-01-20 PROCEDURE — 84550 ASSAY OF BLOOD/URIC ACID: CPT

## 2023-01-20 PROCEDURE — 74011250636 HC RX REV CODE- 250/636

## 2023-01-20 PROCEDURE — 74011250636 HC RX REV CODE- 250/636: Performed by: SURGERY

## 2023-01-20 PROCEDURE — 74011250637 HC RX REV CODE- 250/637: Performed by: SURGERY

## 2023-01-20 PROCEDURE — 85025 COMPLETE CBC W/AUTO DIFF WBC: CPT

## 2023-01-20 RX ORDER — LANOLIN ALCOHOL/MO/W.PET/CERES
3 CREAM (GRAM) TOPICAL
Status: DISCONTINUED | OUTPATIENT
Start: 2023-01-20 | End: 2023-02-07 | Stop reason: HOSPADM

## 2023-01-20 RX ORDER — HALOPERIDOL 1 MG/1
1 TABLET ORAL
Status: DISCONTINUED | OUTPATIENT
Start: 2023-01-20 | End: 2023-01-21

## 2023-01-20 RX ORDER — PROCHLORPERAZINE EDISYLATE 5 MG/ML
10 INJECTION INTRAMUSCULAR; INTRAVENOUS
Status: DISCONTINUED | OUTPATIENT
Start: 2023-01-20 | End: 2023-02-07 | Stop reason: HOSPADM

## 2023-01-20 RX ADMIN — HYDROMORPHONE HYDROCHLORIDE 0.5 MG: 1 INJECTION, SOLUTION INTRAMUSCULAR; INTRAVENOUS; SUBCUTANEOUS at 03:45

## 2023-01-20 RX ADMIN — HYDROMORPHONE HYDROCHLORIDE 0.5 MG: 1 INJECTION, SOLUTION INTRAMUSCULAR; INTRAVENOUS; SUBCUTANEOUS at 18:38

## 2023-01-20 RX ADMIN — PROCHLORPERAZINE EDISYLATE 10 MG: 5 INJECTION INTRAMUSCULAR; INTRAVENOUS at 18:38

## 2023-01-20 RX ADMIN — Medication 1 AMPULE: at 09:54

## 2023-01-20 RX ADMIN — HYDROMORPHONE HYDROCHLORIDE 1 MG: 1 INJECTION, SOLUTION INTRAMUSCULAR; INTRAVENOUS; SUBCUTANEOUS at 23:13

## 2023-01-20 RX ADMIN — ONDANSETRON 4 MG: 2 INJECTION INTRAMUSCULAR; INTRAVENOUS at 16:32

## 2023-01-20 RX ADMIN — PANTOPRAZOLE SODIUM 40 MG: 40 TABLET, DELAYED RELEASE ORAL at 09:54

## 2023-01-20 RX ADMIN — ALLOPURINOL 100 MG: 100 TABLET ORAL at 09:54

## 2023-01-20 NOTE — PROGRESS NOTES
Problem: Mobility Impaired (Adult and Pediatric)  Goal: *Acute Goals and Plan of Care (Insert Text)  Description: FUNCTIONAL STATUS PRIOR TO ADMISSION: Per wife report, pt with recent decline requiring transition to first floor set up and assist with ADLs. Wife states that pt uses SPC for amb, but would benefit from RW which he owns, but has been reluctant to use. Of note, pt diagnosed with Hodgkin's lymphoma Dec 2022 and is now starting chemo. HOME SUPPORT PRIOR TO ADMISSION: The patient lived with wife who provides assist along with children. Physical Therapy Goals  Initiated 1/18/2023  1. Patient will move from supine to sit and sit to supine  in bed with independence within 7 day(s). 2.  Patient will transfer from bed to chair and chair to bed with supervision/set-up using the least restrictive device within 7 day(s). 3.  Patient will perform sit to stand with supervision/set-up within 7 day(s). 4.  Patient will ambulate with supervision/set-up for 50 feet with the least restrictive device within 7 day(s). Outcome: Not Progressing Towards Goal   PHYSICAL THERAPY TREATMENT  Patient: Eddie Bolton (89 y.o. male)  Date: 1/20/2023  Diagnosis[facility] [C81.90] <principal problem not specified>  Procedure(s) (LRB):  INFUSAPORT INSERTION (N/A) 2 Days Post-Op  Precautions: fall, increased confusion   Chart, physical therapy assessment, plan of care and goals were reviewed. ASSESSMENT  Patient continues with skilled PT services and is not progressing towards goals. Pt noted by wife and staff to much more confused since his bx yesterday. Originally it was thought that it would improve but has not to this point. Pt is received in bed. He is conversing about unrelated subjects and took several tries to identify his wife's name.  He is not utilizing the walker as well as last session and is requiring more assist. Attempted use of cane as this is his familiar device but pt still needing min A and constant cues. Easily distractible with limited attention to task. He did not appear to have focal weakness but he was having trouble following direction for testing. LEs are somewhat swollen R slightly greater than L but no c/o pain and no acute redness noted. Relayed to RN as pt is functional declined from last session due to his cognitive status. Had been recommending HHPT with assist of wife - hopeful for this disposition still if pt clears cognitively. Current Level of Function Impacting Discharge (mobility/balance): Overall min A, increased cues and guiding    Other factors to consider for discharge: much more confused needing greater degree of A and cueing         PLAN :  Patient continues to benefit from skilled intervention to address the above impairments. Continue treatment per established plan of care. to address goals. Recommendation for discharge: (in order for the patient to meet his/her long term goals)  Physical therapy at least 2 days/week in the home AND ensure assist and/or supervision for safety with mobility and gait pending resolution of new cognitive issues and no new dx as cause    This discharge recommendation:  A follow-up discussion with the attending provider and/or case management is planned    IF patient discharges home will need the following DME: to be determined (TBD)       SUBJECTIVE:   Patient stated Mann Gerber said that one out there.     OBJECTIVE DATA SUMMARY:   Critical Behavior:  Neurologic State: Alert  Orientation Level: Oriented to person, Disoriented to time, Disoriented to situation, Disoriented to place  Cognition: Decreased command following     Functional Mobility Training:  Bed Mobility:  Rolling: Minimum assistance  Supine to Sit: Minimum assistance (guiding)  Sit to Supine: Contact guard assistance  Scooting: Contact guard assistance; Other (comment) (increased cues)        Transfers:  Sit to Stand: Minimum assistance  Stand to Sit: Minimum assistance Balance:  Sitting: Intact  Standing: Impaired  Standing - Static: Fair;Constant support  Standing - Dynamic : Fair;Constant support  Ambulation/Gait Training:  Distance (ft): 70 Feet (ft) (+50)  Assistive Device: Walker, rolling;Cane, straight;Gait belt; Other (comment)  Ambulation - Level of Assistance: Minimal assistance        Gait Abnormalities: Decreased step clearance;Trunk sway increased              Speed/Briana: Pace decreased (<100 feet/min)  Step Length: Right shortened;Left shortened              Pain Rating:  No c/o    Activity Tolerance:   Fair    After treatment patient left in no apparent distress:   Supine in bed, Call bell within reach, and Bed / chair alarm activated, 3 rails up    COMMUNICATION/COLLABORATION:   The patients plan of care was discussed with: Occupational therapist and Registered nurse.      Estuardo Enriquez, PT   Time Calculation: 28 mins

## 2023-01-20 NOTE — PROGRESS NOTES
CM: Ashley Howe is currently working with pt in the [facility]. CM informed by clinical staff that pt will remain as inpatient greater than 48hrs. Pt is currently receiving chemo, and is being monitored, to ensure he is able to tolerate chemo. Pt has safety sitter at night by bedside. Pt will benefit from pt/ot consult to determine pts baseline. CM will continue to follow.     ENE Medeiros, [address] [place] [address]

## 2023-01-20 NOTE — PROGRESS NOTES
Problem: Patient Education: Go to Patient Education Activity  Goal: Patient/Family Education  Outcome: Progressing Towards Goal     Problem: Chemotherapy, Day 2  Goal: Activity/Safety  Outcome: Progressing Towards Goal  Goal: Consults, if ordered  Outcome: Progressing Towards Goal  Goal: Diagnostic Test/Procedures  Outcome: Progressing Towards Goal  Goal: Nutrition/Diet  Outcome: Progressing Towards Goal  Goal: Discharge Planning  Outcome: Progressing Towards Goal  Goal: Medications  Outcome: Progressing Towards Goal  Goal: Respiratory  Outcome: Progressing Towards Goal  Goal: Treatments/Interventions/Procedures  Outcome: Progressing Towards Goal  Goal: Psychosocial  Outcome: Progressing Towards Goal  Goal: *Optimal pain control at patient's stated goal  Outcome: Progressing Towards Goal  Goal: *Hemodynamically stable  Outcome: Progressing Towards Goal  Goal: *Adequate oxygenation  Outcome: Progressing Towards Goal  Goal: *Chemotherapy regimen is initiated  Outcome: Progressing Towards Goal  Goal: *Effective side effect management  Outcome: Progressing Towards Goal     Problem: Falls - Risk of  Goal: *Absence of Falls  Description: Document Randy Fall Risk and appropriate interventions in the flowsheet. Outcome: Progressing Towards Goal  Note: Fall Risk Interventions:                                Problem: Patient Education: Go to Patient Education Activity  Goal: Patient/Family Education  Outcome: Progressing Towards Goal     Problem: Pressure Injury - Risk of  Goal: *Prevention of pressure injury  Description: Document Matthew Scale and appropriate interventions in the flowsheet. Outcome: Progressing Towards Goal  Note: Pressure Injury Interventions:             Activity Interventions: Increase time out of bed, Pressure redistribution bed/mattress(bed type), PT/OT evaluation    Mobility Interventions: HOB 30 degrees or less    Nutrition Interventions: Document food/fluid/supplement intake    Friction and Shear Interventions: HOB 30 degrees or less                Problem: Patient Education: Go to Patient Education Activity  Goal: Patient/Family Education  Outcome: Progressing Towards Goal     Problem: Patient Education: Go to Patient Education Activity  Goal: Patient/Family Education  Outcome: Progressing Towards Goal     Problem: Patient Education: Go to Patient Education Activity  Goal: Patient/Family Education  Outcome: Progressing Towards Goal

## 2023-01-20 NOTE — PROGRESS NOTES
End of Shift Note    Bedside shift change report given to Jennifer CLEMENTS (oncoming nurse) by Carolyne Londono RN (offgoing nurse). Report included the following information SBAR, Kardex, Intake/Output, MAR, and Recent Results    Shift worked:  7p-7a     Shift summary and any significant changes:     Pt was given scheduled meds and PRN Pepcid for heartburn. Dilaudid given see PRN for shoulder pain. See MAR    Pt was sun downing tonight, oriented to only self. I was able to reorient. Gave melatonin but did not help him sleep. Labs done  Patient rounding   CHG Port     Concerns for physician to address:       Zone phone for oncoming shift:          Activity:  Activity Level: Up with Assistance  Number times ambulated in hallways past shift: 0  Number of times OOB to chair past shift: 1    Cardiac:   Cardiac Monitoring: No      Cardiac Rhythm: Sinus Rhythm    Access:  Current line(s): PIV     Genitourinary:   Urinary status: voiding    Respiratory:   O2 Device: None (Room air)  Chronic home O2 use?: No  Incentive spirometer at bedside: NO       GI:  Last Bowel Movement Date: 01/18/23  Current diet:  ADULT ORAL NUTRITION SUPPLEMENT Breakfast, Lunch, Dinner, AM Snack, PM Snack;  Fortified Pudding  ADULT ORAL NUTRITION SUPPLEMENT Lunch, Dinner; Frozen Supplement  ADULT DIET Regular  Passing flatus: YES  Tolerating current diet: NO       Pain Management:   Patient states pain is manageable on current regimen: YES    Skin:  Matthew Score: 18  Interventions: increase time out of bed and PT/OT consult    Patient Safety:  Fall Score:    Interventions: bed/chair alarm, assistive device (walker, cane, etc), gripper socks, pt to call before getting OOB, and stay with me (per policy)       Length of Stay:  Expected LOS: 9d 21h  Actual LOS: 3      Carolyne Londono, RN

## 2023-01-20 NOTE — PROGRESS NOTES
[employer] Telesitter Monitor initiated on 1/20/2023 at [phone] for the following reason(s): Confusion, restlessness, trying to get out of the bed . Patient wife educated on use of camera and is in agreement. If patient unable to verbalize understanding of camera necessity, the responsible party notified and educated:  Pt wife educated.

## 2023-01-21 ENCOUNTER — APPOINTMENT (OUTPATIENT)
Dept: GENERAL RADIOLOGY | Age: 73
DRG: 823 | End: 2023-01-21
Payer: MEDICARE

## 2023-01-21 LAB
ALBUMIN SERPL-MCNC: 2.1 G/DL (ref 3.5–5)
ALBUMIN/GLOB SERPL: 0.5 (ref 1.1–2.2)
ALP SERPL-CCNC: 516 U/L (ref 45–117)
ALT SERPL-CCNC: 16 U/L (ref 12–78)
AMORPH CRY URNS QL MICRO: ABNORMAL
ANION GAP SERPL CALC-SCNC: 6 MMOL/L (ref 5–15)
APPEARANCE UR: ABNORMAL
AST SERPL-CCNC: 33 U/L (ref 15–37)
BACTERIA URNS QL MICRO: NEGATIVE /HPF
BASOPHILS # BLD: 0 K/UL (ref 0–0.1)
BASOPHILS NFR BLD: 0 % (ref 0–1)
BILIRUB SERPL-MCNC: 3.1 MG/DL (ref 0.2–1)
BILIRUB UR QL CFM: NEGATIVE
BUN SERPL-MCNC: 43 MG/DL (ref 6–20)
BUN/CREAT SERPL: 25 (ref 12–20)
CALCIUM SERPL-MCNC: 9 MG/DL (ref 8.5–10.1)
CHLORIDE SERPL-SCNC: 105 MMOL/L (ref 97–108)
CO2 SERPL-SCNC: 26 MMOL/L (ref 21–32)
COLOR UR: ABNORMAL
CREAT SERPL-MCNC: 1.7 MG/DL (ref 0.7–1.3)
DIFFERENTIAL METHOD BLD: ABNORMAL
EOSINOPHIL # BLD: 0 K/UL (ref 0–0.4)
EOSINOPHIL NFR BLD: 0 % (ref 0–7)
EPITH CASTS URNS QL MICRO: ABNORMAL /LPF
ERYTHROCYTE [DISTWIDTH] IN BLOOD BY AUTOMATED COUNT: 19.5 % (ref 11.5–14.5)
GLOBULIN SER CALC-MCNC: 4.3 G/DL (ref 2–4)
GLUCOSE SERPL-MCNC: 130 MG/DL (ref 65–100)
GLUCOSE UR STRIP.AUTO-MCNC: NEGATIVE MG/DL
HCT VFR BLD AUTO: 39.6 % (ref 36.6–50.3)
HGB BLD-MCNC: 11.9 G/DL (ref 12.1–17)
HGB UR QL STRIP: NEGATIVE
IMM GRANULOCYTES # BLD AUTO: 0.1 K/UL (ref 0–0.04)
IMM GRANULOCYTES NFR BLD AUTO: 1 % (ref 0–0.5)
KETONES UR QL STRIP.AUTO: NEGATIVE MG/DL
LACTATE SERPL-SCNC: 1.4 MMOL/L (ref 0.4–2)
LEUKOCYTE ESTERASE UR QL STRIP.AUTO: ABNORMAL
LYMPHOCYTES # BLD: 1.2 K/UL (ref 0.8–3.5)
LYMPHOCYTES NFR BLD: 8 % (ref 12–49)
MCH RBC QN AUTO: 29.5 PG (ref 26–34)
MCHC RBC AUTO-ENTMCNC: 30.1 G/DL (ref 30–36.5)
MCV RBC AUTO: 98.3 FL (ref 80–99)
MONOCYTES # BLD: 0.7 K/UL (ref 0–1)
MONOCYTES NFR BLD: 4 % (ref 5–13)
MUCOUS THREADS URNS QL MICRO: ABNORMAL /LPF
NEUTS SEG # BLD: 13.7 K/UL (ref 1.8–8)
NEUTS SEG NFR BLD: 87 % (ref 32–75)
NITRITE UR QL STRIP.AUTO: POSITIVE
NRBC # BLD: 0 K/UL (ref 0–0.01)
NRBC BLD-RTO: 0 PER 100 WBC
PH UR STRIP: 5 (ref 5–8)
PHOSPHATE SERPL-MCNC: 4.2 MG/DL (ref 2.6–4.7)
PLATELET # BLD AUTO: 203 K/UL (ref 150–400)
PMV BLD AUTO: 9 FL (ref 8.9–12.9)
POTASSIUM SERPL-SCNC: 4.6 MMOL/L (ref 3.5–5.1)
PROCALCITONIN SERPL-MCNC: 3.12 NG/ML
PROT SERPL-MCNC: 6.4 G/DL (ref 6.4–8.2)
PROT UR STRIP-MCNC: 100 MG/DL
RBC # BLD AUTO: 4.03 M/UL (ref 4.1–5.7)
RBC #/AREA URNS HPF: ABNORMAL /HPF (ref 0–5)
SODIUM SERPL-SCNC: 137 MMOL/L (ref 136–145)
SP GR UR REFRACTOMETRY: 1.03
UA: UC IF INDICATED,UAUC: ABNORMAL
URATE SERPL-MCNC: 4.2 MG/DL (ref 3.5–7.2)
URATE SERPL-MCNC: 5.3 MG/DL (ref 3.5–7.2)
UROBILINOGEN UR QL STRIP.AUTO: 2 EU/DL (ref 0.2–1)
WBC # BLD AUTO: 15.7 K/UL (ref 4.1–11.1)
WBC URNS QL MICRO: ABNORMAL /HPF (ref 0–4)

## 2023-01-21 PROCEDURE — 74011250637 HC RX REV CODE- 250/637: Performed by: STUDENT IN AN ORGANIZED HEALTH CARE EDUCATION/TRAINING PROGRAM

## 2023-01-21 PROCEDURE — 74011000250 HC RX REV CODE- 250

## 2023-01-21 PROCEDURE — C9113 INJ PANTOPRAZOLE SODIUM, VIA: HCPCS

## 2023-01-21 PROCEDURE — 36415 COLL VENOUS BLD VENIPUNCTURE: CPT

## 2023-01-21 PROCEDURE — 74011250636 HC RX REV CODE- 250/636: Performed by: SURGERY

## 2023-01-21 PROCEDURE — 74011250636 HC RX REV CODE- 250/636: Performed by: STUDENT IN AN ORGANIZED HEALTH CARE EDUCATION/TRAINING PROGRAM

## 2023-01-21 PROCEDURE — 74011000258 HC RX REV CODE- 258

## 2023-01-21 PROCEDURE — 84145 PROCALCITONIN (PCT): CPT

## 2023-01-21 PROCEDURE — 84550 ASSAY OF BLOOD/URIC ACID: CPT

## 2023-01-21 PROCEDURE — 84100 ASSAY OF PHOSPHORUS: CPT

## 2023-01-21 PROCEDURE — 80053 COMPREHEN METABOLIC PANEL: CPT

## 2023-01-21 PROCEDURE — 74011250637 HC RX REV CODE- 250/637: Performed by: NURSE PRACTITIONER

## 2023-01-21 PROCEDURE — 74011250637 HC RX REV CODE- 250/637: Performed by: SURGERY

## 2023-01-21 PROCEDURE — 74011250636 HC RX REV CODE- 250/636

## 2023-01-21 PROCEDURE — 71045 X-RAY EXAM CHEST 1 VIEW: CPT

## 2023-01-21 PROCEDURE — 83605 ASSAY OF LACTIC ACID: CPT

## 2023-01-21 PROCEDURE — 65270000029 HC RM PRIVATE

## 2023-01-21 PROCEDURE — 85025 COMPLETE CBC W/AUTO DIFF WBC: CPT

## 2023-01-21 PROCEDURE — 74011250637 HC RX REV CODE- 250/637

## 2023-01-21 PROCEDURE — 81001 URINALYSIS AUTO W/SCOPE: CPT

## 2023-01-21 RX ORDER — SODIUM CHLORIDE 9 MG/ML
100 INJECTION, SOLUTION INTRAVENOUS CONTINUOUS
Status: DISCONTINUED | OUTPATIENT
Start: 2023-01-21 | End: 2023-01-24

## 2023-01-21 RX ORDER — NALOXONE HYDROCHLORIDE 1 MG/ML
1 INJECTION INTRAMUSCULAR; INTRAVENOUS; SUBCUTANEOUS
Status: DISCONTINUED | OUTPATIENT
Start: 2023-01-21 | End: 2023-01-21

## 2023-01-21 RX ORDER — HALOPERIDOL 5 MG/ML
2 INJECTION INTRAMUSCULAR
Status: DISCONTINUED | OUTPATIENT
Start: 2023-01-21 | End: 2023-01-21

## 2023-01-21 RX ORDER — NALOXONE HYDROCHLORIDE 1 MG/ML
1 INJECTION INTRAMUSCULAR; INTRAVENOUS; SUBCUTANEOUS
Status: DISCONTINUED | OUTPATIENT
Start: 2023-01-21 | End: 2023-02-07 | Stop reason: HOSPADM

## 2023-01-21 RX ORDER — METRONIDAZOLE 500 MG/100ML
500 INJECTION, SOLUTION INTRAVENOUS EVERY 8 HOURS
Status: COMPLETED | OUTPATIENT
Start: 2023-01-21 | End: 2023-01-21

## 2023-01-21 RX ORDER — HYDROMORPHONE HYDROCHLORIDE 1 MG/ML
.5-1 INJECTION, SOLUTION INTRAMUSCULAR; INTRAVENOUS; SUBCUTANEOUS
Status: DISCONTINUED | OUTPATIENT
Start: 2023-01-21 | End: 2023-02-07 | Stop reason: HOSPADM

## 2023-01-21 RX ORDER — METRONIDAZOLE 500 MG/100ML
500 INJECTION, SOLUTION INTRAVENOUS EVERY 12 HOURS
Status: DISCONTINUED | OUTPATIENT
Start: 2023-01-22 | End: 2023-01-30

## 2023-01-21 RX ORDER — VANCOMYCIN HYDROCHLORIDE
1250 ONCE
Status: COMPLETED | OUTPATIENT
Start: 2023-01-21 | End: 2023-01-21

## 2023-01-21 RX ORDER — LIDOCAINE 4 G/100G
1 PATCH TOPICAL EVERY 24 HOURS
Status: DISCONTINUED | OUTPATIENT
Start: 2023-01-21 | End: 2023-02-07 | Stop reason: HOSPADM

## 2023-01-21 RX ADMIN — CEFEPIME 2000 MG: 2 INJECTION, POWDER, FOR SOLUTION INTRAVENOUS at 21:38

## 2023-01-21 RX ADMIN — METRONIDAZOLE 500 MG: 500 INJECTION, SOLUTION INTRAVENOUS at 13:33

## 2023-01-21 RX ADMIN — VANCOMYCIN HYDROCHLORIDE 1250 MG: 10 INJECTION, POWDER, LYOPHILIZED, FOR SOLUTION INTRAVENOUS at 15:15

## 2023-01-21 RX ADMIN — PANTOPRAZOLE SODIUM 40 MG: 40 TABLET, DELAYED RELEASE ORAL at 10:33

## 2023-01-21 RX ADMIN — SODIUM CHLORIDE 1000 ML: 9 INJECTION, SOLUTION INTRAVENOUS at 18:34

## 2023-01-21 RX ADMIN — Medication 1 AMPULE: at 10:29

## 2023-01-21 RX ADMIN — MELATONIN 3 MG: at 21:39

## 2023-01-21 RX ADMIN — ACETAMINOPHEN 650 MG: 325 TABLET ORAL at 21:39

## 2023-01-21 RX ADMIN — SODIUM CHLORIDE, PRESERVATIVE FREE 40 MG: 5 INJECTION INTRAVENOUS at 13:32

## 2023-01-21 RX ADMIN — ALLOPURINOL 100 MG: 100 TABLET ORAL at 18:22

## 2023-01-21 RX ADMIN — HYDROMORPHONE HYDROCHLORIDE 1 MG: 1 INJECTION, SOLUTION INTRAMUSCULAR; INTRAVENOUS; SUBCUTANEOUS at 04:59

## 2023-01-21 RX ADMIN — CEFEPIME 2000 MG: 2 INJECTION, POWDER, FOR SOLUTION INTRAVENOUS at 13:34

## 2023-01-21 RX ADMIN — PROCHLORPERAZINE EDISYLATE 10 MG: 5 INJECTION INTRAMUSCULAR; INTRAVENOUS at 04:36

## 2023-01-21 RX ADMIN — HYDROMORPHONE HYDROCHLORIDE 1 MG: 1 INJECTION, SOLUTION INTRAMUSCULAR; INTRAVENOUS; SUBCUTANEOUS at 01:31

## 2023-01-21 RX ADMIN — SODIUM CHLORIDE 100 ML/HR: 9 INJECTION, SOLUTION INTRAVENOUS at 10:29

## 2023-01-21 RX ADMIN — PROCHLORPERAZINE EDISYLATE 10 MG: 5 INJECTION INTRAMUSCULAR; INTRAVENOUS at 21:39

## 2023-01-21 RX ADMIN — HALOPERIDOL LACTATE 2 MG: 5 INJECTION, SOLUTION INTRAMUSCULAR at 02:40

## 2023-01-21 RX ADMIN — HYDROMORPHONE HYDROCHLORIDE 1 MG: 1 INJECTION, SOLUTION INTRAMUSCULAR; INTRAVENOUS; SUBCUTANEOUS at 23:21

## 2023-01-21 RX ADMIN — ALLOPURINOL 100 MG: 100 TABLET ORAL at 10:33

## 2023-01-21 NOTE — PROGRESS NOTES
Hospitalist Progress Note    Subjective:   Daily Progress Note: 1/21/2023 4:46 PM    Hospital Course:  Mr. Devi Esquivel is a 53-year-old male with PMH significant for HTN, seasonal allergies, anemia, recent 12/22 diagnosis of classic Hodgkin's lymphoma who who was admitted for [facility] for initiation of systemic chemotherapy. He received port placement, bone marrow biopsy, and PFTs, and has initiated chemotherapy treatment 1/19. Oncology has asked our hospitalist group to assume attending care for patient, and Dr. Jayla Soliman will remain on as consultant. Per D/W onc there is concern for ongoing tumor lysis syndrome, we will monitor Q8 uric acid levels, and electrolytes, with repletion as needed. PT/OT and nutritionist seeing patient as well to improve functional and nutritional status. Pt within underlying dementia and suspected delirium worse overnight, coughing w/ PO intake and now productive of brown sputum, suspicious for aspiration pneumonia. Initiating Vanco, Cefepime, and Flagyl for aspiration pna coverage in pt with PCN allergy. Subjective:  Pt examined sitting up in bed with safety roll belt in place for safety, pt confused so unable to complete ROS.   No nonverbal s/s of pain at time of my exam.    Current Facility-Administered Medications   Medication Dose Route Frequency    0.9% sodium chloride infusion  100 mL/hr IntraVENous CONTINUOUS    HYDROmorphone (DILAUDID) injection 0.5-1 mg  0.5-1 mg IntraVENous Q4H PRN    naloxone (NARCAN) injection 1 mg  1 mg IntraVENous Q5MIN PRN    OLANZapine (ZyPREXA) 2.5 mg in sterile water (preservative free) 0.5 mL injection  2.5 mg IntraMUSCular DAILY PRN    pantoprazole (PROTONIX) 40 mg in 0.9% sodium chloride 10 mL injection  40 mg IntraVENous DAILY    cefepime (MAXIPIME) 2,000 mg in 0.9% sodium chloride (MBP/ADV) 100 mL MBP  2,000 mg IntraVENous Q12H    metroNIDAZOLE (FLAGYL) IVPB premix 500 mg  500 mg IntraVENous Q8H    lidocaine 4 % patch 1 Patch  1 Patch TransDERmal Q24H    vancomycin (VANCOCIN) 1250 mg in  ml infusion  1,250 mg IntraVENous ONCE    [START ON 2023] vancomycin (VANCOCIN) 750 mg in 0.9% sodium chloride 250 mL ([id number])  750 mg IntraVENous Q24H    [START ON 2023] metroNIDAZOLE (FLAGYL) IVPB premix 500 mg  500 mg IntraVENous Q12H    melatonin tablet 3 mg  3 mg Oral QHS PRN    prochlorperazine (COMPAZINE) injection 10 mg  10 mg IntraVENous Q6H PRN    glucose chewable tablet 16 g  4 Tablet Oral PRN    glucagon (GLUCAGEN) injection 1 mg  1 mg IntraMUSCular PRN    dextrose 10 % infusion 0-250 mL  0-250 mL IntraVENous PRN    alum-mag hydroxide-simeth (MYLANTA) oral suspension 30 mL  30 mL Oral Q4H PRN    alcohol 62% (NOZIN) nasal  1 Ampule  1 Ampule Topical Q12H    ondansetron (ZOFRAN) injection 4 mg  4 mg IntraVENous Q4H PRN    acetaminophen (TYLENOL) tablet 650 mg  650 mg Oral Q6H PRN    polyethylene glycol (MIRALAX) packet 17 g  17 g Oral QHS PRN    allopurinoL (ZYLOPRIM) tablet 100 mg  100 mg Oral BID        Review of Systems:    Review of Systems   Unable to perform ROS: Other (Delirium/Confusion)      Objective:     Visit Vitals  BP (!) 89/62 (BP 1 Location: Right upper arm, BP Patient Position: Supine)   Pulse (!) 133   Temp 98.6 °F (37 °C)   Resp 19   Ht 5' 5\" (1.651 m)   Wt 66.5 kg (146 lb 9.7 oz)   SpO2 94%   BMI 24.40 kg/m²    O2 Flow Rate (L/min): 2 l/min O2 Device: Nasal cannula    Temp (24hrs), Av.3 °F (36.8 °C), Min:97.3 °F (36.3 °C), Max:98.8 °F (37.1 °C)      No intake/output data recorded. No intake/output data recorded. PHYSICAL EXAM:    Physical Exam  Constitutional:       General: He is not in acute distress. Appearance: He is cachectic. He is ill-appearing. HENT:      Head: Normocephalic and atraumatic. Mouth/Throat:      Mouth: Mucous membranes are dry. Eyes:      Pupils: Pupils are equal, round, and reactive to light.    Cardiovascular:      Rate and Rhythm: Regular rhythm. Tachycardia present. Pulses:           Radial pulses are 2+ on the right side and 2+ on the left side. Popliteal pulses are 1+ on the right side and 1+ on the left side. Heart sounds: Normal heart sounds. Pulmonary:      Effort: Pulmonary effort is normal. No respiratory distress. Breath sounds: Examination of the right-lower field reveals decreased breath sounds. Examination of the left-lower field reveals decreased breath sounds. Decreased breath sounds present. Abdominal:      General: Bowel sounds are normal.      Palpations: Abdomen is soft. Tenderness: There is no abdominal tenderness. Musculoskeletal:      Right lower leg: Edema present. Left lower leg: Edema present. Comments: Trace nonpitting edema   Skin:     General: Skin is dry. Coloration: Skin is pale. Comments: BLE edema with dorsal aspect of foot redness, improved from yesterday   Neurological:      Mental Status: He is alert. Motor: Weakness present. Comments: Confused, increasing agitation/confusion overnight   Psychiatric:      Comments: Impulsive, getting out of bed. Redirectable.           Data Review    Recent Results (from the past 24 hour(s))   PHOSPHORUS    Collection Time: 01/21/23  4:32 AM   Result Value Ref Range    Phosphorus 4.2 2.6 - 4.7 MG/DL   URIC ACID    Collection Time: 01/21/23  4:32 AM   Result Value Ref Range    Uric acid 4.2 3.5 - 7.2 MG/DL   CBC WITH AUTOMATED DIFF    Collection Time: 01/21/23  4:32 AM   Result Value Ref Range    WBC 15.7 (H) 4.1 - 11.1 K/uL    RBC 4.03 (L) 4.10 - 5.70 M/uL    HGB 11.9 (L) 12.1 - 17.0 g/dL    HCT 39.6 36.6 - 50.3 %    MCV 98.3 80.0 - 99.0 FL    MCH 29.5 26.0 - 34.0 PG    MCHC 30.1 30.0 - 36.5 g/dL    RDW 19.5 (H) 11.5 - 14.5 %    PLATELET 327 201 - 732 K/uL    MPV 9.0 8.9 - 12.9 FL    NRBC 0.0 0  WBC    ABSOLUTE NRBC 0.00 0.00 - 0.01 K/uL    NEUTROPHILS 87 (H) 32 - 75 %    LYMPHOCYTES 8 (L) 12 - 49 %    MONOCYTES 4 (L) 5 - 13 %    EOSINOPHILS 0 0 - 7 %    BASOPHILS 0 0 - 1 %    IMMATURE GRANULOCYTES 1 (H) 0.0 - 0.5 %    ABS. NEUTROPHILS 13.7 (H) 1.8 - 8.0 K/UL    ABS. LYMPHOCYTES 1.2 0.8 - 3.5 K/UL    ABS. MONOCYTES 0.7 0.0 - 1.0 K/UL    ABS. EOSINOPHILS 0.0 0.0 - 0.4 K/UL    ABS. BASOPHILS 0.0 0.0 - 0.1 K/UL    ABS. IMM. GRANS. 0.1 (H) 0.00 - 0.04 K/UL    DF AUTOMATED     METABOLIC PANEL, COMPREHENSIVE    Collection Time: 01/21/23  4:32 AM   Result Value Ref Range    Sodium 137 136 - 145 mmol/L    Potassium 4.6 3.5 - 5.1 mmol/L    Chloride 105 97 - 108 mmol/L    CO2 26 21 - 32 mmol/L    Anion gap 6 5 - 15 mmol/L    Glucose 130 (H) 65 - 100 mg/dL    BUN 43 (H) 6 - 20 MG/DL    Creatinine 1.70 (H) 0.70 - 1.30 MG/DL    BUN/Creatinine ratio 25 (H) 12 - 20      eGFR 42 (L) >60 ml/min/1.73m2    Calcium 9.0 8.5 - 10.1 MG/DL    Bilirubin, total 3.1 (H) 0.2 - 1.0 MG/DL    ALT (SGPT) 16 12 - 78 U/L    AST (SGOT) 33 15 - 37 U/L    Alk. phosphatase 516 (H) 45 - 117 U/L    Protein, total 6.4 6.4 - 8.2 g/dL    Albumin 2.1 (L) 3.5 - 5.0 g/dL    Globulin 4.3 (H) 2.0 - 4.0 g/dL    A-G Ratio 0.5 (L) 1.1 - 2.2         XR CHEST PORT   Final Result      Decreased lung volumes. Suggestion of increase airspace opacities bilaterally   and increased small left pleural effusion. CT BX BONE MARROW DIAGNOSTIC   Final Result   Technically successful CT guided bone marrow biopsy. XR CHEST PORT   Final Result   No pneumothorax. XR CHEST SNGL V   Final Result   Intraoperative views as above. See operative report for details. DUPLEX LOWER EXT VENOUS BILAT   Final Result      MRI BRAIN W WO CONT   Final Result      1. Evaluation is significantly limited by patient positioning. No evidence of   intracranial metastases. No acute intracranial abnormality. 2. Generalized parenchymal volume loss and mild chronic microvascular ischemic   disease. Small chronic infarcts as above. CT ABD PELV W CONT   Final Result   1. Mediastinal, hilar, upper abdominal, retroperitoneal, and iliac chain   adenopathy with multiple splenic lesions most consistent with lymphoma. Metastatic disease or other inflammatory/infectious process is less likely. Retroperitoneal nodes are amenable to percutaneous biopsy. 2.  Indeterminate, possibly benign segment 6 hepatic hypodensity. Consider   further evaluation with MRI. 3.  Additional findings as above. CT CHEST W CONT   Final Result   1. Mediastinal, hilar, upper abdominal, retroperitoneal, and iliac chain   adenopathy with multiple splenic lesions most consistent with lymphoma. Metastatic disease or other inflammatory/infectious process is less likely. Retroperitoneal nodes are amenable to percutaneous biopsy. 2.  Indeterminate, possibly benign segment 6 hepatic hypodensity. Consider   further evaluation with MRI. 3.  Additional findings as above.          XR FLUOROSCOPY UNDER 60 MINUTES    (Results Pending)       Active Problems:    Essential hypertension (2/19/2017)      Iron deficiency anemia (8/8/2022)      Type 2 diabetes mellitus (12/28/2022)      Hodgkin lymphoma ([facility] [address] (1/12/2023)      Severe protein-calorie malnutrition ([facility] [address] (1/18/2023)      Assessment/Plan:   Classic Hodgkin's Lymphoma  Associated splenomegaly, hyperbilirubinemia, elevated AlkPhos  Oncology following and defer to their expertise for Hodgkins' treatment  Port placed; plan for 6 cycles ABVD chemotherapy with reduced starting dose 1/19  PRN antiemetics, added PPI  Supportive care/symptom management alongside chemotherapy    Tumor Lysis Syndrome  Associated NILTON (Creat 2.04 - was 1.51, now back up to 1.7)  Secondary to extensive disease burden  Received Rasburicase x 1  Trend uric acid Q8h if elevates may need additional Rasburicase   -- Uric Acid 4 this AM, if continues to rise today will consult with onc re: additional rasburicase  On allopurinol -- pt now NPO, per d/w onc NP don't need to switch to IV allopurinol at this time  Monitoring CMP & electrolytes  -- Na 137, K 4.6, Phos 4.2, Calc 9 (corrected 10.5)  Replete electrolytes as needed  Telemetry monitoring 2/2 electrolyte shifts  1/21 added IV hydration NS @ 100    Acute confusion, some agitation  Suspect delirium, multifactorial d/t anesthesia, chemotherapy, hospital setting  Superimposed on known dementia  Safety sitter nino at night when family not able at bedside  -- Required telesitter overnight  Low dose PO haldol started yesterday--was changed to IM overnight, pt received but seems overly sedated, will change this to IM Zyprexa daily PRN and see if this is better for pt  Encourage normal sleep wake cycles, light during day, dark at night, permit uninterrupted sleep where possible, minimize noise/disruptions, keep assistive devices and visual aids next to patient  Bed alarm, fall risk precautions  Decreased Dilaudid frequency 2/2 oversedation 1/21    Productive cough with brown sputum new overnight 1/21  Leukocytosis  WBC 15.7 although has been elevated between 10-15 throughout admission  Sputum Cx pending  CXR suggestive of increased airspace opacities bilaterally  Pt coughing on PO intake this AM  NPO, ST eval/tx, suspect possible aspiration pneumonia  Initiated Vanco, Flagyl, and Cefepime for broad hosp acquired Asp PNA covg in patient w/ PCN allergy  Check lactic and procal    Hx HTN, normotensive this admission  Not on home meds per PTA med rec  Monitor BP, treat elevated BP as needed    Hx DM2  Not on home meds per PTA med rec  Serum glucoses all below 150  HgbA1C 4.8 in 12/22  If elevated serum BS will consider addition of ACHS and SSI  Hypoglycemic protocol ordered    GERD -- added PPI    Severe protein calorie malnutrition  Hypoalbuminemia  Anasarca, BLE swelling  R/t malignancy, malnutrition  BLE dopplers (-) DVT  Generalized weakness - PT/OT  Nutritionist following, encouraging PO intake, supplements  No current plan for NGT/PEG at this time      Disposition: Pending hospital course, likely home 1/22-1/23  DVT Prophylaxis: SCDs  Code Status: Full Code  POA: Choco Lizeth - [phone]    Care Plan discussed with: Patient, Oncology, Family  _______________________________________________________________    JOSH Kumar

## 2023-01-21 NOTE — PROGRESS NOTES
End of Shift Note    Bedside shift change report given to Ernst Underwood RN (oncoming nurse) by Sahara Montaño RN (offgoing nurse). Report included the following information SBAR, Kardex, and MAR    Shift worked:  7am-7pm     Shift summary and any significant changes:     Pt complained of nausea and was given zofran. Nausea was unrelieved, compazine was ordered. Pt complained of stomach pain and was given 0.5 of dilaudid. Pt pain was relived. Pt has tele sitter in the room for night shift to ensure safety since wife has gone home. Uric acid lab drawn. Pt still confused during the whole shift and trying to get out of the bed.           Sahara Montaño RN

## 2023-01-21 NOTE — PROGRESS NOTES
Hospitalist Progress Note    Subjective:   Daily Progress Note: 1/21/2023 4:46 PM    Hospital Course:  Mr. Anna Mckeon is a 21-year-old male with PMH significant for HTN, seasonal allergies, anemia, recent 12/22 diagnosis of classic Hodgkin's lymphoma who who was admitted for [facility] for initiation of systemic chemotherapy. He received port placement, bone marrow biopsy, and PFTs, and has initiated chemotherapy treatment 1/19. Oncology has asked our hospitalist group to assume attending care for patient, and Dr. Vallecillo will remain on as consultant. Per D/W onc there is concern for ongoing tumor lysis syndrome, we will monitor Q8 uric acid levels, and electrolytes, with repletion as needed. PT/OT and nutritionist seeing patient as well to improve functional and nutritional status. Subjective:  Pt examined sitting up in bed with wife at bedside assisting, pt pleasantly confused so unable to complete ROS. Wife reports increased confusion and impulsiveness overnight.      Current Facility-Administered Medications   Medication Dose Route Frequency    haloperidol lactate (HALDOL) injection 2 mg  2 mg IntraMUSCular Q6H PRN    melatonin tablet 3 mg  3 mg Oral QHS PRN    prochlorperazine (COMPAZINE) injection 10 mg  10 mg IntraVENous Q6H PRN    glucose chewable tablet 16 g  4 Tablet Oral PRN    glucagon (GLUCAGEN) injection 1 mg  1 mg IntraMUSCular PRN    dextrose 10 % infusion 0-250 mL  0-250 mL IntraVENous PRN    pantoprazole (PROTONIX) tablet 40 mg  40 mg Oral ACB    alum-mag hydroxide-simeth (MYLANTA) oral suspension 30 mL  30 mL Oral Q4H PRN    HYDROmorphone (DILAUDID) injection 0.5-1 mg  0.5-1 mg IntraVENous Q2H PRN    alcohol 62% (NOZIN) nasal  1 Ampule  1 Ampule Topical Q12H    ondansetron (ZOFRAN) injection 4 mg  4 mg IntraVENous Q4H PRN    acetaminophen (TYLENOL) tablet 650 mg  650 mg Oral Q6H PRN    polyethylene glycol (MIRALAX) packet 17 g  17 g Oral QHS PRN allopurinoL (ZYLOPRIM) tablet 100 mg  100 mg Oral BID        Review of Systems:    Review of Systems   Unable to perform ROS: Other (Delirium/Confusion)   Constitutional:  Positive for malaise/fatigue and weight loss. Negative for chills and fever. Respiratory:  Negative for shortness of breath. Cardiovascular:  Positive for leg swelling. Negative for chest pain. Gastrointestinal:  Negative for abdominal pain, constipation, diarrhea, nausea and vomiting. Neurological:  Positive for weakness. Negative for dizziness. Objective:     Visit Vitals  BP (!) 91/59   Pulse (!) 126   Temp 98.2 °F (36.8 °C)   Resp 19   Ht 5' 5\" (1.651 m)   Wt 66.5 kg (146 lb 9.7 oz)   SpO2 91% Comment: now on 3L   BMI 24.40 kg/m²    O2 Flow Rate (L/min): 2 l/min O2 Device: None (Room air)    Temp (24hrs), Av.1 °F (36.7 °C), Min:97.3 °F (36.3 °C), Max:98.8 °F (37.1 °C)      No intake/output data recorded. No intake/output data recorded. PHYSICAL EXAM:    Physical Exam  Constitutional:       General: He is not in acute distress. Appearance: He is cachectic. He is ill-appearing. HENT:      Head: Normocephalic and atraumatic. Mouth/Throat:      Mouth: Mucous membranes are dry. Eyes:      Pupils: Pupils are equal, round, and reactive to light. Cardiovascular:      Rate and Rhythm: Normal rate and regular rhythm. Pulses:           Radial pulses are 2+ on the right side and 2+ on the left side. Popliteal pulses are 1+ on the right side and 1+ on the left side. Heart sounds: Normal heart sounds. Pulmonary:      Effort: Pulmonary effort is normal. No respiratory distress. Breath sounds: Examination of the right-lower field reveals decreased breath sounds. Examination of the left-lower field reveals decreased breath sounds. Decreased breath sounds present. Abdominal:      General: Bowel sounds are normal.      Palpations: Abdomen is soft. Tenderness: There is no abdominal tenderness. Musculoskeletal:      Right lower leg: Edema present. Left lower leg: Edema present. Skin:     General: Skin is dry. Coloration: Skin is pale. Comments: BLE edema with dorsal aspect of foot redness, improving per wife   Neurological:      Mental Status: He is alert. Motor: Weakness present. Comments: Pleasantly confused, oriented to self. Psychiatric:      Comments: Impulsive, getting out of bed. Redirectable. Data Review    Recent Results (from the past 24 hour(s))   URIC ACID    Collection Time: 01/20/23 10:48 AM   Result Value Ref Range    Uric acid 3.1 (L) 3.5 - 7.2 MG/DL   PHOSPHORUS    Collection Time: 01/21/23  4:32 AM   Result Value Ref Range    Phosphorus 4.2 2.6 - 4.7 MG/DL   URIC ACID    Collection Time: 01/21/23  4:32 AM   Result Value Ref Range    Uric acid 4.2 3.5 - 7.2 MG/DL   CBC WITH AUTOMATED DIFF    Collection Time: 01/21/23  4:32 AM   Result Value Ref Range    WBC 15.7 (H) 4.1 - 11.1 K/uL    RBC 4.03 (L) 4.10 - 5.70 M/uL    HGB 11.9 (L) 12.1 - 17.0 g/dL    HCT 39.6 36.6 - 50.3 %    MCV 98.3 80.0 - 99.0 FL    MCH 29.5 26.0 - 34.0 PG    MCHC 30.1 30.0 - 36.5 g/dL    RDW 19.5 (H) 11.5 - 14.5 %    PLATELET 756 266 - 395 K/uL    MPV 9.0 8.9 - 12.9 FL    NRBC 0.0 0  WBC    ABSOLUTE NRBC 0.00 0.00 - 0.01 K/uL    NEUTROPHILS 87 (H) 32 - 75 %    LYMPHOCYTES 8 (L) 12 - 49 %    MONOCYTES 4 (L) 5 - 13 %    EOSINOPHILS 0 0 - 7 %    BASOPHILS 0 0 - 1 %    IMMATURE GRANULOCYTES 1 (H) 0.0 - 0.5 %    ABS. NEUTROPHILS 13.7 (H) 1.8 - 8.0 K/UL    ABS. LYMPHOCYTES 1.2 0.8 - 3.5 K/UL    ABS. MONOCYTES 0.7 0.0 - 1.0 K/UL    ABS. EOSINOPHILS 0.0 0.0 - 0.4 K/UL    ABS. BASOPHILS 0.0 0.0 - 0.1 K/UL    ABS. IMM.  GRANS. 0.1 (H) 0.00 - 0.04 K/UL    DF AUTOMATED     METABOLIC PANEL, COMPREHENSIVE    Collection Time: 01/21/23  4:32 AM   Result Value Ref Range    Sodium 137 136 - 145 mmol/L    Potassium 4.6 3.5 - 5.1 mmol/L    Chloride 105 97 - 108 mmol/L    CO2 26 21 - 32 mmol/L Anion gap 6 5 - 15 mmol/L    Glucose 130 (H) 65 - 100 mg/dL    BUN 43 (H) 6 - 20 MG/DL    Creatinine 1.70 (H) 0.70 - 1.30 MG/DL    BUN/Creatinine ratio 25 (H) 12 - 20      eGFR 42 (L) >60 ml/min/1.73m2    Calcium 9.0 8.5 - 10.1 MG/DL    Bilirubin, total 3.1 (H) 0.2 - 1.0 MG/DL    ALT (SGPT) 16 12 - 78 U/L    AST (SGOT) 33 15 - 37 U/L    Alk. phosphatase 516 (H) 45 - 117 U/L    Protein, total 6.4 6.4 - 8.2 g/dL    Albumin 2.1 (L) 3.5 - 5.0 g/dL    Globulin 4.3 (H) 2.0 - 4.0 g/dL    A-G Ratio 0.5 (L) 1.1 - 2.2         CT BX BONE MARROW DIAGNOSTIC   Final Result   Technically successful CT guided bone marrow biopsy. XR CHEST PORT   Final Result   No pneumothorax. XR CHEST SNGL V   Final Result   Intraoperative views as above. See operative report for details. DUPLEX LOWER EXT VENOUS BILAT   Final Result      MRI BRAIN W WO CONT   Final Result      1. Evaluation is significantly limited by patient positioning. No evidence of   intracranial metastases. No acute intracranial abnormality. 2. Generalized parenchymal volume loss and mild chronic microvascular ischemic   disease. Small chronic infarcts as above. CT ABD PELV W CONT   Final Result   1. Mediastinal, hilar, upper abdominal, retroperitoneal, and iliac chain   adenopathy with multiple splenic lesions most consistent with lymphoma. Metastatic disease or other inflammatory/infectious process is less likely. Retroperitoneal nodes are amenable to percutaneous biopsy. 2.  Indeterminate, possibly benign segment 6 hepatic hypodensity. Consider   further evaluation with MRI. 3.  Additional findings as above. CT CHEST W CONT   Final Result   1. Mediastinal, hilar, upper abdominal, retroperitoneal, and iliac chain   adenopathy with multiple splenic lesions most consistent with lymphoma. Metastatic disease or other inflammatory/infectious process is less likely.    Retroperitoneal nodes are amenable to percutaneous biopsy. 2.  Indeterminate, possibly benign segment 6 hepatic hypodensity. Consider   further evaluation with MRI. 3.  Additional findings as above.          XR FLUOROSCOPY UNDER 60 MINUTES    (Results Pending)       Active Problems:    Essential hypertension (2/19/2017)      Iron deficiency anemia (8/8/2022)      Type 2 diabetes mellitus (12/28/2022)      Hodgkin lymphoma ([facility] [address] (1/12/2023)      Severe protein-calorie malnutrition ([facility] [address] (1/18/2023)      Assessment/Plan:   Classic Hodgkin's Lymphoma  Associated splenomegaly, hyperbilirubinemia, elevated AlkPhos  Oncology following and defer to their expertise for Hodgkins' treatment  Port placed; plan for 6 cycles ABVD chemotherapy with reduced starting dose 1/19  PRN antiemetics, added PPI  Supportive care/symptom management alongside chemotherapy    Tumor Lysis Syndrome  Associated NILTON (Creat 2.04 - improving 1.66)  Secondary to extensive disease burden  Received Rasburicase x 1  Trend uric acid Q8h if elevates may need additional Rasburicase   On allopurinol  Monitoring CMP & electrolytes  Replete electrolytes as needed    Acute confusion, suspect delirium, multifactorial d/t anesthesia, chemotherapy, hospital setting  Superimposed on known dementia  Safety sitter nino at night when family not able at bedside  Low dose PO haldol TID PRN  Encourage normal sleep wake cycles, light during day, dark at night, permit uninterrupted sleep where possible, minimize noise/disruptions, keep assistive devices and visual aids next to patient  Bed alarm, fall risk precautions    Hx HTN, normotensive this admission  Not on home meds per PTA med rec  Monitor BP, treat elevated BP as needed    Hx DM2  Not on home meds per PTA med rec  Serum glucoses all below 150  HgbA1C 4.8 in 12/22  If elevated serum BS will consider addition of ACHS and SSI  Hypoglycemic protocol ordered    GERD -- added PPI    Severe protein calorie malnutrition  Hypoalbuminemia  Anasarca, BLE swelling  R/t malignancy, malnutrition  BLE dopplers (-) DVT  Generalized weakness - PT/OT  Nutritionist following, encouraging PO intake, supplements  No current plan for NGT/PEG at this time      Disposition: Pending hospital course, likely home 1/22-1/23  DVT Prophylaxis: SCDs  Code Status: Full Code  POA: Moo Camacho - [phone]    Care Plan discussed with: Patient, Oncology, Family  _______________________________________________________________    JOSH Christensen

## 2023-01-21 NOTE — PROGRESS NOTES
Pharmacy Antimicrobial Kinetic Dosing    Indication for Antimicrobials: Asp Pna     Current Regimen of Each Antimicrobial:  Vancomycin consult - started  day 1  Metronidazole 500mg IV q8h - started  day 1  Cefepime 2gm IV q122h - started  day 1    Previous Antimicrobial Therapy:  -  Vancomycin Goal Level: AUC: 400-600 mg/hr/Liter/day    Date Dose & Interval Measured (mcg/mL) Predicted AUC/RUBINA    750mg IV q24h                   Dosing calculator used: [employer] calculator    Significant Positive Cultures:    RCx - pending    Conditions for Dosing Consideration: None    Labs:  Recent Labs     23  0432 23  0156 237   CREA 1.70* 1.51* 1.66*   BUN 43* 34* 31*     Recent Labs     23  0432 23  0156 237   WBC 15.7* 11.3* 15.3*     Temp (24hrs), Av.3 °F (36.8 °C), Min:97.3 °F (36.3 °C), Max:98.8 °F (37.1 °C)    Creatinine Clearance (mL/min):   CrCl (Adjusted Body Weight): 34.8 mL/min  If actual weight < IBW: CrCl (Actual Body Weight) 36.4    Impression/Plan:   Afebrile, SCr fluctuating slightly last few days, leukocytosis last few days  Vancomycin 1250mg IV loading dose the 750ng IV q24h  Plan to order Vancomycin trough vefore  dos3  Adjusted Metronidazole to 500mg IV q12h for indication/protocol  Continue Cefepime regimen  Antimicrobial stop date - pending     Pharmacy will follow daily and adjust medications as appropriate for renal function and/or serum levels.     Thank you,  Dyllan Wells, [facility]

## 2023-01-21 NOTE — PROGRESS NOTES
End of Shift Note    Bedside shift change report given to STARR Price (oncoming nurse) by Irma Rand RN (offgoing nurse). Report included the following information SBAR, Kardex, ED Summary, Procedure Summary, MAR, and Recent Results    Shift worked:  7p-7a     Shift summary and any significant changes:     Pt complained of nausea and abdominal pain during shift, medication given per MAR. Pt was very confused and continued to try and get out of bed, tele sitter able to help with redirection to a degree as Pt became less willing to follow commands, on call PA messaged and IM Haldol was ordered and given per MAR. Pt was coughing up brown mucous. Safety rounding and toileting needs were met. Despite medication and telesitter Pt continued to try and get out of the bed, for Pt's safety a roll belt was placed on Pt.    Concerns for physician to address:       Zone phone for oncoming shift:              Irma Rand RN

## 2023-01-21 NOTE — PROCEDURES
[address]  PULMONARY FUNCTION TEST    Name:  Gustavo Ravi  MR#:  [id number]  :  1950  ACCOUNT #:  [de-identified]  DATE OF SERVICE:  2023    Spirometry:  The patient's FVC is 37% predicted and FEV1 is 51% predicted, FEV1/FVC ratio is 92, which is consistent with severe restrictive airway disease, but it had to be confirmed with lung volume study. Lung volumes are not done in this PFT. Diffusion Capacity:  DLCO is 118, which is normal.  The patient has no gas exchange impairment. Flow-Volume Loop:  Flow-volume loop is consistent with fixed airway obstruction. Has to be correlated clinically.       Brian Tom MD      [id number]  D:  2023 10:00  T:  2023 14:32  JOB #:  [id number]

## 2023-01-22 ENCOUNTER — APPOINTMENT (OUTPATIENT)
Dept: CT IMAGING | Age: 73
DRG: 823 | End: 2023-01-22
Payer: MEDICARE

## 2023-01-22 LAB
ALBUMIN SERPL-MCNC: 1.8 G/DL (ref 3.5–5)
ALBUMIN/GLOB SERPL: 0.5 (ref 1.1–2.2)
ALP SERPL-CCNC: 316 U/L (ref 45–117)
ALT SERPL-CCNC: 13 U/L (ref 12–78)
AMMONIA PLAS-SCNC: <10 UMOL/L
ANION GAP SERPL CALC-SCNC: 5 MMOL/L (ref 5–15)
AST SERPL-CCNC: 24 U/L (ref 15–37)
BASOPHILS # BLD: 0 K/UL (ref 0–0.1)
BASOPHILS NFR BLD: 0 % (ref 0–1)
BILIRUB SERPL-MCNC: 3.1 MG/DL (ref 0.2–1)
BUN SERPL-MCNC: 55 MG/DL (ref 6–20)
BUN/CREAT SERPL: 30 (ref 12–20)
CALCIUM SERPL-MCNC: 8.2 MG/DL (ref 8.5–10.1)
CHLORIDE SERPL-SCNC: 109 MMOL/L (ref 97–108)
CO2 SERPL-SCNC: 26 MMOL/L (ref 21–32)
COMMENT, HOLDF: NORMAL
CREAT SERPL-MCNC: 1.85 MG/DL (ref 0.7–1.3)
DIFFERENTIAL METHOD BLD: ABNORMAL
EOSINOPHIL # BLD: 0 K/UL (ref 0–0.4)
EOSINOPHIL NFR BLD: 0 % (ref 0–7)
ERYTHROCYTE [DISTWIDTH] IN BLOOD BY AUTOMATED COUNT: 19.3 % (ref 11.5–14.5)
GGT SERPL-CCNC: 65 U/L (ref 15–85)
GLOBULIN SER CALC-MCNC: 3.9 G/DL (ref 2–4)
GLUCOSE SERPL-MCNC: 118 MG/DL (ref 65–100)
HCT VFR BLD AUTO: 33.1 % (ref 36.6–50.3)
HGB BLD-MCNC: 10 G/DL (ref 12.1–17)
IMM GRANULOCYTES # BLD AUTO: 0.2 K/UL (ref 0–0.04)
IMM GRANULOCYTES NFR BLD AUTO: 1 % (ref 0–0.5)
LYMPHOCYTES # BLD: 0.6 K/UL (ref 0.8–3.5)
LYMPHOCYTES NFR BLD: 4 % (ref 12–49)
MAGNESIUM SERPL-MCNC: 2.4 MG/DL (ref 1.6–2.4)
MCH RBC QN AUTO: 30 PG (ref 26–34)
MCHC RBC AUTO-ENTMCNC: 30.2 G/DL (ref 30–36.5)
MCV RBC AUTO: 99.4 FL (ref 80–99)
MONOCYTES # BLD: 0.3 K/UL (ref 0–1)
MONOCYTES NFR BLD: 2 % (ref 5–13)
NEUTS SEG # BLD: 14.1 K/UL (ref 1.8–8)
NEUTS SEG NFR BLD: 93 % (ref 32–75)
NRBC # BLD: 0 K/UL (ref 0–0.01)
NRBC BLD-RTO: 0 PER 100 WBC
PHOSPHATE SERPL-MCNC: 4.2 MG/DL (ref 2.6–4.7)
PLATELET # BLD AUTO: 134 K/UL (ref 150–400)
PMV BLD AUTO: 9.6 FL (ref 8.9–12.9)
POTASSIUM SERPL-SCNC: 4.1 MMOL/L (ref 3.5–5.1)
PROT SERPL-MCNC: 5.7 G/DL (ref 6.4–8.2)
RBC # BLD AUTO: 3.33 M/UL (ref 4.1–5.7)
RBC MORPH BLD: ABNORMAL
SAMPLES BEING HELD,HOLD: NORMAL
SODIUM SERPL-SCNC: 140 MMOL/L (ref 136–145)
URATE SERPL-MCNC: 5.8 MG/DL (ref 3.5–7.2)
URATE SERPL-MCNC: 6 MG/DL (ref 3.5–7.2)
WBC # BLD AUTO: 15.2 K/UL (ref 4.1–11.1)

## 2023-01-22 PROCEDURE — 77010033678 HC OXYGEN DAILY

## 2023-01-22 PROCEDURE — 84100 ASSAY OF PHOSPHORUS: CPT

## 2023-01-22 PROCEDURE — 74011000250 HC RX REV CODE- 250

## 2023-01-22 PROCEDURE — 83735 ASSAY OF MAGNESIUM: CPT

## 2023-01-22 PROCEDURE — 82977 ASSAY OF GGT: CPT

## 2023-01-22 PROCEDURE — 80053 COMPREHEN METABOLIC PANEL: CPT

## 2023-01-22 PROCEDURE — 87070 CULTURE OTHR SPECIMN AEROBIC: CPT

## 2023-01-22 PROCEDURE — 85025 COMPLETE CBC W/AUTO DIFF WBC: CPT

## 2023-01-22 PROCEDURE — 36415 COLL VENOUS BLD VENIPUNCTURE: CPT

## 2023-01-22 PROCEDURE — C9113 INJ PANTOPRAZOLE SODIUM, VIA: HCPCS

## 2023-01-22 PROCEDURE — 74011250636 HC RX REV CODE- 250/636

## 2023-01-22 PROCEDURE — 65270000029 HC RM PRIVATE

## 2023-01-22 PROCEDURE — 70450 CT HEAD/BRAIN W/O DYE: CPT

## 2023-01-22 PROCEDURE — 74011250637 HC RX REV CODE- 250/637: Performed by: SURGERY

## 2023-01-22 PROCEDURE — 74011000258 HC RX REV CODE- 258

## 2023-01-22 PROCEDURE — 84550 ASSAY OF BLOOD/URIC ACID: CPT

## 2023-01-22 PROCEDURE — 82140 ASSAY OF AMMONIA: CPT

## 2023-01-22 PROCEDURE — 94760 N-INVAS EAR/PLS OXIMETRY 1: CPT

## 2023-01-22 RX ORDER — HEPARIN SODIUM 5000 [USP'U]/ML
5000 INJECTION, SOLUTION INTRAVENOUS; SUBCUTANEOUS EVERY 8 HOURS
Status: DISCONTINUED | OUTPATIENT
Start: 2023-01-22 | End: 2023-02-07 | Stop reason: HOSPADM

## 2023-01-22 RX ADMIN — OLANZAPINE 2.5 MG: 10 INJECTION, POWDER, FOR SOLUTION INTRAMUSCULAR at 02:28

## 2023-01-22 RX ADMIN — METRONIDAZOLE 500 MG: 500 INJECTION, SOLUTION INTRAVENOUS at 01:50

## 2023-01-22 RX ADMIN — Medication 1 AMPULE: at 10:41

## 2023-01-22 RX ADMIN — SODIUM CHLORIDE, PRESERVATIVE FREE 40 MG: 5 INJECTION INTRAVENOUS at 10:32

## 2023-01-22 RX ADMIN — CEFEPIME 2000 MG: 2 INJECTION, POWDER, FOR SOLUTION INTRAVENOUS at 10:32

## 2023-01-22 RX ADMIN — METRONIDAZOLE 500 MG: 500 INJECTION, SOLUTION INTRAVENOUS at 16:31

## 2023-01-22 RX ADMIN — CEFEPIME 2000 MG: 2 INJECTION, POWDER, FOR SOLUTION INTRAVENOUS at 21:00

## 2023-01-22 RX ADMIN — SODIUM CHLORIDE 100 ML/HR: 9 INJECTION, SOLUTION INTRAVENOUS at 10:41

## 2023-01-22 RX ADMIN — HYDROMORPHONE HYDROCHLORIDE 1 MG: 1 INJECTION, SOLUTION INTRAMUSCULAR; INTRAVENOUS; SUBCUTANEOUS at 21:35

## 2023-01-22 RX ADMIN — Medication 1 AMPULE: at 21:00

## 2023-01-22 RX ADMIN — HEPARIN SODIUM 5000 UNITS: 5000 INJECTION INTRAVENOUS; SUBCUTANEOUS at 19:03

## 2023-01-22 RX ADMIN — VANCOMYCIN HYDROCHLORIDE 750 MG: 750 INJECTION, POWDER, LYOPHILIZED, FOR SOLUTION INTRAVENOUS at 16:33

## 2023-01-22 NOTE — PROGRESS NOTES
Hospitalist Progress Note    Subjective:   Daily Progress Note: 1/22/2023 4:46 PM    Hospital Course:  Mr. Hanna Ruggiero is a 65-year-old male with PMH significant for HTN, seasonal allergies, anemia, recent 12/22 diagnosis of classic Hodgkin's lymphoma who who was admitted for [facility] [address] for initiation of systemic chemotherapy. He received port placement, bone marrow biopsy, and PFTs, and has initiated chemotherapy treatment 1/19. Oncology has asked our hospitalist group to assume attending care for patient, and Dr. Sarita Tate will remain on as consultant. Per D/W onc there is concern for ongoing tumor lysis syndrome, we will monitor Q8 uric acid levels, and electrolytes, with repletion as needed. PT/OT and nutritionist seeing patient as well to improve functional and nutritional status. Pt within underlying dementia per d/w family and suspected delirium worse overnight, coughing w/ PO intake and now productive of brown sputum, suspicious for aspiration pneumonia. Initiating Vanco, Cefepime, and Flagyl for aspiration pna coverage in pt with PCN allergy. Subjective:  Pt examined sitting up in bed with safety roll belt in place for safety, pt confused so unable to complete ROS.   No nonverbal s/s of pain at time of my exam.    Current Facility-Administered Medications   Medication Dose Route Frequency    [START ON 1/23/2023] VANCOMYCIN INFORMATION NOTE   Other ONCE    0.9% sodium chloride infusion  100 mL/hr IntraVENous CONTINUOUS    HYDROmorphone (DILAUDID) injection 0.5-1 mg  0.5-1 mg IntraVENous Q4H PRN    naloxone (NARCAN) injection 1 mg  1 mg IntraVENous Q5MIN PRN    OLANZapine (ZyPREXA) 2.5 mg in sterile water (preservative free) 0.5 mL injection  2.5 mg IntraMUSCular DAILY PRN    pantoprazole (PROTONIX) 40 mg in 0.9% sodium chloride 10 mL injection  40 mg IntraVENous DAILY    cefepime (MAXIPIME) 2,000 mg in 0.9% sodium chloride (MBP/ADV) 100 mL MBP  2,000 mg IntraVENous Q12H lidocaine 4 % patch 1 Patch  1 Patch TransDERmal Q24H    vancomycin (VANCOCIN) 750 mg in 0.9% sodium chloride 250 mL ([id number])  750 mg IntraVENous Q24H    metroNIDAZOLE (FLAGYL) IVPB premix 500 mg  500 mg IntraVENous Q12H    melatonin tablet 3 mg  3 mg Oral QHS PRN    prochlorperazine (COMPAZINE) injection 10 mg  10 mg IntraVENous Q6H PRN    glucose chewable tablet 16 g  4 Tablet Oral PRN    glucagon (GLUCAGEN) injection 1 mg  1 mg IntraMUSCular PRN    dextrose 10 % infusion 0-250 mL  0-250 mL IntraVENous PRN    alum-mag hydroxide-simeth (MYLANTA) oral suspension 30 mL  30 mL Oral Q4H PRN    alcohol 62% (NOZIN) nasal  1 Ampule  1 Ampule Topical Q12H    ondansetron (ZOFRAN) injection 4 mg  4 mg IntraVENous Q4H PRN    acetaminophen (TYLENOL) tablet 650 mg  650 mg Oral Q6H PRN    polyethylene glycol (MIRALAX) packet 17 g  17 g Oral QHS PRN    allopurinoL (ZYLOPRIM) tablet 100 mg  100 mg Oral BID        Review of Systems:    Review of Systems   Unable to perform ROS: Other (Delirium/Confusion)      Objective:     Visit Vitals  /61   Pulse (!) 101   Temp 97.9 °F (36.6 °C)   Resp 22   Ht 5' 5\" (1.651 m)   Wt 66.5 kg (146 lb 9.7 oz)   SpO2 99%   BMI 24.40 kg/m²    O2 Flow Rate (L/min): 2 l/min O2 Device: Nasal cannula    Temp (24hrs), Av.8 °F (36.6 °C), Min:97.7 °F (36.5 °C), Max:97.9 °F (36.6 °C)      No intake/output data recorded. No intake/output data recorded. PHYSICAL EXAM:    Physical Exam  Constitutional:       General: He is not in acute distress. Appearance: He is cachectic. He is ill-appearing. HENT:      Head: Normocephalic and atraumatic. Mouth/Throat:      Mouth: Mucous membranes are dry. Eyes:      Pupils: Pupils are equal, round, and reactive to light. Cardiovascular:      Rate and Rhythm: Regular rhythm. Tachycardia present. Pulses:           Radial pulses are 2+ on the right side and 2+ on the left side.         Popliteal pulses are 1+ on the right side and 1+ on the left side. Heart sounds: Normal heart sounds. Pulmonary:      Effort: Pulmonary effort is normal. No respiratory distress. Breath sounds: Examination of the right-lower field reveals decreased breath sounds. Examination of the left-lower field reveals decreased breath sounds. Decreased breath sounds present. Abdominal:      General: Bowel sounds are normal.      Palpations: Abdomen is soft. Tenderness: There is no abdominal tenderness. Musculoskeletal:      Comments: Trace nonpitting edema   Skin:     General: Skin is dry. Coloration: Skin is pale. Comments: BLE edema with dorsal aspect of foot redness, improved from yesterday   Neurological:      Mental Status: He is alert. Motor: Weakness present. Comments: Confused, awake and oriented to self but not to person/place/situation. Calm and cooperative at this time.           Data Review    Recent Results (from the past 24 hour(s))   PROCALCITONIN    Collection Time: 01/21/23  2:29 PM   Result Value Ref Range    Procalcitonin 3.12 ng/mL   LACTIC ACID    Collection Time: 01/21/23  2:29 PM   Result Value Ref Range    Lactic acid 1.4 0.4 - 2.0 MMOL/L   URIC ACID    Collection Time: 01/21/23  3:12 PM   Result Value Ref Range    Uric acid 5.3 3.5 - 7.2 MG/DL   URINALYSIS W/ REFLEX CULTURE    Collection Time: 01/21/23  4:01 PM    Specimen: Urine    Urine specimen   Result Value Ref Range    Color DARK YELLOW      Appearance CLOUDY (A) CLEAR      Specific gravity 1.028      pH (UA) 5.0 5.0 - 8.0      Protein 100 (A) NEG mg/dL    Glucose Negative NEG mg/dL    Ketone Negative NEG mg/dL    Blood Negative NEG      Urobilinogen 2.0 (H) 0.2 - 1.0 EU/dL    Nitrites Positive (A) NEG      Leukocyte Esterase SMALL (A) NEG      WBC 0-4 0 - 4 /hpf    RBC 0-5 0 - 5 /hpf    Epithelial cells FEW FEW /lpf    Bacteria Negative NEG /hpf    UA:UC IF INDICATED CULTURE NOT INDICATED BY UA RESULT CNI      Mucus 1+ (A) NEG /lpf    Amorphous Crystals 2+ (A) NEG   BILIRUBIN, CONFIRM    Collection Time: 01/21/23  4:01 PM   Result Value Ref Range    Bilirubin UA, confirm Negative     URIC ACID    Collection Time: 01/22/23  7:00 AM   Result Value Ref Range    Uric acid 6.0 3.5 - 7.2 MG/DL   CBC WITH AUTOMATED DIFF    Collection Time: 01/22/23  7:00 AM   Result Value Ref Range    WBC 15.2 (H) 4.1 - 11.1 K/uL    RBC 3.33 (L) 4.10 - 5.70 M/uL    HGB 10.0 (L) 12.1 - 17.0 g/dL    HCT 33.1 (L) 36.6 - 50.3 %    MCV 99.4 (H) 80.0 - 99.0 FL    MCH 30.0 26.0 - 34.0 PG    MCHC 30.2 30.0 - 36.5 g/dL    RDW 19.3 (H) 11.5 - 14.5 %    PLATELET 121 (L) 058 - 400 K/uL    MPV 9.6 8.9 - 12.9 FL    NRBC 0.0 0  WBC    ABSOLUTE NRBC 0.00 0.00 - 0.01 K/uL    NEUTROPHILS 93 (H) 32 - 75 %    LYMPHOCYTES 4 (L) 12 - 49 %    MONOCYTES 2 (L) 5 - 13 %    EOSINOPHILS 0 0 - 7 %    BASOPHILS 0 0 - 1 %    IMMATURE GRANULOCYTES 1 (H) 0.0 - 0.5 %    ABS. NEUTROPHILS 14.1 (H) 1.8 - 8.0 K/UL    ABS. LYMPHOCYTES 0.6 (L) 0.8 - 3.5 K/UL    ABS. MONOCYTES 0.3 0.0 - 1.0 K/UL    ABS. EOSINOPHILS 0.0 0.0 - 0.4 K/UL    ABS. BASOPHILS 0.0 0.0 - 0.1 K/UL    ABS. IMM. GRANS. 0.2 (H) 0.00 - 0.04 K/UL    DF SMEAR SCANNED      RBC COMMENTS ANISOCYTOSIS  1+           XR CHEST PORT   Final Result      Decreased lung volumes. Suggestion of increase airspace opacities bilaterally   and increased small left pleural effusion. CT BX BONE MARROW DIAGNOSTIC   Final Result   Technically successful CT guided bone marrow biopsy. XR CHEST PORT   Final Result   No pneumothorax. XR CHEST SNGL V   Final Result   Intraoperative views as above. See operative report for details. DUPLEX LOWER EXT VENOUS BILAT   Final Result      MRI BRAIN W WO CONT   Final Result      1. Evaluation is significantly limited by patient positioning. No evidence of   intracranial metastases. No acute intracranial abnormality. 2. Generalized parenchymal volume loss and mild chronic microvascular ischemic   disease. Small chronic infarcts as above. CT ABD PELV W CONT   Final Result   1. Mediastinal, hilar, upper abdominal, retroperitoneal, and iliac chain   adenopathy with multiple splenic lesions most consistent with lymphoma. Metastatic disease or other inflammatory/infectious process is less likely. Retroperitoneal nodes are amenable to percutaneous biopsy. 2.  Indeterminate, possibly benign segment 6 hepatic hypodensity. Consider   further evaluation with MRI. 3.  Additional findings as above. CT CHEST W CONT   Final Result   1. Mediastinal, hilar, upper abdominal, retroperitoneal, and iliac chain   adenopathy with multiple splenic lesions most consistent with lymphoma. Metastatic disease or other inflammatory/infectious process is less likely. Retroperitoneal nodes are amenable to percutaneous biopsy. 2.  Indeterminate, possibly benign segment 6 hepatic hypodensity. Consider   further evaluation with MRI. 3.  Additional findings as above.          XR FLUOROSCOPY UNDER 60 MINUTES    (Results Pending)   CT HEAD WO CONT    (Results Pending)       Active Problems:    Essential hypertension (2/19/2017)      Iron deficiency anemia (8/8/2022)      Type 2 diabetes mellitus (12/28/2022)      Hodgkin lymphoma ([facility] [address] (1/12/2023)      Severe protein-calorie malnutrition ([address] (1/18/2023)      Assessment/Plan:   Classic Hodgkin's Lymphoma  Associated splenomegaly, hyperbilirubinemia, elevated AlkPhos  Oncology following and defer to their expertise for Hodgkins' treatment  Port placed; plan for 6 cycles ABVD chemotherapy with reduced starting dose 1/19  PRN antiemetics, added PPI  Supportive care/symptom management alongside chemotherapy    Tumor Lysis Syndrome  Associated NILTON (Creat 2.04 - was 1.51, now back up to 1.7)  Secondary to extensive disease burden  Received Rasburicase x 1  Trend uric acid Q8h if elevates may need additional Rasburicase   -- Uric Acid 6.0, per onc note will do rasburicase if > 8  On allopurinol -- pt now NPO, per d/w onc NP don't need to switch to IV allopurinol at this time  Monitoring CMP & electrolytes  -- Na 137, K 4.6, Phos 4.2, Calc 9 (corrected 10.5)  Replete electrolytes as needed  Telemetry monitoring 2/2 electrolyte shifts  1/21 added IV hydration NS @ 100    Acute confusion, some agitation  Suspect delirium, multifactorial d/t anesthesia, chemotherapy, hospital setting  Superimposed on known dementia  Safety sitter nino at night when family not able at bedside  -- Required telesitter overnight  Low dose PO haldol started yesterday--was changed to IM overnight, pt received but seems overly sedated, will change this to IM Zyprexa daily PRN and see if this is better for pt  Encourage normal sleep wake cycles, light during day, dark at night, permit uninterrupted sleep where possible, minimize noise/disruptions, keep assistive devices and visual aids next to patient  Bed alarm, fall risk precautions  Decreased Dilaudid frequency 2/2 oversedation 1/21  Check CT head d/t mental status change  1/22 Awake, calm and cooperative, oriented to self, following commands better, after receiving Zyprexa this AM    Productive cough with brown sputum new overnight 1/21  Leukocytosis  WBC 15.7 although has been elevated between 10-15 throughout admission  Sputum Cx sent and pending, blood cultures pending  CXR suggestive of increased airspace opacities bilaterally  Pt coughing on PO intake this AM  NPO, ST eval/tx, suspect possible aspiration pneumonia  Lactic 1.4, procal 3.12  Initiated Vanco, Flagyl, and Cefepime for broad hosp acquired Asp PNA covg in patient w/ PCN allergy    Severe protein calorie malnutrition  Hypoalbuminemia  Anasarca, BLE swelling  R/t malignancy, malnutrition  BLE dopplers (-) DVT  Generalized weakness - PT/OT  Nutritionist following, encouraging PO intake, supplements  No current plan for NGT/PEG at this time    Hx HTN  Not on home meds per PTA med rec  Monitor BP, treat elevated BP as needed    Hx DM2  Not on home meds per PTA med rec  Serum glucoses all below 150  HgbA1C 4.8 in 12/22  If elevated serum BS will consider addition of ACHS and SSI  Hypoglycemic protocol ordered    GERD -- added PPI        Disposition: Pending hospital course, likely home 1/24-1/25  DVT Prophylaxis: SCDs  Code Status: Full Code  POA: London Roberson - [phone]    Care Plan discussed with: Patient, Oncology, Family  _______________________________________________________________    JOSH Garibay

## 2023-01-22 NOTE — PROGRESS NOTES
End of Shift Note    Bedside shift change report given to Mireille Winter RN (oncoming nurse) by Katelin Capps RN (offgoing nurse). Report included the following information SBAR, Kardex, Intake/Output, and MAR    Shift worked:  [id number]     Shift summary and any significant changes:     Patient drowsy and confused in the morning, pt coughed and choked when gave a sip a water before giving PO meds, notified provider, provider said to hold all PO meds and keep NPO. All IV meds, incontinent care, and frequent rounding provided. Most labs collected, missed paired BCs. Family at bedside in the afternoon. Pt more alert, less confused in the afternoon, able to take sips of water from a cup and 1 PO med crushed in applesauce in the evening.      Concerns for physician to address:       Zone phone for oncoming shift:            Katelin Capps RN

## 2023-01-22 NOTE — PROGRESS NOTES
End of Shift Note    Bedside shift change report given to STARR Price (oncoming nurse) by Joellen Downs RN (offgoing nurse). Report included the following information SBAR, Kardex, Procedure Summary, MAR, and Recent Results    Shift worked:  7p-7a     Shift summary and any significant changes:     Pt had trouble getting comfortable complaining of back and rib pain. Pt was repositioned multiple times, heat pack applied, pain medication given, along with medication for agitation. Pt seems to have more confusion as night progresses, making it difficult to understand Pt's wishes at times. Pt was coughing up sputum often last night and culture was collected. Pt's wife at bedside, during the shift. Safety rounding and toileting needs were met.       Concerns for physician to address:       Zone phone for oncoming shift:            Joellen Downs RN

## 2023-01-23 LAB
ALBUMIN SERPL-MCNC: 1.5 G/DL (ref 3.5–5)
ALBUMIN/GLOB SERPL: 0.4 (ref 1.1–2.2)
ALP SERPL-CCNC: 242 U/L (ref 45–117)
ALT SERPL-CCNC: 9 U/L (ref 12–78)
ANION GAP SERPL CALC-SCNC: 9 MMOL/L (ref 5–15)
AST SERPL-CCNC: 20 U/L (ref 15–37)
BASOPHILS # BLD: 0 K/UL (ref 0–0.1)
BASOPHILS NFR BLD: 0 % (ref 0–1)
BILIRUB SERPL-MCNC: 2.1 MG/DL (ref 0.2–1)
BUN SERPL-MCNC: 48 MG/DL (ref 6–20)
BUN/CREAT SERPL: 38 (ref 12–20)
CALCIUM SERPL-MCNC: 7 MG/DL (ref 8.5–10.1)
CHLORIDE SERPL-SCNC: 116 MMOL/L (ref 97–108)
CO2 SERPL-SCNC: 21 MMOL/L (ref 21–32)
CREAT SERPL-MCNC: 1.28 MG/DL (ref 0.7–1.3)
DATE LAST DOSE: NORMAL
DIFFERENTIAL METHOD BLD: ABNORMAL
EOSINOPHIL # BLD: 0.1 K/UL (ref 0–0.4)
EOSINOPHIL NFR BLD: 1 % (ref 0–7)
ERYTHROCYTE [DISTWIDTH] IN BLOOD BY AUTOMATED COUNT: 19.1 % (ref 11.5–14.5)
GLOBULIN SER CALC-MCNC: 3.5 G/DL (ref 2–4)
GLUCOSE SERPL-MCNC: 95 MG/DL (ref 65–100)
HCT VFR BLD AUTO: 30 % (ref 36.6–50.3)
HGB BLD-MCNC: 9 G/DL (ref 12.1–17)
IMM GRANULOCYTES # BLD AUTO: 0.2 K/UL (ref 0–0.04)
IMM GRANULOCYTES NFR BLD AUTO: 2 % (ref 0–0.5)
LYMPHOCYTES # BLD: 0.4 K/UL (ref 0.8–3.5)
LYMPHOCYTES NFR BLD: 4 % (ref 12–49)
MCH RBC QN AUTO: 30.2 PG (ref 26–34)
MCHC RBC AUTO-ENTMCNC: 30 G/DL (ref 30–36.5)
MCV RBC AUTO: 100.7 FL (ref 80–99)
MONOCYTES # BLD: 0.1 K/UL (ref 0–1)
MONOCYTES NFR BLD: 1 % (ref 5–13)
NEUTS SEG # BLD: 10.7 K/UL (ref 1.8–8)
NEUTS SEG NFR BLD: 93 % (ref 32–75)
NRBC # BLD: 0.02 K/UL (ref 0–0.01)
NRBC BLD-RTO: 0.2 PER 100 WBC
PHOSPHATE SERPL-MCNC: 3 MG/DL (ref 2.6–4.7)
PLATELET # BLD AUTO: 102 K/UL (ref 150–400)
PMV BLD AUTO: 9.8 FL (ref 8.9–12.9)
POTASSIUM SERPL-SCNC: 3.3 MMOL/L (ref 3.5–5.1)
PROT SERPL-MCNC: 5 G/DL (ref 6.4–8.2)
RBC # BLD AUTO: 2.98 M/UL (ref 4.1–5.7)
REPORTED DOSE,DOSE: NORMAL UNITS
REPORTED DOSE/TIME,TMG: NORMAL
SODIUM SERPL-SCNC: 146 MMOL/L (ref 136–145)
URATE SERPL-MCNC: 4.9 MG/DL (ref 3.5–7.2)
URATE SERPL-MCNC: 5.3 MG/DL (ref 3.5–7.2)
VANCOMYCIN TROUGH SERPL-MCNC: 9.1 UG/ML (ref 5–10)
WBC # BLD AUTO: 11.5 K/UL (ref 4.1–11.1)

## 2023-01-23 PROCEDURE — 97535 SELF CARE MNGMENT TRAINING: CPT

## 2023-01-23 PROCEDURE — C9113 INJ PANTOPRAZOLE SODIUM, VIA: HCPCS

## 2023-01-23 PROCEDURE — 94760 N-INVAS EAR/PLS OXIMETRY 1: CPT

## 2023-01-23 PROCEDURE — 74011250636 HC RX REV CODE- 250/636

## 2023-01-23 PROCEDURE — 74011250636 HC RX REV CODE- 250/636: Performed by: STUDENT IN AN ORGANIZED HEALTH CARE EDUCATION/TRAINING PROGRAM

## 2023-01-23 PROCEDURE — 80053 COMPREHEN METABOLIC PANEL: CPT

## 2023-01-23 PROCEDURE — 80202 ASSAY OF VANCOMYCIN: CPT

## 2023-01-23 PROCEDURE — 85025 COMPLETE CBC W/AUTO DIFF WBC: CPT

## 2023-01-23 PROCEDURE — 77010033678 HC OXYGEN DAILY

## 2023-01-23 PROCEDURE — 74011250637 HC RX REV CODE- 250/637: Performed by: SURGERY

## 2023-01-23 PROCEDURE — 36415 COLL VENOUS BLD VENIPUNCTURE: CPT

## 2023-01-23 PROCEDURE — 74011250636 HC RX REV CODE- 250/636: Performed by: INTERNAL MEDICINE

## 2023-01-23 PROCEDURE — 65270000029 HC RM PRIVATE

## 2023-01-23 PROCEDURE — 97530 THERAPEUTIC ACTIVITIES: CPT

## 2023-01-23 PROCEDURE — 92610 EVALUATE SWALLOWING FUNCTION: CPT | Performed by: SPEECH-LANGUAGE PATHOLOGIST

## 2023-01-23 PROCEDURE — 74011000258 HC RX REV CODE- 258

## 2023-01-23 PROCEDURE — 84100 ASSAY OF PHOSPHORUS: CPT

## 2023-01-23 PROCEDURE — 74011250637 HC RX REV CODE- 250/637: Performed by: NURSE PRACTITIONER

## 2023-01-23 PROCEDURE — 97116 GAIT TRAINING THERAPY: CPT

## 2023-01-23 PROCEDURE — 84550 ASSAY OF BLOOD/URIC ACID: CPT

## 2023-01-23 PROCEDURE — 74011250637 HC RX REV CODE- 250/637: Performed by: STUDENT IN AN ORGANIZED HEALTH CARE EDUCATION/TRAINING PROGRAM

## 2023-01-23 PROCEDURE — 74011000250 HC RX REV CODE- 250

## 2023-01-23 RX ORDER — FLUCONAZOLE 2 MG/ML
200 INJECTION, SOLUTION INTRAVENOUS DAILY
Status: DISCONTINUED | OUTPATIENT
Start: 2023-01-24 | End: 2023-01-30

## 2023-01-23 RX ORDER — POTASSIUM CHLORIDE 7.45 MG/ML
10 INJECTION INTRAVENOUS
Status: DISPENSED | OUTPATIENT
Start: 2023-01-23 | End: 2023-01-23

## 2023-01-23 RX ORDER — POTASSIUM CHLORIDE 7.45 MG/ML
10 INJECTION INTRAVENOUS
Status: COMPLETED | OUTPATIENT
Start: 2023-01-23 | End: 2023-01-23

## 2023-01-23 RX ADMIN — SODIUM CHLORIDE, PRESERVATIVE FREE 40 MG: 5 INJECTION INTRAVENOUS at 10:10

## 2023-01-23 RX ADMIN — METRONIDAZOLE 500 MG: 500 INJECTION, SOLUTION INTRAVENOUS at 03:50

## 2023-01-23 RX ADMIN — OLANZAPINE 2.5 MG: 10 INJECTION, POWDER, FOR SOLUTION INTRAMUSCULAR at 03:57

## 2023-01-23 RX ADMIN — CEFEPIME 2000 MG: 2 INJECTION, POWDER, FOR SOLUTION INTRAVENOUS at 21:43

## 2023-01-23 RX ADMIN — POTASSIUM CHLORIDE 10 MEQ: 7.46 INJECTION, SOLUTION INTRAVENOUS at 22:21

## 2023-01-23 RX ADMIN — POTASSIUM CHLORIDE 10 MEQ: 7.46 INJECTION, SOLUTION INTRAVENOUS at 15:54

## 2023-01-23 RX ADMIN — HEPARIN SODIUM 5000 UNITS: 5000 INJECTION INTRAVENOUS; SUBCUTANEOUS at 03:50

## 2023-01-23 RX ADMIN — ALLOPURINOL 100 MG: 100 TABLET ORAL at 10:10

## 2023-01-23 RX ADMIN — HEPARIN SODIUM 5000 UNITS: 5000 INJECTION INTRAVENOUS; SUBCUTANEOUS at 10:10

## 2023-01-23 RX ADMIN — HYDROMORPHONE HYDROCHLORIDE 1 MG: 1 INJECTION, SOLUTION INTRAMUSCULAR; INTRAVENOUS; SUBCUTANEOUS at 04:40

## 2023-01-23 RX ADMIN — OLANZAPINE 2.5 MG: 10 INJECTION, POWDER, FOR SOLUTION INTRAMUSCULAR at 23:00

## 2023-01-23 RX ADMIN — POTASSIUM CHLORIDE 10 MEQ: 7.46 INJECTION, SOLUTION INTRAVENOUS at 20:18

## 2023-01-23 RX ADMIN — VANCOMYCIN HYDROCHLORIDE 750 MG: 750 INJECTION, POWDER, LYOPHILIZED, FOR SOLUTION INTRAVENOUS at 20:19

## 2023-01-23 RX ADMIN — Medication 1 AMPULE: at 10:15

## 2023-01-23 RX ADMIN — POTASSIUM CHLORIDE 10 MEQ: 7.46 INJECTION, SOLUTION INTRAVENOUS at 16:01

## 2023-01-23 RX ADMIN — METRONIDAZOLE 500 MG: 500 INJECTION, SOLUTION INTRAVENOUS at 15:53

## 2023-01-23 RX ADMIN — CEFEPIME 2000 MG: 2 INJECTION, POWDER, FOR SOLUTION INTRAVENOUS at 10:10

## 2023-01-23 RX ADMIN — Medication 1 AMPULE: at 22:23

## 2023-01-23 RX ADMIN — HEPARIN SODIUM 5000 UNITS: 5000 INJECTION INTRAVENOUS; SUBCUTANEOUS at 20:32

## 2023-01-23 NOTE — PROGRESS NOTES
Hospitalist Progress Note    Subjective:   Daily Progress Note: 1/23/2023 4:46 PM    Hospital Course:  Mr. Domitila Lucas is a 76-year-old male with PMH significant for HTN, seasonal allergies, anemia, recent 12/22 diagnosis of classic Hodgkin's lymphoma who who was admitted for [facility] [address] for initiation of systemic chemotherapy. He received port placement, bone marrow biopsy, and PFTs, and has initiated chemotherapy treatment 1/19. Oncology has asked our hospitalist group to assume attending care for patient, and Dr. Antonia Flynn will remain on as consultant. Per D/W onc there is concern for ongoing tumor lysis syndrome, we will monitor Q8 uric acid levels, and electrolytes, with repletion as needed. PT/OT and nutritionist seeing patient as well to improve functional and nutritional status. Pt within underlying dementia per d/w family and suspected delirium worse overnight, coughing w/ PO intake and now productive of brown sputum, suspicious for aspiration pneumonia. Initiating Vanco, Cefepime, and Flagyl for aspiration pna coverage in pt with PCN allergy, Yeast growing in sputum, added diflucan    Subjective:  Patient seen and evaluated at bedside, overnight events reviewed, patient currently has no new complaints, discussed with RN and patient's wife at bedside    Current Facility-Administered Medications   Medication Dose Route Frequency    Vanc trough 1/23 before 1600 dose.  Thanks  1 Each Other ONCE    potassium chloride 10 mEq in 100 ml IVPB  10 mEq IntraVENous Q1H    [START ON 1/24/2023] fluconazole (DIFLUCAN) 200mg/100 mL IVPB (premix)  200 mg IntraVENous DAILY    heparin (porcine) injection 5,000 Units  5,000 Units SubCUTAneous Q8H    0.9% sodium chloride infusion  100 mL/hr IntraVENous CONTINUOUS    HYDROmorphone (DILAUDID) injection 0.5-1 mg  0.5-1 mg IntraVENous Q4H PRN    naloxone (NARCAN) injection 1 mg  1 mg IntraVENous Q5MIN PRN    OLANZapine (ZyPREXA) 2.5 mg in sterile water (preservative free) 0.5 mL injection  2.5 mg IntraMUSCular DAILY PRN    pantoprazole (PROTONIX) 40 mg in 0.9% sodium chloride 10 mL injection  40 mg IntraVENous DAILY    cefepime (MAXIPIME) 2,000 mg in 0.9% sodium chloride (MBP/ADV) 100 mL MBP  2,000 mg IntraVENous Q12H    lidocaine 4 % patch 1 Patch  1 Patch TransDERmal Q24H    vancomycin (VANCOCIN) 750 mg in 0.9% sodium chloride 250 mL ([id number])  750 mg IntraVENous Q24H    metroNIDAZOLE (FLAGYL) IVPB premix 500 mg  500 mg IntraVENous Q12H    melatonin tablet 3 mg  3 mg Oral QHS PRN    prochlorperazine (COMPAZINE) injection 10 mg  10 mg IntraVENous Q6H PRN    glucose chewable tablet 16 g  4 Tablet Oral PRN    glucagon (GLUCAGEN) injection 1 mg  1 mg IntraMUSCular PRN    dextrose 10 % infusion 0-250 mL  0-250 mL IntraVENous PRN    alum-mag hydroxide-simeth (MYLANTA) oral suspension 30 mL  30 mL Oral Q4H PRN    alcohol 62% (NOZIN) nasal  1 Ampule  1 Ampule Topical Q12H    ondansetron (ZOFRAN) injection 4 mg  4 mg IntraVENous Q4H PRN    acetaminophen (TYLENOL) tablet 650 mg  650 mg Oral Q6H PRN    polyethylene glycol (MIRALAX) packet 17 g  17 g Oral QHS PRN    allopurinoL (ZYLOPRIM) tablet 100 mg  100 mg Oral BID        Review of Systems:    Review of Systems   Unable to perform ROS: Other (Delirium/Confusion)      Objective:     Visit Vitals  /75   Pulse (!) 105   Temp 97.3 °F (36.3 °C)   Resp 17   Ht 5' 5\" (1.651 m)   Wt 66.5 kg (146 lb 9.7 oz)   SpO2 98%   BMI 24.40 kg/m²    O2 Flow Rate (L/min): 3 l/min O2 Device: Nasal cannula    Temp (24hrs), Av.5 °F (36.4 °C), Min:97.2 °F (36.2 °C), Max:98.1 °F (36.7 °C)       0701 -  1900  In: -   Out: 300 [Urine:300]  No intake/output data recorded. PHYSICAL EXAM:    Physical Exam  Constitutional:       General: He is not in acute distress. Appearance: He is cachectic. He is ill-appearing. He is not toxic-appearing or diaphoretic. HENT:      Head: Normocephalic and atraumatic.       Nose: Nose normal. No congestion or rhinorrhea. Mouth/Throat:      Mouth: Mucous membranes are moist.      Pharynx: Oropharynx is clear. No oropharyngeal exudate or posterior oropharyngeal erythema. Eyes:      General: No scleral icterus. Right eye: No discharge. Left eye: No discharge. Extraocular Movements: Extraocular movements intact. Conjunctiva/sclera: Conjunctivae normal.      Pupils: Pupils are equal, round, and reactive to light. Neck:      Vascular: No carotid bruit. Cardiovascular:      Rate and Rhythm: Regular rhythm. Tachycardia present. Pulses:           Radial pulses are 2+ on the right side and 2+ on the left side. Popliteal pulses are 1+ on the right side and 1+ on the left side. Heart sounds: Normal heart sounds. No murmur heard. No friction rub. No gallop. Pulmonary:      Effort: Pulmonary effort is normal. No respiratory distress. Breath sounds: No stridor. Examination of the right-lower field reveals decreased breath sounds. Examination of the left-lower field reveals decreased breath sounds. Decreased breath sounds present. No wheezing, rhonchi or rales. Chest:      Chest wall: No tenderness. Abdominal:      General: Abdomen is flat. Bowel sounds are normal. There is no distension. Palpations: Abdomen is soft. There is no mass. Tenderness: There is no abdominal tenderness. There is no right CVA tenderness, left CVA tenderness, guarding or rebound. Hernia: No hernia is present. Musculoskeletal:         General: No swelling, tenderness, deformity or signs of injury. Normal range of motion. Cervical back: Normal range of motion and neck supple. No rigidity or tenderness. Right lower leg: No edema. Left lower leg: No edema. Comments: Trace nonpitting edema   Lymphadenopathy:      Cervical: No cervical adenopathy. Skin:     General: Skin is warm and dry.       Capillary Refill: Capillary refill takes less than 2 seconds. Coloration: Skin is pale. Skin is not jaundiced. Findings: No bruising, erythema, lesion or rash. Comments: BLE edema with dorsal aspect of foot redness, improved from yesterday   Neurological:      General: No focal deficit present. Mental Status: He is alert and oriented to person, place, and time. Mental status is at baseline. Cranial Nerves: No cranial nerve deficit. Sensory: No sensory deficit. Motor: Weakness present. Coordination: Coordination normal.      Gait: Gait normal.      Deep Tendon Reflexes: Reflexes normal.      Comments: Confused, awake and oriented to self but not to person/place/situation. Calm and cooperative at this time. Psychiatric:         Mood and Affect: Mood normal.         Behavior: Behavior normal.         Thought Content: Thought content normal.         Judgment: Judgment normal.          Data Review    Recent Results (from the past 24 hour(s))   PHOSPHORUS    Collection Time: 01/23/23  3:41 AM   Result Value Ref Range    Phosphorus 3.0 2.6 - 4.7 MG/DL   URIC ACID    Collection Time: 01/23/23  3:41 AM   Result Value Ref Range    Uric acid 4.9 3.5 - 7.2 MG/DL   CBC WITH AUTOMATED DIFF    Collection Time: 01/23/23  3:41 AM   Result Value Ref Range    WBC 11.5 (H) 4.1 - 11.1 K/uL    RBC 2.98 (L) 4.10 - 5.70 M/uL    HGB 9.0 (L) 12.1 - 17.0 g/dL    HCT 30.0 (L) 36.6 - 50.3 %    .7 (H) 80.0 - 99.0 FL    MCH 30.2 26.0 - 34.0 PG    MCHC 30.0 30.0 - 36.5 g/dL    RDW 19.1 (H) 11.5 - 14.5 %    PLATELET 688 (L) 785 - 400 K/uL    MPV 9.8 8.9 - 12.9 FL    NRBC 0.2 (H) 0  WBC    ABSOLUTE NRBC 0.02 (H) 0.00 - 0.01 K/uL    NEUTROPHILS 93 (H) 32 - 75 %    LYMPHOCYTES 4 (L) 12 - 49 %    MONOCYTES 1 (L) 5 - 13 %    EOSINOPHILS 1 0 - 7 %    BASOPHILS 0 0 - 1 %    IMMATURE GRANULOCYTES 2 (H) 0.0 - 0.5 %    ABS. NEUTROPHILS 10.7 (H) 1.8 - 8.0 K/UL    ABS. LYMPHOCYTES 0.4 (L) 0.8 - 3.5 K/UL    ABS.  MONOCYTES 0.1 0.0 - 1.0 K/UL ABS. EOSINOPHILS 0.1 0.0 - 0.4 K/UL    ABS. BASOPHILS 0.0 0.0 - 0.1 K/UL    ABS. IMM. GRANS. 0.2 (H) 0.00 - 0.04 K/UL    DF AUTOMATED     METABOLIC PANEL, COMPREHENSIVE    Collection Time: 01/23/23  3:41 AM   Result Value Ref Range    Sodium 146 (H) 136 - 145 mmol/L    Potassium 3.3 (L) 3.5 - 5.1 mmol/L    Chloride 116 (H) 97 - 108 mmol/L    CO2 21 21 - 32 mmol/L    Anion gap 9 5 - 15 mmol/L    Glucose 95 65 - 100 mg/dL    BUN 48 (H) 6 - 20 MG/DL    Creatinine 1.28 0.70 - 1.30 MG/DL    BUN/Creatinine ratio 38 (H) 12 - 20      eGFR 59 (L) >60 ml/min/1.73m2    Calcium 7.0 (L) 8.5 - 10.1 MG/DL    Bilirubin, total 2.1 (H) 0.2 - 1.0 MG/DL    ALT (SGPT) 9 (L) 12 - 78 U/L    AST (SGOT) 20 15 - 37 U/L    Alk. phosphatase 242 (H) 45 - 117 U/L    Protein, total 5.0 (L) 6.4 - 8.2 g/dL    Albumin 1.5 (L) 3.5 - 5.0 g/dL    Globulin 3.5 2.0 - 4.0 g/dL    A-G Ratio 0.4 (L) 1.1 - 2.2         CT HEAD WO CONT   Final Result   No acute process or change compared to the prior exam.            XR CHEST PORT   Final Result      Decreased lung volumes. Suggestion of increase airspace opacities bilaterally   and increased small left pleural effusion. CT BX BONE MARROW DIAGNOSTIC   Final Result   Technically successful CT guided bone marrow biopsy. XR CHEST PORT   Final Result   No pneumothorax. XR CHEST SNGL V   Final Result   Intraoperative views as above. See operative report for details. DUPLEX LOWER EXT VENOUS BILAT   Final Result      MRI BRAIN W WO CONT   Final Result      1. Evaluation is significantly limited by patient positioning. No evidence of   intracranial metastases. No acute intracranial abnormality. 2. Generalized parenchymal volume loss and mild chronic microvascular ischemic   disease. Small chronic infarcts as above. CT ABD PELV W CONT   Final Result   1.   Mediastinal, hilar, upper abdominal, retroperitoneal, and iliac chain   adenopathy with multiple splenic lesions most consistent with lymphoma. Metastatic disease or other inflammatory/infectious process is less likely. Retroperitoneal nodes are amenable to percutaneous biopsy. 2.  Indeterminate, possibly benign segment 6 hepatic hypodensity. Consider   further evaluation with MRI. 3.  Additional findings as above. CT CHEST W CONT   Final Result   1. Mediastinal, hilar, upper abdominal, retroperitoneal, and iliac chain   adenopathy with multiple splenic lesions most consistent with lymphoma. Metastatic disease or other inflammatory/infectious process is less likely. Retroperitoneal nodes are amenable to percutaneous biopsy. 2.  Indeterminate, possibly benign segment 6 hepatic hypodensity. Consider   further evaluation with MRI. 3.  Additional findings as above.          XR FLUOROSCOPY UNDER 60 MINUTES    (Results Pending)       Active Problems:    Essential hypertension (2/19/2017)      Iron deficiency anemia (8/8/2022)      Type 2 diabetes mellitus (12/28/2022)      Hodgkin lymphoma ([facility] [address] (1/12/2023)      Severe protein-calorie malnutrition ([facility] [address] (1/18/2023)      Assessment/Plan:   Hodgkin's lymphoma with associated splenomegaly/hyperbilirubinemia with elevated alkaline phosphatase-patient currently being followed by oncology, admitted for initiation of inpatient chemotherapy, status post port placement  Continue chemotherapy as per oncology recommendations  Continue as needed antiemetics  Continue supportive care  Continue to follow oncology recommendations    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, status post rasburicase x1, serum creatinine currently downtrending  Continue to trend uric acid  Continue to trend serum creatinine  Continue maintenance IV fluids  Continue to follow oncology recommendations    Altered mental status/metabolic encephalopathy/dementia-of note patient does have occasional episodes of confusion while in the hospital, likely delirium due to inpatient hospital stay, patient does have a history of underlying dementia  Currently well controlled  Continue current medications  Continue to monitor patient's mental status    Severe protein calorie malnutrition-likely secondary to problem #1  Nutrition consult appreciated  Continue to follow recommendations    Aspiration pneumonia-of note patient had an episode of aspiration pneumonia, currently does not meet sepsis criteria at this time, sputum shows growth of yeast  Start Diflucan  Continue vancomycin cefepime and Flagyl for broad-spectrum antibiotic coverage  Follow-up sputum culture results  Follow blood culture results  Continue to monitor patient's clinical status    Gastroesophageal reflux disease-continue current medications    Prophylaxis-SCDs  FEN-full liquid diet with nutritional supplementation, replete potassium and magnesium  Full code, patient surrogate decision-maker is his wife, updated at bedside  Disposition-patient transferred back to oncology service, hospitalist service will continue to follow    Mark Solis MD [Negative] : Heme/Lymph

## 2023-01-23 NOTE — PROGRESS NOTES
Spiritual Care Assessment/Progress Note  [facility]      NAME: Gato Michael      MRN: [id number]  AGE: 67 y.o. SEX: male  Buddhism Affiliation: No Cheondoism   Language: English     1/23/2023     Total Time (in minutes): 27     Spiritual Assessment begun in [facility] through conversation with:         []Patient        [x] Family    [] Friend(s)        Reason for Consult: Request by physician     Spiritual beliefs: (Please include comment if needed)     [x] Identifies with a ronaldo tradition:         [] Supported by a ronaldo community:            [] Claims no spiritual orientation:           [] Seeking spiritual identity:                [] Adheres to an individual form of spirituality:           [] Not able to assess:                           Identified resources for coping:      [x] Prayer                               [] Music                  [] Guided Imagery     [x] Family/friends                 [] Pet visits     [] Devotional reading                         [] Unknown     [] Other:                                                 Interventions offered during this visit: (See comments for more details)          Family/Friend(s):  Affirmation of emotions/emotional suffering, Affirmation of ronaldo, Catharsis/review of pertinent events in supportive environment, Coping skills reviewed/reinforced, Iconic (affirming the presence of God/Higher Power), Normalization of emotional/spiritual concerns, Prayer (assurance of), Buddhism beliefs/image of God discussed     Plan of Care:     [x] Support spiritual and/or cultural needs    [] Support AMD and/or advance care planning process      [] Support grieving process   [] Coordinate Rites and/or Rituals    [] Coordination with community clergy   [x] No spiritual needs identified at this time   [] Detailed Plan of Care below (See Comments)  [] Make referral to Music Therapy  [] Make referral to Pet Therapy     [] Make referral to Addiction services  [] Make referral to [facility]  [] Make referral to Spiritual Care Partner  [] No future visits requested        [x] Contact Spiritual Care for further referrals     Comments: Responded to spiritual care order to offer pastoral support to patient's wife. Reviewed chart prior to visit. Visited with Mrs Charlene Flores in [facility] as patient was engaged in physical therapy. Provided supportive listening presence as she shared her understanding of patient's medical concerns. She and her  have been  46 years. They have strong support from family; their children, her sister and patient's brothers. Family identifies as [place]. Mrs Charlene Flroes acknowledged having good spiritual support also. Her  is on many prayer lists. Mrs Charlene Flores expressed no spiritual needs or concerns at this time, but was receptive to assurance of prayer.      JESUS Chavarria, [facility], Staff [address]    [address] Paging Service  [phone]

## 2023-01-23 NOTE — PROGRESS NOTES
End of Shift Note    Bedside shift change report given to STARR Price (oncoming nurse) by Belle Maldonado RN (offgoing nurse). Report included the following information SBAR, Kardex, ED Summary, MAR, and Recent Results    Shift worked:  7p-7a     Shift summary and any significant changes:     Pt confused during shift, with periods of agitation, and issues being redirectable. Pt received medication per MAR. Pt's wife at beside. Safety rounding complete.       Concerns for physician to address:       Zone phone for oncoming shift:              Belle Maldonado RN

## 2023-01-23 NOTE — PROGRESS NOTES
End of Shift Note    Bedside shift change report given to Evan Beck RN (oncoming nurse) by Claudia Logan RN (offgoing nurse). Report included the following information SBAR, Kardex, Intake/Output, and MAR    Shift worked:  3461-5950     Shift summary and any significant changes:     Patient drowsy through most of shift, short periods of wakefulness with confusion. All PO meds held, pt too drowsy to swallow. All IV meds, incontinent care, CHG line care for port, and frequent rounding provided. All labs collected. Wife and family at bedside.    Concerns for physician to address:       Zone phone for oncoming shift:            Claudia Logan RN

## 2023-01-23 NOTE — PROGRESS NOTES
Comprehensive Nutrition Assessment    Type and Reason for Visit: Reassess    Nutrition Recommendations/Plan:   Continue full liquids per the direction of SLP. If pt would like greater options, consider upgrade to pureed but ideally, would like a combination of full liquids and pureed. Would need SLP approval as we currently follow International Dysphagia Diet Standardization Initiative (IDDS) which is very specific. RD dc Ensure Pudding per request. Continue Magic Cups. RD communicated pref to kitchen team.  Please document % meals and supplements consumed in flowsheet I/O's under intake. Malnutrition Assessment:  Malnutrition Status:  Severe malnutrition (01/18/23 1450)    Context:  Chronic illness     Findings of the 6 clinical characteristics of malnutrition:   Energy Intake:  75% or less est energy requirements for 1 month or longer  Weight Loss:  Greater than 20% over 1 year     Body Fat Loss:  Severe body fat loss,     Muscle Mass Loss:  Severe muscle mass loss,    Fluid Accumulation:  Severe, Extremities   Strength:  Not performed     Nutrition Assessment:    1/23: Chart reviewed; pt has been started on full liquids per the direction of SLP. Pt enjoys soups. Wife reports that at home he could consume very fine textures. Ideally, we would be able to offer him a combination of items as safely. Explained that if at some point wife would like to consider a pureed diet for more kcal/protein dense options then we can work with SLP. Wife reports pt really enjoyed the chicken broth. Wife also reports pt prefers decaf coffee over iced team so RD communicated preferences. Unfortunately, most foods pt enjoys are not kcal/protein/nutrient dense. Pt refusing Ensure Pudding or any products with the name Ensure so wife requested we dc Ensure Pudding. Curious is pt would consume in a clear cup. No data found.     Last Weight Metric  Weight Loss Metrics 1/18/2023 1/17/2023 1/12/2023 1/6/2023 12/28/2022 12/28/2022 12/23/2022   Today's Wt 146 lb 9.7 oz - - 142 lb 3.2 oz 151 lb 6.4 oz 153 lb 6.4 oz 159 lb 9.6 oz   BMI - 24.4 kg/m2 23.66 kg/m2 23.66 kg/m2 24.44 kg/m2 24.76 kg/m2 25.76 kg/m2      1/18: Consult received for poor PO intake. Pt medically noted for new dx of Hodgkin lymphoma, NILTON, anasarca, severe malnutrition, DM2, iron deficiency anemia. NPO for testing today. Pt off the floor for procedure at time of visit, but RD had an extensive talk with his wife and family. He has had profound weight loss related to severely poor intake for at least several months. Wife reports significant fat and muscle wasting with bones protruding. Pt clearly meets criteria for chronic, severe malnutrition. He was already a very picky eater which has only gotten worse, along with early satiety, nausea, and taste changes. He has an increased sensitivity to sweets and absolutely refuses Ensure shakes. We will try the Ensure pudding and Magic cup/Thrive supplements, but they are also quite sweet so I don't have high expectations for him to like these either. I encouraged family to bring in foods they knows he likes. We discussed the possibility of tube feeding which the wife is very agreeable to. She worries he will be reluctant as he is terrified of any medical procedures and has been experiencing cognitive decline. She worries his capacity to make decisions is deteriorating as well. Discussed NGT with Oncology NP, who states \"Dr. Avinash Cobb expects his appetite to improve quickly bc its Hodgkins lymphoma and will respond quickly and his appetite should improve in the short term. \" We will see how he does over the next few days and re-evaluate. Nutrition Related Findings:    BM: 1/18; Labs: Na+ 146, K+ 3.3 Wound Type: None    Current Nutrition Intake & Therapies:  Average Meal Intake: NPO  Average Supplement Intake: None ordered  ADULT ORAL NUTRITION SUPPLEMENT Lunch, Dinner; Frozen Supplement  ADULT DIET Full Liquid;  Patient likes chicken broth, decaf coffee instead of tea every meal, splenda and creamer, likes megan and vanilla ice cream    Anthropometric Measures:  Height: 5' 5\" (165.1 cm)  Ideal Body Weight (IBW): 136 lbs (62 kg)     Current Body Wt:  66.5 kg (146 lb 9.7 oz), 107.8 % IBW. Not specified  Current BMI (kg/m2): 24.4        Weight Adjustment: No adjustment                 BMI Category: Normal weight (BMI 22.0-24.9) age over 72    Estimated Daily Nutrient Needs:  Energy Requirements Based On: Formula  Weight Used for Energy Requirements: Current  Energy (kcal/day): 1740 kcals (BMR x 1. 3AF)  Weight Used for Protein Requirements: Current  Protein (g/day): 80-93g (1.2-1.5g/kg)  Method Used for Fluid Requirements: 1 ml/kcal  Fluid (ml/day): 1700mL    Nutrition Diagnosis:   Severe malnutrition, In context of chronic illness related to catabolic illness, inadequate protein-energy intake as evidenced by Criteria as identified in malnutrition assessment, NPO or clear liquid status due to medical condition    Nutrition Interventions:   Food and/or Nutrient Delivery: Continue current diet, Discontinue oral nutrition supplement  Nutrition Education/Counseling: No recommendations at this time  Coordination of Nutrition Care: Continue to monitor while inpatient       Goals:     Goals: PO intake 50% or greater, by next RD assessment       Nutrition Monitoring and Evaluation:   Behavioral-Environmental Outcomes: None identified  Food/Nutrient Intake Outcomes: Diet advancement/tolerance, Food and nutrient intake, Supplement intake  Physical Signs/Symptoms Outcomes: Biochemical data, GI status, Fluid status or edema, Weight, Nutrition focused physical findings    Discharge Planning:    Continue current diet    Yovana Carl RD  Contact:

## 2023-01-23 NOTE — PROGRESS NOTES
Problem: Mobility Impaired (Adult and Pediatric)  Goal: *Acute Goals and Plan of Care (Insert Text)  Description: FUNCTIONAL STATUS PRIOR TO ADMISSION: Per wife report, pt with recent decline requiring transition to first floor set up and assist with ADLs. Wife states that pt uses [facility] for amb, but would benefit from RW which he owns, but has been reluctant to use. Of note, pt diagnosed with Hodgkin's lymphoma Dec 2022 and is now starting chemo. HOME SUPPORT PRIOR TO ADMISSION: The patient lived with wife who provides assist along with children. Physical Therapy Goals  Initiated 1/18/2023  1. Patient will move from supine to sit and sit to supine  in bed with independence within 7 day(s). 2.  Patient will transfer from bed to chair and chair to bed with supervision/set-up using the least restrictive device within 7 day(s). 3.  Patient will perform sit to stand with supervision/set-up within 7 day(s). 4.  Patient will ambulate with supervision/set-up for 50 feet with the least restrictive device within 7 day(s). Outcome: Progressing Towards Goal   PHYSICAL THERAPY TREATMENT  Patient: Hanna Ruggiero (62 y.o. male)  Date: 1/23/2023  Diagnosis: [facility] [C81.90] <principal problem not specified>  Procedure(s) (LRB):  INFUSAPORT INSERTION (N/A) 5 Days Post-Op  Precautions: Bed Alarm, Fall, Aspiration (telesitter;sun downs; chemo; L chest wall port; 3 sm sub-accute CVAs)  Chart, physical therapy assessment, plan of care and goals were reviewed. ASSESSMENT  Patient continues with skilled PT services and is progressing towards goals, he continues confused but is more appropriately interactive. Not oriented to place or time or situation. He needs simple one step commands and some repetition even after time for processing and is highly distractible. He is needing min to CGA for bed mobility and significant extra time. He comes to stand and tends to place hands on walker, CGA.  He is able to amb with the rolling walker with CGA to min A needing some directional guiding and walker management assist. Pt is below his baseline with more confusion and need for cues and increased assist. Wife is very supportive but uses a cane. If pt continues to improve and wife feels she can give appropriate amount of assist at home, pt could return with HHPT. However, if not, he may need SNF rehab stay prior to return home. Current Level of Function Impacting Discharge (mobility/balance): Pt with need for min to CGA, use of rolling walker and consistent cueing    Other factors to consider for discharge: increased confusion since his bx, increased fall risk, will need 24 hr assist/supervision         PLAN :  Patient continues to benefit from skilled intervention to address the above impairments. Continue treatment per established plan of care. to address goals. Recommendation for discharge: (in order for the patient to meet his/her long term goals)  To be determined: HHPT if wife can manage assist vs SNF if not    This discharge recommendation:  Has been made in collaboration with the attending provider and/or case management    IF patient discharges home will need the following DME: rolling walker       SUBJECTIVE:   Patient stated My wife? Tyrese Easton    OBJECTIVE DATA SUMMARY:   Critical Behavior:  Neurologic State: Alert, Confused  Orientation Level: Oriented to person, Disoriented to time, Disoriented to situation, Disoriented to place  Cognition: Follows commands, Decreased command following, Impaired decision making, Impulsive, Memory loss (reports he is at home)  Safety/Judgement: Decreased insight into deficits, Decreased awareness of need for safety, Decreased awareness of need for assistance, Decreased awareness of environment  Functional Mobility Training:  Bed Mobility:  Rolling: Supervision  Supine to Sit: Minimum assistance; Additional time  Sit to Supine: Minimum assistance; Moderate assistance (A B LEs into bed)  Scooting: Contact guard assistance; Additional time;Minimum assistance        Transfers:  Sit to Stand: Contact guard assistance; Additional time; Adaptive equipment (pulling up on walker; CGA-S from taller bed; min A increased time- heavy pull up on RW from toilet)  Stand to Sit: Contact guard assistance;Supervision; Additional time; Adaptive equipment        Bed to Chair: Minimum assistance;Contact guard assistance; Additional time (with RW, guiding)                    Balance:  Sitting: Intact  Standing: Impaired; With support  Standing - Static: Constant support; Fair (with RW)  Standing - Dynamic : Constant support; Fair (with RW)  Ambulation/Gait Training:  Distance (ft): 40 Feet (ft)  Assistive Device: Gait belt;Walker, rolling  Ambulation - Level of Assistance: Contact guard assistance;Minimal assistance; Other (comment) (min A intermittent for guiding of activity or walker)        Gait Abnormalities: Decreased step clearance              Speed/Briana: Slow;Pace decreased (<100 feet/min)  Step Length: Right shortened;Left shortened              Pain Rating:  No c/o    Activity Tolerance:   Fair    After treatment patient left in no apparent distress:   Call bell within reach, Bed / chair alarm activated, Side rails x 3, and chair position of bed    COMMUNICATION/COLLABORATION:   The patients plan of care was discussed with: Occupational therapist and Registered nurse.      Bong Enriquez, PT   Time Calculation: 33 mins

## 2023-01-23 NOTE — PROGRESS NOTES
Problem: Self Care Deficits Care Plan (Adult)  Goal: *Acute Goals and Plan of Care (Insert Text)  Description: FUNCTIONAL STATUS PRIOR TO ADMISSION: Pt has had recent decline in function, moving to a first floor setup in his home. Patient uses [facility] for functional mobility, wife reports he has a RW but has not been using it. He receives assistance for ADLs as needed from wife. HOME SUPPORT: The patient lived with his wife who provides assistance. Occupational Therapy Goals  Initiated 1/18/2023  1. Patient will perform grooming with supervision/set-up in standing within 7 day(s). 2.  Patient will perform upper body dressing with supervision/set-up within 7 day(s). 3.  Patient will perform toilet transfers with supervision/set-up within 7 day(s). 4.  Patient will perform all aspects of toileting with supervision/set-up within 7 day(s). 5.  Patient will participate in upper extremity therapeutic exercise/activities with independence for 5 minutes within 7 day(s). 6.  Patient will utilize energy conservation techniques during functional activities with verbal cues within 7 day(s). Outcome: Progressing Towards Goal   OCCUPATIONAL THERAPY TREATMENT  Patient: Marlys Olivo (89 y.o. male)  Date: 1/23/2023  Diagnosis: [facility] [C81.90] <principal problem not specified>  Procedure(s) (LRB):  INFUSAPORT INSERTION (N/A) 5 Days Post-Op  Precautions: Bed Alarm, Fall, Aspiration (telesitter;sun downs; chemo; L chest wall port; 3 sm sub-accute CVAs)  Chart, occupational therapy assessment, plan of care, and goals were reviewed. ASSESSMENT  Patient continues with skilled OT services and is progressing towards goals. Patient with decline in self care and cognition since chemo initiated per Nsg and chart review; O x 1 this session.   Able to follow 1 step familiar commands this session with increased time, poor ADL skills except for eating; difficult to know if behavior/desire vs cognition and dont know how to initiate task causing most difficulty; once given tacle, verbal and visual cues with ADL tasks, unable to complete familiar self care tasks, this is a decline from occasional assist noted by family PTA at [facility] level. Current Level of Function Impacting Discharge (ADLs): S-D UE ADLs, D LE ADLs, Min A-mod A functional mobility    Other factors to consider for discharge: coughed 1x with initiation of meal- liquid diet/pudding         PLAN :  Patient continues to benefit from skilled intervention to address the above impairments. Continue treatment per established plan of care to address goals. Frequency increased due to current needs/decline in function/family training needs. Recommend with staff: up to chair for at least 1 meal/day; CM- talk to family re need for respite from family assist if she wants 24/7 care for him    Recommend next OT session: up to chair with meal to clarify safety with this task as currently nsg feels patient not safe enough for this task    Recommendation for discharge: (in order for the patient to meet his/her long term goals)  To be determined: home with family 24/7 care vs SNF    This discharge recommendation:  A follow-up discussion with the attending provider and/or case management is planned    IF patient discharges home will need the following DME: bedside commode, transfer bench, and walker: rolling       SUBJECTIVE:   Patient stated Yes. Laith Chairez we at home? \")    OBJECTIVE DATA SUMMARY:   Cognitive/Behavioral Status:  Neurologic State: Alert;Confused  Orientation Level: Oriented to person;Disoriented to time;Disoriented to situation;Disoriented to place  Cognition: Follows commands;Decreased command following; Impaired decision making; Impulsive;Memory loss (reports he is at home)     Perseveration: No perseveration noted  Safety/Judgement: Decreased insight into deficits; Decreased awareness of need for safety;Decreased awareness of need for assistance;Decreased awareness of environment    Functional Mobility and Transfers for ADLs:  Bed Mobility:  Rolling: Supervision  Supine to Sit: Minimum assistance  Sit to Supine: Minimum assistance; Moderate assistance (A B LEs into bed)  Scooting: Contact guard assistance; Additional time;Minimum assistance    Transfers:  Sit to Stand: Contact guard assistance; Additional time; Adaptive equipment (pulling up on walker; CGA-S from taller bed; min A increased time- heavy pull up on RW)  Functional Transfers  Toilet Transfer : Minimum assistance; Moderate assistance; Additional time (pulls up on RW)  Bed to Chair: Minimum assistance;Contact guard assistance; Additional time    Balance:  Sitting: Intact  Standing: Impaired; With support  Standing - Static: Constant support; Fair  Standing - Dynamic : Constant support; Fair    ADL Intervention:  Feeding  Feeding Assistance: Minimum assistance;Supervision (\"hasnt eaten in 4 days/NPO\" per wife tolerating soup, yogurt, drinking w/straw)  Container Management: Supervision;Minimum assistance  Food to Mouth: Supervision  Drink to Mouth: Supervision;Contact guard assistance              Type of Bath: Chlorhexidine (CHG) (Port care)         Upper Body Dressing Assistance  Dressing Assistance: Maximum assistance; Total assistance(dependent) (unable to initiate gown task, unable to thread either UE with assist; able to remove gown mod I)  Shirt simulation with hospital gown: Maximum assistance; Total assistance (dependent)    Lower Body Dressing Assistance  Dressing Assistance: Total assistance(dependent)  Socks: Total assistance (dependent)  Position Performed: Long sitting on bed;Seated edge of bed;Standing         Cognitive Retraining  Attention to Task: Single task  Maintains Attention For (Time): 3 minutes  Following Commands: Follows one step commands/directions (inconsistently)  Safety/Judgement: Decreased insight into deficits; Decreased awareness of need for safety;Decreased awareness of need for assistance;Decreased awareness of environment      Pain:  No pain reported this session; not he has had back and rib pain    Activity Tolerance:   Fair, SpO2 stable on RA, and requires frequent rest breaks    After treatment patient left in no apparent distress:   Call bell within reach, Caregiver / family present, Side rails x 3, and semi fowlers; telesitter in place; VSS post ambulation    COMMUNICATION/COLLABORATION:   The patients plan of care was discussed with: Physical therapist, Registered nurse, and Certified nursing assistant/patient care technician.      Iqra Pavon OTR/L  Time Calculation: 40 mins

## 2023-01-23 NOTE — PROGRESS NOTES
Problem: Dysphagia (Adult)  Goal: *Acute Goals and Plan of Care (Insert Text)  Description: Speech Therapy Goals  Initiated 1/23/2023  1. Patient will tolerate full liquid diet without overt s/s aspiration within 7 days. Outcome: Not Met     SPEECH LANGUAGE PATHOLOGY BEDSIDE SWALLOW EVALUATION  Patient: Sanjana Garcia (88 y.o. male)  Date: 1/23/2023  Primary Diagnosis: Hodgkin lymphoma ([facility] [C81.90]  Procedure(s) (LRB):  INFUSAPORT INSERTION (N/A) 5 Days Post-Op   Precautions: Aspiration       ASSESSMENT :  Based on the objective data described below, the patient presents with mild-moderate oral dysphagia characterized by prolonged mastication and oral transit of solids. Patient demonstrated oral holding of ice chips but not evident with sips of thin liquids and purees. Patient demonstrated weak cough x1 after PO trials. Patient requested drink but quickly fell asleep prior to offing. Suspect drowsiness and reduced attention to task as well as overall weakness impacting swallow safety. Given patient request for liquids and wife's report that patient enjoys soup recommend initiating full liquid diet. Patient's wife reports poor appetite and accepting only limited foods prior to admit. Patient will benefit from skilled intervention to address the above impairments. Patients rehabilitation potential is considered to be Fair     PLAN :  Recommendations and Planned Interventions:  Full liquid diet  Must be awake/alert and able to feed himself  Small, single bites/sips  Medication in puree  Frequency/Duration: Patient will be followed by speech-language pathology 3 times a week to address goals. Discharge Recommendations: To Be Determined     SUBJECTIVE:   Patient stated Water. Patient's wife at bedside.     OBJECTIVE:     Past Medical History:   Diagnosis Date    Allergic rhinitis, cause unspecified 12/11/2013    Arthritis     knees    Asthma     as a child    Eczema     on lower back waistline on the back    Environmental allergies     GERD (gastroesophageal reflux disease)     occastionally but resolved since 60 pound weight loss    Hodgkin's lymphoma ([facility] 2023    Hypertension     Psychiatric disorder     anxiety and depression     Past Surgical History:   Procedure Laterality Date    HX TONSILLECTOMY      HX WISDOM TEETH EXTRACTION       Prior Level of Function/Home Situation:   Home Situation  Home Environment: Private residence  One/Two Story Residence: Two story, live on 1st floor  Living Alone: No (lives with wife)  Support Systems: Spouse/Significant Other, Other Family Member(s)  Patient Expects to be Discharged to[de-identified] Home with family assistance  Current DME Used/Available at Home: Cane, straight, Walker, rolling  Diet prior to admission: Regular  Current Diet:  NPO   Cognitive and Communication Status:  Neurologic State: Alert, Drowsy  Orientation Level: Oriented to person  Cognition: Decreased attention/concentration, Memory loss           Oral Assessment:  Oral Assessment  Labial: Decreased rate  Dentition: Natural;Limited;Partials (comment)  Oral Hygiene: Mildly dry oral mucosa  Lingual: Decreased rate  Mandible: No impairment  P.O. Trials:  Patient Position: Upright in bed  Vocal quality prior to P.O.: Low volume  Consistency Presented: Ice chips; Thin liquid; Solid;Puree  How Presented: Self-fed/presented;Cup/sip;Spoon;Straw     Bolus Acceptance: No impairment  Bolus Formation/Control: Impaired  Type of Impairment: Delayed;Mastication  Propulsion: Delayed (# of seconds)  Oral Residue: None        Aspiration Signs/Symptoms: Delayed cough/throat clear                Oral Phase Severity: Mild-moderate       NOMS:   The NOMS functional outcome measure was used to quantify this patient's level of swallowing impairment.   Based on the NOMS, the patient was determined to be at level 3 for swallow function       NOMS Swallowing Levels:  Level 1 (CN): NPO  Level 2 (CM): NPO but takes consistency in therapy  Level 3 (CL): Takes less than 50% of nutrition p.o. and continues with nonoral feedings; and/or safe with mod cues; and/or max diet restriction  Level 4 (CK): Safe swallow but needs mod cues; and/or mod diet restriction; and/or still requires some nonoral feeding/supplements  Level 5 (CJ): Safe swallow with min diet restriction; and/or needs min cues  Level 6 (CI): Independent with p.o.; rare cues; usually self cues; may need to avoid some foods or needs extra time  Level 7 ([address] [place] [address]): Independent for all p.o.  ADELA. (2003). National Outcomes Measurement System (NOMS): Adult Speech-Language Pathology User's Guide. After treatment:   Patient left in no apparent distress in bed, Call bell within reach, Nursing notified, and Caregiver / family present    COMMUNICATION/EDUCATION:   Patient was educated regarding role of SLP, diet consistency and POC. Patient fell asleep. Patient's wife indicates understanding. The patient's plan of care including recommendations, planned interventions, and recommended diet changes were discussed with: Registered nurse. Patient/family have participated as able in goal setting and plan of care. Patient/family agree to work toward stated goals and plan of care.     Thank you for this referral.  Manny Guevara, CAIT  Time Calculation: 19 mins

## 2023-01-23 NOTE — PROGRESS NOTES
[facility] at [address], [place], [address] [facility]  W: [phone] F: [phone]      Reason for Visit:   Domitila Lucas is a 67 y.o. male who is seen in consultation at the request of Dr. Zuri Peralta for evaluation of Hodgkin lymphoma. He is now admitted to [facility] for initiation of systemic chemotherapy. Hematology / Oncology Treatment History:     Hematological/Oncological Diagnosis: Classic Hodgkin lymphoma    Date of Diagnosis: 2021    Treatment course: Plan for ABVD chemotherapy      History of Present Illness:     66year old with hx of hypertension, seasonal allergies, presents with worsening normocytic aneima of unclear etiology. He was diagnosed late December 2022 with Classic Hodgkin Lymphoma. Inpatient pllan is to start systemic therapy while for close monitoring given TLS with elevated uric acid and high risk for further deterioration after systemic therapy. While admitted we plan to obtain bone marrow biopsy, MRI of the brain, CT of the chest abdomen and pelvis, dopplers of the lower extremities, place port for systemic therapy and obtain PFTs. Interval History:     1/18/2023 : Patient seen at bedside with wife. Discussed doppler results were negative for DVT. Plan for port placement and bone marrow biopsy today. Hopefully will start chemotherapy in AM.     1/20/2023 Patient seen at bedside today with wife. Encouraged PO intake other than chicken broth. Currently receiving chemo. 1/23/2023: Patient seen at bedside today with his wife. He was unable to participate in conversation, very lethargic. Wife was very tearful this morning as she gave specific details on his condition over the weekend. Patient was diagnosed with aspiration pneumonia and became very confused and agitated at times. She is very worried that he has not been eating and has been very lethargic.   We discussed small improvements as he is now beginning to become more alert and oriented. Hopeful today that he will be able to eat and has a pending speech consult. Review of Systems: A complete review of systems was obtained, negative except as described above. Past Medical History:   Diagnosis Date    Allergic rhinitis, cause unspecified 2013    Arthritis     knees    Asthma     as a child    Eczema     on lower back waistline on the back    Environmental allergies     GERD (gastroesophageal reflux disease)     occastionally but resolved since 60 pound weight loss    Hodgkin's lymphoma ([facility] [address]     Hypertension     Psychiatric disorder     anxiety and depression      Past Surgical History:   Procedure Laterality Date    HX TONSILLECTOMY      HX WISDOM TEETH EXTRACTION        Social History     Tobacco Use    Smoking status: Former     Packs/day: 1.00     Years: 2.00     Pack years: 2.00     Types: Cigarettes     Quit date: 1966     Years since quittin.1    Smokeless tobacco: Never   Substance Use Topics    Alcohol use: No      Family History   Problem Relation Age of Onset    Hypertension Mother     Hypertension Father     Heart Disease Father     Alcohol abuse Father     Hypertension Brother     No Known Problems Brother      Current Facility-Administered Medications   Medication Dose Route Frequency    Vanc trough  before 1600 dose.  Thanks  1 Each Other ONCE    potassium chloride 10 mEq in 100 ml IVPB  10 mEq IntraVENous Q1H    [START ON 2023] fluconazole (DIFLUCAN) 200mg/100 mL IVPB (premix)  200 mg IntraVENous DAILY    heparin (porcine) injection 5,000 Units  5,000 Units SubCUTAneous Q8H    0.9% sodium chloride infusion  100 mL/hr IntraVENous CONTINUOUS    HYDROmorphone (DILAUDID) injection 0.5-1 mg  0.5-1 mg IntraVENous Q4H PRN    naloxone (NARCAN) injection 1 mg  1 mg IntraVENous Q5MIN PRN    OLANZapine (ZyPREXA) 2.5 mg in sterile water (preservative free) 0.5 mL injection  2.5 mg IntraMUSCular DAILY PRN pantoprazole (PROTONIX) 40 mg in 0.9% sodium chloride 10 mL injection  40 mg IntraVENous DAILY    cefepime (MAXIPIME) 2,000 mg in 0.9% sodium chloride (MBP/ADV) 100 mL MBP  2,000 mg IntraVENous Q12H    lidocaine 4 % patch 1 Patch  1 Patch TransDERmal Q24H    vancomycin (VANCOCIN) 750 mg in 0.9% sodium chloride 250 mL (Hmdi9Hfs)  750 mg IntraVENous Q24H    metroNIDAZOLE (FLAGYL) IVPB premix 500 mg  500 mg IntraVENous Q12H    melatonin tablet 3 mg  3 mg Oral QHS PRN    prochlorperazine (COMPAZINE) injection 10 mg  10 mg IntraVENous Q6H PRN    glucose chewable tablet 16 g  4 Tablet Oral PRN    glucagon (GLUCAGEN) injection 1 mg  1 mg IntraMUSCular PRN    dextrose 10 % infusion 0-250 mL  0-250 mL IntraVENous PRN    alum-mag hydroxide-simeth (MYLANTA) oral suspension 30 mL  30 mL Oral Q4H PRN    alcohol 62% (NOZIN) nasal  1 Ampule  1 Ampule Topical Q12H    ondansetron (ZOFRAN) injection 4 mg  4 mg IntraVENous Q4H PRN    acetaminophen (TYLENOL) tablet 650 mg  650 mg Oral Q6H PRN    polyethylene glycol (MIRALAX) packet 17 g  17 g Oral QHS PRN    allopurinoL (ZYLOPRIM) tablet 100 mg  100 mg Oral BID      Allergies   Allergen Reactions    Codeine Other (comments)     Pt states unknown reaction. Pcn [Penicillins] Rash     Received Ancef 1/18/2023 without issue.             Physical Exam:   Visit Vitals  /75   Pulse (!) 105   Temp 97.3 °F (36.3 °C)   Resp 17   Ht 5' 5\" (1.651 m)   Wt 146 lb 9.7 oz (66.5 kg)   SpO2 98%   BMI 24.40 kg/m²     ECOG PS: 0  General: Lethargic, ill and frail appearing, severely cachectic   Mental  status: Lethargic, does not participate in much conversation, unable to assess orientation   HENT: NCAT   Neck: no visualized mass   Resp: no respiratory distress   Neuro: no gross deficits   Skin: no discoloration or lesions of concern on visible areas   Psychiatric: Lethargic, poor insight           Results:     Lab Results   Component Value Date/Time    WBC 11.5 (H) 01/23/2023 03:41 AM HGB 9.0 (L) 01/23/2023 03:41 AM    HCT 30.0 (L) 01/23/2023 03:41 AM    PLATELET 715 (L) 39/06/4647 03:41 AM    .7 (H) 01/23/2023 03:41 AM    ABS. NEUTROPHILS 10.7 (H) 01/23/2023 03:41 AM     Lab Results   Component Value Date/Time    Sodium 146 (H) 01/23/2023 03:41 AM    Potassium 3.3 (L) 01/23/2023 03:41 AM    Chloride 116 (H) 01/23/2023 03:41 AM    CO2 21 01/23/2023 03:41 AM    Glucose 95 01/23/2023 03:41 AM    BUN 48 (H) 01/23/2023 03:41 AM    Creatinine 1.28 01/23/2023 03:41 AM    GFR est AA >60 07/26/2022 01:45 PM    GFR est non-AA >60 07/26/2022 01:45 PM    Calcium 7.0 (L) 01/23/2023 03:41 AM    Sodium,  10/07/2022 10:16 AM    Potassium, POC 3.9 10/07/2022 10:16 AM    Chloride,  10/07/2022 10:16 AM    Glucose (POC) 94 01/06/2023 07:07 AM    Creatinine, POC 1.1 10/07/2022 10:16 AM    Calcium, ionized (POC) 1.22 10/07/2022 10:16 AM     Lab Results   Component Value Date/Time    Bilirubin, total 2.1 (H) 01/23/2023 03:41 AM    ALT (SGPT) 9 (L) 01/23/2023 03:41 AM    Alk. phosphatase 242 (H) 01/23/2023 03:41 AM    Protein, total 5.0 (L) 01/23/2023 03:41 AM    Albumin 1.5 (L) 01/23/2023 03:41 AM    Globulin 3.5 01/23/2023 03:41 AM     CT Results (most recent):  Results from Hospital Encounter encounter on 01/17/23    CT HEAD WO CONT    Narrative  EXAM: CT HEAD WO CONT    INDICATION: altered mental status    COMPARISON: 10/7/2022. CONTRAST: None. TECHNIQUE: Unenhanced CT of the head was performed using 5 mm images. Brain and  bone windows were generated. Coronal and sagittal reformats. CT dose reduction  was achieved through use of a standardized protocol tailored for this  examination and automatic exposure control for dose modulation. FINDINGS:  The ventricles and sulci are normal in size, shape and configuration. Small  vessel ischemic changes are stable compared to the prior exam. There is no  intracranial hemorrhage, extra-axial collection, or mass effect.  The basilar  cisterns are open. No CT evidence of acute infarct. The bone windows demonstrate no abnormalities. The visualized portions of the  paranasal sinuses and mastoid air cells are clear. Impression  No acute process or change compared to the prior exam.    No imaging of spleen      Pathology:           ==========================================================================   * * *FINAL PATHOLOGIC DIAGNOSIS* * *     <<<<<       Lymph node, left supraclavicular lymph node, excision:        Classic Hodgkin lymphoma. See comment.     >>>>>      * * *Comment* * *     <<<<<  The histologic section has an enlarged lymph node with a thickened capsule   and effacement of normal yady architecture by a vaguely nodular   proliferation with few sclerotic bands. Numerous Hodgkin/Levi-Esteban   cells are present within a background of small lymphocytes and   eosinophils. The Hodgkin Levi-Esteban cells are positive for CD30, CD15   and PAX5 (subset, dim) and are negative for CD45, ALK, CD20, EMA,   Granzyme, MUM1, CD43 and CD3. In situ hybridization for Tania-Barr viral   RNA is negative. The small lymphocytes are comprised of CD3 and CD5   positive T cells with few scattered B cells identified. Assessment and Recommendations:     Classic Hodgkin lymphoma, advanced  -At present, the patient has clinical symptoms of diarrhea, abdominal discomfort, discoloration of his lower extremities, findings may be related to widely distributed lymphadenopathy. Splenomegaly may be causing liver dysfunction.     -Discussed the risks and benefits of ABVD chemotherapy in the OP setting, please see clinic note for further detail. -CT chest/abd/pelvis   IMPRESSION  1. Mediastinal, hilar, upper abdominal, retroperitoneal, and iliac chain  adenopathy with multiple splenic lesions most consistent with lymphoma. Metastatic disease or other inflammatory/infectious process is less likely.   Retroperitoneal nodes are amenable to percutaneous biopsy. 2.  Indeterminate, possibly benign segment 6 hepatic hypodensity. Consider  further evaluation with MRI. -MRI Brain   IMPRESSION  1. Evaluation is significantly limited by patient positioning. No evidence of  intracranial metastases. No acute intracranial abnormality. 2. Generalized parenchymal volume loss and mild chronic microvascular ischemic disease. Small chronic infarcts as above. -Appreciate General Surgery input - plan for port placement today     - PFTs completed on 11/18    -S/p Bone Marrow biopsy 1/20, results pending     -S/p  chemotherapy with C#1 on 1/20 of ABVD (Doxorubicin 25mg/m2, Bleomycin 10 units/m2, Vinblastine 6mg/m2, Dacarbazine 375mg/m2 on days 1 and 15 every 28 days), with plans for 6 cycles (further cycles to be OP). Hyperbilirubinemia  -Etiology of his hyperbilirubinemia may be a consequence of impaired liver function as a consequence of lymphoma and splenomegaly.   -Trend CMP, T bili slightly improved today      Elevated alkaline phosphatase  -The patient likely has disease involvement of the bone     Tumor Lysis Syndrome  -Component of TLS given extensive disease burden  -Continue to trend uric every 8 hours, stable  -Consider second dose of Rasburicase if needed   -Continue allopurinol   -Close monitoring of CMP and electrolytes for worsening of TLS with plan for systemic therapy, Appreciate Hospitalist input      Mild Hypercalcemia  - resolved   -Related to disease burden  -Corrected Ca 8.6  -Monitor for now      Acute Kidney Injury - resolved   -Related to TLS  -S/p dose of Rasburicase 1/17  -Follow renal function     Anasarca  Severe Protein Malnutrition   Bilateral LE Swelling   -Likely r/t to malignancy and malnutrition   -Prealbumin 3.2 - encouraged increased PO (wife reports he only eats chicken broth)  -Appreciate Dietician input   -Hold off on NGT or PEG for nutrition now. Concern for refeeding syndrome in setting of TLS after chemo.  Continue to re-assess nutrition status.   -Add supplements, does not like ensure   -Dopplers r/o DVT -- no DVT on US    Generalized Weakness  -R/t malignancy and severe malnutrition   -PT/OT consults -to be decided either home with 24/7 care versus SNF at discharge    Aspiration PNA   Hospitalized Delirium   -Appreciate hospitalist input throughout the weekend.  -Started on broad spectrum antibiotics with cefepime, metronidazole and vancomycin  -Appreciate speech therapy recommendations for full liquid diet  -Sputum culture preliminary result positive for yeast  -Diflucan added to antibiotic regimen per hospitalist team  -Telemetry sitter for safety  -Short-term prognosis is guarded, will discuss goals of care if no improvement in the next 24 to 48 hours.     Signed By: Carmelita Valles NP

## 2023-01-23 NOTE — PROGRESS NOTES
Pharmacy Antimicrobial Kinetic Dosing    Indication for Antimicrobials: Asp Pna     Current Regimen of Each Antimicrobial:  Vancomycin 1250 mg x 1 then 750 mg Q24H- started  day 3  Metronidazole 500mg IV q12h - started  day 3  Cefepime 2gm IV q12h - started  day 3    Previous Antimicrobial Therapy:  -  Vancomycin Goal Level: AUC: 400-600 mg/hr/Liter/day    Date Dose & Interval Measured (mcg/mL) Predicted AUC/RUBINA    750mg IV q24h 9 <400                 Dosing calculator used: [place] calculator    Significant Positive Cultures:    RCx - pending        Labs:  Recent Labs     23  0341 23  1332 23  1429 23  0432   CREA 1.28 1.85*  --  1.70*   BUN 48* 55*  --  43*   PCT  --   --  3.12  --      Recent Labs     23  0341 23  0700 23  0432   WBC 11.5* 15.2* 15.7*     Temp (24hrs), Av.3 °F (36.3 °C), Min:97.2 °F (36.2 °C), Max:97.3 °F (36.3 °C)    Conditions for Dosing Consideration: Severe Protein Calorie Malnutrition, Albumin 1.5, Bilirubin 2.1    Creatinine Clearance (mL/min):   CrCl (Adjusted Body Weight): 46.2 mL/min  If actual weight < IBW: CrCl (Actual Body Weight) 48.3    Impression/Plan:   Afebrile, SCr fluctuating last few days, leukocytosis last few days  Vancomycin increased to 750 mg Q18H with anticipated AUC of 490. Continue Metronidazole and cefepime. Antimicrobial stop date - pending     Pharmacy will follow daily and adjust medications as appropriate for renal function and/or serum levels.     Thank you,  Arthur Mccurdy, [facility]

## 2023-01-24 ENCOUNTER — APPOINTMENT (OUTPATIENT)
Dept: GENERAL RADIOLOGY | Age: 73
DRG: 823 | End: 2023-01-24
Attending: NURSE PRACTITIONER
Payer: MEDICARE

## 2023-01-24 ENCOUNTER — APPOINTMENT (OUTPATIENT)
Dept: CT IMAGING | Age: 73
DRG: 823 | End: 2023-01-24
Attending: NURSE PRACTITIONER
Payer: MEDICARE

## 2023-01-24 ENCOUNTER — TELEPHONE (OUTPATIENT)
Dept: ONCOLOGY | Age: 73
End: 2023-01-24

## 2023-01-24 LAB
ALBUMIN SERPL-MCNC: 1.7 G/DL (ref 3.5–5)
ALBUMIN/GLOB SERPL: 0.4 (ref 1.1–2.2)
ALP SERPL-CCNC: 278 U/L (ref 45–117)
ALT SERPL-CCNC: 10 U/L (ref 12–78)
AMMONIA PLAS-SCNC: <10 UMOL/L
ANION GAP SERPL CALC-SCNC: 6 MMOL/L (ref 5–15)
AST SERPL-CCNC: 20 U/L (ref 15–37)
BACTERIA SPEC CULT: ABNORMAL
BACTERIA SPEC CULT: ABNORMAL
BASOPHILS # BLD: 0 K/UL (ref 0–0.1)
BASOPHILS NFR BLD: 0 % (ref 0–1)
BILIRUB SERPL-MCNC: 2.1 MG/DL (ref 0.2–1)
BUN SERPL-MCNC: 44 MG/DL (ref 6–20)
BUN/CREAT SERPL: 34 (ref 12–20)
CALCIUM SERPL-MCNC: 8.3 MG/DL (ref 8.5–10.1)
CHLORIDE SERPL-SCNC: 116 MMOL/L (ref 97–108)
CO2 SERPL-SCNC: 23 MMOL/L (ref 21–32)
CREAT SERPL-MCNC: 1.28 MG/DL (ref 0.7–1.3)
DIFFERENTIAL METHOD BLD: ABNORMAL
EOSINOPHIL # BLD: 0.1 K/UL (ref 0–0.4)
EOSINOPHIL NFR BLD: 1 % (ref 0–7)
ERYTHROCYTE [DISTWIDTH] IN BLOOD BY AUTOMATED COUNT: 19 % (ref 11.5–14.5)
GLOBULIN SER CALC-MCNC: 4.2 G/DL (ref 2–4)
GLUCOSE SERPL-MCNC: 110 MG/DL (ref 65–100)
GRAM STN SPEC: ABNORMAL
HCT VFR BLD AUTO: 34.6 % (ref 36.6–50.3)
HGB BLD-MCNC: 10.2 G/DL (ref 12.1–17)
IMM GRANULOCYTES # BLD AUTO: 0.1 K/UL (ref 0–0.04)
IMM GRANULOCYTES NFR BLD AUTO: 1 % (ref 0–0.5)
LYMPHOCYTES # BLD: 0.6 K/UL (ref 0.8–3.5)
LYMPHOCYTES NFR BLD: 6 % (ref 12–49)
MCH RBC QN AUTO: 29.6 PG (ref 26–34)
MCHC RBC AUTO-ENTMCNC: 29.5 G/DL (ref 30–36.5)
MCV RBC AUTO: 100.3 FL (ref 80–99)
MONOCYTES # BLD: 0.2 K/UL (ref 0–1)
MONOCYTES NFR BLD: 2 % (ref 5–13)
NEUTS SEG # BLD: 9.7 K/UL (ref 1.8–8)
NEUTS SEG NFR BLD: 90 % (ref 32–75)
NRBC # BLD: 0 K/UL (ref 0–0.01)
NRBC BLD-RTO: 0 PER 100 WBC
PHOSPHATE SERPL-MCNC: 2.1 MG/DL (ref 2.6–4.7)
PLATELET # BLD AUTO: 131 K/UL (ref 150–400)
PMV BLD AUTO: 9.3 FL (ref 8.9–12.9)
POTASSIUM SERPL-SCNC: 4.7 MMOL/L (ref 3.5–5.1)
PROT SERPL-MCNC: 5.9 G/DL (ref 6.4–8.2)
RBC # BLD AUTO: 3.45 M/UL (ref 4.1–5.7)
RBC MORPH BLD: ABNORMAL
SERVICE CMNT-IMP: ABNORMAL
SODIUM SERPL-SCNC: 145 MMOL/L (ref 136–145)
URATE SERPL-MCNC: 4.7 MG/DL (ref 3.5–7.2)
URATE SERPL-MCNC: 5 MG/DL (ref 3.5–7.2)
WBC # BLD AUTO: 10.7 K/UL (ref 4.1–11.1)

## 2023-01-24 PROCEDURE — 84100 ASSAY OF PHOSPHORUS: CPT

## 2023-01-24 PROCEDURE — 84550 ASSAY OF BLOOD/URIC ACID: CPT

## 2023-01-24 PROCEDURE — 94760 N-INVAS EAR/PLS OXIMETRY 1: CPT

## 2023-01-24 PROCEDURE — 85025 COMPLETE CBC W/AUTO DIFF WBC: CPT

## 2023-01-24 PROCEDURE — 74011250636 HC RX REV CODE- 250/636

## 2023-01-24 PROCEDURE — 74011250636 HC RX REV CODE- 250/636: Performed by: NURSE PRACTITIONER

## 2023-01-24 PROCEDURE — 74011000258 HC RX REV CODE- 258: Performed by: INTERNAL MEDICINE

## 2023-01-24 PROCEDURE — 74011000250 HC RX REV CODE- 250

## 2023-01-24 PROCEDURE — C9113 INJ PANTOPRAZOLE SODIUM, VIA: HCPCS

## 2023-01-24 PROCEDURE — 65660000001 HC RM ICU INTERMED STEPDOWN

## 2023-01-24 PROCEDURE — 74011000250 HC RX REV CODE- 250: Performed by: INTERNAL MEDICINE

## 2023-01-24 PROCEDURE — 51798 US URINE CAPACITY MEASURE: CPT

## 2023-01-24 PROCEDURE — 74011000258 HC RX REV CODE- 258

## 2023-01-24 PROCEDURE — 97535 SELF CARE MNGMENT TRAINING: CPT | Performed by: OCCUPATIONAL THERAPIST

## 2023-01-24 PROCEDURE — 82140 ASSAY OF AMMONIA: CPT

## 2023-01-24 PROCEDURE — 77010033678 HC OXYGEN DAILY

## 2023-01-24 PROCEDURE — 80053 COMPREHEN METABOLIC PANEL: CPT

## 2023-01-24 PROCEDURE — 74011000250 HC RX REV CODE- 250: Performed by: STUDENT IN AN ORGANIZED HEALTH CARE EDUCATION/TRAINING PROGRAM

## 2023-01-24 PROCEDURE — 36415 COLL VENOUS BLD VENIPUNCTURE: CPT

## 2023-01-24 PROCEDURE — 71045 X-RAY EXAM CHEST 1 VIEW: CPT

## 2023-01-24 PROCEDURE — 74011250637 HC RX REV CODE- 250/637: Performed by: SURGERY

## 2023-01-24 PROCEDURE — 97530 THERAPEUTIC ACTIVITIES: CPT | Performed by: OCCUPATIONAL THERAPIST

## 2023-01-24 PROCEDURE — 74011250636 HC RX REV CODE- 250/636: Performed by: INTERNAL MEDICINE

## 2023-01-24 PROCEDURE — 92526 ORAL FUNCTION THERAPY: CPT

## 2023-01-24 PROCEDURE — 99233 SBSQ HOSP IP/OBS HIGH 50: CPT | Performed by: STUDENT IN AN ORGANIZED HEALTH CARE EDUCATION/TRAINING PROGRAM

## 2023-01-24 PROCEDURE — 70450 CT HEAD/BRAIN W/O DYE: CPT

## 2023-01-24 RX ORDER — FUROSEMIDE 10 MG/ML
80 INJECTION INTRAMUSCULAR; INTRAVENOUS ONCE
Status: COMPLETED | OUTPATIENT
Start: 2023-01-24 | End: 2023-01-24

## 2023-01-24 RX ORDER — SODIUM CHLORIDE 0.9 % (FLUSH) 0.9 %
5-40 SYRINGE (ML) INJECTION AS NEEDED
Status: DISCONTINUED | OUTPATIENT
Start: 2023-01-24 | End: 2023-02-07 | Stop reason: HOSPADM

## 2023-01-24 RX ORDER — SODIUM CHLORIDE 0.9 % (FLUSH) 0.9 %
5-40 SYRINGE (ML) INJECTION EVERY 8 HOURS
Status: DISCONTINUED | OUTPATIENT
Start: 2023-01-24 | End: 2023-02-07 | Stop reason: HOSPADM

## 2023-01-24 RX ORDER — HYDROXYZINE HYDROCHLORIDE 25 MG/ML
12.5 INJECTION, SOLUTION INTRAMUSCULAR ONCE
Status: COMPLETED | OUTPATIENT
Start: 2023-01-24 | End: 2023-01-24

## 2023-01-24 RX ORDER — LORAZEPAM 2 MG/ML
0.5 INJECTION INTRAMUSCULAR
Status: DISCONTINUED | OUTPATIENT
Start: 2023-01-24 | End: 2023-02-07 | Stop reason: HOSPADM

## 2023-01-24 RX ADMIN — FUROSEMIDE 80 MG: 10 INJECTION, SOLUTION INTRAMUSCULAR; INTRAVENOUS at 15:15

## 2023-01-24 RX ADMIN — LORAZEPAM 0.5 MG: 2 INJECTION INTRAMUSCULAR; INTRAVENOUS at 09:02

## 2023-01-24 RX ADMIN — SODIUM CHLORIDE 100 ML/HR: 9 INJECTION, SOLUTION INTRAVENOUS at 01:45

## 2023-01-24 RX ADMIN — CEFEPIME 2000 MG: 2 INJECTION, POWDER, FOR SOLUTION INTRAVENOUS at 11:09

## 2023-01-24 RX ADMIN — SODIUM CHLORIDE, PRESERVATIVE FREE 10 ML: 5 INJECTION INTRAVENOUS at 21:16

## 2023-01-24 RX ADMIN — Medication 1 AMPULE: at 21:24

## 2023-01-24 RX ADMIN — METRONIDAZOLE 500 MG: 500 INJECTION, SOLUTION INTRAVENOUS at 15:16

## 2023-01-24 RX ADMIN — METRONIDAZOLE 500 MG: 500 INJECTION, SOLUTION INTRAVENOUS at 03:38

## 2023-01-24 RX ADMIN — HYDROXYZINE HYDROCHLORIDE 12.5 MG: 25 INJECTION, SOLUTION INTRAMUSCULAR at 01:46

## 2023-01-24 RX ADMIN — SODIUM CHLORIDE, PRESERVATIVE FREE 10 ML: 5 INJECTION INTRAVENOUS at 14:22

## 2023-01-24 RX ADMIN — HEPARIN SODIUM 5000 UNITS: 5000 INJECTION INTRAVENOUS; SUBCUTANEOUS at 04:00

## 2023-01-24 RX ADMIN — CEFEPIME 2000 MG: 2 INJECTION, POWDER, FOR SOLUTION INTRAVENOUS at 21:15

## 2023-01-24 RX ADMIN — LORAZEPAM 0.5 MG: 2 INJECTION INTRAMUSCULAR; INTRAVENOUS at 21:17

## 2023-01-24 RX ADMIN — HYDROMORPHONE HYDROCHLORIDE 1 MG: 1 INJECTION, SOLUTION INTRAMUSCULAR; INTRAVENOUS; SUBCUTANEOUS at 23:28

## 2023-01-24 RX ADMIN — SODIUM PHOSPHATE, MONOBASIC, MONOHYDRATE: 276; 142 INJECTION, SOLUTION INTRAVENOUS at 17:51

## 2023-01-24 RX ADMIN — SODIUM CHLORIDE, PRESERVATIVE FREE 40 MG: 5 INJECTION INTRAVENOUS at 11:09

## 2023-01-24 RX ADMIN — HEPARIN SODIUM 5000 UNITS: 5000 INJECTION INTRAVENOUS; SUBCUTANEOUS at 21:16

## 2023-01-24 RX ADMIN — SODIUM CHLORIDE, PRESERVATIVE FREE 10 ML: 5 INJECTION INTRAVENOUS at 04:37

## 2023-01-24 RX ADMIN — LORAZEPAM 0.5 MG: 2 INJECTION INTRAMUSCULAR; INTRAVENOUS at 14:22

## 2023-01-24 RX ADMIN — FLUCONAZOLE IN SODIUM CHLORIDE 200 MG: 2 INJECTION, SOLUTION INTRAVENOUS at 13:12

## 2023-01-24 NOTE — PROGRESS NOTES
Problem: Patient Education: Go to Patient Education Activity  Goal: Patient/Family Education  Outcome: Progressing Towards Goal     Problem: Chemotherapy Discharge Outcomes  Goal: *Verbalizes understanding and describes prescribed diet  Outcome: Progressing Towards Goal  Goal: *Describes follow-up/return visits to physicians  Outcome: Progressing Towards Goal  Goal: *Describes home care/support arrangements established based on need  Outcome: Progressing Towards Goal  Goal: *Describes available resources and support systems  Outcome: Progressing Towards Goal  Goal: *Anxiety reduced or absent  Outcome: Progressing Towards Goal  Goal: *Understands and describes signs and symptoms to report to providers(Stroke Metric)  Outcome: Progressing Towards Goal  Goal: *Hemodynamically stable  Outcome: Progressing Towards Goal  Goal: *Tolerating diet  Outcome: Progressing Towards Goal  Goal: *Verbalizes understanding and describes medication purposes and frequencies  Outcome: Progressing Towards Goal  Goal: *Venous access site with positive blood return and without signs and symptoms of infection  Outcome: Progressing Towards Goal  Goal: *Verbalizes understanding of bowel regimen  Outcome: Progressing Towards Goal     Problem: Falls - Risk of  Goal: *Absence of Falls  Description: Document Randy Fall Risk and appropriate interventions in the flowsheet. Outcome: Progressing Towards Goal  Note: Fall Risk Interventions:                                Problem: Patient Education: Go to Patient Education Activity  Goal: Patient/Family Education  Outcome: Progressing Towards Goal     Problem: Pressure Injury - Risk of  Goal: *Prevention of pressure injury  Description: Document Matthew Scale and appropriate interventions in the flowsheet.   Outcome: Progressing Towards Goal  Note: Pressure Injury Interventions:  Sensory Interventions: Assess changes in LOC, Check visual cues for pain, Discuss PT/OT consult with provider, Keep linens dry and wrinkle-free, Maintain/enhance activity level, Minimize linen layers, Monitor skin under medical devices, Pad between skin to skin    Moisture Interventions: Absorbent underpads, Minimize layers, Moisture barrier, Maintain skin hydration (lotion/cream)    Activity Interventions: Pressure redistribution bed/mattress(bed type), PT/OT evaluation    Mobility Interventions: Assess need for specialty bed, Chair cushion, Float heels, HOB 30 degrees or less, Pressure redistribution bed/mattress (bed type), PT/OT evaluation, Turn and reposition approx.  every two hours(pillow and wedges)    Nutrition Interventions: Document food/fluid/supplement intake, Offer support with meals,snacks and hydration    Friction and Shear Interventions: Apply protective barrier, creams and emollients, Foam dressings/transparent film/skin sealants, HOB 30 degrees or less, Lift sheet, Lift team/patient mobility team, Minimize layers                Problem: Patient Education: Go to Patient Education Activity  Goal: Patient/Family Education  Outcome: Progressing Towards Goal     Problem: Patient Education: Go to Patient Education Activity  Goal: Patient/Family Education  Outcome: Progressing Towards Goal     Problem: Patient Education: Go to Patient Education Activity  Goal: Patient/Family Education  Outcome: Progressing Towards Goal     Problem: Patient Education: Go to Patient Education Activity  Goal: Patient/Family Education  Outcome: Progressing Towards Goal     Problem: Patient Education: Go to Patient Education Activity  Goal: Patient/Family Education  Outcome: Progressing Towards Goal     Problem: Non-Violent Restraints  Goal: Removal from restraints as soon as assessed to be safe  Outcome: Progressing Towards Goal  Goal: No harm/injury to patient while restraints in use  Outcome: Progressing Towards Goal  Goal: Patient's dignity will be maintained  Outcome: Progressing Towards Goal  Goal: Patient Interventions  Outcome: Progressing Towards Goal     Problem: Non-Violent Restraints  Goal: Removal from restraints as soon as assessed to be safe  Outcome: Progressing Towards Goal  Goal: No harm/injury to patient while restraints in use  Outcome: Progressing Towards Goal  Goal: Patient's dignity will be maintained  Outcome: Progressing Towards Goal  Goal: Patient Interventions  Outcome: Progressing Towards Goal

## 2023-01-24 NOTE — PROGRESS NOTES
Problem: Dysphagia (Adult)  Goal: *Acute Goals and Plan of Care (Insert Text)  Description: Speech Therapy Goals  Initiated 1/23/2023  1. Patient will tolerate full liquid diet without overt s/s aspiration within 7 days. 2. Added 1/24/2023 patient will participate in instrumental testing as appropriate to objectively assess swallow function    Outcome: Progressing Towards Goal   SPEECH LANGUAGE PATHOLOGY DYSPHAGIA TREATMENT  Patient: Viktoria Stapleton (21 y.o. male)  Date: 1/24/2023  Diagnosis: [facility] [C81.90] <principal problem not specified>  Procedure(s) (LRB):  INFUSAPORT INSERTION (N/A) 6 Days Post-Op  Precautions:  Bed Alarm, Fall, Aspiration (telesitter;sun downs; chemo; L chest wall port; 3 sm chronic CVAs)    ASSESSMENT:  Patient fully alert, confused during session. He had intermittent wet vocal quality even in the absence of PO that would intermittently clear. Brother at bedside reporting that he does have GERD but does not take anything for it. Patient accepted several bites of pureed items, few sips of thin liquids and chicken broth. Patient with single episode of repeated strong coughing as he got to the end of the bowl of chicken broth,which he was drinking via straw. Suspect overt s/s aspiration likely related to a combination of rapid rate and sucking of air; however, given response and aspiration PNA, called wife to discuss further. Patient's wife, Otto Delgado opting for further swallow testing if it could be of benefit to the patient. He also has a history of hiatal hernia. Discussed how tolerance of PO may be variable with mentation. Discussed option for testing vs continued PO with acceptance of possibility of aspiration. She opt for testing via MBS rather than FEES as it may cause further agitation. Appropriateness of testing will depend on patient's mentation. PLAN:  Recommendations and Planned Interventions:  - cautiously continue on full liquid diet.  Single straw sips, strict upright positioning   -- assist with all PO  -- tentatively plan for MBS tomorrow (discussed with wife who desires further workup given difficulty) for objective assessment so long as fully alert, calm and cooperative  - crush meds in a pureed item such as applesauce or pudding    Patient continues to benefit from skilled intervention to address the above impairments. Continue treatment per established plan of care. Discharge Recommendations: To Be Determined     SUBJECTIVE:   Patient stated I love chicken broth. Wife reports chicken broth, coffee and orange juice are patient's absolute favorites    OBJECTIVE:   Cognitive and Communication Status:  Neurologic State: Confused  Orientation Level: Oriented to person  Cognition: Follows commands     Perseveration: No perseveration noted  Safety/Judgement: Decreased insight into deficits, Decreased awareness of need for safety, Decreased awareness of need for assistance, Decreased awareness of environment  Dysphagia Treatment:       P.O. Trials:  Patient Position: up in bed  Vocal quality prior to P.O.: Wet (intermittently, even in the absence of PO)  Consistency Presented: Thin liquid;Puree; Ice chips  How Presented: Straw;Successive swallows;SLP-fed/presented;Spoon     Bolus Acceptance: No impairment  Bolus Formation/Control: Impaired  Type of Impairment: Delayed  Propulsion: Delayed (# of seconds)  Oral Residue: None        Aspiration Signs/Symptoms: Delayed cough/throat clear (repeated, material expectorated. intermittent wet vocal quality throughout)              Pain:  Pain Scale 1: Numeric (0 - 10)  Pain Intensity 1: 0       After treatment:   Patient left in no apparent distress in bed, Call bell within reach, Nursing notified, and Caregiver / family present    COMMUNICATION/EDUCATION:     The patient's plan of care including recommendations, planned interventions, and recommended diet changes were discussed with: Registered nurse.      Michael Chirinos Kae, SLP  Time Calculation: 27 mins

## 2023-01-24 NOTE — PROGRESS NOTES
Hospitalist Progress Note    Subjective:   Daily Progress Note: 1/24/2023 4:46 PM    Hospital Course:  Mr. Sanjana Garcia is a 78-year-old male with PMH significant for HTN, seasonal allergies, anemia, recent 12/22 diagnosis of classic Hodgkin's lymphoma who who was admitted for [facility] for initiation of systemic chemotherapy. He received port placement, bone marrow biopsy, and PFTs, and has initiated chemotherapy treatment 1/19. Oncology has asked our hospitalist group to assume attending care for patient, and Dr. Miesha Burkett will remain on as consultant. Per D/W onc there is concern for ongoing tumor lysis syndrome, we will monitor Q8 uric acid levels, and electrolytes, with repletion as needed. PT/OT and nutritionist seeing patient as well to improve functional and nutritional status. Pt within underlying dementia per d/w family and suspected delirium worse overnight, coughing w/ PO intake and now productive of brown sputum, suspicious for aspiration pneumonia.   Initiating Vanco, Cefepime, and Flagyl for aspiration pna coverage in pt with PCN allergy, Yeast growing in sputum, added diflucan    Subjective:  Patient seen and evaluated at bedside, overnight events reviewed, patient currently has no new complaints, discussed with RN    Current Facility-Administered Medications   Medication Dose Route Frequency    sodium chloride (NS) flush 5-40 mL  5-40 mL IntraVENous Q8H    sodium chloride (NS) flush 5-40 mL  5-40 mL IntraVENous PRN    sodium phosphate 15 mmol in dextrose 5% 250 mL infusion   IntraVENous ONCE    LORazepam (ATIVAN) injection 0.5 mg  0.5 mg IntraVENous Q4H PRN    fluconazole (DIFLUCAN) 200mg/100 mL IVPB (premix)  200 mg IntraVENous DAILY    heparin (porcine) injection 5,000 Units  5,000 Units SubCUTAneous Q8H    0.9% sodium chloride infusion  100 mL/hr IntraVENous CONTINUOUS    HYDROmorphone (DILAUDID) injection 0.5-1 mg  0.5-1 mg IntraVENous Q4H PRN    naloxone (NARCAN) injection 1 mg  1 mg IntraVENous Q5MIN PRN    OLANZapine (ZyPREXA) 2.5 mg in sterile water (preservative free) 0.5 mL injection  2.5 mg IntraMUSCular DAILY PRN    pantoprazole (PROTONIX) 40 mg in 0.9% sodium chloride 10 mL injection  40 mg IntraVENous DAILY    cefepime (MAXIPIME) 2,000 mg in 0.9% sodium chloride (MBP/ADV) 100 mL MBP  2,000 mg IntraVENous Q12H    lidocaine 4 % patch 1 Patch  1 Patch TransDERmal Q24H    metroNIDAZOLE (FLAGYL) IVPB premix 500 mg  500 mg IntraVENous Q12H    melatonin tablet 3 mg  3 mg Oral QHS PRN    prochlorperazine (COMPAZINE) injection 10 mg  10 mg IntraVENous Q6H PRN    glucose chewable tablet 16 g  4 Tablet Oral PRN    glucagon (GLUCAGEN) injection 1 mg  1 mg IntraMUSCular PRN    dextrose 10 % infusion 0-250 mL  0-250 mL IntraVENous PRN    alum-mag hydroxide-simeth (MYLANTA) oral suspension 30 mL  30 mL Oral Q4H PRN    alcohol 62% (NOZIN) nasal  1 Ampule  1 Ampule Topical Q12H    ondansetron (ZOFRAN) injection 4 mg  4 mg IntraVENous Q4H PRN    acetaminophen (TYLENOL) tablet 650 mg  650 mg Oral Q6H PRN    polyethylene glycol (MIRALAX) packet 17 g  17 g Oral QHS PRN    allopurinoL (ZYLOPRIM) tablet 100 mg  100 mg Oral BID        Review of Systems:    Review of Systems   Unable to perform ROS: Other (Delirium/Confusion)      Objective:     Visit Vitals  /74   Pulse 100   Temp 97 °F (36.1 °C)   Resp 19   Ht 5' 5\" (1.651 m)   Wt 66.5 kg (146 lb 9.7 oz)   SpO2 96%   BMI 24.40 kg/m²    O2 Flow Rate (L/min): 3 l/min O2 Device: None (Room air)    Temp (24hrs), Av.3 °F (36.3 °C), Min:97 °F (36.1 °C), Max:97.5 °F (36.4 °C)      No intake/output data recorded.  1901 -  0700  In: 4216.7 [I.V.:4216.7]  Out: 300 [Urine:300]    PHYSICAL EXAM:    Physical Exam  Constitutional:       General: He is not in acute distress. Appearance: He is cachectic. He is ill-appearing. He is not toxic-appearing or diaphoretic. HENT:      Head: Normocephalic and atraumatic. Nose: Nose normal. No congestion or rhinorrhea. Mouth/Throat:      Mouth: Mucous membranes are moist.      Pharynx: Oropharynx is clear. No oropharyngeal exudate or posterior oropharyngeal erythema. Eyes:      General: No scleral icterus. Right eye: No discharge. Left eye: No discharge. Extraocular Movements: Extraocular movements intact. Conjunctiva/sclera: Conjunctivae normal.      Pupils: Pupils are equal, round, and reactive to light. Neck:      Vascular: No carotid bruit. Cardiovascular:      Rate and Rhythm: Regular rhythm. Tachycardia present. Pulses:           Radial pulses are 2+ on the right side and 2+ on the left side. Popliteal pulses are 1+ on the right side and 1+ on the left side. Heart sounds: Normal heart sounds. No murmur heard. No friction rub. No gallop. Pulmonary:      Effort: Pulmonary effort is normal. No respiratory distress. Breath sounds: No stridor. Examination of the right-lower field reveals decreased breath sounds. Examination of the left-lower field reveals decreased breath sounds. Decreased breath sounds present. No wheezing, rhonchi or rales. Chest:      Chest wall: No tenderness. Abdominal:      General: Abdomen is flat. Bowel sounds are normal. There is no distension. Palpations: Abdomen is soft. There is no mass. Tenderness: There is no abdominal tenderness. There is no right CVA tenderness, left CVA tenderness, guarding or rebound. Hernia: No hernia is present. Musculoskeletal:         General: No swelling, tenderness, deformity or signs of injury. Normal range of motion. Cervical back: Normal range of motion and neck supple. No rigidity or tenderness. Right lower leg: No edema. Left lower leg: No edema. Comments: Trace nonpitting edema   Lymphadenopathy:      Cervical: No cervical adenopathy. Skin:     General: Skin is warm and dry.       Capillary Refill: Capillary refill takes less than 2 seconds. Coloration: Skin is pale. Skin is not jaundiced. Findings: No bruising, erythema, lesion or rash. Comments: BLE edema with dorsal aspect of foot redness, improved from yesterday   Neurological:      General: No focal deficit present. Mental Status: He is alert and oriented to person, place, and time. Mental status is at baseline. Cranial Nerves: No cranial nerve deficit. Sensory: No sensory deficit. Motor: Weakness present. Coordination: Coordination normal.      Gait: Gait normal.      Deep Tendon Reflexes: Reflexes normal.      Comments: Confused, awake and oriented to self but not to person/place/situation. Calm and cooperative at this time. Psychiatric:         Mood and Affect: Mood normal.         Behavior: Behavior normal.         Thought Content:  Thought content normal.         Judgment: Judgment normal.          Data Review    Recent Results (from the past 24 hour(s))   URIC ACID    Collection Time: 01/23/23  3:49 PM   Result Value Ref Range    Uric acid 5.3 3.5 - 7.2 MG/DL   VANCOMYCIN, TROUGH    Collection Time: 01/23/23  3:49 PM   Result Value Ref Range    Vancomycin,trough 9.1 5.0 - 10.0 ug/mL    Reported dose date NOT PROVIDED      Reported dose time: NOT PROVIDED      Reported dose: NOT PROVIDED UNITS   CBC WITH AUTOMATED DIFF    Collection Time: 01/24/23  4:37 AM   Result Value Ref Range    WBC 10.7 4.1 - 11.1 K/uL    RBC 3.45 (L) 4.10 - 5.70 M/uL    HGB 10.2 (L) 12.1 - 17.0 g/dL    HCT 34.6 (L) 36.6 - 50.3 %    .3 (H) 80.0 - 99.0 FL    MCH 29.6 26.0 - 34.0 PG    MCHC 29.5 (L) 30.0 - 36.5 g/dL    RDW 19.0 (H) 11.5 - 14.5 %    PLATELET 076 (L) 202 - 400 K/uL    MPV 9.3 8.9 - 12.9 FL    NRBC 0.0 0  WBC    ABSOLUTE NRBC 0.00 0.00 - 0.01 K/uL    NEUTROPHILS 90 (H) 32 - 75 %    LYMPHOCYTES 6 (L) 12 - 49 %    MONOCYTES 2 (L) 5 - 13 %    EOSINOPHILS 1 0 - 7 %    BASOPHILS 0 0 - 1 %    IMMATURE GRANULOCYTES 1 (H) 0.0 - 0.5 % ABS. NEUTROPHILS 9.7 (H) 1.8 - 8.0 K/UL    ABS. LYMPHOCYTES 0.6 (L) 0.8 - 3.5 K/UL    ABS. MONOCYTES 0.2 0.0 - 1.0 K/UL    ABS. EOSINOPHILS 0.1 0.0 - 0.4 K/UL    ABS. BASOPHILS 0.0 0.0 - 0.1 K/UL    ABS. IMM. GRANS. 0.1 (H) 0.00 - 0.04 K/UL    DF SMEAR SCANNED      RBC COMMENTS ANISOCYTOSIS  1+       METABOLIC PANEL, COMPREHENSIVE    Collection Time: 01/24/23  4:37 AM   Result Value Ref Range    Sodium 145 136 - 145 mmol/L    Potassium 4.7 3.5 - 5.1 mmol/L    Chloride 116 (H) 97 - 108 mmol/L    CO2 23 21 - 32 mmol/L    Anion gap 6 5 - 15 mmol/L    Glucose 110 (H) 65 - 100 mg/dL    BUN 44 (H) 6 - 20 MG/DL    Creatinine 1.28 0.70 - 1.30 MG/DL    BUN/Creatinine ratio 34 (H) 12 - 20      eGFR 59 (L) >60 ml/min/1.73m2    Calcium 8.3 (L) 8.5 - 10.1 MG/DL    Bilirubin, total 2.1 (H) 0.2 - 1.0 MG/DL    ALT (SGPT) 10 (L) 12 - 78 U/L    AST (SGOT) 20 15 - 37 U/L    Alk. phosphatase 278 (H) 45 - 117 U/L    Protein, total 5.9 (L) 6.4 - 8.2 g/dL    Albumin 1.7 (L) 3.5 - 5.0 g/dL    Globulin 4.2 (H) 2.0 - 4.0 g/dL    A-G Ratio 0.4 (L) 1.1 - 2.2     PHOSPHORUS    Collection Time: 01/24/23  4:37 AM   Result Value Ref Range    Phosphorus 2.1 (L) 2.6 - 4.7 MG/DL   URIC ACID    Collection Time: 01/24/23  4:37 AM   Result Value Ref Range    Uric acid 5.0 3.5 - 7.2 MG/DL       CT HEAD WO CONT   Final Result   1. No evidence of acute infarct or intracranial hemorrhage. No significant   change. 2. Mild periventricular white matter disease is likely secondary to chronic   small vessel ischemic changes. CT HEAD WO CONT   Final Result   No acute process or change compared to the prior exam.            XR CHEST PORT   Final Result      Decreased lung volumes. Suggestion of increase airspace opacities bilaterally   and increased small left pleural effusion. CT BX BONE MARROW DIAGNOSTIC   Final Result   Technically successful CT guided bone marrow biopsy. XR CHEST PORT   Final Result   No pneumothorax.           XR CHEST SNGL V   Final Result   Intraoperative views as above. See operative report for details. DUPLEX LOWER EXT VENOUS BILAT   Final Result      MRI BRAIN W WO CONT   Final Result      1. Evaluation is significantly limited by patient positioning. No evidence of   intracranial metastases. No acute intracranial abnormality. 2. Generalized parenchymal volume loss and mild chronic microvascular ischemic   disease. Small chronic infarcts as above. CT ABD PELV W CONT   Final Result   1. Mediastinal, hilar, upper abdominal, retroperitoneal, and iliac chain   adenopathy with multiple splenic lesions most consistent with lymphoma. Metastatic disease or other inflammatory/infectious process is less likely. Retroperitoneal nodes are amenable to percutaneous biopsy. 2.  Indeterminate, possibly benign segment 6 hepatic hypodensity. Consider   further evaluation with MRI. 3.  Additional findings as above. CT CHEST W CONT   Final Result   1. Mediastinal, hilar, upper abdominal, retroperitoneal, and iliac chain   adenopathy with multiple splenic lesions most consistent with lymphoma. Metastatic disease or other inflammatory/infectious process is less likely. Retroperitoneal nodes are amenable to percutaneous biopsy. 2.  Indeterminate, possibly benign segment 6 hepatic hypodensity. Consider   further evaluation with MRI. 3.  Additional findings as above.          XR FLUOROSCOPY UNDER 60 MINUTES    (Results Pending)       Active Problems:    Essential hypertension (2/19/2017)      Iron deficiency anemia (8/8/2022)      Type 2 diabetes mellitus (12/28/2022)      Hodgkin lymphoma ([address] (1/12/2023)      Severe protein-calorie malnutrition ([address] (1/18/2023)      Assessment/Plan:   Hodgkin's lymphoma with associated splenomegaly/hyperbilirubinemia with elevated alkaline phosphatase-patient currently being followed by oncology, admitted for initiation of inpatient chemotherapy, status post port placement  Continue chemotherapy as per oncology recommendations  Continue as needed antiemetics  Continue supportive care  Continue to follow oncology recommendations    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, status post rasburicase x1, serum creatinine currently downtrending  Continue to trend uric acid  Continue to trend serum creatinine  Continue maintenance IV fluids  Continue to follow oncology recommendations    Altered mental status/metabolic encephalopathy/dementia-of note patient does have occasional episodes of confusion while in the hospital, likely delirium due to inpatient hospital stay, patient does have a history of underlying dementia  Currently well controlled  Continue current medications  Continue to monitor patient's mental status    Severe protein calorie malnutrition-likely secondary to problem #1  Nutrition consult appreciated  Continue to follow recommendations    Aspiration pneumonia-of note patient had an episode of aspiration pneumonia, currently does not meet sepsis criteria at this time, sputum shows growth of yeast  Continue Diflucan  Continue cefepime and Flagyl for broad-spectrum antibiotic coverage  Follow-up sputum culture results  Follow blood culture results  Continue to monitor patient's clinical status    Gastroesophageal reflux disease-continue current medications    Prophylaxis-SCDs  FEN-full liquid diet with nutritional supplementation, replete potassium and magnesium  Full code, patient surrogate decision-maker is his wife, updated at bedside  Disposition-as per Oncology    Kaden Marshall MD

## 2023-01-24 NOTE — PROGRESS NOTES
TRANSFER - OUT REPORT:    Verbal report given to Santos Love RN (name) on Mireya Keller  being transferred to [facility] (unit) for routine progression of care       Report consisted of patients Situation, Background, Assessment and   Recommendations(SBAR). Information from the following report(s) SBAR, Kardex, Intake/Output, and MAR was reviewed with the receiving nurse. Lines:   Venous Access Device Portacath 01/19/23 (Active)   Central Line Being Utilized Yes 01/24/23 0739   Criteria for Appropriate Use Irritant/vesicant medication 01/24/23 0739   Site Assessment Clean, dry, & intact 01/24/23 0739   Date of Last Dressing Change 01/19/23 01/24/23 0739   Dressing Status Clean, dry, & intact 01/24/23 0739   Dressing Type Transparent 01/24/23 0739   Action Taken Open ports on tubing capped 01/24/23 0739   Date Accessed (Medial Site) 01/19/23 01/19/23 1251   Access Time (Medial Site) 1251 01/19/23 1251   Access Needle Size (Site #1) 20 G 01/19/23 1251   Access Needle Length (Medial Site) 0.75 inches 01/19/23 1251   Positive Blood Return (Medial Site) Yes 01/23/23 1308   Action Taken (Medial Site) Flushed; Infusing 01/23/23 0737   Positive Blood Return (Lateral Site) Yes 01/24/23 1623   Action Taken (Lateral Site) Flushed; Infusing 01/24/23 0453   Alcohol Cap Used Yes 01/22/23 0800        Opportunity for questions and clarification was provided.       Patient transported with:   O2 @ 2 liters  Registered Nurse 23

## 2023-01-24 NOTE — PROGRESS NOTES
Received notification from bedside RN about patient with regards to: patient pulled out collette cath at the beginning of the shift, Zyprexa given with no relief of persistent agitation.  Wife at bedside and requesting medication to help patient relax and sleep and mittens restraints as patient keeps pulling on lines and on the spot where previous port was  VS: /60, HR 96, RR 18, O2 sat  95% on NC 3 L    Intervention given:   - Hydroxyzine 12.5 mg IM x 1 dose   - bilateral mittens restraints
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
## 2023-01-24 NOTE — PROGRESS NOTES
Problem: Non-Violent Restraints  Goal: Removal from restraints as soon as assessed to be safe  Outcome: Progressing Towards Goal  Goal: No harm/injury to patient while restraints in use  Outcome: Progressing Towards Goal  Goal: Patient's dignity will be maintained  Outcome: Progressing Towards Goal  Goal: Patient Interventions  Outcome: Progressing Towards Goal     Problem: Non-Violent Restraints  Goal: Removal from restraints as soon as assessed to be safe  Outcome: Progressing Towards Goal  Goal: No harm/injury to patient while restraints in use  Outcome: Progressing Towards Goal  Goal: Patient's dignity will be maintained  Outcome: Progressing Towards Goal  Goal: Patient Interventions  Outcome: Progressing Towards Goal

## 2023-01-24 NOTE — PROGRESS NOTES
Patient confused. Pulled out chest Port cath at beginning of shift. Zyprexa given and no effect. Patient continue to pull on port. Wife at bedside requesting for Benadryl for  to help  sleep or any recommendation. René Cox NP informed. 0110  Patient restless and continue to pull on port lines. Wife be coming agitated because she concerned pf her  safety and she unable to rest.  Wife stated she has not had any sleep. Wife request for some kind of restraint. René Cox NP notified. Requested for bilateral mitts.

## 2023-01-24 NOTE — PROGRESS NOTES
End of Shift Note    Bedside shift change report given to STARR Whitman(oncoming nurse) by Cuba Bolton RN (offgoing nurse). Report included the following information SBAR, Kardex, Intake/Output, and MAR    Shift worked:  [id number]     Shift summary and any significant changes:     Patient alert and more interactive today but remains confused. Morning PO meds crushed in applesauce, patient too drowsy, confused to give evening PO meds. All IV meds, CHG line care, and frequent rounding provided. Labs collected from port, in the afternoon pt refused PICC team to collect paired blood cultures. Several family members in room through shift supporting pt and wife. .        Concerns for physician to address:       Zone phone for oncoming shift:              Cuba Bolton RN

## 2023-01-24 NOTE — TELEPHONE ENCOUNTER
Pt wife called  & left a vm stating that she is @ home. Katty Cespedes hurt her leg trying to get the nurse because pt pulled out tube & core. Please call Katty Cespedes to discuss what to do moving forward for pt. [phone].

## 2023-01-24 NOTE — PROGRESS NOTES
Problem: Self Care Deficits Care Plan (Adult)  Goal: *Acute Goals and Plan of Care (Insert Text)  Description: FUNCTIONAL STATUS PRIOR TO ADMISSION: Pt has had recent decline in function, moving to a first floor setup in his home. Patient uses [facility] for functional mobility, wife reports he has a RW but has not been using it. He receives assistance for ADLs as needed from wife. HOME SUPPORT: The patient lived with his wife who provides assistance. Occupational Therapy Goals  Initiated 1/18/2023  1. Patient will perform grooming with supervision/set-up in standing within 7 day(s). 2.  Patient will perform upper body dressing with supervision/set-up within 7 day(s). 3.  Patient will perform toilet transfers with supervision/set-up within 7 day(s). 4.  Patient will perform all aspects of toileting with supervision/set-up within 7 day(s). 5.  Patient will participate in upper extremity therapeutic exercise/activities with independence for 5 minutes within 7 day(s). 6.  Patient will utilize energy conservation techniques during functional activities with verbal cues within 7 day(s). Outcome: Not Progressing Towards Goal     OCCUPATIONAL THERAPY TREATMENT  Patient: Robyn Estrada (59 y.o. male)  Date: 1/24/2023  Diagnosis: Hodgkin lymphoma ([facility] [C81.90] <principal problem not specified>  Procedure(s) (LRB):  INFUSAPORT INSERTION (N/A) 6 Days Post-Op  Precautions: Bed Alarm, Fall, Aspiration (telesitter;sun downs; chemo; L chest wall port; 3 sm chronic CVAs)  Chart, occupational therapy assessment, plan of care, and goals were reviewed. ASSESSMENT  Patient continues with skilled OT services and is not progressing towards goals. Patient with decreased arousal, mobility and independence, assisted with bed level toileting, bed mobility. He did follow commands with increased time and cues. Discussed with RN and plan to transfer patient due to change.       Current Level of Function Impacting Discharge (ADLs): total care    Other factors to consider for discharge:          PLAN :  Patient continues to benefit from skilled intervention to address the above impairments. Continue treatment per established plan of care to address goals. Recommend with staff: toileting bed level    Recommend next OT session: as tolerated    Recommendation for discharge: (in order for the patient to meet his/her long term goals)  Therapy up to 5 days/week in SNF setting    This discharge recommendation:  Has been made in collaboration with the attending provider and/or case management    IF patient discharges home will need the following DME:        SUBJECTIVE:   Patient stated: no subjective statements made    OBJECTIVE DATA SUMMARY:   Cognitive/Behavioral Status:  Neurologic State: Confused;Drowsy  Orientation Level: Oriented to person  Cognition: Decreased attention/concentration;Decreased command following        Safety/Judgement: Lack of insight into deficits    Functional Mobility and Transfers for ADLs:  Bed Mobility:  Rolling: Maximum assistance; Additional time;Assist x1  Scooting: Total assistance    Transfers:             Balance:  Sitting:  (unable due to decreased arousal)    ADL Intervention:   Re-oriented to place, situation and time    On arrival he had his LE's over the bed rail on the right and slid down in bed, max assist to reposition in bed. Facilitated bridge with LE's and maintained with cues noted to be incontinent bladder and chucks, brief and gown changed    Did follow some commands and assisted with jeff-care which caused him to void again and process repeated    Left with LE's floated and head of bed ~60 degrees due to MD concern for aspiration          Toileting  Toileting Assistance: Total assistance(dependent)  Bladder Hygiene: Set-up; Supervision  Bowel Hygiene:  Total assistance (dependent)  Clothing Management: Total assistance (dependent)    Cognitive Retraining  Safety/Judgement: Lack of insight into deficits        Pain:  No complaint    Activity Tolerance:   Poor    After treatment patient left in no apparent distress:   Supine in bed, Heels elevated for pressure relief, Call bell within reach, and Bed / chair alarm activated    COMMUNICATION/COLLABORATION:   The patients plan of care was discussed with: Registered nurse and Physician.      Katelin Phillips OTR/L  Time Calculation: 24 mins
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
## 2023-01-24 NOTE — PROGRESS NOTES
End of Shift Note    Bedside shift change report given to Jennifer CLEMENTS (oncoming nurse) by Miguel Lema RN (offgoing nurse). Report included the following information SBAR, Kardex, ED Summary, OR Summary, Procedure Summary, Intake/Output, MAR, and Recent Results    Shift worked:  [id number]     Shift summary and any significant changes:    Patient at beginning of shift pulled out left chest port at beginning of shift. Rosina CLEMENTS and I Replaced with new port. Paint restless and continue to pull on his port. Dressing placed on port to secure line. Patient non violent restrain mitts placed. Patient refuse any intake. Vitals stable. Patient incontinent x 2. Patient bladder scan = 533. Patient voided right after scan. Re scan patient after void = 257. Wife at bedside. Avasys in place     Concerns for physician to address:  See above     Zone phone for oncoming shift:   [phone]       Activity:  Activity Level: Bed Rest, Up with Assistance  Number times ambulated in hallways past shift: 0  Number of times OOB to chair past shift: 0    Cardiac:   Cardiac Monitoring: No      Cardiac Rhythm: Sinus Rhythm    Access:  Current line(s): port     Genitourinary:   Urinary status: incontinent    Respiratory:   O2 Device: None (Room air)  Chronic home O2 use?: NO  Incentive spirometer at bedside: NO       GI:  Last Bowel Movement Date: 01/18/23  Current diet:  ADULT ORAL NUTRITION SUPPLEMENT Lunch, Dinner; Frozen Supplement  ADULT DIET Full Liquid;  Patient likes chicken broth, decaf coffee instead of tea every meal, splenda and creamer, likes megan and vanilla ice cream  Passing flatus: YES  Tolerating current diet: NO       Pain Management:   Patient states pain is manageable on current regimen: NO c/o pain    Skin:  Matthew Score: 14  Interventions: foam dressing, PT/OT consult, and nutritional support     Patient Safety:  Fall Score:    Interventions: bed/chair alarm, assistive device (walker, cane, etc), gripper socks, and pt to call before getting OOB       Length of Stay:  Expected LOS: 9d 21h  Actual LOS: [address], RN

## 2023-01-25 ENCOUNTER — TELEPHONE (OUTPATIENT)
Dept: ONCOLOGY | Age: 73
End: 2023-01-25

## 2023-01-25 LAB
ALBUMIN SERPL-MCNC: 1.8 G/DL (ref 3.5–5)
ALBUMIN/GLOB SERPL: 0.5 (ref 1.1–2.2)
ALP SERPL-CCNC: 266 U/L (ref 45–117)
ALT SERPL-CCNC: 10 U/L (ref 12–78)
ANION GAP SERPL CALC-SCNC: 10 MMOL/L (ref 5–15)
AST SERPL-CCNC: 18 U/L (ref 15–37)
BASOPHILS # BLD: 0 K/UL (ref 0–0.1)
BASOPHILS NFR BLD: 0 % (ref 0–1)
BILIRUB SERPL-MCNC: 1.7 MG/DL (ref 0.2–1)
BUN SERPL-MCNC: 38 MG/DL (ref 6–20)
BUN/CREAT SERPL: 36 (ref 12–20)
CALCIUM SERPL-MCNC: 7.9 MG/DL (ref 8.5–10.1)
CHLORIDE SERPL-SCNC: 114 MMOL/L (ref 97–108)
CO2 SERPL-SCNC: 22 MMOL/L (ref 21–32)
CREAT SERPL-MCNC: 1.05 MG/DL (ref 0.7–1.3)
DIFFERENTIAL METHOD BLD: ABNORMAL
EOSINOPHIL # BLD: 0.3 K/UL (ref 0–0.4)
EOSINOPHIL NFR BLD: 3 % (ref 0–7)
ERYTHROCYTE [DISTWIDTH] IN BLOOD BY AUTOMATED COUNT: 18.6 % (ref 11.5–14.5)
GLOBULIN SER CALC-MCNC: 3.6 G/DL (ref 2–4)
GLUCOSE SERPL-MCNC: 123 MG/DL (ref 65–100)
HCT VFR BLD AUTO: 33.3 % (ref 36.6–50.3)
HGB BLD-MCNC: 10.1 G/DL (ref 12.1–17)
IMM GRANULOCYTES # BLD AUTO: 0.1 K/UL (ref 0–0.04)
IMM GRANULOCYTES NFR BLD AUTO: 1 % (ref 0–0.5)
LYMPHOCYTES # BLD: 0.6 K/UL (ref 0.8–3.5)
LYMPHOCYTES NFR BLD: 8 % (ref 12–49)
MCH RBC QN AUTO: 29.5 PG (ref 26–34)
MCHC RBC AUTO-ENTMCNC: 30.3 G/DL (ref 30–36.5)
MCV RBC AUTO: 97.4 FL (ref 80–99)
MONOCYTES # BLD: 0.3 K/UL (ref 0–1)
MONOCYTES NFR BLD: 4 % (ref 5–13)
NEUTS SEG # BLD: 6.8 K/UL (ref 1.8–8)
NEUTS SEG NFR BLD: 84 % (ref 32–75)
NRBC # BLD: 0 K/UL (ref 0–0.01)
NRBC BLD-RTO: 0 PER 100 WBC
PHOSPHATE SERPL-MCNC: 3.3 MG/DL (ref 2.6–4.7)
PLATELET # BLD AUTO: 166 K/UL (ref 150–400)
PMV BLD AUTO: 9.3 FL (ref 8.9–12.9)
POTASSIUM SERPL-SCNC: 3.3 MMOL/L (ref 3.5–5.1)
PROT SERPL-MCNC: 5.4 G/DL (ref 6.4–8.2)
RBC # BLD AUTO: 3.42 M/UL (ref 4.1–5.7)
SODIUM SERPL-SCNC: 146 MMOL/L (ref 136–145)
URATE SERPL-MCNC: 5.4 MG/DL (ref 3.5–7.2)
WBC # BLD AUTO: 8.1 K/UL (ref 4.1–11.1)

## 2023-01-25 PROCEDURE — 74011000250 HC RX REV CODE- 250

## 2023-01-25 PROCEDURE — 92526 ORAL FUNCTION THERAPY: CPT | Performed by: SPEECH-LANGUAGE PATHOLOGIST

## 2023-01-25 PROCEDURE — 74011000250 HC RX REV CODE- 250: Performed by: STUDENT IN AN ORGANIZED HEALTH CARE EDUCATION/TRAINING PROGRAM

## 2023-01-25 PROCEDURE — 74011250636 HC RX REV CODE- 250/636: Performed by: INTERNAL MEDICINE

## 2023-01-25 PROCEDURE — 65660000001 HC RM ICU INTERMED STEPDOWN

## 2023-01-25 PROCEDURE — 74011000258 HC RX REV CODE- 258

## 2023-01-25 PROCEDURE — C9113 INJ PANTOPRAZOLE SODIUM, VIA: HCPCS

## 2023-01-25 PROCEDURE — 99233 SBSQ HOSP IP/OBS HIGH 50: CPT | Performed by: STUDENT IN AN ORGANIZED HEALTH CARE EDUCATION/TRAINING PROGRAM

## 2023-01-25 PROCEDURE — 74011250636 HC RX REV CODE- 250/636

## 2023-01-25 PROCEDURE — 74011250637 HC RX REV CODE- 250/637: Performed by: SURGERY

## 2023-01-25 PROCEDURE — 36415 COLL VENOUS BLD VENIPUNCTURE: CPT

## 2023-01-25 PROCEDURE — 77010033678 HC OXYGEN DAILY

## 2023-01-25 PROCEDURE — 84100 ASSAY OF PHOSPHORUS: CPT

## 2023-01-25 PROCEDURE — 80053 COMPREHEN METABOLIC PANEL: CPT

## 2023-01-25 PROCEDURE — 85025 COMPLETE CBC W/AUTO DIFF WBC: CPT

## 2023-01-25 PROCEDURE — 84550 ASSAY OF BLOOD/URIC ACID: CPT

## 2023-01-25 PROCEDURE — 74011250636 HC RX REV CODE- 250/636: Performed by: NURSE PRACTITIONER

## 2023-01-25 RX ORDER — DEXTROSE MONOHYDRATE 50 MG/ML
75 INJECTION, SOLUTION INTRAVENOUS CONTINUOUS
Status: DISCONTINUED | OUTPATIENT
Start: 2023-01-25 | End: 2023-02-07 | Stop reason: HOSPADM

## 2023-01-25 RX ORDER — ACETAMINOPHEN 325 MG/1
650 TABLET ORAL AS NEEDED
Status: CANCELLED
Start: 2023-02-01

## 2023-01-25 RX ORDER — DIPHENHYDRAMINE HYDROCHLORIDE 50 MG/ML
25 INJECTION, SOLUTION INTRAMUSCULAR; INTRAVENOUS ONCE
Status: CANCELLED
Start: 2023-02-01 | End: 2023-02-01

## 2023-01-25 RX ORDER — SODIUM CHLORIDE 9 MG/ML
5-40 INJECTION INTRAVENOUS AS NEEDED
Status: CANCELLED | OUTPATIENT
Start: 2023-02-01

## 2023-01-25 RX ORDER — ONDANSETRON 2 MG/ML
4 INJECTION INTRAMUSCULAR; INTRAVENOUS 2 TIMES DAILY
Status: DISCONTINUED | OUTPATIENT
Start: 2023-01-25 | End: 2023-02-07 | Stop reason: HOSPADM

## 2023-01-25 RX ORDER — DOXORUBICIN HYDROCHLORIDE 2 MG/ML
25 INJECTION, SOLUTION INTRAVENOUS ONCE
Status: CANCELLED | OUTPATIENT
Start: 2023-02-01 | End: 2023-02-01

## 2023-01-25 RX ORDER — DIPHENHYDRAMINE HYDROCHLORIDE 50 MG/ML
50 INJECTION, SOLUTION INTRAMUSCULAR; INTRAVENOUS AS NEEDED
Status: CANCELLED
Start: 2023-02-01

## 2023-01-25 RX ORDER — SODIUM CHLORIDE 0.9 % (FLUSH) 0.9 %
5-40 SYRINGE (ML) INJECTION AS NEEDED
Status: CANCELLED | OUTPATIENT
Start: 2023-02-01

## 2023-01-25 RX ORDER — HYDROCORTISONE SODIUM SUCCINATE 100 MG/2ML
100 INJECTION, POWDER, FOR SOLUTION INTRAMUSCULAR; INTRAVENOUS AS NEEDED
Status: CANCELLED | OUTPATIENT
Start: 2023-02-01

## 2023-01-25 RX ORDER — EPINEPHRINE 1 MG/ML
0.3 INJECTION, SOLUTION, CONCENTRATE INTRAVENOUS AS NEEDED
Status: CANCELLED | OUTPATIENT
Start: 2023-02-01

## 2023-01-25 RX ORDER — ONDANSETRON 2 MG/ML
8 INJECTION INTRAMUSCULAR; INTRAVENOUS AS NEEDED
Status: CANCELLED | OUTPATIENT
Start: 2023-02-01

## 2023-01-25 RX ORDER — HEPARIN 100 UNIT/ML
500 SYRINGE INTRAVENOUS AS NEEDED
Status: CANCELLED
Start: 2023-02-01

## 2023-01-25 RX ORDER — SODIUM CHLORIDE 9 MG/ML
5-250 INJECTION, SOLUTION INTRAVENOUS AS NEEDED
Status: CANCELLED | OUTPATIENT
Start: 2023-02-01

## 2023-01-25 RX ORDER — DIPHENHYDRAMINE HYDROCHLORIDE 50 MG/ML
25 INJECTION, SOLUTION INTRAMUSCULAR; INTRAVENOUS AS NEEDED
Status: CANCELLED
Start: 2023-02-01

## 2023-01-25 RX ORDER — POTASSIUM CHLORIDE 7.45 MG/ML
10 INJECTION INTRAVENOUS ONCE
Status: COMPLETED | OUTPATIENT
Start: 2023-01-25 | End: 2023-01-25

## 2023-01-25 RX ORDER — PALONOSETRON 0.05 MG/ML
0.25 INJECTION, SOLUTION INTRAVENOUS ONCE
Status: CANCELLED | OUTPATIENT
Start: 2023-02-01 | End: 2023-02-01

## 2023-01-25 RX ORDER — ACETAMINOPHEN 325 MG/1
650 TABLET ORAL ONCE
Status: CANCELLED
Start: 2023-02-01 | End: 2023-02-01

## 2023-01-25 RX ORDER — ALBUTEROL SULFATE 0.83 MG/ML
2.5 SOLUTION RESPIRATORY (INHALATION) AS NEEDED
Status: CANCELLED
Start: 2023-02-01

## 2023-01-25 RX ADMIN — CEFEPIME 2000 MG: 2 INJECTION, POWDER, FOR SOLUTION INTRAVENOUS at 09:45

## 2023-01-25 RX ADMIN — HEPARIN SODIUM 5000 UNITS: 5000 INJECTION INTRAVENOUS; SUBCUTANEOUS at 12:21

## 2023-01-25 RX ADMIN — DEXTROSE MONOHYDRATE 75 ML/HR: 50 INJECTION, SOLUTION INTRAVENOUS at 14:51

## 2023-01-25 RX ADMIN — HYDROMORPHONE HYDROCHLORIDE 1 MG: 1 INJECTION, SOLUTION INTRAMUSCULAR; INTRAVENOUS; SUBCUTANEOUS at 22:23

## 2023-01-25 RX ADMIN — SODIUM CHLORIDE, PRESERVATIVE FREE 10 ML: 5 INJECTION INTRAVENOUS at 21:33

## 2023-01-25 RX ADMIN — METRONIDAZOLE 500 MG: 500 INJECTION, SOLUTION INTRAVENOUS at 14:50

## 2023-01-25 RX ADMIN — SODIUM CHLORIDE, PRESERVATIVE FREE 10 ML: 5 INJECTION INTRAVENOUS at 14:00

## 2023-01-25 RX ADMIN — ONDANSETRON 4 MG: 2 INJECTION INTRAMUSCULAR; INTRAVENOUS at 09:41

## 2023-01-25 RX ADMIN — SODIUM CHLORIDE, PRESERVATIVE FREE 10 ML: 5 INJECTION INTRAVENOUS at 06:22

## 2023-01-25 RX ADMIN — FLUCONAZOLE IN SODIUM CHLORIDE 200 MG: 2 INJECTION, SOLUTION INTRAVENOUS at 09:32

## 2023-01-25 RX ADMIN — CEFEPIME 2000 MG: 2 INJECTION, POWDER, FOR SOLUTION INTRAVENOUS at 21:31

## 2023-01-25 RX ADMIN — Medication 1 AMPULE: at 09:55

## 2023-01-25 RX ADMIN — LORAZEPAM 0.5 MG: 2 INJECTION INTRAMUSCULAR; INTRAVENOUS at 21:30

## 2023-01-25 RX ADMIN — ONDANSETRON 4 MG: 2 INJECTION INTRAMUSCULAR; INTRAVENOUS at 18:25

## 2023-01-25 RX ADMIN — HEPARIN SODIUM 5000 UNITS: 5000 INJECTION INTRAVENOUS; SUBCUTANEOUS at 06:22

## 2023-01-25 RX ADMIN — HEPARIN SODIUM 5000 UNITS: 5000 INJECTION INTRAVENOUS; SUBCUTANEOUS at 21:32

## 2023-01-25 RX ADMIN — Medication 1 AMPULE: at 21:31

## 2023-01-25 RX ADMIN — POTASSIUM CHLORIDE 10 MEQ: 10 INJECTION, SOLUTION INTRAVENOUS at 14:50

## 2023-01-25 RX ADMIN — METRONIDAZOLE 500 MG: 500 INJECTION, SOLUTION INTRAVENOUS at 04:25

## 2023-01-25 RX ADMIN — SODIUM CHLORIDE, PRESERVATIVE FREE 40 MG: 5 INJECTION INTRAVENOUS at 09:36

## 2023-01-25 NOTE — PROGRESS NOTES
[address] [place] [address] 
[phone] Patient: Laurie Teran MRN: [id number] :2008 Visit Information Date & Time Provider Department Dept. Phone Encounter #  
 2018  1:50 PM Gloria Garvin MD [address] [id number] Follow-up Instructions Return if symptoms worsen or fail to improve. Upcoming Health Maintenance Date Due  
 HPV AGE 9Y-34Y (1 of 2 - Male 2-Dose Series) 2018 MCV through Age 25 (1 of 2) 2018 DTaP/Tdap/Td series (6 - Tdap) 2018 Allergies as of 2018  Review Complete On: 2018 By: Leola Chopra Severity Noted Reaction Type Reactions Augmentin [Amoxicillin-pot Clavulanate]  2011    Diarrhea Pcn [Penicillins]  2013    Rash Current Immunizations  Reviewed on 10/10/2014 Name Date DTAP Vaccine 2012, 2009, 2008, 3/11/2008, 2008 HIB Vaccine 2008, 3/11/2008, 2008 Hepatitis A Vaccine 2009, 2008 Hepatitis B Vaccine 2008, 3/11/2008, 2008 IPV 2012, 2008, 3/11/2008, 2008 Influenza Vaccine 2018 Influenza Vaccine (Quad) PF 2016, 2016, 10/10/2014, 2013 Influenza Vaccine Split 2008 Influenza Vaccine Whole 2/10/2012 MMR Vaccine 2012, 2008 Pneumococcal Vaccine (Pcv) 2008, 2008, 3/11/2008, 2008 Rotavirus Vaccine 2008, 3/11/2008, 2008 Varicella Virus Vaccine Live 2012, 2008 Not reviewed this visit You Were Diagnosed With   
  
 Codes Comments Generalized abdominal pain    -  Primary ICD-10-CM: R10.84 ICD-9-CM: 789.07 Gastroenteritis     ICD-10-CM: K52.9 ICD-9-CM: 558. 9 Vitals BP Pulse Temp Resp Height(growth percentile)  109/58 (59 %/ 32 %)* (BP 1 Location: Right arm, BP Patient Position: [facility] [place] at [address] [facility] [address] [facility]  W: [phone] F: [phone]      Reason for Visit:   Lesvia Gabriel is a 67 y.o. male who is seen in consultation at the request of Dr. Gorman Phoenix for evaluation of Hodgkin lymphoma. He is now admitted to [facility] ER for initiation of systemic chemotherapy. Hematology / Oncology Treatment History:     Hematological/Oncological Diagnosis: Classic Hodgkin lymphoma    Date of Diagnosis: 2021    Treatment course: Plan for ABVD chemotherapy      History of Present Illness:     66year old with hx of hypertension, seasonal allergies, presents with worsening normocytic aneima of unclear etiology. He was diagnosed late December 2022 with Classic Hodgkin Lymphoma. Inpatient pllan is to start systemic therapy while for close monitoring given TLS with elevated uric acid and high risk for further deterioration after systemic therapy. While admitted we plan to obtain bone marrow biopsy, MRI of the brain, CT of the chest abdomen and pelvis, dopplers of the lower extremities, place port for systemic therapy and obtain PFTs. Interval History:     1/18/2023 : Patient seen at bedside with wife. Discussed doppler results were negative for DVT. Plan for port placement and bone marrow biopsy today. Hopefully will start chemotherapy in AM.     1/20/2023 Patient seen at bedside today with wife. Encouraged PO intake other than chicken broth. Currently receiving chemo. 1/23/2023: Patient seen at bedside today with his wife. He was unable to participate in conversation, very lethargic. Wife was very tearful this morning as she gave specific details on his condition over the weekend. Patient was diagnosed with aspiration pneumonia and became very confused and agitated at times. She is very worried that he has not been eating and has been very lethargic.   We discussed small improvements as he is Sitting) 87 97.6 °F (36.4 °C) (Oral) 20 (!) 5' 6.8\" (1.697 m) (>99 %, Z= 4.25) Weight(growth percentile) SpO2 BMI Smoking Status 128 lb 3.2 oz (58.2 kg) (99 %, Z= 2.30) 98% 20.2 kg/m2 (89 %, Z= 1.21) Never Smoker *BP percentiles are based on NHBPEP's 4th Report Growth percentiles are based on CDC 2-20 Years data. Vitals History BMI and BSA Data Body Mass Index Body Surface Area  
 20.2 kg/m 2 1.66 m 2 [facility]erred [facility] [phone] Your Updated Medication List  
  
   
This list is accurate as of: 2/5/18  2:13 PM.  Always use your most recent med list.  
  
  
  
  
 ibuprofen 100 mg/5 mL suspension Commonly known as:  ADVIL;MOTRIN Take 10 ml every 6 hours as needed for headache.  
  
 montelukast 5 mg chewable tablet Commonly known as:  SINGULAIR Take 1 Tab by mouth nightly. ondansetron 4 mg disintegrating tablet Commonly known as:  ZOFRAN ODT Take 1 Tab by mouth every eight (8) hours as needed for Nausea. pantoprazole 20 mg tablet Commonly known as:  PROTONIX  
TAKE ONE TABLET BY MOUTH EVERY DAY Prescriptions Sent to Pharmacy Refills  
 ondansetron (ZOFRAN ODT) 4 mg disintegrating tablet 0 Sig: Take 1 Tab by mouth every eight (8) hours as needed for Nausea. Class: Normal  
 Pharmacy: [facility] #: [phone] Route: Oral  
  
We Performed the Following AMB POC URINALYSIS DIP STICK AUTO W/O MICRO [54316 CPT(R)] CBC WITH AUTOMATED DIFF [68593 CPT(R)] Follow-up Instructions Return if symptoms worsen or fail to improve. To-Do List   
 02/05/2018 Imaging:  XR ABD (KUB) Patient Instructions Abdominal Pain in Children: Care Instructions Your Care Instructions Abdominal pain has many possible causes.  Some are not serious and get now beginning to become more alert and oriented. Hopeful today that he will be able to eat and has a pending speech consult. 1/24/2023: Patient seen at bedside this morning, lethargic and currently wearing mitts. Per nursing he became very agitated overnight and pulled the [place] needle out of his port. He has been given his low-dose Seroquel and still remained agitated, was given a very small dose of IV Ativan. Patient is now resting comfortably. There is no family at bedside. Called to bedside around 1500 for concerns in change in patient condition. Suspected re-aspiration. CXR ordered and stat lasix given. Transfer to PCU. Long discussion regarding goals of care give patient continued decline. Discussed concern for very guarded short term prognosis. 1/25/2023 : Patient lethargic and responsive to sternal rub. Long discussion with wife regarding patient condition that continues to decline. Wife very concerned that about nutrition as patient has not been eating and is not able to tolerate PO. Extensive discussion regarding increased risk of infection with TPN. Wife continues to request IV nutrition although she acknowledges the risks. Review of Systems: A complete review of systems was obtained, negative except as described above.     Past Medical History:   Diagnosis Date    Allergic rhinitis, cause unspecified 12/11/2013    Arthritis     knees    Asthma     as a child    Eczema     on lower back waistline on the back    Environmental allergies     GERD (gastroesophageal reflux disease)     occastionally but resolved since 60 pound weight loss    Hodgkin's lymphoma ([facility] [address] 2023    Hypertension     Psychiatric disorder     anxiety and depression      Past Surgical History:   Procedure Laterality Date    HX TONSILLECTOMY      HX WISDOM TEETH EXTRACTION        Social History     Tobacco Use    Smoking status: Former     Packs/day: 1.00     Years: 2.00     Pack years: 2.00     Types: Cigarettes Quit date: 1966     Years since quittin.1    Smokeless tobacco: Never   Substance Use Topics    Alcohol use: No      Family History   Problem Relation Age of Onset    Hypertension Mother     Hypertension Father     Heart Disease Father     Alcohol abuse Father     Hypertension Brother     No Known Problems Brother      Current Facility-Administered Medications   Medication Dose Route Frequency    ondansetron (ZOFRAN) injection 4 mg  4 mg IntraVENous BID    sodium chloride (NS) flush 5-40 mL  5-40 mL IntraVENous Q8H    sodium chloride (NS) flush 5-40 mL  5-40 mL IntraVENous PRN    LORazepam (ATIVAN) injection 0.5 mg  0.5 mg IntraVENous Q4H PRN    fluconazole (DIFLUCAN) 200mg/100 mL IVPB (premix)  200 mg IntraVENous DAILY    heparin (porcine) injection 5,000 Units  5,000 Units SubCUTAneous Q8H    HYDROmorphone (DILAUDID) injection 0.5-1 mg  0.5-1 mg IntraVENous Q4H PRN    naloxone (NARCAN) injection 1 mg  1 mg IntraVENous Q5MIN PRN    pantoprazole (PROTONIX) 40 mg in 0.9% sodium chloride 10 mL injection  40 mg IntraVENous DAILY    cefepime (MAXIPIME) 2,000 mg in 0.9% sodium chloride (MBP/ADV) 100 mL MBP  2,000 mg IntraVENous Q12H    lidocaine 4 % patch 1 Patch  1 Patch TransDERmal Q24H    metroNIDAZOLE (FLAGYL) IVPB premix 500 mg  500 mg IntraVENous Q12H    melatonin tablet 3 mg  3 mg Oral QHS PRN    prochlorperazine (COMPAZINE) injection 10 mg  10 mg IntraVENous Q6H PRN    glucose chewable tablet 16 g  4 Tablet Oral PRN    glucagon (GLUCAGEN) injection 1 mg  1 mg IntraMUSCular PRN    dextrose 10 % infusion 0-250 mL  0-250 mL IntraVENous PRN    alum-mag hydroxide-simeth (MYLANTA) oral suspension 30 mL  30 mL Oral Q4H PRN    alcohol 62% (NOZIN) nasal  1 Ampule  1 Ampule Topical Q12H    ondansetron (ZOFRAN) injection 4 mg  4 mg IntraVENous Q4H PRN    acetaminophen (TYLENOL) tablet 650 mg  650 mg Oral Q6H PRN    polyethylene glycol (MIRALAX) packet 17 g  17 g Oral QHS PRN    allopurinoL (ZYLOPRIM) better on their own in a few days. Others need more testing and treatment. If your child's belly pain continues or gets worse, he or she may need more tests to find out what is wrong. Most cases of abdominal pain in children are caused by minor problems, such as stomach flu or constipation. Home treatment often is all that is needed to relieve them. Your doctor may have recommended a follow-up visit in the next 8 to 12 hours. Do not ignore new symptoms, such as fever, nausea and vomiting, urination problems, or pain that gets worse. These may be signs of a more serious problem. The doctor has checked your child carefully, but problems can develop later. If you notice any problems or new symptoms, get medical treatment right away. Follow-up care is a key part of your child's treatment and safety. Be sure to make and go to all appointments, and call your doctor if your child is having problems. It's also a good idea to know your child's test results and keep a list of the medicines your child takes. How can you care for your child at home? · Your child should rest until he or she feels better. · Give your child lots of fluids, enough so that the urine is light yellow or clear like water. This is very important if your child is vomiting or has diarrhea. Give your child sips of water or drinks such as Pedialyte or Infalyte. These drinks contain a mix of salt, sugar, and minerals. You can buy them at drugstores or grocery stores. Give these drinks as long as your child is throwing up or has diarrhea. Do not use them as the only source of liquids or food for more than 12 to 24 hours. · Feed your child mild foods, such as rice, dry toast or crackers, bananas, and applesauce. Try feeding your child several small meals instead of 2 or 3 large ones.  
· Do not give your child spicy foods, fruits other than bananas or applesauce, or drinks that contain caffeine until 48 hours after all your child's symptoms have gone away. · Do not feed your child foods that are high in fat. · Have your child take medicines exactly as directed. Call your doctor if you think your child is having a problem with his or her medicine. · Do not give your child aspirin, ibuprofen (Advil, Motrin), or naproxen (Aleve). These can cause stomach upset. When should you call for help? Call 911 anytime you think your child may need emergency care. For example, call if: 
? · Your child passes out (loses consciousness). ? · Your child vomits blood or what looks like coffee grounds. ? · Your child's stools are maroon or very bloody. ?Call your doctor now or seek immediate medical care if: 
? · Your child has new belly pain or his or her pain gets worse. ? · Your child's pain becomes focused in one area of his or her belly. ? · Your child has a new or higher fever. ? · Your child's stools are black and look like tar or have streaks of blood. ? · Your child has new or worse diarrhea or vomiting. ? · Your child has symptoms of a urinary tract infection. These may include: 
¨ Pain when he or she urinates. ¨ Urinating more often than usual. 
¨ Blood in his or her urine. ? Watch closely for changes in your child's health, and be sure to contact your doctor if: 
? · Your child does not get better as expected. Where can you learn more? Go to http://judy-yuki.info/. Enter [phone] in the search box to learn more about \"Abdominal Pain in Children: Care Instructions. \" Current as of: March 20, 2017 Content Version: 11.4 © 6072-2778 [employer]. Care instructions adapted under license by [employer] (which disclaims liability or warranty for this information). If you have questions about a medical condition or this instruction, always ask your healthcare professional. [place] [address] any warranty or liability for your use of this information. tablet 100 mg  100 mg Oral BID      Allergies   Allergen Reactions    Codeine Other (comments)     Pt states unknown reaction. Pcn [Penicillins] Rash     Received Ancef 1/18/2023 without issue. Physical Exam:   Visit Vitals  BP 94/61   Pulse 82   Temp 98 °F (36.7 °C)   Resp 25   Ht 5' 5\" (1.651 m)   Wt 156 lb 1.4 oz (70.8 kg)   SpO2 100%   BMI 25.97 kg/m²     ECOG PS: 0  General: Lethargic, ill and frail appearing, severely cachectic   Mental  status: Lethargic, does not participate in much conversation, unable to assess orientation   HENT: NCAT   Neck: no visualized mass   Resp: no respiratory distress   Neuro: no gross deficits   Skin: no discoloration or lesions of concern on visible areas   Psychiatric: Lethargic, poor insight           Results:     Lab Results   Component Value Date/Time    WBC 8.1 01/25/2023 04:07 AM    HGB 10.1 (L) 01/25/2023 04:07 AM    HCT 33.3 (L) 01/25/2023 04:07 AM    PLATELET 937 47/25/6389 04:07 AM    MCV 97.4 01/25/2023 04:07 AM    ABS. NEUTROPHILS 6.8 01/25/2023 04:07 AM     Lab Results   Component Value Date/Time    Sodium 146 (H) 01/25/2023 04:07 AM    Potassium 3.3 (L) 01/25/2023 04:07 AM    Chloride 114 (H) 01/25/2023 04:07 AM    CO2 22 01/25/2023 04:07 AM    Glucose 123 (H) 01/25/2023 04:07 AM    BUN 38 (H) 01/25/2023 04:07 AM    Creatinine 1.05 01/25/2023 04:07 AM    GFR est AA >60 07/26/2022 01:45 PM    GFR est non-AA >60 07/26/2022 01:45 PM    Calcium 7.9 (L) 01/25/2023 04:07 AM    Sodium,  10/07/2022 10:16 AM    Potassium, POC 3.9 10/07/2022 10:16 AM    Chloride,  10/07/2022 10:16 AM    Glucose (POC) 94 01/06/2023 07:07 AM    Creatinine, POC 1.1 10/07/2022 10:16 AM    Calcium, ionized (POC) 1.22 10/07/2022 10:16 AM     Lab Results   Component Value Date/Time    Bilirubin, total 1.7 (H) 01/25/2023 04:07 AM    ALT (SGPT) 10 (L) 01/25/2023 04:07 AM    Alk.  phosphatase 266 (H) 01/25/2023 04:07 AM    Protein, total 5.4 (L) 01/25/2023 04:07 AM Albumin 1.8 (L) 01/25/2023 04:07 AM    Globulin 3.6 01/25/2023 04:07 AM     CT Results (most recent):  Results from Hospital Encounter encounter on 01/17/23    CT HEAD WO CONT    Narrative  Indication:  worsening AMS    Comparison: CT 1/22/2023    Findings: 5 mm axial images were obtained from the skull base through the  vertex. CT dose reduction was achieved through the use of a standardized protocol  tailored for this examination and automatic exposure control for dose  modulation. The ventricles and cortical sulci are prominent, compatible with age related  volume loss. There is no evidence of intracranial hemorrhage, mass, mass effect,  or acute infarct. There is periventricular white matter disease. No extra-axial  fluid collections are seen. The visualized paranasal sinuses and mastoid air  cells are clear. The orbital structures are unremarkable. No osseous  abnormalities are seen. Impression  1. No evidence of acute infarct or intracranial hemorrhage. No significant  change. 2. Mild periventricular white matter disease is likely secondary to chronic  small vessel ischemic changes. No imaging of spleen      Pathology:           ==========================================================================   * * *FINAL PATHOLOGIC DIAGNOSIS* * *     <<<<<       Lymph node, left supraclavicular lymph node, excision:        Classic Hodgkin lymphoma. See comment.     >>>>>      * * *Comment* * *     <<<<<  The histologic section has an enlarged lymph node with a thickened capsule   and effacement of normal yady architecture by a vaguely nodular   proliferation with few sclerotic bands. Numerous Hodgkin/Levi-Esteban   cells are present within a background of small lymphocytes and   eosinophils. The Hodgkin Levi-Esteban cells are positive for CD30, CD15   and PAX5 (subset, dim) and are negative for CD45, ALK, CD20, EMA,   Granzyme, MUM1, CD43 and CD3.  In situ hybridization for Tania-Cortez viral   RNA Introducing [address]! Dear Parent or Guardian, Thank you for requesting a Tiggly account for your child. With Tiggly, you can view your childs hospital or ER discharge instructions, current allergies, immunizations and much more. In order to access your childs information, we require a signed consent on file. Please see the [facility] department or call [phone] for instructions on completing a Tiggly Proxy request.   
Additional Information If you have questions, please visit the Frequently Asked Questions section of the [employer] website at [email or web address] Remember, Tiggly is NOT to be used for urgent needs. For medical emergencies, dial 911. Now available from your iPhone and Android! Please provide this summary of care documentation to your next provider. Your primary care clinician is listed as [address]. If you have any questions after today's visit, please call [phone]. is negative. The small lymphocytes are comprised of CD3 and CD5   positive T cells with few scattered B cells identified. Assessment and Recommendations:     Classic Hodgkin lymphoma, advanced  -At present, the patient has clinical symptoms of diarrhea, abdominal discomfort, discoloration of his lower extremities, findings may be related to widely distributed lymphadenopathy. Splenomegaly may be causing liver dysfunction.     -Discussed the risks and benefits of ABVD chemotherapy in the OP setting, please see clinic note for further detail. -CT chest/abd/pelvis   IMPRESSION  1. Mediastinal, hilar, upper abdominal, retroperitoneal, and iliac chain  adenopathy with multiple splenic lesions most consistent with lymphoma. Metastatic disease or other inflammatory/infectious process is less likely. Retroperitoneal nodes are amenable to percutaneous biopsy. 2.  Indeterminate, possibly benign segment 6 hepatic hypodensity. Considerfurther evaluation with MRI. -MRI Brain   IMPRESSION  1. Evaluation is significantly limited by patient positioning. No evidence of intracranial metastases. No acute intracranial abnormality. 2. Generalized parenchymal volume loss and mild chronic microvascular ischemic disease. Small chronic infarcts as above. -Appreciate General Surgery input - port placed prior to chemo start   - PFTs completed on 11/18  -S/p Bone Marrow biopsy 1/20 results: No diagnostic immunophenotypic abnormalities detected  -S/p  chemotherapy with C#1 on 1/20 of ABVD (Doxorubicin 25mg/m2, Bleomycin 10 units/m2, Vinblastine 6mg/m2, Dacarbazine 375mg/m2 on days 1 and 15 every 28 days), with plans for 6 cycles (further cycles to be OP).       Hyperbilirubinemia  -Etiology of his hyperbilirubinemia may be a consequence of impaired liver function as a consequence of lymphoma and splenomegaly.   -Trend CMP, T bili stable at 1.7     Elevated alkaline phosphatase  -The patient likely has disease involvement of the bone     Tumor Lysis Syndrome  -Component of TLS given extensive disease burden  -Continue to trend uric every 8 hours, so far stable at 5.4 today  -So far has not required additional dose of Rasburicase   -Close monitoring of CMP and electrolytes for worsening of TLS with plan for systemic therapy, Appreciate Hospitalist input      Mild Hypercalcemia  - resolved   -Related to disease burden  -Corrected Ca 8.6  -Monitor for now      Acute Kidney Injury - resolved   -Related to TLS  -S/p dose of Rasburicase 1/17  -Follow renal function     Anasarca  Severe Protein Malnutrition   Bilateral LE Swelling bilateral lower extremity Dopplers ruled out DVT  -Likely r/t to malignancy and malnutrition   -Prealbumin 3.2 - encouraged increased PO. Patient has only been eating chicken broth for several weeks per wife. -Appreciate Dietician input   -Hold off on NGT or PEG for nutrition now. Concern for refeeding syndrome in setting of TLS after chemo.   -Patient has had very poor intake over the last several days with delirium. Unable to place NG tube or consider PEG for nutrition given patient's agitation and confusion. Patient pulled out [place] needle out of his port and is currently in mitts.  > TPN carries high risk of infeciton   -Continue to assess mentation and nutritional status    Generalized Weakness  Failure to Thrive   -R/t malignancy and severe malnutrition   -PT/OT consults -to be decided either home with 24/7 care versus SNF at discharge    Aspiration PNA   Hospitalized Delirium   -Appreciate hospitalist input throughout the weekend.  -Started on broad spectrum antibiotics with cefepime, metronidazole and vancomycin.  No longer on vancomycin   -Sputum culture final for moderate yeast  -Diflucan added to antibiotic regimen per hospitalist team on 1/23  -Telemetry sitter for safety  -CT head completed, no acute findings  -Appreciate speech therapy input, unable to complete MBS with increased confusion/lethargy   > Continue low dose seroquel       Discussed with Dr. Carrie Brown     -Short-term prognosis is guarded, continue to discuss goals of care. Palliative care consulted. Signed By: Donn Wharton NP      I have personally seen and evaluated the patient in conjunction with Rody [place] NP. I find the patient's history and physical exam are consistent with the NP's documentation. I agree with the above assessment and plan, which I have modified as needed. Patient seen and evaluated at bedside, seen to be poorly responsive. Findings consistent with hypoactive delirium. Plan to monitor closely. Continue abx for suspected aspiration pna.         Kailyn Laughlin MD    Attending [address] [place] [address]

## 2023-01-25 NOTE — PROGRESS NOTES
Problem: Patient Education: Go to Patient Education Activity  Goal: Patient/Family Education  Outcome: Progressing Towards Goal     Problem: Falls - Risk of  Goal: *Absence of Falls  Description: Document Fabien Cottrell Fall Risk and appropriate interventions in the flowsheet. Outcome: Progressing Towards Goal  Note: Fall Risk Interventions:                                Problem: Patient Education: Go to Patient Education Activity  Goal: Patient/Family Education  Outcome: Progressing Towards Goal     Problem: Pressure Injury - Risk of  Goal: *Prevention of pressure injury  Description: Document Matthew Scale and appropriate interventions in the flowsheet.   Outcome: Progressing Towards Goal  Note: Pressure Injury Interventions:  Sensory Interventions: Assess changes in LOC, Discuss PT/OT consult with provider, Check visual cues for pain, Minimize linen layers    Moisture Interventions: Absorbent underpads, Minimize layers    Activity Interventions: Increase time out of bed, PT/OT evaluation    Mobility Interventions: PT/OT evaluation, Float heels    Nutrition Interventions: Document food/fluid/supplement intake    Friction and Shear Interventions: Minimize layers                Problem: Patient Education: Go to Patient Education Activity  Goal: Patient/Family Education  Outcome: Progressing Towards Goal

## 2023-01-25 NOTE — TELEPHONE ENCOUNTER
V/M from 1/24/23 a women's voice was left on vm stating she was calling back. Stated that she would like for pt to have all nutrition options even if it means restraints. Stated you have her permission. Female was crying on phone requesting a return call @ [phone].

## 2023-01-25 NOTE — PROGRESS NOTES
Problem: Dysphagia (Adult)  Goal: *Acute Goals and Plan of Care (Insert Text)  Description: Speech Therapy Goals  Initiated 1/23/2023  1. Patient will tolerate full liquid diet without overt s/s aspiration within 7 days. 2. Added 1/24/2023 patient will participate in instrumental testing as appropriate to objectively assess swallow function    Outcome: Not Progressing Towards Goal       SPEECH LANGUAGE PATHOLOGY DYSPHAGIA TREATMENT  Patient: Mely Palacios (52 y.o. male)  Date: 1/25/2023  Diagnosis: Hodgkin lymphoma ([facility] [address] [C81.90] <principal problem not specified>  Procedure(s) (LRB):  INFUSAPORT INSERTION (N/A) 7 Days Post-Op  Precautions:  Bed Alarm, Fall, Aspiration (telesitter;sun downs; chemo; L chest wall port; 3 sm chronic CVAs)    ASSESSMENT:  Patient seen in follow up. He was seen by SLP yesterday and family contacted and they and SLP agreed further testing warranted. He was cautiously on a full liquid diet. MBS was planned for this afternoon. Since that time, two things have changed. Patient had an episode of worsening condition and SOB yesterday after session, with concerns for aspiration, and was made NPO. He was also more agitated and has received sedating meds overnight. Now, is difficult to rouse, nurse held meds for safety, not alert enough for MBS. Trials given briefly to assess readiness for MBS, with max repositioning and other methods to arouse patient to tale significant PO. These were unsuccessful. Not ready for MBS today. Options below. PLAN:  Recommendations and Planned Interventions: Will reassess for readiness for MBS tomorrow morning. If he is alert and cooperative, we will proceed as able that day as per radiology schedule. Can keep NPO as he is not alert enough to eat at this time. If PO desired, based on family wishes or need for nutrition without access to alternate means, or sudden improvement in status,  full liquid would be best method.     Patient continues to benefit from skilled intervention to address the above impairments. Continue treatment per established plan of care. Discharge Recommendations: To Be Determined     SUBJECTIVE:   Patient stated mmmmmm. No other verbalizations, Not able to state  name. OBJECTIVE:   Cognitive and Communication Status:  Neurologic State: Lethargic, Drowsy, Eyes open to stimulus  Orientation Level: Unable to verbalize  Cognition: Unable to assess (comment), No command following     Perseveration: No perseveration noted  Safety/Judgement: Lack of insight into deficits  Dysphagia Treatment:  Oral Assessment:  Oral Assessment  Labial: Decreased seal  Dentition: Natural;Limited;Partials (comment)  Oral Hygiene: clean lip, unable to fully assess oral cavity but no dried mucous noted  P.O. Trials:  Patient Position: upright in bed  Vocal quality prior to P.O.:  (non verbal)  Consistency Presented: Puree; Thin liquid; Ice chips  How Presented: Spoon     Bolus Acceptance:  (accepted with verbal and tactile cues)  Bolus Formation/Control: Impaired  Type of Impairment: Anterior;Delayed  Propulsion: Delayed (# of seconds); Absent  Oral Residue:  (oral holding at first, no visible residue but difficult to assess oral cavity)        Aspiration Signs/Symptoms: None (had s/s yesterday when activrly taking more PO)  Pharyngeal Phase Characteristics:  (delayed swallow, but this was more liely 2/2 oral phase or poor transit, based on patient falling asleep)  Effective Modifications:  (tactile dues to awaken patient with PO in mouth)          Oral Phase Severity: Moderate  Pharyngeal Phase Severity :  (suspected at least moderate)                                                                                                        Pain:  Pain Scale 1: Adult Nonverbal Pain Scale  Pain Intensity 1: 0       After treatment:   Patient left in no apparent distress in bed and Call bell within reach    COMMUNICATION/EDUCATION:     The patient's plan of care including recommendations, planned interventions, and recommended diet changes were discussed with: Registered nurse.      CAIT Angulo  Time Calculation: 19 mins

## 2023-01-25 NOTE — PROGRESS NOTES
Transition of Care Plan:    RUR: 18% (moderate)  Disposition: Home with HH vs SNF? If SNF or IPR: Date FOC offered: N/A  Date FOC received: N/A  Date authorization started with reference number: N/A  Date authorization received and expires: N/A  Accepting facility: N/A  Follow up appointments: PCP/specialists if needed. DME needed: TBD. Transportation at Discharge: Family vs. Transport. [place] or means to access home:  Patient has access. IM Medicare Letter: 2nd IM needed prior to discharge. Is patient a  and connected with the [facility] ? N/A             If yes, was [place] transfer form completed and [facility] notified? N/A  Caregiver Contact: David Farris - [facility] - [phone]. Discharge Caregiver contacted prior to discharge? CM to contact. Care Conference needed?: No.                  Previous therapy notes state patient needs HH vs SNF, CM will continue to follow for discharge needs.       Frantz Bruno, TOMEKAN, RN    Care Management  [phone]

## 2023-01-25 NOTE — PROGRESS NOTES
End of Shift Note    Bedside shift change report given to [employer] by Oscar Storey RN (offgoing nurse). Report included the following information SBAR, Kardex, ED Summary, Intake/Output, MAR, Recent Results, and Cardiac Rhythm NSR/tach    Shift worked:  [id number]     Shift summary and any significant changes:     Patient remains lethargic/drowsy.       Concerns for physician to address:  none     Zone phone for oncoming shift:   [phone]       Activity:  Activity Level: Bed Rest  Number times ambulated in hallways past shift: 0  Number of times OOB to chair past shift: 0    Cardiac:   Cardiac Monitoring: Yes      Cardiac Rhythm: Sinus Rhythm    Access:  Current line(s): port     Genitourinary:   Urinary status: voiding, incontinent, and external catheter    Respiratory:   O2 Device: Nasal cannula  Chronic home O2 use?: NO  Incentive spirometer at bedside: N/A       GI:  Last Bowel Movement Date: 01/18/23  Current diet:  ADULT ORAL NUTRITION SUPPLEMENT Lunch, Dinner; Frozen Supplement  DIET NPO  Passing flatus: YES  Tolerating current diet: YES       Pain Management:   Patient states pain is manageable on current regimen: N/A    Skin:  Matthew Score: 15  Interventions: float heels, internal/external urinary devices, and nutritional support     Patient Safety:  Fall Score:    Interventions: bed/chair alarm       Length of Stay:  Expected LOS: 9d 21h  Actual LOS: 3173 Jessi Lynn RN

## 2023-01-25 NOTE — PROGRESS NOTES
Nutrition Note    Consult received for TF orders and management. Will wait to see if pt is able to have NGT place as he is very lethargic/drowsy and likely unable to follow swallow commands for NGT to go down. Also suspect he will pull it out as he has with lines (if not restrained). If we want to remain aggressive with placing NGT for TF, recommend DHT placement with bridle. Will check in tomorrow morning.      Electronically signed by Tila Bowden RD on 1/25/2023 at 3:54 PM    Contact: [id number]

## 2023-01-25 NOTE — PROGRESS NOTES
Physical Therapy:  Patient with increased drowsiness and inability to follow commands. Unable to actively participate in PT session. Will defer and continue to follow.     Sylvia Moreira PT, DPT

## 2023-01-25 NOTE — PROGRESS NOTES
Hospitalist Progress Note    NAME: Loyde Spatz   :  1950   MRN:  [id number]       Assessment / Plan:    Advanced Hodgkin lymphoma  - With splenomegaly lymphadenopathy  - Status postchemotherapy   - Hematology oncology team are following, discussed with them today, very poor prognosis at this point recommended palliative/hospice  - Discussed with patient's son and wife and plans for DNR.    -Patient's wife is adamant to start nutrition understanding risks, benefits and alternatives including aspiration pneumonia and worsening respiratory status she still wants to proceed with nutrition. Will order NG tube and nutrition service consult    Tumor lysis syndrome  -Continue following uric acid level  - Hematology oncology recommending allopurinol  - Follow CMP very closely  - NILTON, hypercalcemia as evidence of tumor lysis syndrome    Hyperbilirubinemia  Alkaline phosphatase elevated  - Likely secondary to lymphoma/splenomegaly  - 1.8 today    Pseudo hypocalcemia  - Calcium 7.9, albumin 1.8, corrected calcium on the higher end    Aspiration pneumonia  Acute hypoxic respiratory failure  -Currently on 2 L oxygen via nasal cannula  - Continue cefepime and metronidazole  - Aspiration precautions    Acute metabolic encephalopathy  - Likely due to worsening metabolic status in the setting of a tumor lysis syndrome  - Follow mentation very closely    GERD  - Continue home meds    Severe protein calorie malnutrition  - Likely secondary to advanced lymphoma and decreased p.o. intake  - Nutrition consult input is appreciated    25.0 - 29.9 Overweight / Body mass index is 25.97 kg/m². Estimated discharge date:   Barriers: Clinical improvement    Code status: DNR  Prophylaxis: Hep SQ  Recommended Disposition:  TBD     Subjective:     Patient was seen and examined. No acute events overnight. Discussed with RN overnight events.     Patient's brother assistant allowed bedside, all questions were answered. I discussed the case in details with patient's wife and son over the phone. Patient's wife would like to start feeding tube understanding risks, benefits and alternatives of that including worsening of aspiration pneumonia and respiratory status. Patient's wife and son would like his CODE STATUS to be changed to DO NOT RESUSCITATE. Patient is lethargic not following commands. Review of Systems:  Symptom Y/N Comments  Symptom Y/N Comments   Fever/Chills    Chest Pain     Poor Appetite    Edema     Cough    Abdominal Pain     Sputum    Joint Pain     SOB/SALMERON    Pruritis/Rash     Nausea/vomit    Tolerating PT/OT     Diarrhea    Tolerating Diet     Constipation    Other       Could NOT obtain due to: Lethargic not following commands         Objective:     VITALS:   Last 24hrs VS reviewed since prior progress note.  Most recent are:  Patient Vitals for the past 24 hrs:   Temp Pulse Resp BP SpO2   01/25/23 1107 98 °F (36.7 °C) 82 25 94/61 100 %   01/25/23 0746 98.4 °F (36.9 °C) (!) 113 21 (!) 149/76 94 %   01/25/23 0330 97.5 °F (36.4 °C) (!) 108 14 (!) 149/78 99 %   01/24/23 2332 97.6 °F (36.4 °C) 100 14 -- 99 %   01/24/23 2327 97 °F (36.1 °C) (!) 107 24 (!) 165/89 100 %   01/24/23 2322 -- (!) 104 (!) 40 (!) 148/125 100 %   01/24/23 2317 -- (!) 105 (!) 39 -- 100 %   01/24/23 2312 -- (!) 104 27 -- 100 %   01/24/23 2307 -- (!) 104 (!) 32 -- 100 %   01/24/23 2302 -- (!) 101 19 -- 100 %   01/24/23 2257 -- (!) 107 24 -- 100 %   01/24/23 2252 -- (!) 108 23 -- 100 %   01/24/23 2247 -- (!) 104 22 -- 100 %   01/24/23 2242 -- (!) 102 22 -- 100 %   01/24/23 2237 -- (!) 106 23 -- 100 %   01/24/23 2232 -- (!) 107 23 -- 100 %   01/24/23 2227 -- (!) 101 25 -- 100 %   01/24/23 2222 -- (!) 102 22 -- 100 %   01/24/23 2217 -- 100 (!) 33 -- 100 %   01/24/23 2212 -- (!) 101 28 -- 100 %   01/24/23 2207 -- (!) 101 23 -- 100 %   01/24/23 2202 -- 100 26 -- 100 %   01/24/23 2157 -- 98 22 -- 100 %   01/24/23 2152 -- (!) 105 21 -- 100 %   01/24/23 2147 -- 100 30 -- 100 %   01/24/23 2142 -- (!) 104 (!) 34 -- 100 %   01/24/23 2137 -- 95 27 -- 100 %   01/24/23 2132 -- 97 26 -- 100 %   01/24/23 2127 -- 99 23 -- 100 %   01/24/23 2122 -- 100 20 -- 100 %   01/24/23 2117 -- 99 26 -- 100 %   01/24/23 2112 -- (!) 101 28 -- 100 %   01/24/23 2107 -- (!) 103 29 -- 100 %   01/24/23 2102 -- (!) 106 22 -- 100 %   01/24/23 2057 -- (!) 107 27 -- 100 %   01/24/23 2052 -- (!) 107 24 -- 100 %   01/24/23 2047 -- (!) 102 23 -- 100 %   01/24/23 2042 -- (!) 103 25 -- 100 %   01/24/23 2037 -- (!) 104 23 -- 100 %   01/24/23 2032 97.2 °F (36.2 °C) (!) 103 24 137/85 100 %   01/24/23 2027 -- (!) 101 25 -- 100 %   01/24/23 2022 -- 99 22 -- 100 %   01/24/23 2017 -- (!) 105 25 -- 100 %   01/24/23 2012 -- (!) 101 22 -- 100 %   01/24/23 2007 -- (!) 105 18 -- 100 %   01/24/23 2002 -- 100 21 -- 100 %   01/24/23 1957 -- 98 22 -- 100 %   01/24/23 1952 -- (!) 106 15 -- 100 %   01/24/23 1947 -- (!) 101 22 -- 100 %   01/24/23 1942 -- 100 23 -- 100 %   01/24/23 1937 -- 99 23 -- 100 %   01/24/23 1932 -- 99 25 -- 100 %   01/24/23 1927 -- (!) 113 18 -- 100 %   01/24/23 1922 -- 97 21 -- 100 %   01/24/23 1917 -- (!) 105 22 -- 100 %   01/24/23 1912 -- 99 23 -- 100 %   01/24/23 1907 -- 91 26 -- 100 %   01/24/23 1902 -- (!) 102 (!) 31 -- 100 %   01/24/23 1857 -- (!) 102 26 -- 100 %   01/24/23 1852 -- 96 26 -- 100 %   01/24/23 1847 -- (!) 101 23 -- 100 %   01/24/23 1842 -- 99 23 -- 100 %   01/24/23 1837 -- 100 29 -- 100 %   01/24/23 1832 -- 89 24 -- 100 %   01/24/23 1827 -- 100 23 -- 100 %   01/24/23 1822 -- (!) 101 (!) 39 -- 100 %   01/24/23 1817 -- 95 22 -- 100 %   01/24/23 1812 -- (!) 108 22 -- 100 %   01/24/23 1807 -- (!) 110 22 -- 100 %   01/24/23 1802 -- 91 18 -- 100 %   01/24/23 1757 -- 94 16 -- 100 %   01/24/23 1752 -- 97 17 -- 99 %   01/24/23 1747 -- (!) 104 26 -- 100 %   01/24/23 1742 -- 94 17 -- --   01/24/23 1737 -- -- -- 130/70 --   01/24/23 1502 97.3 °F (36.3 °C) 89 22 133/77 99 %     No intake or output data in the 24 hours ending 01/25/23 1414     I had a face to face encounter and independently examined this patient on 1/25/2023, as outlined below:  PHYSICAL EXAM:  General: Ill-appearing, lethargic, not following commands  EENT: Anicteric sclerae. Resp:  CTA bilaterally, no wheezing or rales. No accessory muscle use  CV:  Regular  rhythm,  No edema  GI:  Soft, Non distended, Non tender. +Bowel sounds  Neurologic:  Lethargic, not following commands, not waking up to painful stimuli, pupils are reactive bilaterally    Psych:   Poor insight. Not anxious nor agitated  Skin:  No rashes. No jaundice    Reviewed most current lab test results and cultures  YES  Reviewed most current radiology test results   YES  Review and summation of old records today    NO  Reviewed patient's current orders and MAR    YES  PMH/ reviewed - no change compared to H&P  ________________________________________________________________________  Care Plan discussed with:    Comments   Patient X    Family  x    RN X    Care Manager     Consultant                        Multidiciplinary team rounds were held today with , nursing, pharmacist and clinical coordinator. Patient's plan of care was discussed; medications were reviewed and discharge planning was addressed. ________________________________________________________________________        Comments   >50% of visit spent in counseling and coordination of care X    ________________________________________________________________________  Newton Ferrell MD     Procedures: see electronic medical records for all procedures/Xrays and details which were not copied into this note but were reviewed prior to creation of Plan. LABS:  I reviewed today's most current labs and imaging studies.   Pertinent labs include:  Recent Labs     01/25/23  0407 01/24/23  0437 01/23/23  0341   WBC 8.1 10.7 11.5*   HGB 10.1* 10.2* 9.0*   HCT 33.3* 34.6* 30.0*  131* 102*     Recent Labs     01/25/23  0407 01/24/23  0437 01/23/23  0341   * 145 146*   K 3.3* 4.7 3.3*   * 116* 116*   CO2 22 23 21   * 110* 95   BUN 38* 44* 48*   CREA 1.05 1.28 1.28   CA 7.9* 8.3* 7.0*   PHOS 3.3 2.1* 3.0   ALB 1.8* 1.7* 1.5*   TBILI 1.7* 2.1* 2.1*   ALT 10* 10* 9*       Signed: Chris Sutton MD

## 2023-01-25 NOTE — PROGRESS NOTES
Bedside shift change report given to Evelene Mcardle RN by Raffaele Reese. Report included the following information SBAR, Kardex, Recent Results, and Cardiac Rhythm NSR/tach . 80 Patient's sister in law on phone with patient's wife at bedside and says she does not want a tube placed in patient's nose because we \"will say he choked and take it out and say there is nothing yall can do\". A message was sent to Dr. Mariza Lora to make him aware. 0 Patient's sister in law now says that the wife states if that's all that can be done then do it. Will notify Dr. Mariza Lora to clarify with patient's wife.

## 2023-01-26 ENCOUNTER — APPOINTMENT (OUTPATIENT)
Dept: GENERAL RADIOLOGY | Age: 73
DRG: 823 | End: 2023-01-26
Attending: STUDENT IN AN ORGANIZED HEALTH CARE EDUCATION/TRAINING PROGRAM
Payer: MEDICARE

## 2023-01-26 LAB
ALBUMIN SERPL-MCNC: 1.7 G/DL (ref 3.5–5)
ALBUMIN/GLOB SERPL: 0.5 (ref 1.1–2.2)
ALP SERPL-CCNC: 211 U/L (ref 45–117)
ALT SERPL-CCNC: 8 U/L (ref 12–78)
ANION GAP SERPL CALC-SCNC: 7 MMOL/L (ref 5–15)
AST SERPL-CCNC: 16 U/L (ref 15–37)
ATRIAL RATE: 124 BPM
BASOPHILS # BLD: 0 K/UL (ref 0–0.1)
BASOPHILS NFR BLD: 1 % (ref 0–1)
BILIRUB SERPL-MCNC: 1.3 MG/DL (ref 0.2–1)
BUN SERPL-MCNC: 35 MG/DL (ref 6–20)
BUN/CREAT SERPL: 31 (ref 12–20)
CALCIUM SERPL-MCNC: 8 MG/DL (ref 8.5–10.1)
CALCULATED P AXIS, ECG09: 50 DEGREES
CALCULATED R AXIS, ECG10: -50 DEGREES
CALCULATED T AXIS, ECG11: 72 DEGREES
CHLORIDE SERPL-SCNC: 114 MMOL/L (ref 97–108)
CO2 SERPL-SCNC: 25 MMOL/L (ref 21–32)
CREAT SERPL-MCNC: 1.12 MG/DL (ref 0.7–1.3)
DIAGNOSIS, 93000: NORMAL
DIFFERENTIAL METHOD BLD: ABNORMAL
EOSINOPHIL # BLD: 0.4 K/UL (ref 0–0.4)
EOSINOPHIL NFR BLD: 6 % (ref 0–7)
ERYTHROCYTE [DISTWIDTH] IN BLOOD BY AUTOMATED COUNT: 18.5 % (ref 11.5–14.5)
GLOBULIN SER CALC-MCNC: 3.7 G/DL (ref 2–4)
GLUCOSE SERPL-MCNC: 123 MG/DL (ref 65–100)
HCT VFR BLD AUTO: 29.5 % (ref 36.6–50.3)
HGB BLD-MCNC: 8.8 G/DL (ref 12.1–17)
IMM GRANULOCYTES # BLD AUTO: 0.1 K/UL (ref 0–0.04)
IMM GRANULOCYTES NFR BLD AUTO: 1 % (ref 0–0.5)
LYMPHOCYTES # BLD: 1 K/UL (ref 0.8–3.5)
LYMPHOCYTES NFR BLD: 16 % (ref 12–49)
MAGNESIUM SERPL-MCNC: 2.2 MG/DL (ref 1.6–2.4)
MCH RBC QN AUTO: 29.1 PG (ref 26–34)
MCHC RBC AUTO-ENTMCNC: 29.8 G/DL (ref 30–36.5)
MCV RBC AUTO: 97.7 FL (ref 80–99)
MONOCYTES # BLD: 0.3 K/UL (ref 0–1)
MONOCYTES NFR BLD: 5 % (ref 5–13)
NEUTS SEG # BLD: 4.6 K/UL (ref 1.8–8)
NEUTS SEG NFR BLD: 71 % (ref 32–75)
NRBC # BLD: 0 K/UL (ref 0–0.01)
NRBC BLD-RTO: 0 PER 100 WBC
P-R INTERVAL, ECG05: 160 MS
PHOSPHATE SERPL-MCNC: 2.6 MG/DL (ref 2.6–4.7)
PLATELET # BLD AUTO: 185 K/UL (ref 150–400)
PMV BLD AUTO: 10 FL (ref 8.9–12.9)
POTASSIUM SERPL-SCNC: 3.2 MMOL/L (ref 3.5–5.1)
PROT SERPL-MCNC: 5.4 G/DL (ref 6.4–8.2)
Q-T INTERVAL, ECG07: 316 MS
QRS DURATION, ECG06: 92 MS
QTC CALCULATION (BEZET), ECG08: 453 MS
RBC # BLD AUTO: 3.02 M/UL (ref 4.1–5.7)
SODIUM SERPL-SCNC: 146 MMOL/L (ref 136–145)
URATE SERPL-MCNC: 5.6 MG/DL (ref 3.5–7.2)
VENTRICULAR RATE, ECG03: 124 BPM
WBC # BLD AUTO: 6.4 K/UL (ref 4.1–11.1)

## 2023-01-26 PROCEDURE — 97116 GAIT TRAINING THERAPY: CPT

## 2023-01-26 PROCEDURE — 74230 X-RAY XM SWLNG FUNCJ C+: CPT

## 2023-01-26 PROCEDURE — 83735 ASSAY OF MAGNESIUM: CPT

## 2023-01-26 PROCEDURE — 36415 COLL VENOUS BLD VENIPUNCTURE: CPT

## 2023-01-26 PROCEDURE — C9113 INJ PANTOPRAZOLE SODIUM, VIA: HCPCS

## 2023-01-26 PROCEDURE — 99233 SBSQ HOSP IP/OBS HIGH 50: CPT | Performed by: STUDENT IN AN ORGANIZED HEALTH CARE EDUCATION/TRAINING PROGRAM

## 2023-01-26 PROCEDURE — 74011000250 HC RX REV CODE- 250: Performed by: INTERNAL MEDICINE

## 2023-01-26 PROCEDURE — 74011250636 HC RX REV CODE- 250/636: Performed by: INTERNAL MEDICINE

## 2023-01-26 PROCEDURE — 74011250637 HC RX REV CODE- 250/637: Performed by: NURSE PRACTITIONER

## 2023-01-26 PROCEDURE — 74011000250 HC RX REV CODE- 250

## 2023-01-26 PROCEDURE — 51798 US URINE CAPACITY MEASURE: CPT

## 2023-01-26 PROCEDURE — 74011250636 HC RX REV CODE- 250/636: Performed by: NURSE PRACTITIONER

## 2023-01-26 PROCEDURE — 92611 MOTION FLUOROSCOPY/SWALLOW: CPT

## 2023-01-26 PROCEDURE — 65660000001 HC RM ICU INTERMED STEPDOWN

## 2023-01-26 PROCEDURE — 74011250636 HC RX REV CODE- 250/636

## 2023-01-26 PROCEDURE — 74011000258 HC RX REV CODE- 258

## 2023-01-26 PROCEDURE — 92526 ORAL FUNCTION THERAPY: CPT

## 2023-01-26 PROCEDURE — 84100 ASSAY OF PHOSPHORUS: CPT

## 2023-01-26 PROCEDURE — 80053 COMPREHEN METABOLIC PANEL: CPT

## 2023-01-26 PROCEDURE — 84550 ASSAY OF BLOOD/URIC ACID: CPT

## 2023-01-26 PROCEDURE — 74011000250 HC RX REV CODE- 250: Performed by: STUDENT IN AN ORGANIZED HEALTH CARE EDUCATION/TRAINING PROGRAM

## 2023-01-26 PROCEDURE — 74011000258 HC RX REV CODE- 258: Performed by: INTERNAL MEDICINE

## 2023-01-26 PROCEDURE — 97530 THERAPEUTIC ACTIVITIES: CPT

## 2023-01-26 PROCEDURE — 77010033678 HC OXYGEN DAILY

## 2023-01-26 PROCEDURE — 85025 COMPLETE CBC W/AUTO DIFF WBC: CPT

## 2023-01-26 PROCEDURE — 74011250637 HC RX REV CODE- 250/637: Performed by: SURGERY

## 2023-01-26 RX ORDER — POTASSIUM CHLORIDE 7.45 MG/ML
10 INJECTION INTRAVENOUS
Status: DISPENSED | OUTPATIENT
Start: 2023-01-26 | End: 2023-01-26

## 2023-01-26 RX ADMIN — SODIUM CHLORIDE, PRESERVATIVE FREE 10 ML: 5 INJECTION INTRAVENOUS at 05:24

## 2023-01-26 RX ADMIN — METRONIDAZOLE 500 MG: 500 INJECTION, SOLUTION INTRAVENOUS at 04:40

## 2023-01-26 RX ADMIN — SODIUM CHLORIDE, PRESERVATIVE FREE 40 MG: 5 INJECTION INTRAVENOUS at 09:39

## 2023-01-26 RX ADMIN — FLUCONAZOLE IN SODIUM CHLORIDE 200 MG: 2 INJECTION, SOLUTION INTRAVENOUS at 12:43

## 2023-01-26 RX ADMIN — POTASSIUM CHLORIDE 10 MEQ: 7.46 INJECTION, SOLUTION INTRAVENOUS at 07:52

## 2023-01-26 RX ADMIN — DEXTROSE MONOHYDRATE 75 ML/HR: 50 INJECTION, SOLUTION INTRAVENOUS at 12:54

## 2023-01-26 RX ADMIN — ALLOPURINOL 100 MG: 100 TABLET ORAL at 18:29

## 2023-01-26 RX ADMIN — CEFEPIME 2000 MG: 2 INJECTION, POWDER, FOR SOLUTION INTRAVENOUS at 09:39

## 2023-01-26 RX ADMIN — MAGNESIUM SULFATE HEPTAHYDRATE: 500 INJECTION, SOLUTION INTRAMUSCULAR; INTRAVENOUS at 18:56

## 2023-01-26 RX ADMIN — HEPARIN SODIUM 5000 UNITS: 5000 INJECTION INTRAVENOUS; SUBCUTANEOUS at 21:20

## 2023-01-26 RX ADMIN — ONDANSETRON 4 MG: 2 INJECTION INTRAMUSCULAR; INTRAVENOUS at 09:39

## 2023-01-26 RX ADMIN — SODIUM CHLORIDE, PRESERVATIVE FREE 10 ML: 5 INJECTION INTRAVENOUS at 21:21

## 2023-01-26 RX ADMIN — POTASSIUM CHLORIDE 10 MEQ: 7.46 INJECTION, SOLUTION INTRAVENOUS at 09:38

## 2023-01-26 RX ADMIN — SODIUM CHLORIDE, PRESERVATIVE FREE 10 ML: 5 INJECTION INTRAVENOUS at 15:23

## 2023-01-26 RX ADMIN — CEFEPIME 2000 MG: 2 INJECTION, POWDER, FOR SOLUTION INTRAVENOUS at 21:20

## 2023-01-26 RX ADMIN — ONDANSETRON 4 MG: 2 INJECTION INTRAMUSCULAR; INTRAVENOUS at 18:28

## 2023-01-26 RX ADMIN — HEPARIN SODIUM 5000 UNITS: 5000 INJECTION INTRAVENOUS; SUBCUTANEOUS at 12:52

## 2023-01-26 RX ADMIN — POTASSIUM CHLORIDE 10 MEQ: 7.46 INJECTION, SOLUTION INTRAVENOUS at 11:27

## 2023-01-26 RX ADMIN — Medication 1 AMPULE: at 21:20

## 2023-01-26 RX ADMIN — HEPARIN SODIUM 5000 UNITS: 5000 INJECTION INTRAVENOUS; SUBCUTANEOUS at 04:50

## 2023-01-26 RX ADMIN — Medication 1 AMPULE: at 09:00

## 2023-01-26 RX ADMIN — METRONIDAZOLE 500 MG: 500 INJECTION, SOLUTION INTRAVENOUS at 16:07

## 2023-01-26 NOTE — PROGRESS NOTES
Noted K+ 3.1 with AM labs  VS: /71, , RR 29, O2  sat 96% on RA    Intervention given: KCL 10 meq IV x 4 doses ordered.  Has orders for daily BMP

## 2023-01-26 NOTE — PROGRESS NOTES
Palliative Medicine      Code Status: DNR    Advance Care Planning:  Advance Care Planning 1/18/2023   Patient's Healthcare Decision Maker is: -legal nok, spouse   Confirm Advance Directive None   Patient Would Like to Complete Advance Directive No       Patient / Family Encounter Documentation    Participants (names): Michael Rios, spouse, Michael Lieberman, son, Dr. Mervin Angel, [facility]    Narrative: Palliative team met with spouse and son after reviewing patient chart and consulting nurse Joseph De La Paz. Patient was initially KALE for MBS and returned as Palliative team was concluding encounter. Emotional support, empathetic, reflective listening provided with encouragement to spouse and son to express their feelings and concerns regarding patient's medical condition and events of his hospitalization and to provide information about patient and family. They were educated on the role and services of Palliative team, including  and music therapists, as an extra layer of support for patient and family during hospitalization. Explained SW role for psychosocial support and complementary strategies for anxiety and coping and spouse expressed being receptive to this support. For questions about patient's cancer and treatment they were referred to oncology team. Martina Schafer NP met with them shortly after Palliative team.    This writer provided business card for Palliative team contact as well as the phone # for [facility] patient advocate. Psychosocial Issues Identified/ Resilience Factors:   Patient and spouse met when she was 14 and have been  46 years. He has 2 brothers and is especially close to one of them, who visits regularly and is very supportive. Patient and spouse have 3 children who all live in [place]. Today is daughter Tomasa's birthday and they plan to celebrate as best they can. Son Luma Douglass lives with his parents and cares for both of them as Mrs. Jonah Perez is disabled and has pain in her knees. She expressed great appreciation for Liset Orellana and their daughters and extended family for their strong love and support. Mr. Kendrick Howell is retired and was described affectionately by his family. He does woodwork and his son pointed to the cane in the room, which patient had made. They have support from their [employer] and had scheduled a blessing for patient, which they want to reschedule due to patient's hospitalization and testing. Caregiver [place]: moderate   While Mrs. Kendrick Howell expressed the family wants patient to return home, she also stated that patient has told them he does not want to die at home. She is planning to stay overnight with him and to have 24 hour presence of family with patient during his stay if possible. Does the caregiver feel confident administering medication? No  Does the caregiver need any help connecting with community resources? Yes  Does the caregiver feel confident assisting with activities of daily living? No. She is disabled. Goals of Care / Plan:   Patient will have symptoms managed. Patient will have appropriate nutrition. Palliative team will continue to provide education and support to patient and family as appropriate. Thank you for including Palliative Medicine in the care of Mr. Khang Rothman.     Jeanette Saucedo LCSW  [phone] ([phone])

## 2023-01-26 NOTE — PROGRESS NOTES
Problem: Patient Education: Go to Patient Education Activity  Goal: Patient/Family Education  Outcome: Progressing Towards Goal     Problem: Chemotherapy Discharge Outcomes  Goal: *Verbalizes understanding and describes prescribed diet  Outcome: Progressing Towards Goal  Goal: *Describes follow-up/return visits to physicians  Outcome: Progressing Towards Goal  Goal: *Describes home care/support arrangements established based on need  Outcome: Progressing Towards Goal  Goal: *Describes available resources and support systems  Outcome: Progressing Towards Goal  Goal: *Anxiety reduced or absent  Outcome: Progressing Towards Goal  Goal: *Understands and describes signs and symptoms to report to providers(Stroke Metric)  Outcome: Progressing Towards Goal

## 2023-01-26 NOTE — PROGRESS NOTES
4726 - Patient transferred to room 2165. Care assumed. Will hang patient's TPN. Called pharmacist to verify that it can be hung through left chest port. Tele sitter at bedside. 1940 - Bedside shift change report given to Harsh CLEMENTS (oncoming nurse) by Rashmi Bartlett (offgoing nurse). Report included the following information SBAR, Kardex, Procedure Summary, Intake/Output, MAR, Accordion, Recent Results, Med Rec Status, and Cardiac Rhythm NSR .

## 2023-01-26 NOTE — PROGRESS NOTES
Problem: Dysphagia (Adult)  Goal: *Acute Goals and Plan of Care (Insert Text)  Description: Speech Therapy Goals  Initiated 1/23/2023  1. Patient will tolerate full liquid diet without overt s/s aspiration within 7 days. UPGRADED TO PUREE/THIN LIQUIDS 1/26/2023  2. Added 1/24/2023 patient will participate in instrumental testing as appropriate to objectively assess swallow function. MET 1/26/2023 1/26/2023 1514 by CAIT Sher  Outcome: Progressing Towards Goal     SPEECH PATHOLOGY MODIFIED BARIUM SWALLOW STUDY  Patient: Crystal Sadler (72 y.o. male)  Date: 1/26/2023  Primary Diagnosis: Hodgkin lymphoma ([facility] [address] [C81.90]  Procedure(s) (LRB):  INFUSAPORT INSERTION (N/A) 8 Days Post-Op   Precautions: Bed Alarm, Fall, Aspiration (telesitter;sun downs; chemo; L chest wall port; 3 sm chronic CVAs)    ASSESSMENT :  Based on the objective data described below, the patient presents with mild to moderate oral dysphagia and a grossly functional pharyngeal swallow. Oral dysphagia characterized by prolonged oral manipulation and mastication resulting in delayed posterior propulsion. Pharyngeal swallow assessed to [facility] Swallow initiated inconsistently at the level of the valleculae. No penetration or aspiration observed with any consistency tried. Note patient observed to cough during MBS in the absence of penetration or aspiration. Mild vallecular residue observed following solid trial, in which a liquid wash was effective to clear. Given patient's mentation, recommend Puree/Thin Liquid diet with 1:1 assistance with PO as needed. Spoke with RN re: MBS and SLP recommendations. Patient will benefit from skilled intervention to address the above impairments. Patients rehabilitation potential is considered to be guarded.      PLAN :  Recommendations and Planned Interventions:  -- Puree/Thin Liquids  -- Medication crushed in puree  -- 1:1 assistance with PO intake  -- Alternate liquids/solids  -- Only feed when alert/awake and actively accepting PO intake    Frequency/Duration: Patient will be followed by speech-language pathology 3 times a week to address goals. Discharge Recommendations: To Be Determined     SUBJECTIVE:   Patient stated I remember when I was drinking this on the [address] OBJECTIVE:     Past Medical History:   Diagnosis Date    Allergic rhinitis, cause unspecified 12/11/2013    Arthritis     knees    Asthma     as a child    Eczema     on lower back waistline on the back    Environmental allergies     GERD (gastroesophageal reflux disease)     occastionally but resolved since 60 pound weight loss    Hodgkin's lymphoma ([facility] [address] 2023    Hypertension     Psychiatric disorder     anxiety and depression     Past Surgical History:   Procedure Laterality Date    HX TONSILLECTOMY      HX WISDOM TEETH EXTRACTION       Prior Level of Function/Home Situation:   Home Situation  Home Environment: Private residence  One/Two Story Residence: Two story, live on 1st floor  Living Alone: No (lives with wife)  Support Systems: Spouse/Significant Other, Other Family Member(s)  Patient Expects to be Discharged to[de-identified] Home with home health  Current DME Used/Available at Home: Cane, straight, Walker, rolling    Diet prior to admission:   Current Diet: NPO     Radiologist: Dr. Eli Ledesma: Fluoro;Lateral  Patient Position: Upright in chair    Trial 1: Trial 2:   Consistency Presented: Thin liquid Consistency Presented: Puree   How Presented: SLP-fed/presented;Straw;Spoon; Successive swallows How Presented: SLP-fed/presented;Spoon         Bolus Acceptance: No impairment Bolus Acceptance: No impairment   Bolus Formation/Control: No impairment:   Bolus Formation/Control: Impaired: Delayed   Propulsion: No impairment Propulsion: Delayed (# of seconds)   Oral Residue: None Oral Residue: None   Initiation of Swallow: Triggered at vallecula Initiation of Swallow: Triggered at valleculae   Timing: Pooling 1-5 sec;Vallecular Timing: No impairment   Penetration: None Penetration: None   Aspiration/Timing: No evidence of aspiration Aspiration/Timing: No evidence of aspiration   Pharyngeal Clearance: Less than 10%; Vallecular residue;Pyriform residue  Pharyngeal Clearance: Less than 10%; Vallecular residue;Pyriform residue                              Trial 3:   Consistency Presented: Solid   How Presented: SLP-fed/presented       Bolus Acceptance: No impairment   Bolus Formation/Control: Impaired: Delayed;Mastication   Propulsion: Delayed (# of seconds)   Oral Residue: None   Initiation of Swallow: Triggered at base of tongue   Timing: No impairment   Penetration: None   Aspiration/Timing: No evidence of aspiration   Pharyngeal Clearance: 10-50%; Vallecular residue   Attempted Modifications: Alternate liquids/solids   Effective Modifications: Alternate liquids/solids             Decreased Tongue Base Retraction?: No  Laryngeal Elevation: WFL (within functional limits)  Aspiration/Penetration Score: 1 (No penetration or aspiration-Contrast does not enter the airway)  Pharyngeal Symmetry: Not assessed  Pharyngeal-Esophageal Segment: No impairment  Pharyngeal Dysfunction: None    Oral Phase Severity: Mild-moderate  Pharyngeal Phase Severity:  (WFL)    NOMS:   The NOMS functional outcome measure was used to quantify this patient's level of swallowing impairment. Based on the NOMS, the patient was determined to be at level 4 for swallow function. NOMS Swallowing Levels:  Level 1 (CN): NPO  Level 2 (CM): NPO but takes consistency in therapy  Level 3 (CL): Takes less than 50% of nutrition p.o. and continues with nonoral feedings; and/or safe with mod cues; and/or max diet restriction  Level 4 (CK):  Safe swallow but needs mod cues; and/or mod diet restriction; and/or still requires some nonoral feeding/supplements  Level 5 (CJ): Safe swallow with min diet restriction; and/or needs min cues  Level 6 (CI): Independent with p.o.; rare cues; usually self cues; may need to avoid some foods or needs extra time  Level 7 [facility]): Independent for all p.o.  ADELA. (2003). National Outcomes Measurement System (NOMS): Adult Speech-Language Pathology User's Guide. COMMUNICATION/EDUCATION:   The patients plan of care including findings from MBS, recommendations, planned interventions, and recommended diet changes were discussed with: Registered nurse. Patient/family have participated as able in goal setting and plan of care. Patient/family agree to work toward stated goals and plan of care.     Thank you for this referral.  Carey Son, CAIT  Time Calculation: 20 mins

## 2023-01-26 NOTE — PROGRESS NOTES
[facility] [place] at [address] [facility] [address]  W: [phone] F: [phone]      Reason for Visit:   Khang Rothman is a 67 y.o. male who is seen in consultation at the request of Dr. Amy Douglass for evaluation of Hodgkin lymphoma. He is now admitted to [id number] [place] [address] for initiation of systemic chemotherapy. Hematology / Oncology Treatment History:     Hematological/Oncological Diagnosis: Classic Hodgkin lymphoma    Date of Diagnosis: 2021    Treatment course: Oncology Flowsheet 1/19/2023 1/19/2023   Day, Cycle Day 1, Cycle 1     bleomycin (BLEOCIN) IV 1 Units/m2 = 1.7 Units 5 Units/m2 = 8.6 Units   dacarbazine (DTIC) .5 mg/m2 = 452 mg     DOXOrubicin (ADRIAMYCIN) IV 6.25 mg/m2 = 10.8 mg     vinBLAStine (VELBAN) IV 3 mg/m2 = 5.16 mg       Supportive Care Flowsheet 8/26/2022 9/3/2022 9/12/2022 9/16/2022   Day, Cycle Day 8, Cycle 1 Day 15, Cycle 1 Day 22, Cycle 1 Day 29, Cycle 1   iron sucrose (VENOFER)  mg 200 mg 200 mg 200 mg           History of Present Illness:     66year old with hx of hypertension, seasonal allergies, presents with worsening normocytic aneima of unclear etiology. He was diagnosed late December 2022 with Classic Hodgkin Lymphoma. Inpatient pllan is to start systemic therapy while for close monitoring given TLS with elevated uric acid and high risk for further deterioration after systemic therapy. While admitted we plan to obtain bone marrow biopsy, MRI of the brain, CT of the chest abdomen and pelvis, dopplers of the lower extremities, place port for systemic therapy and obtain PFTs. Interval History:     1/18/2023 : Patient seen at bedside with wife. Discussed doppler results were negative for DVT. Plan for port placement and bone marrow biopsy today. Hopefully will start chemotherapy in AM.     1/20/2023 Patient seen at bedside today with wife. Encouraged PO intake other than chicken broth. Currently receiving chemo. 1/23/2023: Patient seen at bedside today with his wife. He was unable to participate in conversation, very lethargic. Wife was very tearful this morning as she gave specific details on his condition over the weekend. Patient was diagnosed with aspiration pneumonia and became very confused and agitated at times. She is very worried that he has not been eating and has been very lethargic. We discussed small improvements as he is now beginning to become more alert and oriented. Hopeful today that he will be able to eat and has a pending speech consult. 1/24/2023: Patient seen at bedside this morning, lethargic and currently wearing mitts. Per nursing he became very agitated overnight and pulled the [place] needle out of his port. He has been given his low-dose Seroquel and still remained agitated, was given a very small dose of IV Ativan. Patient is now resting comfortably. There is no family at bedside. Called to bedside around 1500 for concerns in change in patient condition. Suspected re-aspiration. CXR ordered and stat lasix given. Transfer to PCU. Long discussion regarding goals of care give patient continued decline. Discussed concern for very guarded short term prognosis. 1/25/2023 : Patient lethargic and responsive to sternal rub. Long discussion with wife regarding patient condition that continues to decline. Wife very concerned that about nutrition as patient has not been eating and is not able to tolerate PO. Extensive discussion regarding increased risk of infection with TPN. Wife continues to request IV nutrition although she acknowledges the risks. 1/26/2023: Patient seen at bedside today with his son and wife present. Patient had returned from modified barium swallow. Palliative also just met with patient and family. Patient appears much more alert today although remains confused.   Successful modified barium swallow, speech therapy recommending puréed diet with thin liquids. Review of Systems: A complete review of systems was obtained, negative except as described above.     Past Medical History:   Diagnosis Date    Allergic rhinitis, cause unspecified 2013    Arthritis     knees    Asthma     as a child    Eczema     on lower back waistline on the back    Environmental allergies     GERD (gastroesophageal reflux disease)     occastionally but resolved since 60 pound weight loss    Hodgkin's lymphoma ([facility] [address]     Hypertension     Psychiatric disorder     anxiety and depression      Past Surgical History:   Procedure Laterality Date    HX TONSILLECTOMY      HX WISDOM TEETH EXTRACTION        Social History     Tobacco Use    Smoking status: Former     Packs/day: 1.00     Years: 2.00     Pack years: 2.00     Types: Cigarettes     Quit date: 1966     Years since quittin.1    Smokeless tobacco: Never   Substance Use Topics    Alcohol use: No      Family History   Problem Relation Age of Onset    Hypertension Mother     Hypertension Father     Heart Disease Father     Alcohol abuse Father     Hypertension Brother     No Known Problems Brother      Current Facility-Administered Medications   Medication Dose Route Frequency    TPN ADULT - PERIPHERAL   IntraVENous CONTINUOUS    ondansetron (ZOFRAN) injection 4 mg  4 mg IntraVENous BID    dextrose 5% infusion  75 mL/hr IntraVENous CONTINUOUS    sodium chloride (NS) flush 5-40 mL  5-40 mL IntraVENous Q8H    sodium chloride (NS) flush 5-40 mL  5-40 mL IntraVENous PRN    LORazepam (ATIVAN) injection 0.5 mg  0.5 mg IntraVENous Q4H PRN    fluconazole (DIFLUCAN) 200mg/100 mL IVPB (premix)  200 mg IntraVENous DAILY    heparin (porcine) injection 5,000 Units  5,000 Units SubCUTAneous Q8H    HYDROmorphone (DILAUDID) injection 0.5-1 mg  0.5-1 mg IntraVENous Q4H PRN    naloxone (NARCAN) injection 1 mg  1 mg IntraVENous Q5MIN PRN    pantoprazole (PROTONIX) 40 mg in 0.9% sodium chloride 10 mL injection  40 mg IntraVENous DAILY    cefepime (MAXIPIME) 2,000 mg in 0.9% sodium chloride (MBP/ADV) 100 mL MBP  2,000 mg IntraVENous Q12H    lidocaine 4 % patch 1 Patch  1 Patch TransDERmal Q24H    metroNIDAZOLE (FLAGYL) IVPB premix 500 mg  500 mg IntraVENous Q12H    melatonin tablet 3 mg  3 mg Oral QHS PRN    prochlorperazine (COMPAZINE) injection 10 mg  10 mg IntraVENous Q6H PRN    glucose chewable tablet 16 g  4 Tablet Oral PRN    glucagon (GLUCAGEN) injection 1 mg  1 mg IntraMUSCular PRN    dextrose 10 % infusion 0-250 mL  0-250 mL IntraVENous PRN    alum-mag hydroxide-simeth (MYLANTA) oral suspension 30 mL  30 mL Oral Q4H PRN    alcohol 62% (NOZIN) nasal  1 Ampule  1 Ampule Topical Q12H    ondansetron (ZOFRAN) injection 4 mg  4 mg IntraVENous Q4H PRN    acetaminophen (TYLENOL) tablet 650 mg  650 mg Oral Q6H PRN    polyethylene glycol (MIRALAX) packet 17 g  17 g Oral QHS PRN    allopurinoL (ZYLOPRIM) tablet 100 mg  100 mg Oral BID      Allergies   Allergen Reactions    Codeine Other (comments)     Pt states unknown reaction. Pcn [Penicillins] Rash     Received Ancef 1/18/2023 without issue.             Physical Exam:   Visit Vitals  /67 (BP 1 Location: Right arm, BP Patient Position: At rest)   Pulse 92   Temp 98.7 °F (37.1 °C)   Resp 18   Ht 5' 5\" (1.651 m)   Wt 156 lb 1.4 oz (70.8 kg)   SpO2 96%   BMI 25.97 kg/m²     ECOG PS: 0  General: Lethargic, ill and frail appearing, severely cachectic   Mental  status: Lethargic, does not participate in much conversation, unable to assess orientation   HENT: NCAT   Neck: no visualized mass   Resp: no respiratory distress   Neuro: no gross deficits   Skin: no discoloration or lesions of concern on visible areas   Psychiatric: Lethargic, poor insight           Results:     Lab Results   Component Value Date/Time    WBC 6.4 01/26/2023 01:33 AM    HGB 8.8 (L) 01/26/2023 01:33 AM    HCT 29.5 (L) 01/26/2023 01:33 AM    PLATELET 324 22/56/3221 01:33 AM    MCV 97.7 01/26/2023 01:33 AM    ABS. NEUTROPHILS 4.6 01/26/2023 01:33 AM     Lab Results   Component Value Date/Time    Sodium 146 (H) 01/26/2023 01:33 AM    Potassium 3.2 (L) 01/26/2023 01:33 AM    Chloride 114 (H) 01/26/2023 01:33 AM    CO2 25 01/26/2023 01:33 AM    Glucose 123 (H) 01/26/2023 01:33 AM    BUN 35 (H) 01/26/2023 01:33 AM    Creatinine 1.12 01/26/2023 01:33 AM    GFR est AA >60 07/26/2022 01:45 PM    GFR est non-AA >60 07/26/2022 01:45 PM    Calcium 8.0 (L) 01/26/2023 01:33 AM    Sodium,  10/07/2022 10:16 AM    Potassium, POC 3.9 10/07/2022 10:16 AM    Chloride,  10/07/2022 10:16 AM    Glucose (POC) 94 01/06/2023 07:07 AM    Creatinine, POC 1.1 10/07/2022 10:16 AM    Calcium, ionized (POC) 1.22 10/07/2022 10:16 AM     Lab Results   Component Value Date/Time    Bilirubin, total 1.3 (H) 01/26/2023 01:33 AM    ALT (SGPT) 8 (L) 01/26/2023 01:33 AM    Alk. phosphatase 211 (H) 01/26/2023 01:33 AM    Protein, total 5.4 (L) 01/26/2023 01:33 AM    Albumin 1.7 (L) 01/26/2023 01:33 AM    Globulin 3.7 01/26/2023 01:33 AM     CT Results (most recent):  Results from Hospital Encounter encounter on 01/17/23    CT HEAD WO CONT    Narrative  Indication:  worsening AMS    Comparison: CT 1/22/2023    Findings: 5 mm axial images were obtained from the skull base through the  vertex. CT dose reduction was achieved through the use of a standardized protocol  tailored for this examination and automatic exposure control for dose  modulation. The ventricles and cortical sulci are prominent, compatible with age related  volume loss. There is no evidence of intracranial hemorrhage, mass, mass effect,  or acute infarct. There is periventricular white matter disease. No extra-axial  fluid collections are seen. The visualized paranasal sinuses and mastoid air  cells are clear. The orbital structures are unremarkable. No osseous  abnormalities are seen. Impression  1.  No evidence of acute infarct or intracranial hemorrhage. No significant  change. 2. Mild periventricular white matter disease is likely secondary to chronic  small vessel ischemic changes. No imaging of spleen      Pathology:           ==========================================================================   * * *FINAL PATHOLOGIC DIAGNOSIS* * *     <<<<<       Lymph node, left supraclavicular lymph node, excision:        Classic Hodgkin lymphoma. See comment.     >>>>>      * * *Comment* * *     <<<<<  The histologic section has an enlarged lymph node with a thickened capsule   and effacement of normal yady architecture by a vaguely nodular   proliferation with few sclerotic bands. Numerous Hodgkin/Levi-Esteban   cells are present within a background of small lymphocytes and   eosinophils. The Hodgkin Levi-Esteban cells are positive for CD30, CD15   and PAX5 (subset, dim) and are negative for CD45, ALK, CD20, EMA,   Granzyme, MUM1, CD43 and CD3. In situ hybridization for Tania-Barr viral   RNA is negative. The small lymphocytes are comprised of CD3 and CD5   positive T cells with few scattered B cells identified. Assessment and Recommendations:     Classic Hodgkin lymphoma, advanced  -At present, the patient has clinical symptoms of diarrhea, abdominal discomfort, discoloration of his lower extremities, findings may be related to widely distributed lymphadenopathy. Splenomegaly may be causing liver dysfunction.     -Discussed the risks and benefits of ABVD chemotherapy in the OP setting, please see clinic note for further detail. -CT chest/abd/pelvis   IMPRESSION  1. Mediastinal, hilar, upper abdominal, retroperitoneal, and iliac chain  adenopathy with multiple splenic lesions most consistent with lymphoma. Metastatic disease or other inflammatory/infectious process is less likely. Retroperitoneal nodes are amenable to percutaneous biopsy. 2.  Indeterminate, possibly benign segment 6 hepatic hypodensity.  Considerfurther evaluation with MRI. -MRI Brain   IMPRESSION  1. Evaluation is significantly limited by patient positioning. No evidence of intracranial metastases. No acute intracranial abnormality. 2. Generalized parenchymal volume loss and mild chronic microvascular ischemic disease. Small chronic infarcts as above. -Appreciate General Surgery input - port placed prior to chemo start   - PFTs completed on 11/18  -S/p Bone Marrow biopsy 1/20   -S/p  chemotherapy with C#1 on 1/20 of ABVD (Doxorubicin 25mg/m2, Bleomycin 10 units/m2, Vinblastine 6mg/m2, Dacarbazine 375mg/m2 on days 1 and 15 every 28 days), with plans for 6 cycles (further cycles to be OP). Hyperbilirubinemia  -Etiology of his hyperbilirubinemia may be a consequence of impaired liver function as a consequence of lymphoma and splenomegaly.   -Trend CMP, T bili continues to trend down     Elevated alkaline phosphatase  -The patient likely has disease involvement of the bone     Tumor Lysis Syndrome  -Component of TLS given extensive disease burden  -Continue to trend uric every 8 hours, so far stable at 5.6 today  -So far has not required additional dose of Rasburicase   -Close monitoring of CMP and electrolytes for worsening of TLS with plan for systemic therapy, Appreciate Hospitalist input      Mild Hypercalcemia  - resolved   -Related to disease burden  -Corrected Ca 8.6  -Monitor for now      Acute Kidney Injury - resolved   -Related to TLS  -S/p dose of Rasburicase 1/17  -Follow renal function     Anasarca  Severe Protein Malnutrition   Bilateral LE Swelling bilateral lower extremity Dopplers ruled out DVT  -Likely r/t to malignancy and malnutrition   -Prealbumin 3.2 - encouraged increased PO. Patient has only been eating chicken broth for several weeks per wife. -Appreciate Dietician input   -Hold off on NGT or PEG for nutrition now.  Concern for refeeding syndrome in setting of TLS after chemo.   -Patient has had very poor intake over the last several days with delirium. Unable to place NG tube or consider PEG for nutrition given patient's agitation and confusion. Patient pulled out [employer]aren[employer] needle out of his port and required mitts  > TPN ordered by hospitalist    Generalized Weakness  Failure to Thrive   -R/t malignancy and severe malnutrition   -PT/OT consults -to be decided either home with 24/7 care versus SNF at discharge    Aspiration PNA   Hospitalized Delirium   -Appreciate hospitalist input throughout the weekend.  -Started on broad spectrum antibiotics with cefepime, metronidazole and vancomycin. No longer on vancomycin   -Sputum culture final for moderate yeast  -Diflucan added to antibiotic regimen per hospitalist team on 1/23  -Telemetry sitter for safety  -CT head completed, no acute findings  -Appreciate speech therapy input, able to complete modified barium swallow today with recommendations for puréed diet and thin liquids   > TPN ordered, hopefully as patient is now more alert his mentation will continue to improve to allow for more p.o. intake  > Continue low dose seroquel   > Appreciate palliative care input    We will continue to follow        Signed By: Tyelr Kilgore NP      I have personally seen and evaluated the patient in conjunction with Petey Lockhart NP. I find the patient's history and physical exam are consistent with the NP's documentation. I agree with the above assessment and plan, which I have modified as needed. Marked clinical improvement overnight. Mentation is slowly improving. He is awake and eating. He continues to be somewhat confused. Overall altered mentation likely secondary to hospital-acquired delirium.       Nella Sacks, MD    Attending [address] [place] [address]

## 2023-01-26 NOTE — PROGRESS NOTES
Problem: Mobility Impaired (Adult and Pediatric)  Goal: *Acute Goals and Plan of Care (Insert Text)  Description: FUNCTIONAL STATUS PRIOR TO ADMISSION: Per wife report, pt with recent decline requiring transition to first floor set up and assist with ADLs. Wife states that pt uses [facility] for amb, but would benefit from RW which he owns, but has been reluctant to use. Of note, pt diagnosed with Hodgkin's lymphoma Dec 2022 and is now starting chemo. HOME SUPPORT PRIOR TO ADMISSION: The patient lived with wife who provides assist along with children. Physical Therapy Goals  Reviewed 1/26/2023 and remain appropriate for the next 7 days  Initiated 1/18/2023  1. Patient will move from supine to sit and sit to supine  in bed with independence within 7 day(s). 2.  Patient will transfer from bed to chair and chair to bed with supervision/set-up using the least restrictive device within 7 day(s). 3.  Patient will perform sit to stand with supervision/set-up within 7 day(s). 4.  Patient will ambulate with supervision/set-up for 50 feet with the least restrictive device within 7 day(s). Outcome: Progressing Towards Goal   PHYSICAL THERAPY TREATMENT: WEEKLY REASSESSMENT  Patient: Gato Michael (36 y.o. male)  Date: 1/26/2023  Primary Diagnosis: Hodgkin lymphoma ([facility] [address] [C81.90]  Procedure(s) (LRB):  INFUSAPORT INSERTION (N/A) 8 Days Post-Op   Precautions:   Bed Alarm, Fall, Aspiration (telesitter;sun downs; chemo; L chest wall port; 3 sm chronic CVAs)      ASSESSMENT  Patient continues with skilled PT services and is progressing slowly towards goals. Pt received in bed with several family members present. He is confused, but noted with increased cognition today as compared to yesterday. He was able to follow simple, one step commands and participate in some meaningful conversation intermittently.   Overall he demonstrates min a x 2 with mobility and did have one major lob with ambulation requiring max a to recover. He continues to demonstrate impaired problem solving/task sequencing/safety and activity tolerance. He hasn't met any goals, thus all goals continued. Rehab at discharge remains appropriate. Patient's progression toward goals since last assessment: none, pt requiring increased physical assistance since last tx session    Current Level of Function Impacting Discharge (mobility/balance):   Bed Mobility:  Rolling: Minimum assistance  Supine to Sit: Minimum assistance; Additional time;Bed Modified  Transfers:  Sit to Stand: Minimum assistance;Assist x2  Stand to Sit: Minimum assistance;Assist x2  Ambulation/Gait Training:  Distance (ft): 10 Feet (ft)  Assistive Device: Gait belt;Cane, straight  Ambulation - Level of Assistance: Minimal assistance; Moderate assistance;Assist x2    Other factors to consider for discharge: supportive family, functioning below baseline, confusion         PLAN :  Goals have been updated based on progression since last assessment. Patient continues to benefit from skilled intervention to address the above impairments. Recommendations and Planned Interventions: bed mobility training, transfer training, gait training, therapeutic exercises, patient and family training/education, and therapeutic activities      Frequency/Duration: Patient will be followed by physical therapy:  4 times a week to address goals. Recommendation for discharge: (in order for the patient to meet his/her long term goals)  Therapy up to 5 days/week in SNF setting    This discharge recommendation:  A follow-up discussion with the attending provider and/or case management is planned    IF patient discharges home will need the following DME: to be determined (TBD)         SUBJECTIVE:   Patient stated it's 1950.     OBJECTIVE DATA SUMMARY:   HISTORY:    Past Medical History:   Diagnosis Date    Allergic rhinitis, cause unspecified 12/11/2013    Arthritis     knees    Asthma     as a child    Eczema on lower back waistline on the back    Environmental allergies     GERD (gastroesophageal reflux disease)     occastionally but resolved since 60 pound weight loss    Hodgkin's lymphoma ([facility] [address] 2023    Hypertension     Psychiatric disorder     anxiety and depression     Past Surgical History:   Procedure Laterality Date    HX TONSILLECTOMY      HX WISDOM TEETH EXTRACTION         Personal factors and/or comorbidities impacting plan of care: confusion, medical status    Home Situation  Home Environment: Private residence  One/Two Story Residence: Two story, live on 1st floor  Living Alone: No (lives with wife)  Support Systems: Spouse/Significant Other, Other Family Member(s)  Patient Expects to be Discharged to[de-identified] Home with home health  Current DME Used/Available at Home: Francesca Kar, straight, Walker, rolling    EXAMINATION/PRESENTATION/DECISION MAKING:   Critical Behavior:  Neurologic State: Alert  Orientation Level: Oriented to person, Disoriented to place, Disoriented to situation, Disoriented to time  Cognition: Follows commands (Simple, directive cues)  Safety/Judgement: Lack of insight into deficits  Hearing: Auditory  Auditory Impairment: Hard of hearing, bilateral  Hearing Aids/Status: Does not own  Range Of Motion:  AROM: Generally decreased, functional            Strength:    Strength: Generally decreased, functional         Tone & Sensation:   Tone: Normal          Coordination:  Coordination: Generally decreased, functional       Functional Mobility:  Bed Mobility:  Rolling: Minimum assistance  Supine to Sit: Minimum assistance; Additional time;Bed Modified        Transfers:  Sit to Stand: Minimum assistance;Assist x2  Stand to Sit: Minimum assistance;Assist x2            Balance:   Sitting: Impaired; With support  Sitting - Static: Good (unsupported)  Sitting - Dynamic: Good (unsupported); Fair (occasional)  Standing: Impaired; With support  Standing - Static: Fair;Poor;Constant support  Standing - Dynamic : Fair;Poor;Constant support  Ambulation/Gait Training:  Distance (ft): 10 Feet (ft)  Assistive Device: Gait belt;Cane, straight  Ambulation - Level of Assistance: Minimal assistance; Moderate assistance;Assist x2        Gait Abnormalities: Decreased step clearance              Speed/Briana: Slow;Pace decreased (<100 feet/min)  Step Length: Right shortened;Left shortened           Activity Tolerance:   Fair and SpO2 stable on RA    After treatment patient left in no apparent distress:   Supine in bed, Call bell within reach, Bed / chair alarm activated, Caregiver / family present, and Side rails x 3    COMMUNICATION/EDUCATION:   The patients plan of care was discussed with: Occupational therapist, Registered nurse, Physician, and Case management. Fall prevention education was provided and the patient/caregiver indicated understanding., Patient/family have participated as able in goal setting and plan of care. , and Patient/family agree to work toward stated goals and plan of care.     Thank you for this referral.  Kelly Rios, PT   Time Calculation: 23 mins

## 2023-01-26 NOTE — PROGRESS NOTES
Speech Pathology Note    MBS complete. Full note to follow. Recommend Puree/Thin Liquid diet. Note patient coughs in the absence of penetration/aspiration.     Xin Carwley M.S., CCC-SLP

## 2023-01-26 NOTE — PROGRESS NOTES
Problem: Non-Violent Restraints  Goal: Removal from restraints as soon as assessed to be safe  Outcome: Resolved/Met  Goal: No harm/injury to patient while restraints in use  Outcome: Resolved/Met  Goal: Patient's dignity will be maintained  Outcome: Resolved/Met  Goal: Patient Interventions  Outcome: Resolved/Met     Problem: Non-Violent Restraints  Goal: Removal from restraints as soon as assessed to be safe  Outcome: Resolved/Met  Goal: No harm/injury to patient while restraints in use  Outcome: Resolved/Met  Goal: Patient's dignity will be maintained  Outcome: Resolved/Met  Goal: Patient Interventions  Outcome: Resolved/Met

## 2023-01-26 NOTE — PROGRESS NOTES
Comprehensive Nutrition Assessment    Type and Reason for Visit: Reassess    Nutrition Recommendations/Plan:   Continue PO diet per SLP recommendations. Initiate PPN: AA 4.25% / D10%   Day 1: 42mL/hr  Day 2: if lytes WNL and BG <200mg/dL, advance to 63mL/hr  Day 3: if lytes WNL and BG <200mg/dL, advance to 83mL/hr. Day 4: if lytes WNL and BG <200mg/dL, advance to goal rate 100mL/hr. Add 500mL 20% lipids three times per week. Provides daily avg 1652 kcals, 102g Pro, 240g dextrose.  (if able to provide higher concentration TPN, please consult for new recommendations)    If NGT is able to be placed, wean off PPN and start Jevity 1.5. Start at 10mL/hr and advance [id number] depending on electrolyte stability + Prosource daily. FWF 50mL Q6hr. Goal rate 50mL/hr + Prosource daily. Provides 1840 kcals, 97g Pro, 259g CHO. Malnutrition Assessment:  Malnutrition Status:  Severe malnutrition (01/18/23 1450)    Context:  Chronic illness     Findings of the 6 clinical characteristics of malnutrition:   Energy Intake:  75% or less est energy requirements for 1 month or longer  Weight Loss:  Greater than 20% over 1 year     Body Fat Loss:  Severe body fat loss,     Muscle Mass Loss:  Severe muscle mass loss,    Fluid Accumulation:  Severe, Extremities   Strength:  Not performed     Nutrition Assessment:     Chart reviewed and case discussed during IDR. Pt's diet advanced per SLP this morning from NPO to purees. Orders for PPN placed too late in the day yesterday, so that will also start this evening. There is also talk of attempting to place an NGT for tube feeding, so those recommendations have been written as well. Providers in pt's room at time of visit. Pt remains encephalopathic. Will continue to monitor plan of care closely. Current weight is now much higher than previous weight, but he now +3.9L since admission per documentation.     Wt Readings from Last 5 Encounters:   01/25/23 70.8 kg (156 lb 1.4 oz) 01/06/23 64.5 kg (142 lb 3.2 oz)   12/28/22 69.6 kg (153 lb 6.4 oz)   12/28/22 68.7 kg (151 lb 6.4 oz)   12/23/22 72.4 kg (159 lb 9.6 oz)   ]    Nutrition Related Findings:    Labs: Na 146, K 3.2, Hgb 8.8. Meds: cefepime, diflucan, flagyl, zofran, protonix, KCl, D5.   Edema: 1+ genital, 1+pitting BLE. BM 1/19. Wound Type: None    Current Nutrition Intake & Therapies:  Average Meal Intake: NPO  Average Supplement Intake: None ordered  ADULT ORAL NUTRITION SUPPLEMENT Lunch, Dinner; Frozen Supplement  ADULT DIET Dysphagia - Pureed; Patient likes chicken broth, decaf coffee instead of tea every meal, splenda and creamer, likes megan and vanilla ice cream  TPN ADULT - PERIPHERAL    Anthropometric Measures:  Height: 5' 5\" (165.1 cm)  Ideal Body Weight (IBW): 136 lbs (62 kg)     Current Body Wt:  70.8 kg (156 lb 1.4 oz), 107.8 % IBW. Bed scale  Current BMI (kg/m2): 26        Weight Adjustment: No adjustment                 BMI Category: Normal weight (BMI 22.0-24.9) age over 72    Estimated Daily Nutrient Needs:  Energy Requirements Based On: Formula  Weight Used for Energy Requirements: Current  Energy (kcal/day): 1740 kcals (BMR x 1. 3AF)  Weight Used for Protein Requirements: Current  Protein (g/day): 80-93g (1.2-1.5g/kg)  Method Used for Fluid Requirements: 1 ml/kcal  Fluid (ml/day): 1700mL    Nutrition Diagnosis:   Severe malnutrition, In context of chronic illness related to catabolic illness, inadequate protein-energy intake as evidenced by Criteria as identified in malnutrition assessment, NPO or clear liquid status due to medical condition  Dx continues. PO intake quite even with diet order advancement.      Nutrition Interventions:   Food and/or Nutrient Delivery: Continue current diet, Continue oral nutrition supplement  Nutrition Education/Counseling: No recommendations at this time  Coordination of Nutrition Care: Continue to monitor while inpatient       Goals:     Goals: PO intake 50% or greater, Initiate nutrition support, by next RD assessment       Nutrition Monitoring and Evaluation:   Behavioral-Environmental Outcomes: None identified  Food/Nutrient Intake Outcomes: Diet advancement/tolerance, Food and nutrient intake, Supplement intake, Enteral nutrition intake/tolerance, Parenteral nutrition intake/tolerance  Physical Signs/Symptoms Outcomes: Biochemical data, GI status, Fluid status or edema, Weight, Nutrition focused physical findings    Discharge Planning:     Too soon to determine    Rachael Navas, [address]  Contact: [facility]-[id number]

## 2023-01-26 NOTE — PROGRESS NOTES
Problem: Self Care Deficits Care Plan (Adult)  Goal: *Acute Goals and Plan of Care (Insert Text)  Description: FUNCTIONAL STATUS PRIOR TO ADMISSION: Pt has had recent decline in function, moving to a first floor setup in his home. Patient uses [facility] [place] for functional mobility, wife reports he has a RW but has not been using it. He receives assistance for ADLs as needed from wife. HOME SUPPORT: The patient lived with his wife who provides assistance. Occupational Therapy Goals  Initiated 1/18/2023; Weekly Re-assessment 1/26/2023- All goals continued  1. Patient will perform grooming with supervision/set-up in standing within 7 day(s). 2.  Patient will perform upper body dressing with supervision/set-up within 7 day(s). 3.  Patient will perform toilet transfers with supervision/set-up within 7 day(s). 4.  Patient will perform all aspects of toileting with supervision/set-up within 7 day(s). 5.  Patient will participate in upper extremity therapeutic exercise/activities with independence for 5 minutes within 7 day(s). 6.  Patient will utilize energy conservation techniques during functional activities with verbal cues within 7 day(s). Outcome: Progressing Towards Goal    OCCUPATIONAL THERAPY TREATMENT/WEEKLY RE-ASSESSMENT  Patient: Mireya Keller (20 y.o. male)  Date: 1/26/2023  Diagnosis: [facility] [C81.90] <principal problem not specified>  Procedure(s) (LRB):  INFUSAPORT INSERTION (N/A) 8 Days Post-Op  Precautions: Bed Alarm, Fall, Aspiration (telesitter;sun downs; chemo; L chest wall port; 3 sm chronic CVAs)  Chart, occupational therapy assessment, plan of care, and goals were reviewed. ASSESSMENT  Patient continues with skilled OT services and is slowly progressing towards goals. Although confused, pt noted with increased cognition this date as he was able to follow simple, direction commands, as well as, demonstrate Min Ax2 functional transfers.   Pt continues to demonstrate decreased problem solving/sequencing/safety, decreased mobility/balance, decreased activity tolerance and decreased full body reaching, all of which limit pt's safe functional independence, with all goals continued at their current levels. Continue skilled OT during acute hospitalization to address above listed functional deficits, with reporting therapist believing pt would benefit from SNF rehab upon discharge. Current Level of Function Impacting Discharge (ADLs): Max A    Other factors to consider for discharge: Confusion         PLAN :  Goals have been updated based on progression since last assessment. Patient continues to benefit from skilled intervention to address the above impairments. Continue to follow patient 4 times a week to address goals. Recommend with staff: Frequent positional changes, EOB ADL engagement    Recommend next OT session: POC progression    Recommendation for discharge: (in order for the patient to meet his/her long term goals)  Therapy up to 5 days/week in SNF setting    This discharge recommendation:  Has not yet been discussed the attending provider and/or case management    IF patient discharges home will need the following DME: TBD       SUBJECTIVE:   Patient stated I'm from here, [place].     OBJECTIVE DATA SUMMARY:   Cognitive/Behavioral Status:  Neurologic State: Alert  Orientation Level: Oriented to person;Disoriented to place; Disoriented to situation;Disoriented to time  Cognition: Follows commands (Simple, directive cues)  Perception: Cues to maintain midline in standing  Perseveration: No perseveration noted  Safety/Judgement: Lack of insight into deficits    Functional Mobility and Transfers for ADLs:  Bed Mobility:  Rolling: Minimum assistance  Supine to Sit: Minimum assistance; Additional time;Bed Modified    Transfers:  Sit to Stand: Minimum assistance;Assist x2     Pt educated on safe transfer techniques, with specific emphasis on proper hand placement to push up from seated surface rather than attempt to pull self up, fully positioning self in-front of desired seated location, feeling chair on back of legs and reaching back with 1-2 UE to slowly lower self to seated position. Balance:  Sitting: Impaired; With support  Sitting - Static: Good (unsupported)  Sitting - Dynamic: Good (unsupported); Fair (occasional)  Standing: Impaired; With support  Standing - Static: Fair;Poor;Constant support  Standing - Dynamic : Fair;Poor;Constant support    ADL Intervention:  Lower Body Dressing Assistance  Socks: Total assistance (dependent)  Position Performed: Seated edge of bed    Cognitive Retraining  Safety/Judgement: Lack of insight into deficits    Pain:  No c/o pain    Activity Tolerance:   Fair and requires frequent rest breaks    After treatment patient left in no apparent distress:   Supine in bed, Call bell within reach, Bed / chair alarm activated, Caregiver / family present, and Side rails x 3    COMMUNICATION/COLLABORATION:   The patients plan of care was discussed with: Physical therapist, Registered nurse, and Physician.      Hardik Zuniga OT  Time Calculation: 23 mins

## 2023-01-26 NOTE — PROGRESS NOTES
Hospitalist Progress Note    NAME: Eulah Eisenmenger   :  1950   MRN:  [id number]       Assessment / Plan:    Advanced Hodgkin lymphoma  - With splenomegaly lymphadenopathy  - Status postchemotherapy   - Hematology oncology team are following, input is appreciated  - Discussed with patient's wife, son and rest of the family at bedside    Tumor lysis syndrome  -Continue following uric acid level  - Hematology oncology recommending allopurinol  - Follow CMP very closely  - NILTON, hypercalcemia as evidence of tumor lysis syndrome    Hyperbilirubinemia  Alkaline phosphatase elevated  - Likely secondary to lymphoma/splenomegaly    Pseudo hypocalcemia  - Calcium 7.9, albumin 1.8, corrected calcium on the higher end    Aspiration pneumonia  Acute hypoxic respiratory failure  -Currently on 2 L oxygen via nasal cannula  - Continue cefepime and metronidazole  - Aspiration precautions    Acute metabolic encephalopathy  - Likely due to worsening metabolic status in the setting of a tumor lysis syndrome  - Follow mentation very closely  -Very awake today and following all commands but not oriented  - MBS normal, speech therapy recommending puréed food, but this appreciated    GERD  - Continue home meds    Severe protein calorie malnutrition  - Likely secondary to advanced lymphoma and decreased p.o. intake  - Nutrition consult input is appreciated    25.0 - 29.9 Overweight / Body mass index is 25.97 kg/m². Estimated discharge date:   Barriers: Clinical improvement    Code status: DNR  Prophylaxis: Hep SQ  Recommended Disposition:  TBD     Subjective:     Patient was seen and examined. No acute events overnight. Discussed with RN overnight events. All patient's questions were answered. \"I am fine\"    Patient's wife and son at bedside, all questions were answered.       Review of Systems:  Symptom Y/N Comments  Symptom Y/N Comments   Fever/Chills    Chest Pain     Poor Appetite    Edema Cough    Abdominal Pain     Sputum    Joint Pain     SOB/SALMERON    Pruritis/Rash     Nausea/vomit    Tolerating PT/OT     Diarrhea    Tolerating Diet     Constipation    Other       Could NOT obtain due to: Patient was alert but not oriented, was unable to answer review of other system questions         Objective:     VITALS:   Last 24hrs VS reviewed since prior progress note.  Most recent are:  Patient Vitals for the past 24 hrs:   Temp Pulse Resp BP SpO2   01/26/23 1120 98.7 °F (37.1 °C) 92 18 131/67 96 %   01/26/23 0808 98.9 °F (37.2 °C) 96 20 (!) 142/76 97 %   01/26/23 0234 -- (!) 116 29 -- 96 %   01/26/23 0229 97.7 °F (36.5 °C) (!) 116 22 131/71 96 %   01/26/23 0224 -- (!) 108 (!) 32 -- 93 %   01/26/23 0219 -- (!) 119 20 -- 97 %   01/26/23 0214 -- (!) 110 (!) 33 -- 93 %   01/26/23 0209 -- (!) 110 29 -- 93 %   01/26/23 0204 -- (!) 110 (!) 33 -- 92 %   01/26/23 0159 -- (!) 119 21 -- 95 %   01/26/23 0154 -- (!) 113 29 -- 93 %   01/26/23 0149 -- (!) 115 30 -- 94 %   01/26/23 0144 -- (!) 110 (!) 33 -- 95 %   01/26/23 0139 -- (!) 108 (!) 31 -- 94 %   01/26/23 0134 -- (!) 111 (!) 31 -- 93 %   01/26/23 0129 -- (!) 120 24 -- 97 %   01/26/23 0124 -- (!) 108 29 -- 94 %   01/26/23 0119 -- (!) 105 (!) 31 -- 94 %   01/26/23 0114 -- (!) 104 30 -- 94 %   01/26/23 0109 -- (!) 114 19 -- 97 %   01/26/23 0104 -- (!) 104 27 -- 94 %   01/26/23 0059 -- (!) 106 (!) 36 -- 96 %   01/26/23 0054 -- (!) 101 30 -- 94 %   01/26/23 0049 -- (!) 105 (!) 31 -- 95 %   01/26/23 0044 -- (!) 102 28 -- 93 %   01/26/23 0039 -- (!) 101 28 -- 94 %   01/26/23 0034 -- (!) 102 28 -- 93 %   01/26/23 0029 -- (!) 102 28 -- 94 %   01/26/23 0024 -- (!) 102 27 -- 93 %   01/26/23 0019 -- (!) 104 29 -- 93 %   01/26/23 0014 -- (!) 107 27 -- 93 %   01/26/23 0009 -- (!) 108 28 -- 93 %   01/26/23 0004 -- (!) 106 28 -- 93 %   01/25/23 2359 -- (!) 106 28 -- 93 %   01/25/23 2354 -- (!) 112 29 -- 93 %   01/25/23 2349 -- (!) 122 25 -- 96 %   01/25/23 2344 -- (!) 114 23 -- 91 %   01/25/23 2339 -- (!) 120 23 -- 96 %   01/25/23 2334 -- (!) 113 20 -- 94 %   01/25/23 2329 -- (!) 111 28 -- 93 %   01/25/23 2324 -- (!) 113 15 -- 92 %   01/25/23 2319 -- (!) 116 19 -- 95 %   01/25/23 2314 -- (!) 112 18 -- 94 %   01/25/23 2309 -- (!) 116 16 -- 94 %   01/25/23 2304 -- (!) 118 14 -- 96 %   01/25/23 2259 -- (!) 112 19 -- 94 %   01/25/23 2254 -- (!) 109 14 -- 93 %   01/25/23 2249 -- (!) 117 19 -- 96 %   01/25/23 2244 97.8 °F (36.6 °C) (!) 117 15 130/79 96 %   01/25/23 2239 -- (!) 114 18 -- 96 %   01/25/23 2234 -- (!) 112 15 -- 94 %   01/25/23 2229 -- (!) 117 18 -- 97 %   01/25/23 2224 -- (!) 106 14 -- 94 %   01/25/23 2021 98 °F (36.7 °C) 99 18 120/71 100 %   01/25/23 1534 97.3 °F (36.3 °C) 92 17 (!) 132/54 100 %         Intake/Output Summary (Last 24 hours) at 1/26/2023 1439  Last data filed at 1/25/2023 2315  Gross per 24 hour   Intake --   Output 500 ml   Net -500 ml          I had a face to face encounter and independently examined this patient on 1/26/2023, as outlined below:  PHYSICAL EXAM:  General: Awake, alert, following commands  EENT: Anicteric sclerae. Resp:  CTA bilaterally, no wheezing or rales. No accessory muscle use  CV:  Regular  rhythm,  No edema  GI:  Soft, Non distended, Non tender. +Bowel sounds  Neurologic:  EOMs intact. No facial asymmetry. No aphasia or slurred speech. Symmetrical strength, Sensation grossly intact. Alert and oriented X 0. Psych:   Poor insight. Not anxious nor agitated  Skin:  No rashes.   No jaundice    Reviewed most current lab test results and cultures  YES  Reviewed most current radiology test results   YES  Review and summation of old records today    NO  Reviewed patient's current orders and MAR    YES  PMH/SH reviewed - no change compared to H&P  ________________________________________________________________________  Care Plan discussed with:    Comments   Patient X    Family  x    RN X    Care Manager     Consultant Multidiciplinary team rounds were held today with , nursing, pharmacist and clinical coordinator. Patient's plan of care was discussed; medications were reviewed and discharge planning was addressed. ________________________________________________________________________        Comments   >50% of visit spent in counseling and coordination of care X    ________________________________________________________________________  Tito Shelton MD     Procedures: see electronic medical records for all procedures/Xrays and details which were not copied into this note but were reviewed prior to creation of Plan. LABS:  I reviewed today's most current labs and imaging studies.   Pertinent labs include:  Recent Labs     01/26/23  0133 01/25/23  0407 01/24/23  0437   WBC 6.4 8.1 10.7   HGB 8.8* 10.1* 10.2*   HCT 29.5* 33.3* 34.6*    166 131*       Recent Labs     01/26/23  0133 01/25/23  0407 01/24/23  0437   * 146* 145   K 3.2* 3.3* 4.7   * 114* 116*   CO2 25 22 23   * 123* 110*   BUN 35* 38* 44*   CREA 1.12 1.05 1.28   CA 8.0* 7.9* 8.3*   MG 2.2  --   --    PHOS 2.6 3.3 2.1*   ALB 1.7* 1.8* 1.7*   TBILI 1.3* 1.7* 2.1*   ALT 8* 10* 10*         Signed: Tito Shelton MD

## 2023-01-26 NOTE — PROGRESS NOTES
Problem: Dysphagia (Adult)  Goal: *Acute Goals and Plan of Care (Insert Text)  Description: Speech Therapy Goals  Initiated 1/23/2023  1. Patient will tolerate full liquid diet without overt s/s aspiration within 7 days. 2. Added 1/24/2023 patient will participate in instrumental testing as appropriate to objectively assess swallow function    Outcome: Progressing Towards Goal     SPEECH LANGUAGE PATHOLOGY DYSPHAGIA TREATMENT  Patient: Maria L Avendano (41 y.o. male)  Date: 1/26/2023  Diagnosis: Hodgkin lymphoma ([facility] [C81.90] <principal problem not specified>  Procedure(s) (LRB):  INFUSAPORT INSERTION (N/A) 8 Days Post-Op  Precautions: Bed Alarm, Fall, Aspiration (telesitter;sun downs; chemo; L chest wall port; 3 sm chronic CVAs)    ASSESSMENT:  Patient continues to be followed by SLP. MBS planned for 1/25, however patient not appropriate 2/2 alertness. Patient seen at bedside on this date. Patient received alert/awake and Ox1. Patient tolerated ice chips without signs of aspiration. Delayed throat clear observed following tsp of thin liquid. Inconsistent strong cough observed following straw sips of thin liquid. Given patient's increased alertness and active PO acceptance, MBS planned for this morning. RN educated re: SLP POC. PLAN:  Recommendations and Planned Interventions:  -- Modified Barium Swallow Study (MBS) today    Patient continues to benefit from skilled intervention to address the above impairments. Continue treatment per established plan of care. Discharge Recommendations: To Be Determined     SUBJECTIVE:   Patient stated there's a spider over there. . it's been walking all over me.     OBJECTIVE:   Cognitive and Communication Status:  Neurologic State: Alert  Orientation Level: Oriented to person, Disoriented to place, Disoriented to situation, Disoriented to time  Cognition: Decreased attention/concentration, Decreased command following, Impaired decision making     Perseveration: No perseveration noted  Safety/Judgement: Lack of insight into deficits    Dysphagia Treatment:    P.O. Trials:  Patient Position: Upright in bed  Vocal quality prior to P.O.: No impairment  Consistency Presented: Ice chips; Thin liquid  How Presented: SLP-fed/presented;Spoon;Straw     Bolus Acceptance: No impairment  Bolus Formation/Control: Impaired  Type of Impairment: Delayed  Propulsion: Delayed (# of seconds)  Oral Residue: None        Aspiration Signs/Symptoms: Delayed cough/throat clear;Strong cough                      Pain:  Pain Scale 1: Numeric (0 - 10)  Pain Intensity 1: 0       After treatment:   Patient left in no apparent distress in bed, Call bell within reach, and Nursing notified    COMMUNICATION/EDUCATION:   The patient's plan of care including recommendations, planned interventions, and recommended diet changes were discussed with: Registered nurse.      CAIT Trevino  Time Calculation: 15 mins

## 2023-01-26 NOTE — PROGRESS NOTES
1000: Patient off the floor for barium swallow test.     1700: Report called to Dawood Leahy RN. Report consisted of SBAR, Kardex, MAR, Recent Results, intake/output, and cardiac rhythm. 1850: Transferred patient to room 2164.

## 2023-01-26 NOTE — PROGRESS NOTES
Transition of Care Plan:     RUR: 19% (moderate)  Disposition: Pending clinical progress. If SNF or IPR: Date FOC offered: N/A  Date FOC received: N/A  Date authorization started with reference number: N/A  Date authorization received and expires: N/A  Accepting facility: N/A  Follow up appointments: PCP/specialists if needed. DME needed: TBD. Transportation at Discharge: Family vs. Transport. [place] or means to access home:  Patient has access. IM Medicare Letter: 2nd IM needed prior to discharge. Is patient a  and connected with the [place]? N/A             If yes, was [employer] transfer form completed and [facility] notified? N/A  Caregiver Contact: Tabitha Moreno - [facility] - [phone]. Discharge Caregiver contacted prior to discharge? CM to contact. Care Conference needed?: No.                   Previous therapy notes state patient needs HH vs SNF, CM will continue to follow for discharge needs.         Alejandrina Ahuja, TOMEKAN, RN    Care Management  [phone]

## 2023-01-26 NOTE — CONSULTS
Palliative Medicine Consult  Jorge Alberto: [phone] ([phone])    Patient Name: Karina Garland  YOB: 1950    Date of Initial Consult: 1/25/23  Date of Today's Visit: 1/26/2023  Reason for Consult: Care decisions  Requesting Provider: Jane Moreland   Primary Care Physician: Jane Carl MD     SUMMARY:   Karina Garland is a 67 y.o. with a past history of HTN, new diagnosis (12/22) classic Hodgkin Lymphoma, who was admitted on 1/17/2023 from home for initiation of systemic chemotherapy. Since admission he underwent BMB, PORT placement, ultrasound which was negative for DVT and was started on ABVD chemotherapy on 1/20/23. His hospital course was complicated by poor PO intake. He also developed lethargy and AMS felt to be due to aspiration pneumonia. Discussions are ongoing regarding possibly starting artificial nutrition with pt's wife. His mental status improved this morning and he is undergoing a MBS. Current medical issues leading to Palliative Medicine involvement include: Hodgkins Lymphoma, delirium, poor po intake. Social: pt is  to wife, Fidelina Hartman. He has three children Angelita Durham. He lives at home with his son Jessi Foley and wife Elissa Dancer. PALLIATIVE DIAGNOSES:   Palliative care encounter  Goals of care  Delirium  Poor PO intake/severe protein calorie malnutrition  Hodgkin lymphoma       PLAN:   Chart reviewed, discussed case with Jane Moreland NP and Dr. Hitesh Acosta. Met with wife Elissa Dancer, son Richar Wu at bedside, pt initially not present however I returned after initial visit with wife and son and saw pt again once he returned from the [facility]. Pt does not have capacity at this time due to delirium. Pt played the guitar will ask music therapy to see him. Provided expressive supportive counseling to wife and son. Wife expressed concerns regarding pt's nutritional status, chemo plans, code status and overall decline of patient.  Their goals continue to be to continue therapies to prolong life. Will continue to discuss with pt and family as his care progresses. Delirium: with hallucinations. Likely 2/2 acute hospitalization, aspiration pneumonia. If worsens can consider medication particularly at night to help him sleep  MBS today; per hospitalist and oncology plan to initiate TPN and ADAT  Hodgkin Lymphoma: receiving chemo as per oncology  Initial consult note routed to primary continuity provider and/or primary health care team members  Communicated plan of care with: Palliative IDTRani 192 Team     GOALS OF CARE / TREATMENT PREFERENCES:     GOALS OF CARE:  Patient/Health Care Proxy Stated Goals: Prolong life    TREATMENT PREFERENCES:   Code Status: DNR    Patient and family's personal goals include: continuation of therapy to prolong life        Advance Care Planning:  [x] [facility] Interdisciplinary Team has updated the ACP Navigator with [address] and Patient Capacity      Advance Care Planning 1/18/2023   Patient's Healthcare Decision Maker is: -   Confirm Advance Directive None   Patient Would Like to Complete Advance Directive No               Other:    As far as possible, the palliative care team has discussed with patient / health care proxy about goals of care / treatment preferences for patient.      HISTORY:     History obtained from: patient, wife    CHIEF COMPLAINT: 'I see a spider\"    HPI/SUBJECTIVE:    The patient is:   [x] Verbal and participatory  [] Non-participatory due to:     Clinical Pain Assessment (nonverbal scale for severity on nonverbal patients):   Clinical Pain Assessment  Severity: 0     Activity (Movement): Lying quietly, normal position    Duration: for how long has pt been experiencing pain (e.g., 2 days, 1 month, years)  Frequency: how often pain is an issue (e.g., several times per day, once every few days, constant)     FUNCTIONAL ASSESSMENT:     Palliative Performance Scale (PPS):  PPS: 30 PSYCHOSOCIAL/SPIRITUAL SCREENING:     Palliative IDT has assessed this patient for cultural preferences / practices and a referral made as appropriate to needs (Cultural Services, Patient Advocacy, Ethics, etc.)    Any spiritual / Yazidism concerns:  [] Yes /  [x] No   If \"Yes\" to discuss with pastoral care during IDT     Does caregiver feel burdened by caring for their loved one:   [] Yes /  [x] No /  [] No Caregiver Present/Available [] No Caregiver [] Pt Lives at [place] [address]  If \"Yes\" to discuss with social work during IDT    Anticipatory grief assessment:   [x] Normal  / [] Maladaptive     If \"Maladaptive\" to discuss with social work during IDT    ESAS Anxiety: Anxiety: 0    ESAS Depression: Depression: 0        REVIEW OF SYSTEMS:     Positive and pertinent negative findings in ROS are noted above in HPI. The following systems were [x] reviewed / [] unable to be reviewed as noted in HPI  Other findings are noted below. Systems: constitutional, ears/nose/mouth/throat, respiratory, gastrointestinal, genitourinary, musculoskeletal, integumentary, neurologic, psychiatric, endocrine. Positive findings noted below. Modified ESAS Completed by: provider   Fatigue: 0 Drowsiness: 0   Depression: 0 Pain: 0   Anxiety: 0 Nausea: 0   Anorexia: 1 Dyspnea: 0                    PHYSICAL EXAM:     From RN flowsheet:  Wt Readings from Last 3 Encounters:   01/25/23 70.8 kg (156 lb 1.4 oz)   01/06/23 64.5 kg (142 lb 3.2 oz)   12/28/22 69.6 kg (153 lb 6.4 oz)     Blood pressure 131/67, pulse 92, temperature 98.7 °F (37.1 °C), resp. rate 18, height 5' 5\" (1.651 m), weight 70.8 kg (156 lb 1.4 oz), SpO2 96 %.     Pain Scale 1: Numeric (0 - 10)  Pain Intensity 1: 0  Pain Onset 1: acute  Pain Location 1: Back  Pain Orientation 1: Lower  Pain Description 1: Aching  Pain Intervention(s) 1: Medication (see MAR)  Last bowel movement, if known:     Constitutional: alert, lying in bed, eating some lunch  Eyes: pupils equal, anicteric  ENMT: no nasal discharge, moist mucous membranes  Cardiovascular: regular rhythm, distal pulses intact  Respiratory: breathing not labored, symmetric  Gastrointestinal: soft non-tender, +bowel sounds  Musculoskeletal: no deformity, no tenderness to palpation  Skin: warm, dry  Neurologic: following commands, moving all extremities not oriented  Psychiatric: full affect, + hallucinations  Other:       HISTORY:     Active Problems:    Essential hypertension (2017)      Iron deficiency anemia (2022)      Type 2 diabetes mellitus (2022)      Hodgkin lymphoma ([facility] (2023)      Severe protein-calorie malnutrition ([facility] (2023)    Past Medical History:   Diagnosis Date    Allergic rhinitis, cause unspecified 2013    Arthritis     knees    Asthma     as a child    Eczema     on lower back waistline on the back    Environmental allergies     GERD (gastroesophageal reflux disease)     occastionally but resolved since 60 pound weight loss    Hodgkin's lymphoma ([facility]     Hypertension     Psychiatric disorder     anxiety and depression      Past Surgical History:   Procedure Laterality Date    HX TONSILLECTOMY      HX WISDOM TEETH EXTRACTION        Family History   Problem Relation Age of Onset    Hypertension Mother     Hypertension Father     Heart Disease Father     Alcohol abuse Father     Hypertension Brother     No Known Problems Brother       History reviewed, no pertinent family history. Social History     Tobacco Use    Smoking status: Former     Packs/day: 1.00     Years: 2.00     Pack years: 2.00     Types: Cigarettes     Quit date: 1966     Years since quittin.1    Smokeless tobacco: Never   Substance Use Topics    Alcohol use: No     Allergies   Allergen Reactions    Codeine Other (comments)     Pt states unknown reaction. Pcn [Penicillins] Rash     Received Ancef 2023 without issue.       Current Facility-Administered Medications   Medication Dose Route Frequency    TPN ADULT - PERIPHERAL   IntraVENous CONTINUOUS    ondansetron (ZOFRAN) injection 4 mg  4 mg IntraVENous BID    dextrose 5% infusion  75 mL/hr IntraVENous CONTINUOUS    sodium chloride (NS) flush 5-40 mL  5-40 mL IntraVENous Q8H    sodium chloride (NS) flush 5-40 mL  5-40 mL IntraVENous PRN    LORazepam (ATIVAN) injection 0.5 mg  0.5 mg IntraVENous Q4H PRN    fluconazole (DIFLUCAN) 200mg/100 mL IVPB (premix)  200 mg IntraVENous DAILY    heparin (porcine) injection 5,000 Units  5,000 Units SubCUTAneous Q8H    HYDROmorphone (DILAUDID) injection 0.5-1 mg  0.5-1 mg IntraVENous Q4H PRN    naloxone (NARCAN) injection 1 mg  1 mg IntraVENous Q5MIN PRN    pantoprazole (PROTONIX) 40 mg in 0.9% sodium chloride 10 mL injection  40 mg IntraVENous DAILY    cefepime (MAXIPIME) 2,000 mg in 0.9% sodium chloride (MBP/ADV) 100 mL MBP  2,000 mg IntraVENous Q12H    lidocaine 4 % patch 1 Patch  1 Patch TransDERmal Q24H    metroNIDAZOLE (FLAGYL) IVPB premix 500 mg  500 mg IntraVENous Q12H    melatonin tablet 3 mg  3 mg Oral QHS PRN    prochlorperazine (COMPAZINE) injection 10 mg  10 mg IntraVENous Q6H PRN    glucose chewable tablet 16 g  4 Tablet Oral PRN    glucagon (GLUCAGEN) injection 1 mg  1 mg IntraMUSCular PRN    dextrose 10 % infusion 0-250 mL  0-250 mL IntraVENous PRN    alum-mag hydroxide-simeth (MYLANTA) oral suspension 30 mL  30 mL Oral Q4H PRN    alcohol 62% (NOZIN) nasal  1 Ampule  1 Ampule Topical Q12H    ondansetron (ZOFRAN) injection 4 mg  4 mg IntraVENous Q4H PRN    acetaminophen (TYLENOL) tablet 650 mg  650 mg Oral Q6H PRN    polyethylene glycol (MIRALAX) packet 17 g  17 g Oral QHS PRN    allopurinoL (ZYLOPRIM) tablet 100 mg  100 mg Oral BID          LAB AND IMAGING FINDINGS:     Lab Results   Component Value Date/Time    WBC 6.4 01/26/2023 01:33 AM    HGB 8.8 (L) 01/26/2023 01:33 AM    PLATELET 246 36/16/6658 01:33 AM     Lab Results   Component Value Date/Time    Sodium 146 (H) 01/26/2023 01:33 AM    Potassium 3.2 (L) 01/26/2023 01:33 AM    Chloride 114 (H) 01/26/2023 01:33 AM    CO2 25 01/26/2023 01:33 AM    BUN 35 (H) 01/26/2023 01:33 AM    Creatinine 1.12 01/26/2023 01:33 AM    Calcium 8.0 (L) 01/26/2023 01:33 AM    Magnesium 2.2 01/26/2023 01:33 AM    Phosphorus 2.6 01/26/2023 01:33 AM      Lab Results   Component Value Date/Time    Alk. phosphatase 211 (H) 01/26/2023 01:33 AM    Protein, total 5.4 (L) 01/26/2023 01:33 AM    Albumin 1.7 (L) 01/26/2023 01:33 AM    Globulin 3.7 01/26/2023 01:33 AM    GGT 65 01/22/2023 01:32 PM     Lab Results   Component Value Date/Time    INR 1.4 (H) 01/17/2023 08:28 AM    Prothrombin time 14.6 (H) 01/17/2023 08:28 AM    aPTT 35.4 (H) 01/17/2023 08:28 AM      Lab Results   Component Value Date/Time    Iron 43 01/09/2023 02:32 PM    TIBC 207 (L) 01/09/2023 02:32 PM    Iron % saturation 21 01/09/2023 02:32 PM    Ferritin 1,912 (H) 01/09/2023 02:32 PM      No results found for: PH, PCO2, PO2  No components found for: GLPOC   No results found for: CPK, CKMB             Total time: 90 minutes  Counseling / coordination time, spent as noted above:   > 50% counseling / coordination?:     Prolonged service was provided for  []30 min   []75 min in face to face time in the presence of the patient, spent as noted above. Time Start:   Time End:   Note: this can only be billed with 45785 (initial) or 34541 (follow up). If multiple start / stop times, list each separately.

## 2023-01-26 NOTE — PROGRESS NOTES
Pharmacy TPN Management Note    TPN indication: Malnutrition    TPN type: Peripheral    Macronutrients:  Protein: 4.25 %  Dextrose: 10 %  Lipids: none    Rate: 42 mL/hr    Labs:  Recent Labs     23   * 146* 145   K 3.2* 3.3* 4.7   * 114* 116*   CO2    MG 2.2  --   --    PHOS 2.6 3.3 2.1*   CA 8.0* 7.9* 8.3*   CREA 1.12 1.05 1.28   BUN 35* 38* 44*     Recent Labs     23   AST 16 18   ALT 8* 10*   * 266*     Recent Labs     23   WBC 6.4   HGB 8.8*   HCT 29.5*       Electrolytes: This TPN contains the following electrolytes (total per liter): Na: 35 meq/L; K: 30 meq/L; Phos:15 mmol/L; M meq/L; Ca 4.5 meq/L      Other additives in TPN: none    Impression/Plan:   TPN macronutrient/rate changes: new start  TPN electrolyte changes: new start  TPN insulin changes: none     Pharmacy will continue to monitor enteral nutrition plan, electrolytes, renal function, and dietician recommendations and adjust parenteral nutrition as needed.     Thank you,  Marcello Hinson, PHARMD

## 2023-01-27 LAB
ALBUMIN SERPL-MCNC: 1.7 G/DL (ref 3.5–5)
ALBUMIN/GLOB SERPL: 0.5 (ref 1.1–2.2)
ALP SERPL-CCNC: 204 U/L (ref 45–117)
ALT SERPL-CCNC: 9 U/L (ref 12–78)
ANION GAP SERPL CALC-SCNC: 6 MMOL/L (ref 5–15)
AST SERPL-CCNC: 16 U/L (ref 15–37)
BASOPHILS # BLD: 0 K/UL (ref 0–0.1)
BASOPHILS NFR BLD: 0 % (ref 0–1)
BILIRUB SERPL-MCNC: 1.2 MG/DL (ref 0.2–1)
BUN SERPL-MCNC: 27 MG/DL (ref 6–20)
BUN/CREAT SERPL: 27 (ref 12–20)
CALCIUM SERPL-MCNC: 7.9 MG/DL (ref 8.5–10.1)
CHLORIDE SERPL-SCNC: 113 MMOL/L (ref 97–108)
CO2 SERPL-SCNC: 26 MMOL/L (ref 21–32)
CREAT SERPL-MCNC: 1.01 MG/DL (ref 0.7–1.3)
DIFFERENTIAL METHOD BLD: ABNORMAL
EOSINOPHIL # BLD: 0.3 K/UL (ref 0–0.4)
EOSINOPHIL NFR BLD: 5 % (ref 0–7)
ERYTHROCYTE [DISTWIDTH] IN BLOOD BY AUTOMATED COUNT: 18.4 % (ref 11.5–14.5)
GLOBULIN SER CALC-MCNC: 3.7 G/DL (ref 2–4)
GLUCOSE SERPL-MCNC: 150 MG/DL (ref 65–100)
HCT VFR BLD AUTO: 28.9 % (ref 36.6–50.3)
HGB BLD-MCNC: 8.8 G/DL (ref 12.1–17)
IMM GRANULOCYTES # BLD AUTO: 0.1 K/UL (ref 0–0.04)
IMM GRANULOCYTES NFR BLD AUTO: 1 % (ref 0–0.5)
LYMPHOCYTES # BLD: 0.9 K/UL (ref 0.8–3.5)
LYMPHOCYTES NFR BLD: 18 % (ref 12–49)
MCH RBC QN AUTO: 29.5 PG (ref 26–34)
MCHC RBC AUTO-ENTMCNC: 30.4 G/DL (ref 30–36.5)
MCV RBC AUTO: 97 FL (ref 80–99)
MONOCYTES # BLD: 0.4 K/UL (ref 0–1)
MONOCYTES NFR BLD: 8 % (ref 5–13)
NEUTS SEG # BLD: 3.6 K/UL (ref 1.8–8)
NEUTS SEG NFR BLD: 68 % (ref 32–75)
NRBC # BLD: 0 K/UL (ref 0–0.01)
NRBC BLD-RTO: 0 PER 100 WBC
PHOSPHATE SERPL-MCNC: 1.8 MG/DL (ref 2.6–4.7)
PLATELET # BLD AUTO: 176 K/UL (ref 150–400)
PMV BLD AUTO: 9.4 FL (ref 8.9–12.9)
POTASSIUM SERPL-SCNC: 3 MMOL/L (ref 3.5–5.1)
PROT SERPL-MCNC: 5.4 G/DL (ref 6.4–8.2)
RBC # BLD AUTO: 2.98 M/UL (ref 4.1–5.7)
SODIUM SERPL-SCNC: 145 MMOL/L (ref 136–145)
URATE SERPL-MCNC: 4.6 MG/DL (ref 3.5–7.2)
WBC # BLD AUTO: 5.3 K/UL (ref 4.1–11.1)

## 2023-01-27 PROCEDURE — 65660000001 HC RM ICU INTERMED STEPDOWN

## 2023-01-27 PROCEDURE — 74011000258 HC RX REV CODE- 258: Performed by: INTERNAL MEDICINE

## 2023-01-27 PROCEDURE — 74011250637 HC RX REV CODE- 250/637: Performed by: NURSE PRACTITIONER

## 2023-01-27 PROCEDURE — 74011250637 HC RX REV CODE- 250/637: Performed by: SURGERY

## 2023-01-27 PROCEDURE — 85025 COMPLETE CBC W/AUTO DIFF WBC: CPT

## 2023-01-27 PROCEDURE — C9113 INJ PANTOPRAZOLE SODIUM, VIA: HCPCS

## 2023-01-27 PROCEDURE — 74011250637 HC RX REV CODE- 250/637: Performed by: INTERNAL MEDICINE

## 2023-01-27 PROCEDURE — 74011250636 HC RX REV CODE- 250/636: Performed by: INTERNAL MEDICINE

## 2023-01-27 PROCEDURE — 92526 ORAL FUNCTION THERAPY: CPT

## 2023-01-27 PROCEDURE — 84100 ASSAY OF PHOSPHORUS: CPT

## 2023-01-27 PROCEDURE — 74011000250 HC RX REV CODE- 250

## 2023-01-27 PROCEDURE — 74011250636 HC RX REV CODE- 250/636: Performed by: NURSE PRACTITIONER

## 2023-01-27 PROCEDURE — 80053 COMPREHEN METABOLIC PANEL: CPT

## 2023-01-27 PROCEDURE — 84550 ASSAY OF BLOOD/URIC ACID: CPT

## 2023-01-27 PROCEDURE — 36415 COLL VENOUS BLD VENIPUNCTURE: CPT

## 2023-01-27 PROCEDURE — 74011250636 HC RX REV CODE- 250/636

## 2023-01-27 PROCEDURE — 74011000250 HC RX REV CODE- 250: Performed by: INTERNAL MEDICINE

## 2023-01-27 PROCEDURE — 74011000250 HC RX REV CODE- 250: Performed by: STUDENT IN AN ORGANIZED HEALTH CARE EDUCATION/TRAINING PROGRAM

## 2023-01-27 RX ORDER — SODIUM,POTASSIUM PHOSPHATES 280-250MG
1 POWDER IN PACKET (EA) ORAL 2 TIMES DAILY
Status: DISPENSED | OUTPATIENT
Start: 2023-01-27 | End: 2023-01-29

## 2023-01-27 RX ADMIN — CEFEPIME 2000 MG: 2 INJECTION, POWDER, FOR SOLUTION INTRAVENOUS at 10:48

## 2023-01-27 RX ADMIN — Medication 1 AMPULE: at 20:14

## 2023-01-27 RX ADMIN — SODIUM CHLORIDE, PRESERVATIVE FREE 10 ML: 5 INJECTION INTRAVENOUS at 06:33

## 2023-01-27 RX ADMIN — MAGNESIUM SULFATE HEPTAHYDRATE: 500 INJECTION, SOLUTION INTRAMUSCULAR; INTRAVENOUS at 18:32

## 2023-01-27 RX ADMIN — METRONIDAZOLE 500 MG: 500 INJECTION, SOLUTION INTRAVENOUS at 03:08

## 2023-01-27 RX ADMIN — ALLOPURINOL 100 MG: 100 TABLET ORAL at 10:42

## 2023-01-27 RX ADMIN — ALLOPURINOL 100 MG: 100 TABLET ORAL at 18:34

## 2023-01-27 RX ADMIN — POTASSIUM PHOSPHATE, MONOBASIC AND POTASSIUM PHOSPHATE, DIBASIC: 224; 236 INJECTION, SOLUTION, CONCENTRATE INTRAVENOUS at 18:29

## 2023-01-27 RX ADMIN — CEFEPIME 2000 MG: 2 INJECTION, POWDER, FOR SOLUTION INTRAVENOUS at 20:53

## 2023-01-27 RX ADMIN — POTASSIUM & SODIUM PHOSPHATES POWDER PACK 280-160-250 MG 1 PACKET: 280-160-250 PACK at 10:42

## 2023-01-27 RX ADMIN — HEPARIN SODIUM 5000 UNITS: 5000 INJECTION INTRAVENOUS; SUBCUTANEOUS at 20:13

## 2023-01-27 RX ADMIN — ONDANSETRON 4 MG: 2 INJECTION INTRAMUSCULAR; INTRAVENOUS at 10:42

## 2023-01-27 RX ADMIN — SODIUM CHLORIDE, PRESERVATIVE FREE 40 MG: 5 INJECTION INTRAVENOUS at 10:42

## 2023-01-27 RX ADMIN — Medication 1 AMPULE: at 12:29

## 2023-01-27 RX ADMIN — POTASSIUM & SODIUM PHOSPHATES POWDER PACK 280-160-250 MG 1 PACKET: 280-160-250 PACK at 18:33

## 2023-01-27 RX ADMIN — HEPARIN SODIUM 5000 UNITS: 5000 INJECTION INTRAVENOUS; SUBCUTANEOUS at 04:01

## 2023-01-27 RX ADMIN — METRONIDAZOLE 500 MG: 500 INJECTION, SOLUTION INTRAVENOUS at 15:35

## 2023-01-27 RX ADMIN — FLUCONAZOLE IN SODIUM CHLORIDE 200 MG: 2 INJECTION, SOLUTION INTRAVENOUS at 11:58

## 2023-01-27 RX ADMIN — SODIUM CHLORIDE, PRESERVATIVE FREE 10 ML: 5 INJECTION INTRAVENOUS at 21:20

## 2023-01-27 RX ADMIN — HEPARIN SODIUM 5000 UNITS: 5000 INJECTION INTRAVENOUS; SUBCUTANEOUS at 12:28

## 2023-01-27 RX ADMIN — ONDANSETRON 4 MG: 2 INJECTION INTRAMUSCULAR; INTRAVENOUS at 18:33

## 2023-01-27 RX ADMIN — SODIUM CHLORIDE, PRESERVATIVE FREE 10 ML: 5 INJECTION INTRAVENOUS at 15:36

## 2023-01-27 NOTE — PROGRESS NOTES
Pharmacy TPN Management Note    TPN indication: Malnutrition    TPN type: Peripheral    Macronutrients:  Protein: 4.25 %  Dextrose: 10 %  Lipids: none    Rate: 42 mL/hr    Labs:  Recent Labs     23  040    146* 146*   K 3.0* 3.2* 3.3*   * 114* 114*   CO2 26 25 22   MG  --  2.2  --    PHOS 1.8* 2.6 3.3   CA 7.9* 8.0* 7.9*   CREA 1.01 1.12 1.05   BUN 27* 35* 38*     Recent Labs     23   AST 16 16   ALT 9* 8*   * 211*     Recent Labs     23   WBC 5.3   HGB 8.8*   HCT 28.9*       Electrolytes: This TPN contains the following electrolytes (total per liter): Na: 35 meq/L; K: 30 meq/L; Phos:15 mmol/L; M meq/L; Ca 4.5 meq/L      Other additives in TPN: none    Impression/Plan:   TPN macronutrient/rate changes: new start  TPN electrolyte changes: Increase Kphos to 20 mmol/L  TPN insulin changes: none     Pharmacy will continue to monitor enteral nutrition plan, electrolytes, renal function, and dietician recommendations and adjust parenteral nutrition as needed.     Thank you,  Denise Earl, PHARMD

## 2023-01-27 NOTE — PROGRESS NOTES
[facility] at [address] [place] [address] [address] [facility]  W: [phone] F: [phone]      Reason for Visit:   Gato Michael is a 67 y.o. male who is seen in consultation at the request of Dr. Clarita Iglesias for evaluation of Hodgkin lymphoma. He is now admitted to [facility] for initiation of systemic chemotherapy. Hematology / Oncology Treatment History:     Hematological/Oncological Diagnosis: Classic Hodgkin lymphoma    Date of Diagnosis: 2021    Treatment course: Oncology Flowsheet 1/19/2023 1/19/2023   Day, Cycle Day 1, Cycle 1     bleomycin (BLEOCIN) IV 1 Units/m2 = 1.7 Units 5 Units/m2 = 8.6 Units   dacarbazine (DTIC) .5 mg/m2 = 452 mg     DOXOrubicin (ADRIAMYCIN) IV 6.25 mg/m2 = 10.8 mg     vinBLAStine (VELBAN) IV 3 mg/m2 = 5.16 mg       Supportive Care Flowsheet 8/26/2022 9/3/2022 9/12/2022 9/16/2022   Day, Cycle Day 8, Cycle 1 Day 15, Cycle 1 Day 22, Cycle 1 Day 29, Cycle 1   iron sucrose (VENOFER)  mg 200 mg 200 mg 200 mg           History of Present Illness:     66year old with hx of hypertension, seasonal allergies, presents with worsening normocytic aneima of unclear etiology. He was diagnosed late December 2022 with Classic Hodgkin Lymphoma. Inpatient pllan is to start systemic therapy while for close monitoring given TLS with elevated uric acid and high risk for further deterioration after systemic therapy. While admitted we plan to obtain bone marrow biopsy, MRI of the brain, CT of the chest abdomen and pelvis, dopplers of the lower extremities, place port for systemic therapy and obtain PFTs. Interval History:     1/18/2023 : Patient seen at bedside with wife. Discussed doppler results were negative for DVT. Plan for port placement and bone marrow biopsy today. Hopefully will start chemotherapy in AM.     1/20/2023 Patient seen at bedside today with wife. Encouraged PO intake other than chicken broth. Currently receiving chemo. 1/23/2023: Patient seen at bedside today with his wife. He was unable to participate in conversation, very lethargic. Wife was very tearful this morning as she gave specific details on his condition over the weekend. Patient was diagnosed with aspiration pneumonia and became very confused and agitated at times. She is very worried that he has not been eating and has been very lethargic. We discussed small improvements as he is now beginning to become more alert and oriented. Hopeful today that he will be able to eat and has a pending speech consult. 1/24/2023: Patient seen at bedside this morning, lethargic and currently wearing mitts. Per nursing he became very agitated overnight and pulled the [place] needle out of his port. He has been given his low-dose Seroquel and still remained agitated, was given a very small dose of IV Ativan. Patient is now resting comfortably. There is no family at bedside. Called to bedside around 1500 for concerns in change in patient condition. Suspected re-aspiration. CXR ordered and stat lasix given. Transfer to PCU. Long discussion regarding goals of care give patient continued decline. Discussed concern for very guarded short term prognosis. 1/25/2023 : Patient lethargic and responsive to sternal rub. Long discussion with wife regarding patient condition that continues to decline. Wife very concerned that about nutrition as patient has not been eating and is not able to tolerate PO. Extensive discussion regarding increased risk of infection with TPN. Wife continues to request IV nutrition although she acknowledges the risks. 1/26/2023: Patient seen at bedside today with his son and wife present. Patient had returned from modified barium swallow. Palliative also just met with patient and family. Patient appears much more alert today although remains confused.   Successful modified barium swallow, speech therapy recommending puréed diet with thin liquids. 2023 : Patient seen at bedside, no family present. More alert today but remains confused. Attempted to assist him with breakfast, he was not much interested. Review of Systems: A complete review of systems was obtained, negative except as described above.     Past Medical History:   Diagnosis Date    Allergic rhinitis, cause unspecified 2013    Arthritis     knees    Asthma     as a child    Eczema     on lower back waistline on the back    Environmental allergies     GERD (gastroesophageal reflux disease)     occastionally but resolved since 60 pound weight loss    Hodgkin's lymphoma ([facility] [address]     Hypertension     Psychiatric disorder     anxiety and depression      Past Surgical History:   Procedure Laterality Date    HX TONSILLECTOMY      HX WISDOM TEETH EXTRACTION        Social History     Tobacco Use    Smoking status: Former     Packs/day: 1.00     Years: 2.00     Pack years: 2.00     Types: Cigarettes     Quit date: 1966     Years since quittin.1    Smokeless tobacco: Never   Substance Use Topics    Alcohol use: No      Family History   Problem Relation Age of Onset    Hypertension Mother     Hypertension Father     Heart Disease Father     Alcohol abuse Father     Hypertension Brother     No Known Problems Brother      Current Facility-Administered Medications   Medication Dose Route Frequency    potassium, sodium phosphates (NEUTRA-PHOS) packet 1 Packet  1 Packet Oral BID    potassium phosphate 30 mmol in dextrose 5% 250 mL infusion   IntraVENous ONCE    TPN ADULT - PERIPHERAL   IntraVENous CONTINUOUS    cefepime (MAXIPIME) 2,000 mg in 0.9% sodium chloride (MBP/ADV) 100 mL MBP  2,000 mg IntraVENous Q12H    ondansetron (ZOFRAN) injection 4 mg  4 mg IntraVENous BID    dextrose 5% infusion  75 mL/hr IntraVENous CONTINUOUS    sodium chloride (NS) flush 5-40 mL  5-40 mL IntraVENous Q8H    sodium chloride (NS) flush 5-40 mL  5-40 mL IntraVENous PRN LORazepam (ATIVAN) injection 0.5 mg  0.5 mg IntraVENous Q4H PRN    fluconazole (DIFLUCAN) 200mg/100 mL IVPB (premix)  200 mg IntraVENous DAILY    heparin (porcine) injection 5,000 Units  5,000 Units SubCUTAneous Q8H    HYDROmorphone (DILAUDID) injection 0.5-1 mg  0.5-1 mg IntraVENous Q4H PRN    naloxone (NARCAN) injection 1 mg  1 mg IntraVENous Q5MIN PRN    pantoprazole (PROTONIX) 40 mg in 0.9% sodium chloride 10 mL injection  40 mg IntraVENous DAILY    lidocaine 4 % patch 1 Patch  1 Patch TransDERmal Q24H    metroNIDAZOLE (FLAGYL) IVPB premix 500 mg  500 mg IntraVENous Q12H    melatonin tablet 3 mg  3 mg Oral QHS PRN    prochlorperazine (COMPAZINE) injection 10 mg  10 mg IntraVENous Q6H PRN    glucose chewable tablet 16 g  4 Tablet Oral PRN    glucagon (GLUCAGEN) injection 1 mg  1 mg IntraMUSCular PRN    dextrose 10 % infusion 0-250 mL  0-250 mL IntraVENous PRN    alum-mag hydroxide-simeth (MYLANTA) oral suspension 30 mL  30 mL Oral Q4H PRN    alcohol 62% (NOZIN) nasal  1 Ampule  1 Ampule Topical Q12H    ondansetron (ZOFRAN) injection 4 mg  4 mg IntraVENous Q4H PRN    acetaminophen (TYLENOL) tablet 650 mg  650 mg Oral Q6H PRN    polyethylene glycol (MIRALAX) packet 17 g  17 g Oral QHS PRN    allopurinoL (ZYLOPRIM) tablet 100 mg  100 mg Oral BID      Allergies   Allergen Reactions    Codeine Other (comments)     Pt states unknown reaction. Pcn [Penicillins] Rash     Received Anc 1/18/2023 without issue.             Physical Exam:   Visit Vitals  BP (!) 149/60   Pulse 96   Temp 97.6 °F (36.4 °C)   Resp 18   Ht 5' 5\" (1.651 m)   Wt 150 lb 5.7 oz (68.2 kg)   SpO2 96%   BMI 25.02 kg/m²     ECOG PS: 0  General: Lethargic, ill and frail appearing, severely cachectic   Mental  status: Lethargic, does not participate in much conversation, unable to assess orientation   HENT: NCAT   Neck: no visualized mass   Resp: no respiratory distress   Neuro: no gross deficits   Skin: no discoloration or lesions of concern on visible areas   Psychiatric: Lethargic, poor insight           Results:     Lab Results   Component Value Date/Time    WBC 5.3 01/27/2023 06:27 AM    HGB 8.8 (L) 01/27/2023 06:27 AM    HCT 28.9 (L) 01/27/2023 06:27 AM    PLATELET 327 85/44/8403 06:27 AM    MCV 97.0 01/27/2023 06:27 AM    ABS. NEUTROPHILS 3.6 01/27/2023 06:27 AM     Lab Results   Component Value Date/Time    Sodium 145 01/27/2023 06:27 AM    Potassium 3.0 (L) 01/27/2023 06:27 AM    Chloride 113 (H) 01/27/2023 06:27 AM    CO2 26 01/27/2023 06:27 AM    Glucose 150 (H) 01/27/2023 06:27 AM    BUN 27 (H) 01/27/2023 06:27 AM    Creatinine 1.01 01/27/2023 06:27 AM    GFR est AA >60 07/26/2022 01:45 PM    GFR est non-AA >60 07/26/2022 01:45 PM    Calcium 7.9 (L) 01/27/2023 06:27 AM    Sodium,  10/07/2022 10:16 AM    Potassium, POC 3.9 10/07/2022 10:16 AM    Chloride,  10/07/2022 10:16 AM    Glucose (POC) 94 01/06/2023 07:07 AM    Creatinine, POC 1.1 10/07/2022 10:16 AM    Calcium, ionized (POC) 1.22 10/07/2022 10:16 AM     Lab Results   Component Value Date/Time    Bilirubin, total 1.2 (H) 01/27/2023 06:27 AM    ALT (SGPT) 9 (L) 01/27/2023 06:27 AM    Alk. phosphatase 204 (H) 01/27/2023 06:27 AM    Protein, total 5.4 (L) 01/27/2023 06:27 AM    Albumin 1.7 (L) 01/27/2023 06:27 AM    Globulin 3.7 01/27/2023 06:27 AM     CT Results (most recent):  Results from Hospital Encounter encounter on 01/17/23    CT HEAD WO CONT    Narrative  Indication:  worsening AMS    Comparison: CT 1/22/2023    Findings: 5 mm axial images were obtained from the skull base through the  vertex. CT dose reduction was achieved through the use of a standardized protocol  tailored for this examination and automatic exposure control for dose  modulation. The ventricles and cortical sulci are prominent, compatible with age related  volume loss. There is no evidence of intracranial hemorrhage, mass, mass effect,  or acute infarct.  There is periventricular white matter disease. No extra-axial  fluid collections are seen. The visualized paranasal sinuses and mastoid air  cells are clear. The orbital structures are unremarkable. No osseous  abnormalities are seen. Impression  1. No evidence of acute infarct or intracranial hemorrhage. No significant  change. 2. Mild periventricular white matter disease is likely secondary to chronic  small vessel ischemic changes. No imaging of spleen      Pathology:           ==========================================================================   * * *FINAL PATHOLOGIC DIAGNOSIS* * *     <<<<<       Lymph node, left supraclavicular lymph node, excision:        Classic Hodgkin lymphoma. See comment.     >>>>>      * * *Comment* * *     <<<<<  The histologic section has an enlarged lymph node with a thickened capsule   and effacement of normal yady architecture by a vaguely nodular   proliferation with few sclerotic bands. Numerous Hodgkin/Levi-Esteban   cells are present within a background of small lymphocytes and   eosinophils. The Hodgkin Levi-Esteban cells are positive for CD30, CD15   and PAX5 (subset, dim) and are negative for CD45, ALK, CD20, EMA,   Granzyme, MUM1, CD43 and CD3. In situ hybridization for Tania-Barr viral   RNA is negative. The small lymphocytes are comprised of CD3 and CD5   positive T cells with few scattered B cells identified. Assessment and Recommendations:     Classic Hodgkin lymphoma, advanced  -At present, the patient has clinical symptoms of diarrhea, abdominal discomfort, discoloration of his lower extremities, findings may be related to widely distributed lymphadenopathy. Splenomegaly may be causing liver dysfunction.     -Discussed the risks and benefits of ABVD chemotherapy in the OP setting, please see clinic note for further detail. -CT chest/abd/pelvis   IMPRESSION  1.   Mediastinal, hilar, upper abdominal, retroperitoneal, and iliac chain  adenopathy with multiple splenic lesions most consistent with lymphoma. Metastatic disease or other inflammatory/infectious process is less likely. Retroperitoneal nodes are amenable to percutaneous biopsy. 2.  Indeterminate, possibly benign segment 6 hepatic hypodensity. Considerfurther evaluation with MRI. -MRI Brain   IMPRESSION  1. Evaluation is significantly limited by patient positioning. No evidence of intracranial metastases. No acute intracranial abnormality. 2. Generalized parenchymal volume loss and mild chronic microvascular ischemic disease. Small chronic infarcts as above. -Appreciate General Surgery input - port placed prior to chemo start   - PFTs completed on 11/18  -S/p Bone Marrow biopsy 1/20   -S/p  chemotherapy with C#1 on 1/20 of ABVD (Doxorubicin 25mg/m2, Bleomycin 10 units/m2, Vinblastine 6mg/m2, Dacarbazine 375mg/m2 on days 1 and 15 every 28 days), with plans for 6 cycles (further cycles to be OP). Hyperbilirubinemia - improving   -Etiology of his hyperbilirubinemia may be a consequence of impaired liver function as a consequence of lymphoma and splenomegaly.   -Trend CMP, T bili continues to trend down     Elevated alkaline phosphatase  -The patient likely has disease involvement of the bone     Tumor Lysis Syndrome - resolving   -Component of TLS given extensive disease burden  -Continue to trend uric every 8 hours, so far stable  -So far has not required additional dose of Rasburicase   -Close monitoring of CMP and electrolytes for worsening of TLS with plan for systemic therapy, Appreciate Hospitalist input      Mild Hypercalcemia  - resolved   -Related to disease burden  -Corrected Ca 8.6  -Monitor for now      Acute Kidney Injury - resolved   -Related to TLS  -S/p dose of Rasburicase 1/17  -Follow renal function     Anasarca  Severe Protein Malnutrition   Bilateral LE Swelling bilateral lower extremity Dopplers ruled out DVT  -Likely r/t to malignancy and malnutrition   -Prealbumin 3.2 - encouraged increased PO. Patient has only been eating chicken broth for several weeks per wife. -Appreciate Dietician input   -Hold off on NGT or PEG for nutrition now. Concern for refeeding syndrome in setting of TLS after chemo.   -Patient has had very poor intake over the last several days with delirium. Unable to place NG tube or consider PEG for nutrition given patient's agitation and confusion. Patient pulled out [place] needle out of his port and required mitts  > TPN ordered by hospitalist    Generalized Weakness  Failure to Thrive   -R/t malignancy and severe malnutrition   -PT/OT consults -to be decided either home with 24/7 care versus SNF at discharge    Aspiration PNA   Hospitalized Delirium - improving   -Started on broad spectrum antibiotics with cefepime, metronidazole and vancomycin.  No longer on vancomycin   -Sputum culture final for moderate yeast  -Diflucan added to antibiotic regimen per hospitalist team on 1/23  -CT head completed, no acute findings  -Appreciate speech therapy input, able to complete modified barium swallow today with recommendations for puréed diet and thin liquids   > TPN ordered, hopefully as patient is now more alert his mentation will continue to improve to allow for more p.o. intake  > Continue low dose seroquel   > Appreciate palliative care input    Discussed with Dr. Autumn Platt     We will continue to follow PRN over the weekend         Signed By: Mira Le NP

## 2023-01-27 NOTE — PROGRESS NOTES
1900 - Report received from offgoing RN. Bedside report included SBAR, Kardex, Procedure Summary, Intake/Output, MAR, Recent Results, Cardiologist, and Hospitalist.    2000 - Shift assessment completed and documented. Hourly rounding completed. 2100 - Hourly rounding completed. PM dose of cefepime changed to 100mL/hr by pharmacy. RN to ask day shift to establish new IV if possible for IV ABX    2200 - Hourly rounding completed. 2300 - Vital signs taken. No acute abnormalities. Hourly rounding completed. No acute distress. 0000 - Hourly rounding completed. 0100 - Hourly rounding completed. 0200 -Hourly rounding completed. 0300 - Vital signs taken. No acute abnormalities. Hourly rounding completed. 0400 - Hourly rounding completed. 0500 - Hourly rounding completed. 0600 - Hourly rounding completed. Blood work taken from Chest port    Pt intermittently confused throughout durations of the shift. Compliant with verbal commands and redirection. 0700 - Bedside report given to oncoming RN by Oscar Gusman.  Report included SBAR, Kardex, Procedure Summary, Intake/Output, MAR, Recent Results, Cardiologist, and Hospitalist.

## 2023-01-27 NOTE — PROGRESS NOTES
Problem: Dysphagia (Adult)  Goal: *Acute Goals and Plan of Care (Insert Text)  Description: Speech Therapy Goals  Initiated 1/23/2023  1. Patient will tolerate full liquid diet without overt s/s aspiration within 7 days. UPGRADED TO PUREE/THIN LIQUIDS 1/26/2023  2. Added 1/24/2023 patient will participate in instrumental testing as appropriate to objectively assess swallow function. MET 1/26/2023    Outcome: Progressing Towards Goal     SPEECH LANGUAGE PATHOLOGY DYSPHAGIA TREATMENT  Patient: Nancy See (22 y.o. male)  Date: 1/27/2023  Diagnosis: Hodgkin lymphoma ([facility] [address] [C81.90] <principal problem not specified>  Procedure(s) (LRB):  INFUSAPORT INSERTION (N/A) 9 Days Post-Op  Precautions: Bed Alarm, Fall, Aspiration (telesitter;sun downs; chemo; L chest wall port; 3 sm chronic CVAs)    ASSESSMENT:  Patient continues to be followed by SLP and MBS on 1/26 revealed \"mild to moderate oral dysphagia and a grossly functional pharyngeal swallow. \" Note patient observed to cough in the absence of penetration or aspiration during MBS. Spoke with RN on this date, who reported patient with poor PO intake on this date. Patient received alert, yet confused, laying in bed. Patient demonstrated difficulty initiating straw sip on this date. Hand-over-hand assistance provided for cup sips of thin liquid. Dry, weak cough observed x1, which was not reproducible, however do not suspect related to swallow function given MBS results yesterday. Patient accepted limited trials of puree, which he tolerated without difficulty. Suspect patient's intake to continue to be limited given acute mentation. At this time, Puree/Thin Liquid diet remains the safest course of PO intake for patient. SLP following to advance diet when mentation improves. RN educated re: SLP recommendations.      PLAN:  Recommendations and Planned Interventions:  -- Puree/Thin Liquids  -- Medication crushed in puree  -- 1:1 assistance with PO intake  -- Alternate liquids/solids  -- Only feed when alert/awake and actively accepting PO intake  -- Note patient coughs in the absence of penetration/aspiration, per MBS    Patient continues to benefit from skilled intervention to address the above impairments. Continue treatment per established plan of care. Discharge Recommendations: To Be Determined     SUBJECTIVE:   Patient stated something doesn't taste right. OBJECTIVE:   Cognitive and Communication Status:  Neurologic State: Alert, Confused  Orientation Level: Oriented to person, Disoriented to place, Disoriented to situation, Disoriented to time  Cognition: Decreased command following  Perception: Cues to maintain midline in standing  Perseveration: No perseveration noted  Safety/Judgement: Decreased awareness of environment, Decreased awareness of need for assistance, Decreased awareness of need for safety, Lack of insight into deficits    Dysphagia Treatment:    P.O. Trials:  Patient Position: Upright in bed  Vocal quality prior to P.O.: No impairment  Consistency Presented: Thin liquid;Puree  How Presented: SLP-fed/presented;Cup/sip;Spoon     Bolus Acceptance: Impaired (Required moderate to maximal verbal cueing, suspect related to AMS)  Bolus Formation/Control: Impaired  Type of Impairment: Delayed  Propulsion: Delayed (# of seconds)  Oral Residue: None        Aspiration Signs/Symptoms: Weak cough (Note patient coughs in absence of aspiration, per MBS)                      Pain:  Pain Scale 1: Numeric (0 - 10)  Pain Intensity 1: 0       After treatment:   Patient left in no apparent distress in bed, Call bell within reach, Nursing notified, and Caregiver / family present    COMMUNICATION/EDUCATION:   The patient's plan of care including recommendations, planned interventions, and recommended diet changes were discussed with: Registered nurse and Patient's wife .      CAIT Lemus  Time Calculation: 17 mins

## 2023-01-27 NOTE — PROGRESS NOTES
Nutrition Note    PPN started yesterday. K and Phos dropped this morning - highly suspect refeeding syndrome. Please replete deficiencies aggressively. Do not advance PPN/TPN until lytes are stable.     Electronically signed by Herb Romero RD on 1/27/2023 at 9:48 AM    Contact: [facility]-[phone]

## 2023-01-27 NOTE — PROGRESS NOTES
Hospitalist Progress Note    NAME: Eulah Eisenmenger   :  1950   MRN:  [id number]       Assessment / Plan:  Advanced Hodgkin lymphoma  - With splenomegaly lymphadenopathy  - Status postchemotherapy   - Hematology oncology team are following, input is appreciated  Poor prognosis per oncology discussed - pt and family aware, Palliative following  IP PT/OT eval noted- SNF recommended if family interested for reconditioning.       Tumor lysis syndrome POA  -Continue following uric acid level- 4.6 today (stable)  - Hematology oncology recommending allopurinol  - Follow CMP very closely  - NILTON, hypercalcemia as evidence of tumor lysis syndrome    Hyperbilirubinemia  Alkaline phosphatase elevated  - Likely secondary to lymphoma/splenomegaly    Pseudo hypocalcemia  - Calcium 7.9, albumin 1.8, corrected calcium on the higher end    Aspiration pneumonia POA  Acute hypoxic respiratory failure POA- resolved, now on RA  - Continue cefepime and metronidazole  - Aspiration precautions  MBS noted - cleared for Pureed diet and thin liquids- started PO     Acute metabolic encephalopathy POA- more stable in past 24-48 hrs it seems  - Likely due to worsening metabolic status in the setting of a tumor lysis syndrome  - Follow mentation very closely  -Very awake again today and following all commands but not oriented  - MBS normal, speech therapy recommending puréed food,    GERD  - Continue home meds    Severe protein calorie malnutrition POA  - Likely secondary to advanced lymphoma and decreased p.o. intake  - Nutrition consult input is appreciated  Started on TPN -- renew per Oncology recc, PO started ,if continues safely can taper off TPN if PO intake improved-- will watch over the weekend to see progress/decline as per wife/family  Follow up with palliative today and then on Monday for DC planning-- SNF/restorative care versus Home with Hospice if pt declines & family agreeable  Palliative    25.0 - 29.9 Overweight / Body mass index is 25.02 kg/m². Estimated discharge date: January 30? Barriers: Clinical improvement versus Hospice considertion, TPN weaning if PO improves, SNF? Versus Hospice TBD    Code status: DNR  Prophylaxis: Hep SQ  Recommended Disposition:  TBD     Subjective:     Patient was seen and examined. No acute events overnight. Discussed with RN overnight events. All patient's questions were answered. \"I am fine\"    Patient's wife and son at bedside, all questions were answered. Review of Systems:  Symptom Y/N Comments  Symptom Y/N Comments   Fever/Chills    Chest Pain     Poor Appetite    Edema     Cough    Abdominal Pain     Sputum    Joint Pain     SOB/SALMERON    Pruritis/Rash     Nausea/vomit    Tolerating PT/OT     Diarrhea    Tolerating Diet     Constipation    Other       Could NOT obtain due to: Alert and more oriented but stil confused         Objective:     VITALS:   Last 24hrs VS reviewed since prior progress note. Most recent are:  Patient Vitals for the past 24 hrs:   Temp Pulse Resp BP SpO2   01/27/23 0305 97.6 °F (36.4 °C) 89 18 (!) 151/77 96 %   01/26/23 2355 97.9 °F (36.6 °C) 90 18 (!) 149/73 97 %   01/26/23 1925 97.4 °F (36.3 °C) 93 18 (!) 142/74 95 %   01/26/23 1523 98.8 °F (37.1 °C) 91 18 126/61 95 %   01/26/23 1120 98.7 °F (37.1 °C) 92 18 131/67 96 %   01/26/23 0808 98.9 °F (37.2 °C) 96 20 (!) 142/76 97 %         Intake/Output Summary (Last 24 hours) at 1/27/2023 0755  Last data filed at 1/27/2023 2569  Gross per 24 hour   Intake --   Output 1500 ml   Net -1500 ml          I had a face to face encounter and independently examined this patient on 1/27/2023, as outlined below:  PHYSICAL EXAM:  General: Awake, alert, following commands  EENT: Anicteric sclerae. Resp:  CTA bilaterally, no wheezing or rales. No accessory muscle use  CV:  Regular  rhythm,  No edema  GI:  Soft, Non distended, Non tender. +Bowel sounds  Neurologic:  EOMs intact.  No facial asymmetry. No aphasia or slurred speech. Symmetrical strength, Sensation grossly intact. Alert and oriented X 0. Psych:   Poor insight. Not anxious nor agitated  Skin:  No rashes. No jaundice    Reviewed most current lab test results and cultures  YES  Reviewed most current radiology test results   YES  Review and summation of old records today    NO  Reviewed patient's current orders and MAR    YES  PMH/SH reviewed - no change compared to H&P  ________________________________________________________________________  Care Plan discussed with:    Comments   Patient x    Family      RN x    Care Manager x    Consultant  x Oncology Team                    x Multidiciplinary team rounds were held today with , nursing, pharmacist and clinical coordinator. Patient's plan of care was discussed; medications were reviewed and discharge planning was addressed. ________________________________________________________________________  Total time 26 mins      Comments   >50% of visit spent in counseling and coordination of care x    ________________________________________________________________________  Josh Johnson MD     Procedures: see electronic medical records for all procedures/Xrays and details which were not copied into this note but were reviewed prior to creation of Plan. LABS:  I reviewed today's most current labs and imaging studies.   Pertinent labs include:  Recent Labs     01/27/23  0627 01/26/23  0133 01/25/23  0407   WBC 5.3 6.4 8.1   HGB 8.8* 8.8* 10.1*   HCT 28.9* 29.5* 33.3*    185 166       Recent Labs     01/27/23  0627 01/26/23  0133 01/25/23  0407    146* 146*   K 3.0* 3.2* 3.3*   * 114* 114*   CO2 26 25 22   * 123* 123*   BUN 27* 35* 38*   CREA 1.01 1.12 1.05   CA 7.9* 8.0* 7.9*   MG  --  2.2  --    PHOS 1.8* 2.6 3.3   ALB 1.7* 1.7* 1.8*   TBILI 1.2* 1.3* 1.7*   ALT 9* 8* 10*         Signed: Josh Johnson MD

## 2023-01-27 NOTE — PROGRESS NOTES
Problem: Patient Education: Go to Patient Education Activity  Goal: Patient/Family Education  Outcome: Not Progressing Towards Goal     Problem: Chemotherapy, Day 1  Goal: Off Pathway (Use only if patient is Off Pathway)  Outcome: Not Progressing Towards Goal  Goal: Activity/Safety  Outcome: Not Progressing Towards Goal  Goal: Consults, if ordered  Outcome: Not Progressing Towards Goal  Goal: Diagnostic Test/Procedures  Outcome: Not Progressing Towards Goal  Goal: Nutrition/Diet  Outcome: Not Progressing Towards Goal  Goal: Discharge Planning  Outcome: Not Progressing Towards Goal  Goal: Medications  Outcome: Not Progressing Towards Goal  Goal: Respiratory  Outcome: Not Progressing Towards Goal  Goal: Treatments/Interventions/Procedures  Outcome: Not Progressing Towards Goal  Goal: Psychosocial  Outcome: Not Progressing Towards Goal  Goal: *Optimal pain control at patient's stated goal  Outcome: Not Progressing Towards Goal  Goal: *Hemodynamically stable  Outcome: Not Progressing Towards Goal  Goal: *Adequate oxygenation  Outcome: Not Progressing Towards Goal  Goal: *Chemotherapy regimen is initiated  Outcome: Not Progressing Towards Goal  Goal: *Patient and family verbalize understanding of plan of care  Outcome: Not Progressing Towards Goal     Problem: Chemotherapy, Day 2  Goal: Off Pathway (Use only if patient is Off Pathway)  Outcome: Not Progressing Towards Goal  Goal: Activity/Safety  Outcome: Not Progressing Towards Goal  Goal: Consults, if ordered  Outcome: Not Progressing Towards Goal  Goal: Diagnostic Test/Procedures  Outcome: Not Progressing Towards Goal  Goal: Nutrition/Diet  Outcome: Not Progressing Towards Goal  Goal: Discharge Planning  Outcome: Not Progressing Towards Goal  Goal: Medications  Outcome: Not Progressing Towards Goal  Goal: Respiratory  Outcome: Not Progressing Towards Goal  Goal: Treatments/Interventions/Procedures  Outcome: Not Progressing Towards Goal  Goal: Psychosocial  Outcome: Not Progressing Towards Goal  Goal: *Optimal pain control at patient's stated goal  Outcome: Not Progressing Towards Goal  Goal: *Hemodynamically stable  Outcome: Not Progressing Towards Goal  Goal: *Adequate oxygenation  Outcome: Not Progressing Towards Goal  Goal: *Chemotherapy regimen is initiated  Outcome: Not Progressing Towards Goal  Goal: *Effective side effect management  Outcome: Not Progressing Towards Goal     Problem: Chemotherapy, Day 3 to Discharge  Goal: Off Pathway (Use only if patient is Off Pathway)  Outcome: Not Progressing Towards Goal  Goal: Activity/Safety  Outcome: Not Progressing Towards Goal  Goal: Consults, if ordered  Outcome: Not Progressing Towards Goal  Goal: Diagnostic Test/Procedures  Outcome: Not Progressing Towards Goal  Goal: Nutrition/Diet  Outcome: Not Progressing Towards Goal  Goal: Discharge Planning  Outcome: Not Progressing Towards Goal  Goal: Medications  Outcome: Not Progressing Towards Goal  Goal: Respiratory  Outcome: Not Progressing Towards Goal  Goal: Treatments/Interventions/Procedures  Outcome: Not Progressing Towards Goal  Goal: Psychosocial  Outcome: Not Progressing Towards Goal  Goal: *Optimal pain control at patient's stated goal  Outcome: Not Progressing Towards Goal  Goal: *Hemodynamically stable  Outcome: Not Progressing Towards Goal  Goal: *Adequate oxygenation  Outcome: Not Progressing Towards Goal     Problem: Chemotherapy Discharge Outcomes  Goal: *Verbalizes understanding and describes prescribed diet  Outcome: Not Progressing Towards Goal  Goal: *Describes follow-up/return visits to physicians  Outcome: Not Progressing Towards Goal  Goal: *Describes home care/support arrangements established based on need  Outcome: Not Progressing Towards Goal  Goal: *Describes available resources and support systems  Outcome: Not Progressing Towards Goal  Goal: *Anxiety reduced or absent  Outcome: Not Progressing Towards Goal  Goal: *Understands and describes signs and symptoms to report to providers(Stroke Metric)  Outcome: Not Progressing Towards Goal  Goal: *Hemodynamically stable  Outcome: Not Progressing Towards Goal  Goal: *Tolerating diet  Outcome: Not Progressing Towards Goal  Goal: *Verbalizes understanding and describes medication purposes and frequencies  Outcome: Not Progressing Towards Goal  Goal: *Venous access site with positive blood return and without signs and symptoms of infection  Outcome: Not Progressing Towards Goal  Goal: *Verbalizes understanding of bowel regimen  Outcome: Not Progressing Towards Goal     Problem: Falls - Risk of  Goal: *Absence of Falls  Description: Document Randy Fall Risk and appropriate interventions in the flowsheet. Outcome: Not Progressing Towards Goal  Note: Fall Risk Interventions:  Mobility Interventions: Bed/chair exit alarm, Patient to call before getting OOB    Mentation Interventions: Bed/chair exit alarm, Increase mobility    Medication Interventions: Patient to call before getting OOB, Teach patient to arise slowly    Elimination Interventions: Bed/chair exit alarm, Call light in reach, Toileting schedule/hourly rounds              Problem: Patient Education: Go to Patient Education Activity  Goal: Patient/Family Education  Outcome: Not Progressing Towards Goal     Problem: Pressure Injury - Risk of  Goal: *Prevention of pressure injury  Description: Document Matthew Scale and appropriate interventions in the flowsheet.   Outcome: Not Progressing Towards Goal  Note: Pressure Injury Interventions:  Sensory Interventions: Assess changes in LOC, Float heels, Keep linens dry and wrinkle-free, Maintain/enhance activity level    Moisture Interventions: Check for incontinence Q2 hours and as needed, Minimize layers, Moisture barrier, Absorbent underpads    Activity Interventions: Increase time out of bed, Pressure redistribution bed/mattress(bed type)    Mobility Interventions: Chair cushion, Float heels, HOB 30 degrees or less, Pressure redistribution bed/mattress (bed type)    Nutrition Interventions: Document food/fluid/supplement intake, Offer support with meals,snacks and hydration    Friction and Shear Interventions: Feet elevated on foot rest, HOB 30 degrees or less                Problem: Patient Education: Go to Patient Education Activity  Goal: Patient/Family Education  Outcome: Not Progressing Towards Goal     Problem: Patient Education: Go to Patient Education Activity  Goal: Patient/Family Education  Outcome: Not Progressing Towards Goal     Problem: Patient Education: Go to Patient Education Activity  Goal: Patient/Family Education  Outcome: Not Progressing Towards Goal     Problem: Patient Education: Go to Patient Education Activity  Goal: Patient/Family Education  Outcome: Not Progressing Towards Goal     Problem: Patient Education: Go to Patient Education Activity  Goal: Patient/Family Education  Outcome: Not Progressing Towards Goal

## 2023-01-28 LAB
ALBUMIN SERPL-MCNC: 1.7 G/DL (ref 3.5–5)
ALBUMIN/GLOB SERPL: 0.5 (ref 1.1–2.2)
ALP SERPL-CCNC: 209 U/L (ref 45–117)
ALT SERPL-CCNC: 10 U/L (ref 12–78)
ANION GAP SERPL CALC-SCNC: 6 MMOL/L (ref 5–15)
ANION GAP SERPL CALC-SCNC: 8 MMOL/L (ref 5–15)
AST SERPL-CCNC: 15 U/L (ref 15–37)
BASOPHILS # BLD: 0 K/UL (ref 0–0.1)
BASOPHILS NFR BLD: 1 % (ref 0–1)
BILIRUB SERPL-MCNC: 1.1 MG/DL (ref 0.2–1)
BUN SERPL-MCNC: 23 MG/DL (ref 6–20)
BUN SERPL-MCNC: 25 MG/DL (ref 6–20)
BUN/CREAT SERPL: 24 (ref 12–20)
BUN/CREAT SERPL: 27 (ref 12–20)
CALCIUM SERPL-MCNC: 7.5 MG/DL (ref 8.5–10.1)
CALCIUM SERPL-MCNC: 7.7 MG/DL (ref 8.5–10.1)
CHLORIDE SERPL-SCNC: 111 MMOL/L (ref 97–108)
CHLORIDE SERPL-SCNC: 112 MMOL/L (ref 97–108)
CO2 SERPL-SCNC: 24 MMOL/L (ref 21–32)
CO2 SERPL-SCNC: 26 MMOL/L (ref 21–32)
COMMENT, HOLDF: NORMAL
CREAT SERPL-MCNC: 0.93 MG/DL (ref 0.7–1.3)
CREAT SERPL-MCNC: 0.97 MG/DL (ref 0.7–1.3)
DIFFERENTIAL METHOD BLD: ABNORMAL
EOSINOPHIL # BLD: 0.4 K/UL (ref 0–0.4)
EOSINOPHIL NFR BLD: 6 % (ref 0–7)
ERYTHROCYTE [DISTWIDTH] IN BLOOD BY AUTOMATED COUNT: 18 % (ref 11.5–14.5)
GLOBULIN SER CALC-MCNC: 3.4 G/DL (ref 2–4)
GLUCOSE BLD STRIP.AUTO-MCNC: 108 MG/DL (ref 65–117)
GLUCOSE BLD STRIP.AUTO-MCNC: 131 MG/DL (ref 65–117)
GLUCOSE BLD STRIP.AUTO-MCNC: 141 MG/DL (ref 65–117)
GLUCOSE BLD STRIP.AUTO-MCNC: 146 MG/DL (ref 65–117)
GLUCOSE SERPL-MCNC: 143 MG/DL (ref 65–100)
GLUCOSE SERPL-MCNC: 146 MG/DL (ref 65–100)
HCT VFR BLD AUTO: 28.9 % (ref 36.6–50.3)
HGB BLD-MCNC: 8.8 G/DL (ref 12.1–17)
IMM GRANULOCYTES # BLD AUTO: 0 K/UL (ref 0–0.04)
IMM GRANULOCYTES NFR BLD AUTO: 1 % (ref 0–0.5)
LYMPHOCYTES # BLD: 1.2 K/UL (ref 0.8–3.5)
LYMPHOCYTES NFR BLD: 20 % (ref 12–49)
MAGNESIUM SERPL-MCNC: 1.7 MG/DL (ref 1.6–2.4)
MCH RBC QN AUTO: 29.3 PG (ref 26–34)
MCHC RBC AUTO-ENTMCNC: 30.4 G/DL (ref 30–36.5)
MCV RBC AUTO: 96.3 FL (ref 80–99)
MONOCYTES # BLD: 0.6 K/UL (ref 0–1)
MONOCYTES NFR BLD: 11 % (ref 5–13)
NEUTS SEG # BLD: 3.7 K/UL (ref 1.8–8)
NEUTS SEG NFR BLD: 61 % (ref 32–75)
NRBC # BLD: 0 K/UL (ref 0–0.01)
NRBC BLD-RTO: 0 PER 100 WBC
PHOSPHATE SERPL-MCNC: 3.1 MG/DL (ref 2.6–4.7)
PHOSPHATE SERPL-MCNC: 3.7 MG/DL (ref 2.6–4.7)
PLATELET # BLD AUTO: 213 K/UL (ref 150–400)
PMV BLD AUTO: 9.9 FL (ref 8.9–12.9)
POTASSIUM SERPL-SCNC: 3 MMOL/L (ref 3.5–5.1)
POTASSIUM SERPL-SCNC: 3.4 MMOL/L (ref 3.5–5.1)
PROT SERPL-MCNC: 5.1 G/DL (ref 6.4–8.2)
RBC # BLD AUTO: 3 M/UL (ref 4.1–5.7)
SAMPLES BEING HELD,HOLD: NORMAL
SERVICE CMNT-IMP: ABNORMAL
SERVICE CMNT-IMP: NORMAL
SODIUM SERPL-SCNC: 143 MMOL/L (ref 136–145)
SODIUM SERPL-SCNC: 144 MMOL/L (ref 136–145)
URATE SERPL-MCNC: 3.6 MG/DL (ref 3.5–7.2)
WBC # BLD AUTO: 5.9 K/UL (ref 4.1–11.1)

## 2023-01-28 PROCEDURE — 74011000258 HC RX REV CODE- 258: Performed by: INTERNAL MEDICINE

## 2023-01-28 PROCEDURE — 74011250636 HC RX REV CODE- 250/636

## 2023-01-28 PROCEDURE — 36415 COLL VENOUS BLD VENIPUNCTURE: CPT

## 2023-01-28 PROCEDURE — 83735 ASSAY OF MAGNESIUM: CPT

## 2023-01-28 PROCEDURE — 74011000250 HC RX REV CODE- 250: Performed by: INTERNAL MEDICINE

## 2023-01-28 PROCEDURE — 84550 ASSAY OF BLOOD/URIC ACID: CPT

## 2023-01-28 PROCEDURE — 74011250636 HC RX REV CODE- 250/636: Performed by: NURSE PRACTITIONER

## 2023-01-28 PROCEDURE — 82962 GLUCOSE BLOOD TEST: CPT

## 2023-01-28 PROCEDURE — 74011250637 HC RX REV CODE- 250/637: Performed by: SURGERY

## 2023-01-28 PROCEDURE — 74011250636 HC RX REV CODE- 250/636: Performed by: INTERNAL MEDICINE

## 2023-01-28 PROCEDURE — 74011250637 HC RX REV CODE- 250/637: Performed by: INTERNAL MEDICINE

## 2023-01-28 PROCEDURE — 65660000001 HC RM ICU INTERMED STEPDOWN

## 2023-01-28 PROCEDURE — 74011000250 HC RX REV CODE- 250

## 2023-01-28 PROCEDURE — 85025 COMPLETE CBC W/AUTO DIFF WBC: CPT

## 2023-01-28 PROCEDURE — 80053 COMPREHEN METABOLIC PANEL: CPT

## 2023-01-28 PROCEDURE — 84100 ASSAY OF PHOSPHORUS: CPT

## 2023-01-28 PROCEDURE — 74011250637 HC RX REV CODE- 250/637: Performed by: NURSE PRACTITIONER

## 2023-01-28 PROCEDURE — 74011000250 HC RX REV CODE- 250: Performed by: STUDENT IN AN ORGANIZED HEALTH CARE EDUCATION/TRAINING PROGRAM

## 2023-01-28 PROCEDURE — C9113 INJ PANTOPRAZOLE SODIUM, VIA: HCPCS

## 2023-01-28 RX ORDER — BALSAM PERU/CASTOR OIL
OINTMENT (GRAM) TOPICAL 2 TIMES DAILY
Status: DISCONTINUED | OUTPATIENT
Start: 2023-01-28 | End: 2023-02-07 | Stop reason: HOSPADM

## 2023-01-28 RX ADMIN — SODIUM CHLORIDE, PRESERVATIVE FREE 20 ML: 5 INJECTION INTRAVENOUS at 21:22

## 2023-01-28 RX ADMIN — HEPARIN SODIUM 5000 UNITS: 5000 INJECTION INTRAVENOUS; SUBCUTANEOUS at 20:16

## 2023-01-28 RX ADMIN — ALLOPURINOL 100 MG: 100 TABLET ORAL at 09:03

## 2023-01-28 RX ADMIN — CASTOR OIL AND BALSAM, PERU: 788; 87 OINTMENT TOPICAL at 21:22

## 2023-01-28 RX ADMIN — METRONIDAZOLE 500 MG: 500 INJECTION, SOLUTION INTRAVENOUS at 16:11

## 2023-01-28 RX ADMIN — CEFEPIME 2000 MG: 2 INJECTION, POWDER, FOR SOLUTION INTRAVENOUS at 09:02

## 2023-01-28 RX ADMIN — ONDANSETRON 4 MG: 2 INJECTION INTRAMUSCULAR; INTRAVENOUS at 18:54

## 2023-01-28 RX ADMIN — ONDANSETRON 4 MG: 2 INJECTION INTRAMUSCULAR; INTRAVENOUS at 09:03

## 2023-01-28 RX ADMIN — SODIUM CHLORIDE, PRESERVATIVE FREE 10 ML: 5 INJECTION INTRAVENOUS at 05:18

## 2023-01-28 RX ADMIN — Medication 1 AMPULE: at 20:18

## 2023-01-28 RX ADMIN — HEPARIN SODIUM 5000 UNITS: 5000 INJECTION INTRAVENOUS; SUBCUTANEOUS at 12:32

## 2023-01-28 RX ADMIN — SODIUM CHLORIDE, PRESERVATIVE FREE 40 MG: 5 INJECTION INTRAVENOUS at 09:03

## 2023-01-28 RX ADMIN — HEPARIN SODIUM 5000 UNITS: 5000 INJECTION INTRAVENOUS; SUBCUTANEOUS at 03:25

## 2023-01-28 RX ADMIN — METRONIDAZOLE 500 MG: 500 INJECTION, SOLUTION INTRAVENOUS at 02:28

## 2023-01-28 RX ADMIN — MAGNESIUM SULFATE HEPTAHYDRATE: 500 INJECTION, SOLUTION INTRAMUSCULAR; INTRAVENOUS at 18:49

## 2023-01-28 RX ADMIN — CEFEPIME 2000 MG: 2 INJECTION, POWDER, FOR SOLUTION INTRAVENOUS at 20:16

## 2023-01-28 RX ADMIN — SODIUM CHLORIDE, PRESERVATIVE FREE 10 ML: 5 INJECTION INTRAVENOUS at 16:14

## 2023-01-28 NOTE — PROGRESS NOTES
Hospitalist Progress Note    NAME: Sanjana Garcia   :  1950   MRN:  [id number]       Assessment / Plan:  Advanced Hodgkin lymphoma  - With splenomegaly lymphadenopathy  - Status postchemotherapy   - Hematology oncology team are following, input is appreciated  Poor prognosis per oncology discussed - pt and family aware, Palliative following  IP PT/OT eval noted- SNF recommended if family interested for reconditioning- Palliative to followup with family on monday      Tumor lysis syndrome POA  -Continue following uric acid level- 3.6 today (stable)  - Hematology oncology recommending allopurinol  - Follow CMP very closely  - NILTON, hypercalcemia as evidence of tumor lysis syndrome    Hyperbilirubinemia  Alkaline phosphatase elevated  - Likely secondary to lymphoma/splenomegaly    Pseudo hypocalcemia  - Calcium 7.9, albumin 1.8, corrected calcium on the higher end    Aspiration pneumonia POA  Acute hypoxic respiratory failure POA- resolved, now on RA  - Continue cefepime and metronidazole  - Aspiration precautions  MBS noted - cleared for Pureed diet and thin liquids- started PO     Acute metabolic encephalopathy POA- more stable in past 24-48 hrs it seems  - Likely due to worsening metabolic status in the setting of a tumor lysis syndrome  - Follow mentation very closely  -Very awake again today and following all commands but not oriented  - MBS normal, speech therapy recommending puréed food,    GERD  - Continue home meds    Severe protein calorie malnutrition POA  - Likely secondary to advanced lymphoma and decreased p.o. intake  - Nutrition consult input is appreciated  Started on TPN -- renew per Oncology recc, PO started ,if continues safely can taper off TPN if PO intake improved-- will watch over the weekend to see progress/decline as per wife/family, check lytes Q 12 and replenish as needed per RD recc to prevent refeeding syndrome    Follow up with palliative ?  on Monday now for DC planning-- SNF/restorative care versus Home with Hospice if pt declines & family agreeable  Palliative    25.0 - 29.9 Overweight / Body mass index is 25.35 kg/m². Estimated discharge date: January 30? Barriers: Clinical improvement versus Hospice considertion, TPN weaning if PO improves, SNF? Versus Hospice TBD    Code status: DNR  Prophylaxis: Hep SQ  Recommended Disposition:  TBD     Subjective:     Patient was seen and examined. No acute events overnight. Discussed with RN overnight events. All patient's questions were answered. \"I am fine\"    Patient's wife and son at bedside, all questions were answered. Review of Systems:  Symptom Y/N Comments  Symptom Y/N Comments   Fever/Chills    Chest Pain     Poor Appetite    Edema     Cough    Abdominal Pain     Sputum    Joint Pain     SOB/SALMERON    Pruritis/Rash     Nausea/vomit    Tolerating PT/OT     Diarrhea    Tolerating Diet     Constipation    Other       Could NOT obtain due to: Alert and more oriented but stil confused         Objective:     VITALS:   Last 24hrs VS reviewed since prior progress note. Most recent are:  Patient Vitals for the past 24 hrs:   Temp Pulse Resp BP SpO2   01/28/23 0743 97.5 °F (36.4 °C) 84 16 (!) 142/73 91 %   01/28/23 0234 97.5 °F (36.4 °C) 86 16 (!) 150/74 95 %   01/27/23 2247 98 °F (36.7 °C) 84 16 (!) 161/85 93 %   01/27/23 1915 98 °F (36.7 °C) 84 16 (!) 146/78 97 %   01/27/23 1527 98.4 °F (36.9 °C) 86 18 (!) 148/69 98 %   01/27/23 1200 -- 88 -- -- --   01/27/23 1108 97.9 °F (36.6 °C) 90 18 (!) 146/73 97 %         Intake/Output Summary (Last 24 hours) at 1/28/2023 1057  Last data filed at 1/28/2023 0555  Gross per 24 hour   Intake 1167.92 ml   Output 400 ml   Net 767.92 ml          I had a face to face encounter and independently examined this patient on 1/28/2023, as outlined below:  PHYSICAL EXAM:  General: Awake, alert, following commands  EENT: Anicteric sclerae.    Resp:  CTA bilaterally, no wheezing or rales. No accessory muscle use  CV:  Regular  rhythm,  No edema  GI:  Soft, Non distended, Non tender. +Bowel sounds  Neurologic:  EOMs intact. No facial asymmetry. No aphasia or slurred speech. Symmetrical strength, Sensation grossly intact. Alert and oriented X 0. Psych:   Poor insight. Not anxious nor agitated  Skin:  No rashes. No jaundice    Reviewed most current lab test results and cultures  YES  Reviewed most current radiology test results   YES  Review and summation of old records today    NO  Reviewed patient's current orders and MAR    YES  PMH/SH reviewed - no change compared to H&P  ________________________________________________________________________  Care Plan discussed with:    Comments   Patient x    Family      RN x    Care Manager x    Consultant  x Oncology Team yesterday, RD team yesterday                     Multidiciplinary team rounds were held today with , nursing, pharmacist and clinical coordinator. Patient's plan of care was discussed; medications were reviewed and discharge planning was addressed. ________________________________________________________________________  Total time 26 mins      Comments   >50% of visit spent in counseling and coordination of care     ________________________________________________________________________  Ishan Kay MD     Procedures: see electronic medical records for all procedures/Xrays and details which were not copied into this note but were reviewed prior to creation of Plan. LABS:  I reviewed today's most current labs and imaging studies.   Pertinent labs include:  Recent Labs     01/28/23  0323 01/27/23  0627 01/26/23  0133   WBC 5.9 5.3 6.4   HGB 8.8* 8.8* 8.8*   HCT 28.9* 28.9* 29.5*    176 185       Recent Labs     01/28/23  0323 01/27/23  0627 01/26/23  0133    145 146*   K 3.4* 3.0* 3.2*   * 113* 114*   CO2 24 26 25   * 150* 123*   BUN 25* 27* 35*   CREA 0.93 1.01 1.12   CA 7. 5* 7.9* 8.0*   MG  --   --  2.2   PHOS 3.7 1.8* 2.6   ALB 1.7* 1.7* 1.7*   TBILI 1.1* 1.2* 1.3*   ALT 10* 9* 8*         Signed: Joceline Antony MD

## 2023-01-28 NOTE — PROGRESS NOTES
Bedside and Verbal shift change report given to [facility], RN (oncoming nurse) by Emily Richard (offgoing nurse). Report given with SBAR, Kardex, Intake/Output, MAR and Recent Results.

## 2023-01-28 NOTE — PROGRESS NOTES
Problem: Patient Education: Go to Patient Education Activity  Goal: Patient/Family Education  Outcome: Progressing Towards Goal     Problem: Pressure Injury - Risk of  Goal: *Prevention of pressure injury  Description: Document Matthew Scale and appropriate interventions in the flowsheet.   Outcome: Progressing Towards Goal

## 2023-01-29 LAB
GLUCOSE BLD STRIP.AUTO-MCNC: 104 MG/DL (ref 65–117)
GLUCOSE BLD STRIP.AUTO-MCNC: 118 MG/DL (ref 65–117)
GLUCOSE BLD STRIP.AUTO-MCNC: 129 MG/DL (ref 65–117)
GLUCOSE BLD STRIP.AUTO-MCNC: 169 MG/DL (ref 65–117)
MAGNESIUM SERPL-MCNC: 1.7 MG/DL (ref 1.6–2.4)
PHOSPHATE SERPL-MCNC: 3.4 MG/DL (ref 2.6–4.7)
SERVICE CMNT-IMP: ABNORMAL
SERVICE CMNT-IMP: NORMAL

## 2023-01-29 PROCEDURE — 74011000258 HC RX REV CODE- 258: Performed by: INTERNAL MEDICINE

## 2023-01-29 PROCEDURE — 82962 GLUCOSE BLOOD TEST: CPT

## 2023-01-29 PROCEDURE — 84100 ASSAY OF PHOSPHORUS: CPT

## 2023-01-29 PROCEDURE — 83735 ASSAY OF MAGNESIUM: CPT

## 2023-01-29 PROCEDURE — 74011000250 HC RX REV CODE- 250: Performed by: INTERNAL MEDICINE

## 2023-01-29 PROCEDURE — C9113 INJ PANTOPRAZOLE SODIUM, VIA: HCPCS

## 2023-01-29 PROCEDURE — 65660000001 HC RM ICU INTERMED STEPDOWN

## 2023-01-29 PROCEDURE — 74011250636 HC RX REV CODE- 250/636: Performed by: NURSE PRACTITIONER

## 2023-01-29 PROCEDURE — 36415 COLL VENOUS BLD VENIPUNCTURE: CPT

## 2023-01-29 PROCEDURE — 74011250636 HC RX REV CODE- 250/636: Performed by: INTERNAL MEDICINE

## 2023-01-29 PROCEDURE — 74011250636 HC RX REV CODE- 250/636

## 2023-01-29 PROCEDURE — 74011000250 HC RX REV CODE- 250

## 2023-01-29 PROCEDURE — 74011250637 HC RX REV CODE- 250/637: Performed by: SURGERY

## 2023-01-29 PROCEDURE — 74011000250 HC RX REV CODE- 250: Performed by: STUDENT IN AN ORGANIZED HEALTH CARE EDUCATION/TRAINING PROGRAM

## 2023-01-29 RX ADMIN — CEFEPIME 2000 MG: 2 INJECTION, POWDER, FOR SOLUTION INTRAVENOUS at 22:20

## 2023-01-29 RX ADMIN — HEPARIN SODIUM 5000 UNITS: 5000 INJECTION INTRAVENOUS; SUBCUTANEOUS at 22:20

## 2023-01-29 RX ADMIN — CASTOR OIL AND BALSAM, PERU: 788; 87 OINTMENT TOPICAL at 22:22

## 2023-01-29 RX ADMIN — ONDANSETRON 4 MG: 2 INJECTION INTRAMUSCULAR; INTRAVENOUS at 17:01

## 2023-01-29 RX ADMIN — SODIUM CHLORIDE, PRESERVATIVE FREE 10 ML: 5 INJECTION INTRAVENOUS at 22:21

## 2023-01-29 RX ADMIN — FLUCONAZOLE IN SODIUM CHLORIDE 200 MG: 2 INJECTION, SOLUTION INTRAVENOUS at 12:48

## 2023-01-29 RX ADMIN — SODIUM CHLORIDE, PRESERVATIVE FREE 10 ML: 5 INJECTION INTRAVENOUS at 05:09

## 2023-01-29 RX ADMIN — METRONIDAZOLE 500 MG: 500 INJECTION, SOLUTION INTRAVENOUS at 03:10

## 2023-01-29 RX ADMIN — Medication 1 AMPULE: at 22:20

## 2023-01-29 RX ADMIN — METRONIDAZOLE 500 MG: 500 INJECTION, SOLUTION INTRAVENOUS at 17:01

## 2023-01-29 RX ADMIN — ONDANSETRON 4 MG: 2 INJECTION INTRAMUSCULAR; INTRAVENOUS at 10:47

## 2023-01-29 RX ADMIN — CEFEPIME 2000 MG: 2 INJECTION, POWDER, FOR SOLUTION INTRAVENOUS at 10:47

## 2023-01-29 RX ADMIN — HEPARIN SODIUM 5000 UNITS: 5000 INJECTION INTRAVENOUS; SUBCUTANEOUS at 12:54

## 2023-01-29 RX ADMIN — CASTOR OIL AND BALSAM, PERU: 788; 87 OINTMENT TOPICAL at 10:56

## 2023-01-29 RX ADMIN — SODIUM CHLORIDE, PRESERVATIVE FREE 40 MG: 5 INJECTION INTRAVENOUS at 10:47

## 2023-01-29 RX ADMIN — HEPARIN SODIUM 5000 UNITS: 5000 INJECTION INTRAVENOUS; SUBCUTANEOUS at 03:10

## 2023-01-29 RX ADMIN — MAGNESIUM SULFATE HEPTAHYDRATE: 500 INJECTION, SOLUTION INTRAMUSCULAR; INTRAVENOUS at 17:53

## 2023-01-29 NOTE — PROGRESS NOTES
Problem: Pressure Injury - Risk of  Goal: *Prevention of pressure injury  Description: Document Matthew Scale and appropriate interventions in the flowsheet.   Outcome: Progressing Towards Goal

## 2023-01-29 NOTE — PROGRESS NOTES
0730: Bedside report received from Teri Stephens, [address]: Pt restless in bed. Very confused, difficult to reorient. Avasys and bed alarm in place. Side rails up x3.    1000: Pt asleep in bed. 1145: Incontinence care provided. Urine and Bmx1. BM brown, soft, formed. 1300: Family to bedside. 1800: Incontinence care, Bmx1. Pt in bed eating dinner. TPN hung. 1945: Bedside report given to Gricel Elam RN.

## 2023-01-29 NOTE — PROGRESS NOTES
Problem: Falls - Risk of  Goal: *Absence of Falls  Description: Document Graciela Leon Fall Risk and appropriate interventions in the flowsheet. Outcome: Progressing Towards Goal  Note: Fall Risk Interventions:  Mobility Interventions: Bed/chair exit alarm, Communicate number of staff needed for ambulation/transfer, OT consult for ADLs, Patient to call before getting OOB, PT Consult for mobility concerns, PT Consult for assist device competence, Strengthening exercises (ROM-active/passive), Utilize walker, cane, or other assistive device    Mentation Interventions: Bed/chair exit alarm, Door open when patient unattended, Evaluate medications/consider consulting pharmacy, Increase mobility, More frequent rounding, Reorient patient, Room close to nurse's station, Toileting rounds    Medication Interventions: Bed/chair exit alarm, Evaluate medications/consider consulting pharmacy, Patient to call before getting OOB, Teach patient to arise slowly    Elimination Interventions: Bed/chair exit alarm, Call light in reach, Patient to call for help with toileting needs, Toileting schedule/hourly rounds              Problem: Patient Education: Go to Patient Education Activity  Goal: Patient/Family Education  Outcome: Progressing Towards Goal     Problem: Pressure Injury - Risk of  Goal: *Prevention of pressure injury  Description: Document Matthew Scale and appropriate interventions in the flowsheet. Outcome: Progressing Towards Goal  Note: Pressure Injury Interventions:  Sensory Interventions: Float heels, Keep linens dry and wrinkle-free, Maintain/enhance activity level, Minimize linen layers, Turn and reposition approx.  every two hours (pillows and wedges if needed), Pressure redistribution bed/mattress (bed type)    Moisture Interventions: Absorbent underpads, Apply protective barrier, creams and emollients, Check for incontinence Q2 hours and as needed, Minimize layers    Activity Interventions: Pressure redistribution bed/mattress(bed type), Increase time out of bed, PT/OT evaluation    Mobility Interventions: Float heels, HOB 30 degrees or less, Pressure redistribution bed/mattress (bed type), PT/OT evaluation, Turn and reposition approx.  every two hours(pillow and wedges)    Nutrition Interventions: Document food/fluid/supplement intake    Friction and Shear Interventions: Apply protective barrier, creams and emollients, Lift sheet, Lift team/patient mobility team, Minimize layers, HOB 30 degrees or less                Problem: Patient Education: Go to Patient Education Activity  Goal: Patient/Family Education  Outcome: Progressing Towards Goal     Problem: Infection - Risk of, Central Venous Catheter-Associated Bloodstream Infection  Goal: *Absence of infection signs and symptoms  Outcome: Progressing Towards Goal     Problem: Patient Education: Go to Patient Education Activity  Goal: Patient/Family Education  Outcome: Progressing Towards Goal

## 2023-01-29 NOTE — PROGRESS NOTES
0730 Bedside shift change report given to [address] (oncoming nurse) by Hanna Chery (offgoing nurse). Report included the following information SBAR, Kardex, ED Summary, MAR, Recent Results, and Cardiac Rhythm Sinus Tach  . End of Shift Note    Bedside shift change report given to Hanna Chery (oncoming nurse) by Abdullahi Cao (offgoing nurse). Report included the following information SBAR and Kardex    Shift worked:  0705-8764     Shift summary and any significant changes:    Venelex order for blanchable redness on heels and sacrum   Mobility team consulted for low alley score  BMP and phosphorus to be drawn every 12 hours. Patient refused to take medications. Patient refused potassium phosphorus drink twice. Encouraged patient to eat with no success. The patient's family tried to get the patient to eat as well with no success    Patient had periodic hallucinations throughout the da, notified MD. Patient believed black gait belt was a snake, removed it from patient's sight. Concerns for physician to address: This evening the patient's wife notified me that a family member that had visited the   patient today had a positive COVID test. Notified night RN. [facility]   [phone]       Activity:  Activity Level: Bed Rest  Number times ambulated in hallways past shift: 0  Number of times OOB to chair past shift: 0    Cardiac:   Cardiac Monitoring: Yes      Cardiac Rhythm: Sinus Rhythm    Access:  Current line(s): PIV and port     Genitourinary:   Urinary status: incontinent    Respiratory:   O2 Device: None (Room air)  Chronic home O2 use?: NO  Incentive spirometer at bedside: NO       GI:  Last Bowel Movement Date: 01/28/23  Current diet:  ADULT ORAL NUTRITION SUPPLEMENT Lunch, Dinner; Frozen Supplement  ADULT DIET Dysphagia - Pureed;  Patient likes chicken broth, decaf coffee instead of tea every meal, splenda and creamer, likes megan and vanilla ice cream  TPN ADULT - PERIPHERAL  Passing flatus: YES  Tolerating current diet: NO       Pain Management:   Patient states pain is manageable on current regimen: N/A    Skin:  Matthew Score: 14  Interventions: turn team, speciality bed, float heels, increase time out of bed, PT/OT consult, and internal/external urinary devices    Patient Safety:  Fall Score:  Total Score: 3  Interventions: bed/chair alarm, gripper socks, and pt to call before getting OOB  High Fall Risk: Yes    Length of Stay:  Expected LOS: 9d 21h  Actual LOS: 13[facility]

## 2023-01-29 NOTE — PROGRESS NOTES
Hospitalist Progress Note    NAME: Madelaine Morel   :  1950   MRN:  [id number]       Assessment / Plan:  Advanced Hodgkin lymphoma P  - With splenomegaly lymphadenopathy  - Status postchemotherapy   - Hematology oncology team are following, input is appreciated  Poor prognosis per oncology discussed - pt and family aware, Palliative following  IP PT/OT eval noted- SNF recommended if family interested for reconditioning- Palliative to followup with family on monday      Tumor lysis syndrome POA  -Continue following uric acid level- 3.6 last  (stable), no further check needed  - Hematology oncology recommending allopurinol  - Follow CMP very closely  - NILTON, hypercalcemia as evidence of tumor lysis syndrome    Hyperbilirubinemia  Alkaline phosphatase elevated  - Likely secondary to lymphoma/splenomegaly    Pseudo hypocalcemia  - Calcium 7.9, albumin 1.8, corrected calcium on the higher end    Aspiration pneumonia POA  Acute hypoxic respiratory failure POA- resolved, now on RA  - Continue cefepime and metronidazole  - Aspiration precautions  MBS noted - cleared for Pureed diet and thin liquids- started PO -- but pt not eating much     Acute metabolic encephalopathy POA- more stable in past 24-48 hrs it seems  - Likely due to worsening metabolic status in the setting of a tumor lysis syndrome  - Follow mentation very closely  -Very awake again today and following all commands but not oriented  - MBS normal, speech therapy recommending puréed food,    GERD  - Continue home meds    Severe protein calorie malnutrition POA  - Likely secondary to advanced lymphoma and decreased p.o. intake  - Nutrition consult input is appreciated  Started on TPN -- renew per Oncology recc, PO started ,if continues safely can taper off TPN if PO intake improved-- will watch over the weekend to see progress/decline as per wife/family, check lytes Q 12 and replenish as needed per RD recc to prevent refeeding syndrome    Follow up with palliative ? on Monday now for DC planning-- SNF/restorative care versus Home with Hospice if pt declines & family agreeable  Palliative    25.0 - 29.9 Overweight / Body mass index is 25.42 kg/m². Estimated discharge date: January 30? Barriers: Clinical improvement versus Hospice considertion, TPN weaning if PO improves, SNF? Versus Hospice TBD    Code status: DNR  Prophylaxis: Hep SQ  Recommended Disposition:  TBD     Subjective:     Patient was seen and examined. No acute events overnight. Discussed with RN overnight events. All patient's questions were answered. \"I am fine\"    Patient's wife and son at bedside, all questions were answered. Review of Systems:  Symptom Y/N Comments  Symptom Y/N Comments   Fever/Chills    Chest Pain     Poor Appetite    Edema     Cough    Abdominal Pain     Sputum    Joint Pain     SOB/SALMERON    Pruritis/Rash     Nausea/vomit    Tolerating PT/OT     Diarrhea    Tolerating Diet     Constipation    Other       Could NOT obtain due to: Alert and more oriented but stil confused         Objective:     VITALS:   Last 24hrs VS reviewed since prior progress note.  Most recent are:  Patient Vitals for the past 24 hrs:   Temp Pulse Resp BP SpO2   01/29/23 1016 98.1 °F (36.7 °C) 84 15 (!) 145/81 97 %   01/29/23 0852 98.2 °F (36.8 °C) 82 16 (!) 146/87 98 %   01/29/23 0232 98.1 °F (36.7 °C) 87 15 (!) 152/73 95 %   01/28/23 2247 98.5 °F (36.9 °C) 96 16 (!) 161/87 97 %   01/28/23 1906 98.5 °F (36.9 °C) 94 15 (!) 154/75 97 %   01/28/23 1600 98.2 °F (36.8 °C) 89 14 131/77 94 %   01/28/23 1206 98.2 °F (36.8 °C) -- -- -- --         Intake/Output Summary (Last 24 hours) at 1/29/2023 1119  Last data filed at 1/29/2023 6107  Gross per 24 hour   Intake 508.1 ml   Output --   Net 508.1 ml          I had a face to face encounter and independently examined this patient on 1/29/2023, as outlined below:  PHYSICAL EXAM:  General: Awake, alert, following commands  EENT: Anicteric sclerae. Resp:  CTA bilaterally, no wheezing or rales. No accessory muscle use  CV:  Regular  rhythm,  No edema  GI:  Soft, Non distended, Non tender. +Bowel sounds  Neurologic:  EOMs intact. No facial asymmetry. No aphasia or slurred speech. Symmetrical strength, Sensation grossly intact. Alert and oriented X 0. Psych:   Poor insight. Not anxious nor agitated  Skin:  No rashes. No jaundice    Reviewed most current lab test results and cultures  YES  Reviewed most current radiology test results   YES  Review and summation of old records today    NO  Reviewed patient's current orders and MAR    YES  PMH/SH reviewed - no change compared to H&P  ________________________________________________________________________  Care Plan discussed with:    Comments   Patient x    Family      RN x    Care Manager x    Consultant                        Multidiciplinary team rounds were held today with , nursing, pharmacist and clinical coordinator. Patient's plan of care was discussed; medications were reviewed and discharge planning was addressed. ________________________________________________________________________  Total time 26 mins      Comments   >50% of visit spent in counseling and coordination of care x    ________________________________________________________________________  Carla Angeles MD     Procedures: see electronic medical records for all procedures/Xrays and details which were not copied into this note but were reviewed prior to creation of Plan. LABS:  I reviewed today's most current labs and imaging studies.   Pertinent labs include:  Recent Labs     01/28/23  0323 01/27/23  0627   WBC 5.9 5.3   HGB 8.8* 8.8*   HCT 28.9* 28.9*    176       Recent Labs     01/29/23  0147 01/28/23  1904 01/28/23  0323 01/27/23  0627   NA  --  143 144 145   K  --  3.0* 3.4* 3.0*   CL  --  111* 112* 113*   CO2  --  26 24 26   GLU  --  146* 143* 150*   BUN --  23* 25* 27*   CREA  --  0.97 0.93 1.01   CA  --  7.7* 7.5* 7.9*   MG 1.7  --  1.7  --    PHOS 3.4 3.1 3.7 1.8*   ALB  --   --  1.7* 1.7*   TBILI  --   --  1.1* 1.2*   ALT  --   --  10* 9*         Signed: Surya Bynum MD

## 2023-01-30 LAB
ANION GAP SERPL CALC-SCNC: 6 MMOL/L (ref 5–15)
BUN SERPL-MCNC: 26 MG/DL (ref 6–20)
BUN/CREAT SERPL: 28 (ref 12–20)
CALCIUM SERPL-MCNC: 7.7 MG/DL (ref 8.5–10.1)
CHLORIDE SERPL-SCNC: 110 MMOL/L (ref 97–108)
CO2 SERPL-SCNC: 26 MMOL/L (ref 21–32)
CREAT SERPL-MCNC: 0.92 MG/DL (ref 0.7–1.3)
GLUCOSE SERPL-MCNC: 151 MG/DL (ref 65–100)
PHOSPHATE SERPL-MCNC: 3.1 MG/DL (ref 2.6–4.7)
POTASSIUM SERPL-SCNC: 3.3 MMOL/L (ref 3.5–5.1)
SODIUM SERPL-SCNC: 142 MMOL/L (ref 136–145)

## 2023-01-30 PROCEDURE — 74011250636 HC RX REV CODE- 250/636: Performed by: INTERNAL MEDICINE

## 2023-01-30 PROCEDURE — 74011250636 HC RX REV CODE- 250/636: Performed by: NURSE PRACTITIONER

## 2023-01-30 PROCEDURE — 74011000250 HC RX REV CODE- 250

## 2023-01-30 PROCEDURE — 65660000001 HC RM ICU INTERMED STEPDOWN

## 2023-01-30 PROCEDURE — 80048 BASIC METABOLIC PNL TOTAL CA: CPT

## 2023-01-30 PROCEDURE — 95816 EEG AWAKE AND DROWSY: CPT | Performed by: PSYCHIATRY & NEUROLOGY

## 2023-01-30 PROCEDURE — 84100 ASSAY OF PHOSPHORUS: CPT

## 2023-01-30 PROCEDURE — 74011250637 HC RX REV CODE- 250/637: Performed by: INTERNAL MEDICINE

## 2023-01-30 PROCEDURE — 36415 COLL VENOUS BLD VENIPUNCTURE: CPT

## 2023-01-30 PROCEDURE — 74011250636 HC RX REV CODE- 250/636

## 2023-01-30 PROCEDURE — 74011250637 HC RX REV CODE- 250/637: Performed by: SURGERY

## 2023-01-30 PROCEDURE — 74011000250 HC RX REV CODE- 250: Performed by: STUDENT IN AN ORGANIZED HEALTH CARE EDUCATION/TRAINING PROGRAM

## 2023-01-30 PROCEDURE — 74011000258 HC RX REV CODE- 258: Performed by: INTERNAL MEDICINE

## 2023-01-30 PROCEDURE — 74011000250 HC RX REV CODE- 250: Performed by: INTERNAL MEDICINE

## 2023-01-30 PROCEDURE — 99223 1ST HOSP IP/OBS HIGH 75: CPT | Performed by: PSYCHIATRY & NEUROLOGY

## 2023-01-30 PROCEDURE — C9113 INJ PANTOPRAZOLE SODIUM, VIA: HCPCS

## 2023-01-30 PROCEDURE — 99233 SBSQ HOSP IP/OBS HIGH 50: CPT | Performed by: STUDENT IN AN ORGANIZED HEALTH CARE EDUCATION/TRAINING PROGRAM

## 2023-01-30 RX ORDER — POTASSIUM CHLORIDE 750 MG/1
20 TABLET, FILM COATED, EXTENDED RELEASE ORAL
Status: ACTIVE | OUTPATIENT
Start: 2023-01-30 | End: 2023-01-30

## 2023-01-30 RX ORDER — OLANZAPINE 5 MG/1
5 TABLET, ORALLY DISINTEGRATING ORAL
Status: DISCONTINUED | OUTPATIENT
Start: 2023-01-30 | End: 2023-02-07 | Stop reason: HOSPADM

## 2023-01-30 RX ADMIN — Medication 1 AMPULE: at 09:18

## 2023-01-30 RX ADMIN — METRONIDAZOLE 500 MG: 500 INJECTION, SOLUTION INTRAVENOUS at 05:02

## 2023-01-30 RX ADMIN — CASTOR OIL AND BALSAM, PERU: 788; 87 OINTMENT TOPICAL at 21:02

## 2023-01-30 RX ADMIN — HEPARIN SODIUM 5000 UNITS: 5000 INJECTION INTRAVENOUS; SUBCUTANEOUS at 05:02

## 2023-01-30 RX ADMIN — SODIUM CHLORIDE, PRESERVATIVE FREE 10 ML: 5 INJECTION INTRAVENOUS at 22:42

## 2023-01-30 RX ADMIN — MAGNESIUM SULFATE HEPTAHYDRATE: 500 INJECTION, SOLUTION INTRAMUSCULAR; INTRAVENOUS at 18:48

## 2023-01-30 RX ADMIN — Medication 1 AMPULE: at 21:01

## 2023-01-30 RX ADMIN — OLANZAPINE 5 MG: 5 TABLET, ORALLY DISINTEGRATING ORAL at 22:43

## 2023-01-30 RX ADMIN — SODIUM CHLORIDE, PRESERVATIVE FREE 10 ML: 5 INJECTION INTRAVENOUS at 05:02

## 2023-01-30 RX ADMIN — HEPARIN SODIUM 5000 UNITS: 5000 INJECTION INTRAVENOUS; SUBCUTANEOUS at 20:56

## 2023-01-30 NOTE — PROCEDURES
EEG REPORT    Patient Name: Madelaine Morel  : 1950  Age: 67 y.o. Ordering physician: Mulugeta Tsai  Date of EE2023   14:14 - 14:35  Diagnosis: encephalopathy  Interpreting physician: Domitila [place] D.O. FAAN    Procedure: EEG    CLINICAL INDICATION: The patient is a 67 y.o. male who is being evaluated for baseline electro cerebral activities and to rule out seizure focus.       Current Facility-Administered Medications   Medication Dose Route Frequency    potassium chloride SR (KLOR-CON 10) tablet 20 mEq  20 mEq Oral NOW    TPN ADULT - PERIPHERAL   IntraVENous CONTINUOUS    OLANZapine (ZyPREXA zydis) disintegrating tablet 5 mg  5 mg Oral QHS    TPN ADULT - PERIPHERAL   IntraVENous CONTINUOUS    balsam peru-castor oiL (VENELEX) ointment   Topical BID    ondansetron (ZOFRAN) injection 4 mg  4 mg IntraVENous BID    dextrose 5% infusion  75 mL/hr IntraVENous CONTINUOUS    sodium chloride (NS) flush 5-40 mL  5-40 mL IntraVENous Q8H    sodium chloride (NS) flush 5-40 mL  5-40 mL IntraVENous PRN    LORazepam (ATIVAN) injection 0.5 mg  0.5 mg IntraVENous Q4H PRN    heparin (porcine) injection 5,000 Units  5,000 Units SubCUTAneous Q8H    HYDROmorphone (DILAUDID) injection 0.5-1 mg  0.5-1 mg IntraVENous Q4H PRN    naloxone (NARCAN) injection 1 mg  1 mg IntraVENous Q5MIN PRN    pantoprazole (PROTONIX) 40 mg in 0.9% sodium chloride 10 mL injection  40 mg IntraVENous DAILY    lidocaine 4 % patch 1 Patch  1 Patch TransDERmal Q24H    melatonin tablet 3 mg  3 mg Oral QHS PRN    prochlorperazine (COMPAZINE) injection 10 mg  10 mg IntraVENous Q6H PRN    glucose chewable tablet 16 g  4 Tablet Oral PRN    glucagon (GLUCAGEN) injection 1 mg  1 mg IntraMUSCular PRN    dextrose 10 % infusion 0-250 mL  0-250 mL IntraVENous PRN    alum-mag hydroxide-simeth (MYLANTA) oral suspension 30 mL  30 mL Oral Q4H PRN    alcohol 62% (NOZIN) nasal  1 Ampule  1 Ampule Topical Q12H    ondansetron (ZOFRAN) injection 4 mg  4 mg IntraVENous Q4H PRN    acetaminophen (TYLENOL) tablet 650 mg  650 mg Oral Q6H PRN    polyethylene glycol (MIRALAX) packet 17 g  17 g Oral QHS PRN    allopurinoL (ZYLOPRIM) tablet 100 mg  100 mg Oral BID           DESCRIPTION OF PROCEDURE:     This is a digitally recorded electroencephalogram  Electrodes were applied in accordance with the international 10-20 system of electrode placement. 18 channels of scalp EEG are recorded  A channel was used for EoG  Another channel was used for ECG   The data is stored digitally and reviewed in reformatted montages for optimal display  EEG  was reviewed in both bipolar and referential montages    Description of Activity: The background of this recording contains no well-formed alpha activity seen. There was a mixture of symmetric diffuse theta and delta activity identified throughout the study. Throughout the recording, there were no clear areas of focal slowing nor spike or spike-and-wave discharges seen. Triphasic waves were periodically identified. Hyperventilation was not performed. Photic stimulation produced no response in the posterior head regions. During drowsiness, there is attenuation of the underlying  frequency and slower theta/delta activity occurs. During the recording, the patient did  not achieve stage II sleep        Clinical Interpretation: This EEG, performed during wakefulness and drowsiness, is abnormal. There is mild to moderate generalized slowing as seen in encephalopathies. Triphasic waves were identified throughout the study suggesting metabolic/toxic etiology. There is no focal asymmetry, seizures or epileptiform discharges seen. Clinical correlation is recommended             Adolph [place] D.O.   Melinda Moss

## 2023-01-30 NOTE — PROGRESS NOTES
Palliative Medicine Consult  Jorge Alberto: [phone])    Patient Name: Marlys Olivo  YOB: 1950    Date of Initial Consult: 1/25/23  Date of Today's Visit: 1/30/2023  Reason for Consult: Care decisions  Requesting Provider: Landon Drumright   Primary Care Physician: Nadia Jaramillo MD     SUMMARY:   Marlys Olivo is a 67 y.o. with a past history of HTN, new diagnosis (12/22) classic Hodgkin Lymphoma, who was admitted on 1/17/2023 from home for initiation of systemic chemotherapy. Since admission he underwent BMB, PORT placement, ultrasound which was negative for DVT and was started on ABVD chemotherapy on 1/20/23. His hospital course was complicated by poor PO intake. He also developed lethargy and AMS felt to be due to aspiration pneumonia. Update 1/30/23: TPN started by primary team. Pt remains confused. Diet started but pt is not taking much PO. Current medical issues leading to Palliative Medicine involvement include: Hodgkins Lymphoma, delirium, poor po intake. Social: pt is  to wife, London Roberson. He has three children Burndamaso Hansen. He lives at home with his son Emanuel Melara and wife Ghislaine Dickerson. PALLIATIVE DIAGNOSES:   Palliative care encounter  Goals of care  Delirium  Poor PO intake/severe protein calorie malnutrition  Hodgkin lymphoma       PLAN:   Chart reviewed, discussed case with [facility] NP and Dr. Fito Marquez. Met with wife Ghislaine Dickerson, son Emanuel Melara, at bedside. GOC: discussed plans moving forward including options for possible rehab, HH, hospice. Wife and son express hope for an alternate chemotherapy option. They have decided the do not want to do another treatment of ABVD. They do not feel ready for hospice at this time as they are interested in pursuing other treatment options. Wife does express worries about the ability to care for him at home due to his debility and confusion. Delirium: with hallucinations.  Likely 2/2 acute hospitalization, aspiration pneumonia. Discussed with Dr. Brinda Goldstein, plan to consult neurology. Start low dose zyprexa ODT at bedtime  On TPN as per primary team  Hodgkin Lymphoma: receiving chemo as per oncology  Initial consult note routed to primary continuity provider and/or primary health care team members  Communicated plan of care with: Palliative IDT, R[facility] Team     GOALS OF CARE / TREATMENT PREFERENCES:     GOALS OF CARE:  Patient/Health Care Proxy Stated Goals: Prolong life    TREATMENT PREFERENCES:   Code Status: DNR    Patient and family's personal goals include: continuation of therapy to prolong life        Advance Care Planning:  [x] The [address] [place] [address] Interdisciplinary Team has updated the ACP Navigator with [address] and Patient Capacity      Advance Care Planning 1/18/2023   Patient's Healthcare Decision Maker is: -   Confirm Advance Directive None   Patient Would Like to Complete Advance Directive No               Other:    As far as possible, the palliative care team has discussed with patient / health care proxy about goals of care / treatment preferences for patient.      HISTORY:     History obtained from: patient, wife    CHIEF COMPLAINT: 'I see a spider\"    HPI/SUBJECTIVE:    The patient is:   [x] Verbal and participatory  [] Non-participatory due to:     Clinical Pain Assessment (nonverbal scale for severity on nonverbal patients):   Clinical Pain Assessment  Severity: 0     Activity (Movement): Lying quietly, normal position    Duration: for how long has pt been experiencing pain (e.g., 2 days, 1 month, years)  Frequency: how often pain is an issue (e.g., several times per day, once every few days, constant)     FUNCTIONAL ASSESSMENT:     Palliative Performance Scale (PPS):  PPS: 30       PSYCHOSOCIAL/SPIRITUAL SCREENING:     Palliative IDT has assessed this patient for cultural preferences / practices and a referral made as appropriate to needs (Cultural Services, Patient Advocacy, Ethics, etc.)    Any spiritual / Yarsanism concerns:  [] Yes /  [x] No   If \"Yes\" to discuss with pastoral care during IDT     Does caregiver feel burdened by caring for their loved one:   [] Yes /  [x] No /  [] No Caregiver Present/Available [] No Caregiver [] Pt [facility] at [facility]  If \"Yes\" to discuss with social work during IDT    Anticipatory grief assessment:   [x] Normal  / [] Maladaptive     If \"Maladaptive\" to discuss with social work during IDT    ESAS Anxiety: Anxiety: 0    ESAS Depression: Depression: 0        REVIEW OF SYSTEMS:     Positive and pertinent negative findings in ROS are noted above in HPI. The following systems were [x] reviewed / [] unable to be reviewed as noted in HPI  Other findings are noted below. Systems: constitutional, ears/nose/mouth/throat, respiratory, gastrointestinal, genitourinary, musculoskeletal, integumentary, neurologic, psychiatric, endocrine. Positive findings noted below. Modified ESAS Completed by: provider   Fatigue: 0 Drowsiness: 0   Depression: 0 Pain: 0   Anxiety: 0 Nausea: 0   Anorexia: 1 Dyspnea: 0           Stool Occurrence(s): 1        PHYSICAL EXAM:     From RN flowsheet:  Wt Readings from Last 3 Encounters:   01/29/23 69.3 kg (152 lb 12.5 oz)   01/06/23 64.5 kg (142 lb 3.2 oz)   12/28/22 69.6 kg (153 lb 6.4 oz)     Blood pressure (!) 157/82, pulse 88, temperature 98 °F (36.7 °C), resp. rate 20, height 5' 5\" (1.651 m), weight 69.3 kg (152 lb 12.5 oz), SpO2 97 %.     Pain Scale 1: Numeric (0 - 10)  Pain Intensity 1: 0  Pain Onset 1: acute  Pain Location 1: Back  Pain Orientation 1: Lower  Pain Description 1: Aching  Pain Intervention(s) 1: Medication (see MAR)  Last bowel movement, if known:     Constitutional: sleeping  ENMT: no nasal discharge, moist mucous membranes  Cardiovascular: regular rhythm, distal pulses intact  Respiratory: breathing not labored, symmetric  Gastrointestinal: non distended  Skin: warm, dry  Neurologic: following commands, moving all extremities not oriented  Psychiatric: , + hallucinations  Other:       HISTORY:     Active Problems:    Essential hypertension (2017)      Iron deficiency anemia (2022)      Type 2 diabetes mellitus (2022)      Hodgkin lymphoma ([facility] (2023)      Severe protein-calorie malnutrition ([facility] (2023)    Past Medical History:   Diagnosis Date    Allergic rhinitis, cause unspecified 2013    Arthritis     knees    Asthma     as a child    Eczema     on lower back waistline on the back    Environmental allergies     GERD (gastroesophageal reflux disease)     occastionally but resolved since 60 pound weight loss    Hodgkin's lymphoma ([facility]     Hypertension     Psychiatric disorder     anxiety and depression      Past Surgical History:   Procedure Laterality Date    HX TONSILLECTOMY      HX WISDOM TEETH EXTRACTION        Family History   Problem Relation Age of Onset    Hypertension Mother     Hypertension Father     Heart Disease Father     Alcohol abuse Father     Hypertension Brother     No Known Problems Brother       History reviewed, no pertinent family history. Social History     Tobacco Use    Smoking status: Former     Packs/day: 1.00     Years: 2.00     Pack years: 2.00     Types: Cigarettes     Quit date: 1966     Years since quittin.1    Smokeless tobacco: Never   Substance Use Topics    Alcohol use: No     Allergies   Allergen Reactions    Codeine Other (comments)     Pt states unknown reaction. Pcn [Penicillins] Rash     Received Ancef 2023 without issue.       Current Facility-Administered Medications   Medication Dose Route Frequency    potassium chloride SR (KLOR-CON 10) tablet 20 mEq  20 mEq Oral NOW    TPN ADULT - PERIPHERAL   IntraVENous CONTINUOUS    TPN ADULT - PERIPHERAL   IntraVENous CONTINUOUS    balsam peru-castor oiL (VENELEX) ointment   Topical BID    ondansetron (ZOFRAN) injection 4 mg  4 mg IntraVENous BID    dextrose 5% infusion  75 mL/hr IntraVENous CONTINUOUS    sodium chloride (NS) flush 5-40 mL  5-40 mL IntraVENous Q8H    sodium chloride (NS) flush 5-40 mL  5-40 mL IntraVENous PRN    LORazepam (ATIVAN) injection 0.5 mg  0.5 mg IntraVENous Q4H PRN    heparin (porcine) injection 5,000 Units  5,000 Units SubCUTAneous Q8H    HYDROmorphone (DILAUDID) injection 0.5-1 mg  0.5-1 mg IntraVENous Q4H PRN    naloxone (NARCAN) injection 1 mg  1 mg IntraVENous Q5MIN PRN    pantoprazole (PROTONIX) 40 mg in 0.9% sodium chloride 10 mL injection  40 mg IntraVENous DAILY    lidocaine 4 % patch 1 Patch  1 Patch TransDERmal Q24H    melatonin tablet 3 mg  3 mg Oral QHS PRN    prochlorperazine (COMPAZINE) injection 10 mg  10 mg IntraVENous Q6H PRN    glucose chewable tablet 16 g  4 Tablet Oral PRN    glucagon (GLUCAGEN) injection 1 mg  1 mg IntraMUSCular PRN    dextrose 10 % infusion 0-250 mL  0-250 mL IntraVENous PRN    alum-mag hydroxide-simeth (MYLANTA) oral suspension 30 mL  30 mL Oral Q4H PRN    alcohol 62% (NOZIN) nasal  1 Ampule  1 Ampule Topical Q12H    ondansetron (ZOFRAN) injection 4 mg  4 mg IntraVENous Q4H PRN    acetaminophen (TYLENOL) tablet 650 mg  650 mg Oral Q6H PRN    polyethylene glycol (MIRALAX) packet 17 g  17 g Oral QHS PRN    allopurinoL (ZYLOPRIM) tablet 100 mg  100 mg Oral BID          LAB AND IMAGING FINDINGS:     Lab Results   Component Value Date/Time    WBC 5.9 01/28/2023 03:23 AM    HGB 8.8 (L) 01/28/2023 03:23 AM    PLATELET 956 24/61/5959 03:23 AM     Lab Results   Component Value Date/Time    Sodium 142 01/30/2023 05:17 AM    Potassium 3.3 (L) 01/30/2023 05:17 AM    Chloride 110 (H) 01/30/2023 05:17 AM    CO2 26 01/30/2023 05:17 AM    BUN 26 (H) 01/30/2023 05:17 AM    Creatinine 0.92 01/30/2023 05:17 AM    Calcium 7.7 (L) 01/30/2023 05:17 AM    Magnesium 1.7 01/29/2023 01:47 AM    Phosphorus 3.1 01/30/2023 05:17 AM      Lab Results   Component Value Date/Time    Alk.  phosphatase 209 (H) 01/28/2023 03:23 AM    Protein, total 5.1 (L) 01/28/2023 03:23 AM    Albumin 1.7 (L) 01/28/2023 03:23 AM    Globulin 3.4 01/28/2023 03:23 AM    GGT 65 01/22/2023 01:32 PM     Lab Results   Component Value Date/Time    INR 1.4 (H) 01/17/2023 08:28 AM    Prothrombin time 14.6 (H) 01/17/2023 08:28 AM    aPTT 35.4 (H) 01/17/2023 08:28 AM      Lab Results   Component Value Date/Time    Iron 43 01/09/2023 02:32 PM    TIBC 207 (L) 01/09/2023 02:32 PM    Iron % saturation 21 01/09/2023 02:32 PM    Ferritin 1,912 (H) 01/09/2023 02:32 PM      No results found for: PH, PCO2, PO2  No components found for: GLPOC   No results found for: CPK, CKMB             Total time: 40 minutes  Counseling / coordination time, spent as noted above:   > 50% counseling / coordination?:     Prolonged service was provided for  []30 min   []75 min in face to face time in the presence of the patient, spent as noted above. Time Start:   Time End:   Note: this can only be billed with 71925 (initial) or 05299 (follow up). If multiple start / stop times, list each separately.

## 2023-01-30 NOTE — PROGRESS NOTES
Pharmacy TPN Management Note    TPN indication: Malnutrition    TPN type: Peripheral    Macronutrients:  Protein: 4.25 %  Dextrose: 10 %  Lipids: none    Rate: 42 mL/hr    Labs:  Recent Labs     23  0517 23  0147 23  1904 23  0323     --  143 144   K 3.3*  --  3.0* 3.4*   *  --  111* 112*   CO2 26  --  26 24   MG  --  1.7  --  1.7   PHOS 3.1 3.4 3.1 3.7   CA 7.7*  --  7.7* 7.5*   CREA 0.92  --  0.97 0.93   BUN 26*  --  23* 25*     Recent Labs     23   AST 15   ALT 10*   *     Recent Labs     23   WBC 5.9   HGB 8.8*   HCT 28.9*       Electrolytes: This TPN contains the following electrolytes (total per liter): Na: 35 meq/L; K: 55 meq/L; Phos:20 mmol/L; M meq/L; Ca 4.5 meq/L      Other additives in TPN: none    Impression/Plan:   TPN macronutrient/rate changes: No  TPN electrolyte changes: Increase K acet to 25  TPN insulin changes: none     Pharmacy will continue to monitor enteral nutrition plan, electrolytes, renal function, and dietician recommendations and adjust parenteral nutrition as needed.     Thank you,  Augustine Barber, PHARMD

## 2023-01-30 NOTE — CONSULTS
Neurology Note    Patient ID:  Anna Mckeon  [id number]  28 y.o.  1950      Date of Consultation:  January 30, 2023    Referring Physician: Dr. Dolly Dudley    Reason for Consultation:  altered mental status      Assessment and Plan:    Patient is a pleasant 74-year-old gentleman with recent diagnosis of stage IV Hodgkin's lymphoma with multiple ongoing medical conditions who has developed a waxing and waning mental status since admission. His neurological examination does reveal decreased attention and concentration with no focal sensory or motor deficits. Acute encephalopathy:  Differential includes metabolic encephalopathy, medication induced, hospital based delirium, decreased nutritional status, systemic impact of infection, concern for subclinical seizures due to waxing and waning mental status. Most like multifactorial is etiology    Recent head CT with no acute abnormalities  Brain MRI obtained earlier during the hospital stay revealed generalized volume loss and microvascular ischemic changes suggesting potential decreased cognitive reserve. I will obtain an urgent EEG for this afternoon to ensure that there is no subclinical status epilepticus that could be contributing to this mental status. Continue to correct metabolic derangements. Continue TPN from a nutritional standpoint. His albumin was 1.7. Continue nonpharmacological measures for sundowning    Advanced Hodgkin's lymphoma    I will continue to follow closely in this complicated patient with ongoing neurological symptoms requiring additional neurological testing             Subjective: confusion       History of Present Illness:   Anna Mckeon is a 67 y.o. male who was admitted to the hospital on January 17, 2023 due to progression of stage IV Hodgkin's lymphoma with tumor lysis syndrome. There was worsening kidney function and elevated uric acid. The patient was admitted for additional imaging and staging.   The patient was started on chemotherapy. Hematology/oncology have been following along closely with the patient. It appears the patient has had a waxing and waning mental status over the past few days. It was noted that the patient was having intermittent hallucinations over the weekend and having decreased nutritional intake. Neurology was consulted for evaluation of a waxing and waning mental status. Upon my examination, the patient denies any numbness, tingling, or weakness. He is easily confused and unable to provide me with a detailed history. The history is from reviewing the medical records and talking with nursing/care team.  There was no family at the bedside today. updated laboratory results which I reviewed include a white blood cell count of 5.9, hemoglobin of 8.8, platelets of 385. Sodium of 142. Potassium of 3.3. Creatinine is 0.92. Calcium 7.7. AST of 15 and ALT of 10. Albumin of 1.7. vitamin b12 > 2000. Ammonia < 10    I was able to independently review the brain MRI completed on January 17, 2023. There is no evidence of acute abnormality. There is no evidence of intracranial metastasis. There was generalized volume loss with mild microvascular ischemic changes. I was able to independently review the head CT from January 24, 2023. There is no evidence of an acute stroke. There was mild periventricular white matter ischemic changes.   Past Medical History:   Diagnosis Date    Allergic rhinitis, cause unspecified 12/11/2013    Arthritis     knees    Asthma     as a child    Eczema     on lower back waistline on the back    Environmental allergies     GERD (gastroesophageal reflux disease)     occastionally but resolved since 60 pound weight loss    Hodgkin's lymphoma ([facility] [address] 2023    Hypertension     Psychiatric disorder     anxiety and depression        Past Surgical History:   Procedure Laterality Date    HX TONSILLECTOMY      HX WISDOM TEETH EXTRACTION          Family History   Problem Relation Age of Onset    Hypertension Mother     Hypertension Father     Heart Disease Father     Alcohol abuse Father     Hypertension Brother     No Known Problems Brother         Social History     Tobacco Use    Smoking status: Former     Packs/day: 1.00     Years: 2.00     Pack years: 2.00     Types: Cigarettes     Quit date: 1966     Years since quittin.1    Smokeless tobacco: Never   Substance Use Topics    Alcohol use: No        Allergies   Allergen Reactions    Codeine Other (comments)     Pt states unknown reaction. Pcn [Penicillins] Rash     Received Ancef 2023 without issue.           Current Facility-Administered Medications   Medication Dose Route Frequency    potassium chloride SR (KLOR-CON 10) tablet 20 mEq  20 mEq Oral NOW    TPN ADULT - PERIPHERAL   IntraVENous CONTINUOUS    TPN ADULT - PERIPHERAL   IntraVENous CONTINUOUS    balsam peru-castor oiL (VENELEX) ointment   Topical BID    ondansetron (ZOFRAN) injection 4 mg  4 mg IntraVENous BID    dextrose 5% infusion  75 mL/hr IntraVENous CONTINUOUS    sodium chloride (NS) flush 5-40 mL  5-40 mL IntraVENous Q8H    sodium chloride (NS) flush 5-40 mL  5-40 mL IntraVENous PRN    LORazepam (ATIVAN) injection 0.5 mg  0.5 mg IntraVENous Q4H PRN    heparin (porcine) injection 5,000 Units  5,000 Units SubCUTAneous Q8H    HYDROmorphone (DILAUDID) injection 0.5-1 mg  0.5-1 mg IntraVENous Q4H PRN    naloxone (NARCAN) injection 1 mg  1 mg IntraVENous Q5MIN PRN    pantoprazole (PROTONIX) 40 mg in 0.9% sodium chloride 10 mL injection  40 mg IntraVENous DAILY    lidocaine 4 % patch 1 Patch  1 Patch TransDERmal Q24H    melatonin tablet 3 mg  3 mg Oral QHS PRN    prochlorperazine (COMPAZINE) injection 10 mg  10 mg IntraVENous Q6H PRN    glucose chewable tablet 16 g  4 Tablet Oral PRN    glucagon (GLUCAGEN) injection 1 mg  1 mg IntraMUSCular PRN    dextrose 10 % infusion 0-250 mL  0-250 mL IntraVENous PRN    alum-mag hydroxide-simeth (MYLANTA) oral suspension 30 mL  30 mL Oral Q4H PRN    alcohol 62% (NOZIN) nasal  1 Ampule  1 Ampule Topical Q12H    ondansetron (ZOFRAN) injection 4 mg  4 mg IntraVENous Q4H PRN    acetaminophen (TYLENOL) tablet 650 mg  650 mg Oral Q6H PRN    polyethylene glycol (MIRALAX) packet 17 g  17 g Oral QHS PRN    allopurinoL (ZYLOPRIM) tablet 100 mg  100 mg Oral BID         Review of Systems:      [x]Unable to obtain  ROS due to  [x]mental status change  []sedated   []intubated    Objective:     Visit Vitals  BP (!) 149/75   Pulse 87   Temp 98.4 °F (36.9 °C)   Resp 19   Ht 5' 5\" (1.651 m)   Wt 152 lb 12.5 oz (69.3 kg)   SpO2 98%   BMI 25.42 kg/m²       Physical Exam:      General:  appears well nourished in no acute distress  Neck: no carotid bruits  Lungs: clear to auscultation  Heart:  no murmurs, regular rate  Lower extremity: peripheral pulses palpable and no edema  Skin: intact. Mild bruising    Neurological exam:    The patient is awake and alert. He is oriented to person. He did tell me the correct year and tell me the president. He did not know the month, day of the week or date. He has decreased attention and concentration. He needed continued redirection. he does have tangential thoughts at times. He could name objects correctly. He could perform simple calculations. He is confused about his location and current medical condition. He has decreased insight into his medical health. He was able to tell me the days of the week. Cranial nerves:   II-XII were tested    Perrrla  Fundoscopic examination attempted but difficult to visualize fundus due to cooperation. Visual negro were full  Eomi, no evidence of nystagmus  Facial sensation:  normal and symmetric  Facial motor: normal and symmetric  Hearing decreased  Tongue: midline without fasciculations    Motor:    Tone normal  Pronator drift was absent  No evidence of fasciculations    Strength testing:  He had a difficult time with comprehending manual motor testing. He was at least a 4+ out of 5 throughout. There was no focal weakness that was apparent. Sensory:  Upper extremity: intact to pp  Lower extremity: intact to pp,   He was unable to cooperate with more detailed sensory testing. Reflexes:    Right Left  Biceps  2 2  Triceps 2 2  Brachiorad. 2 2  Patella  2 2  Achilles 1 1    Plantar response:  flexor bilaterally      Cerebellar testing:  no tremor apparent, finger/nose and carlton were intact  Gait: This was not assessed due to concerns over safety given his mental status    Labs:     Lab Results   Component Value Date/Time    Hemoglobin A1c 4.8 12/12/2022 01:57 PM    Sodium 142 01/30/2023 05:17 AM    Potassium 3.3 (L) 01/30/2023 05:17 AM    Chloride 110 (H) 01/30/2023 05:17 AM    Glucose 151 (H) 01/30/2023 05:17 AM    BUN 26 (H) 01/30/2023 05:17 AM    Creatinine 0.92 01/30/2023 05:17 AM    Calcium 7.7 (L) 01/30/2023 05:17 AM    WBC 5.9 01/28/2023 03:23 AM    HCT 28.9 (L) 01/28/2023 03:23 AM    HGB 8.8 (L) 01/28/2023 03:23 AM    PLATELET 239 00/09/4503 03:23 AM       Imaging:    Results from Hospital Encounter encounter on 01/17/23    MRI BRAIN W WO CONT    Narrative  EXAM:  MRI BRAIN W WO CONT    INDICATION:    Staging disease. Hodgkin's lymphoma. COMPARISON:  CT head 10/7/2022. CONTRAST: 15 ml ProHance. TECHNIQUE:  Multiplanar multisequence acquisition without and with contrast of the brain. FINDINGS:  Evaluation is significantly limited by patient positioning, with healed tilted  to the right. Generalized parenchymal volume loss with commensurate dilation of the sulci and  ventricular system. Few scattered periventricular and deep white matter T2/FLAIR  hyperintensities, consistent with mild chronic microvascular ischemic disease. Small chronic infarcts in the right basal ganglia, left thalamus, and right  cerebellum. There is no acute infarct, hemorrhage, extra-axial fluid collection,  or mass effect.  There is no cerebellar tonsillar herniation. Expected arterial  flow-voids are present. No evidence of abnormal enhancement. The paranasal sinuses, mastoid air cells, and middle ears are clear. The orbital  contents are within normal limits. No significant osseous or scalp lesions are  identified. Impression  1. Evaluation is significantly limited by patient positioning. No evidence of  intracranial metastases. No acute intracranial abnormality. 2. Generalized parenchymal volume loss and mild chronic microvascular ischemic  disease. Small chronic infarcts as above. Results from Hospital Encounter encounter on 01/17/23    CT HEAD WO CONT    Narrative  Indication:  worsening AMS    Comparison: CT 1/22/2023    Findings: 5 mm axial images were obtained from the skull base through the  vertex. CT dose reduction was achieved through the use of a standardized protocol  tailored for this examination and automatic exposure control for dose  modulation. The ventricles and cortical sulci are prominent, compatible with age related  volume loss. There is no evidence of intracranial hemorrhage, mass, mass effect,  or acute infarct. There is periventricular white matter disease. No extra-axial  fluid collections are seen. The visualized paranasal sinuses and mastoid air  cells are clear. The orbital structures are unremarkable. No osseous  abnormalities are seen. Impression  1. No evidence of acute infarct or intracranial hemorrhage. No significant  change. 2. Mild periventricular white matter disease is likely secondary to chronic  small vessel ischemic changes. Complex decision making was necessary due to the patient's current medical condition and other medical co-morbidities.          Active Problems:    Essential hypertension (2/19/2017)      Iron deficiency anemia (8/8/2022)      Type 2 diabetes mellitus (12/28/2022)      Hodgkin lymphoma ([facility] [address] (1/12/2023)      Severe protein-calorie malnutrition ([facility] [address] (1/18/2023) Signed By:  Fatimah Hutchins DO FAAN    January 30, 2023

## 2023-01-30 NOTE — PROGRESS NOTES
Comprehensive Nutrition Assessment    Type and Reason for Visit: Reassess    Nutrition Recommendations/Plan:   Continue PPN at 42mL/hr today and until all lytes are stable d/t refeeding risk. Recommend PEG placement if long term nutrition support is desired. Please document % meals and supplements consumed in flowsheet I/O's under intake   Recommend reconsideration for comfort measures/hospice route. Malnutrition Assessment:  Malnutrition Status:  Severe malnutrition (01/18/23 1450)    Context:  Chronic illness     Findings of the 6 clinical characteristics of malnutrition:   Energy Intake:  75% or less est energy requirements for 1 month or longer  Weight Loss:  Greater than 20% over 1 year     Body Fat Loss:  Severe body fat loss,     Muscle Mass Loss:  Severe muscle mass loss,    Fluid Accumulation:  Severe, Extremities   Strength:  Not performed     Nutrition Assessment:     Chart reviewed and case discussed during IDR. S/p palliative meeting today, family is not yet ready for hospice. Pt remains on PPN, has a diet order but is taking in little to no PO nutrition. He remains confused and refuses PO meds, also refused IV placement this morning for IV meds. Will continue PPN for now, but if plans are going to remain aggressive, we need an alternative plan for nutrition. .. Nutrition Related Findings:    Labs: K 3.3, -[id number]. Meds: zofran, protonix, Unknown Perch. Edema: genital & BLE. BM 1/29. Wound Type: None    Current Nutrition Intake & Therapies:  Average Meal Intake: NPO  Average Supplement Intake: None ordered  ADULT ORAL NUTRITION SUPPLEMENT Lunch, Dinner; Frozen Supplement  ADULT DIET Dysphagia - Pureed;  Patient likes chicken broth, decaf coffee instead of tea every meal, splenda and creamer, likes megan and vanilla ice cream  TPN ADULT - PERIPHERAL  TPN ADULT - PERIPHERAL    Anthropometric Measures:  Height: 5' 5\" (165.1 cm)  Ideal Body Weight (IBW): 136 lbs (62 kg)     Current Body Wt:  69.3 kg (152 lb 12.5 oz), 107.8 % IBW. Bed scale  Current BMI (kg/m2): 25.4        Weight Adjustment: No adjustment                 BMI Category: Normal weight (BMI 22.0-24.9) age over 72    Estimated Daily Nutrient Needs:  Energy Requirements Based On: Formula  Weight Used for Energy Requirements: Current  Energy (kcal/day): 1740 kcals (BMR x 1. 3AF)  Weight Used for Protein Requirements: Current  Protein (g/day): 80-93g (1.2-1.5g/kg)  Method Used for Fluid Requirements: 1 ml/kcal  Fluid (ml/day): 1700mL    Nutrition Diagnosis:   Severe malnutrition, In context of chronic illness related to catabolic illness, inadequate protein-energy intake as evidenced by Criteria as identified in malnutrition assessment, NPO or clear liquid status due to medical condition  Dx continues. Nutrition Interventions:   Food and/or Nutrient Delivery: Continue current diet, Continue oral nutrition supplement  Nutrition Education/Counseling: No recommendations at this time  Coordination of Nutrition Care: Continue to monitor while inpatient       Goals:  Previous Goal Met: Progressing toward goal(s)  Goals: Tolerate nutrition support at goal rate, by next RD assessment       Nutrition Monitoring and Evaluation:   Behavioral-Environmental Outcomes: None identified  Food/Nutrient Intake Outcomes: Diet advancement/tolerance, Food and nutrient intake, Supplement intake, Enteral nutrition intake/tolerance, Parenteral nutrition intake/tolerance  Physical Signs/Symptoms Outcomes: Biochemical data, GI status, Fluid status or edema, Weight, Nutrition focused physical findings    Discharge Planning:     Too soon to determine    Winsome Rosado, [address]  Contact: [phone]

## 2023-01-30 NOTE — PROGRESS NOTES
Speech Pathology Note    Chart reviewed and spoke with RN. Patient refusing PO medications on this date and with very limited PO intake overall, per RN. Music therapy currently at bedside. Will defer SLP visit on this date and will continue to follow. Thank you.     Shaka Ray M.S., CCC-SLP

## 2023-01-30 NOTE — PROGRESS NOTES
Hospitalist Progress Note    NAME: Valdemar Mercado   :  1950   MRN:  [id number]       Assessment / Plan:  Advanced Hodgkin lymphoma POA  - With splenomegaly lymphadenopathy  - Status postchemotherapy   - Hematology oncology team are following, input is appreciated  Poor prognosis per oncology discussed - pt and family aware, Palliative following  IP PT/OT eval noted- SNF recommended if family interested for reconditioning- Palliative to followup with family today for possible transition to Comfort feeding and stopping TPN      Tumor lysis syndrome POA  -Continue following uric acid level- 3.6 last  (stable), no further check needed  - Hematology oncology recommending allopurinol  - Follow CMP very closely  - NILTON, hypercalcemia as evidence of tumor lysis syndrome    Hyperbilirubinemia  Alkaline phosphatase elevated  - Likely secondary to lymphoma/splenomegaly    Pseudo hypocalcemia  - Calcium 7.9, albumin 1.8, corrected calcium on the higher end    Aspiration pneumonia POA  Acute hypoxic respiratory failure POA- resolved, now on RA  - Continue cefepime and metronidazole  - Aspiration precautions  MBS noted - cleared for Pureed diet and thin liquids- started PO -- but pt not eating much consistently    Acute metabolic encephalopathy POA- more stable in past 24-48 hrs it seems  - Likely due to worsening metabolic status in the setting of a tumor lysis syndrome  - Follow mentation very closely  -Very awake again today and following all commands but not oriented  - MBS normal, speech therapy recommending puréed food,    GERD  - Continue home meds    Severe protein calorie malnutrition POA  - Likely secondary to advanced lymphoma and decreased p.o. intake  - Nutrition consult input is appreciated  Started on TPN -- renew per Oncology recc, PO started ,if continues safely can taper off TPN if PO intake improved-- will watch over the weekend to see progress/decline as per wife/family, check lytes Q 12 and replenish as needed per RD recc to prevent refeeding syndrome    Follow up with palliative today- SNF/restorative care versus Home with Hospice if pt declines & family agreeable-- inclining towards Hospice it seems after failed trial of TPN over the weekend      25.0 - 29.9 Overweight / Body mass index is 25.42 kg/m². Estimated discharge date: January 30?-2/1  Barriers: Clinical improvement versus Hospice considertion, TPN weaning if PO improves, SNF? Versus Hospice TBD    Code status: DNR  Prophylaxis: Hep SQ  Recommended Disposition:  TBD     Subjective:     Patient was seen and examined. No acute events overnight. Discussed with RN overnight events. All patient's questions were answered. \"I am fine\"    Patient's wife and son at bedside, all questions were answered. Review of Systems:  Symptom Y/N Comments  Symptom Y/N Comments   Fever/Chills    Chest Pain     Poor Appetite    Edema     Cough    Abdominal Pain     Sputum    Joint Pain     SOB/SALMERON    Pruritis/Rash     Nausea/vomit    Tolerating PT/OT     Diarrhea    Tolerating Diet     Constipation    Other       Could NOT obtain due to: Alert and more oriented but stil confused         Objective:     VITALS:   Last 24hrs VS reviewed since prior progress note.  Most recent are:  Patient Vitals for the past 24 hrs:   Temp Pulse Resp BP SpO2   01/30/23 0400 98.4 °F (36.9 °C) 87 19 (!) 149/75 98 %   01/30/23 0015 97.8 °F (36.6 °C) 87 22 136/79 99 %   01/29/23 2019 98 °F (36.7 °C) 87 19 137/68 96 %   01/29/23 1707 -- 91 16 (!) 135/96 --   01/29/23 1016 98.1 °F (36.7 °C) 84 15 (!) 145/81 97 %         Intake/Output Summary (Last 24 hours) at 1/30/2023 0857  Last data filed at 1/29/2023 1600  Gross per 24 hour   Intake 536 ml   Output --   Net 536 ml          I had a face to face encounter and independently examined this patient on 1/30/2023, as outlined below:  PHYSICAL EXAM:  General: Awake, alert, following commands  EENT: Anicteric sclerae. Resp:  CTA bilaterally, no wheezing or rales. No accessory muscle use  CV:  Regular  rhythm,  No edema  GI:  Soft, Non distended, Non tender. +Bowel sounds  Neurologic:  EOMs intact. No facial asymmetry. No aphasia or slurred speech. Symmetrical strength, Sensation grossly intact. Alert and oriented X 0. Psych:   Poor insight. Not anxious nor agitated  Skin:  No rashes. No jaundice    Reviewed most current lab test results and cultures  YES  Reviewed most current radiology test results   YES  Review and summation of old records today    NO  Reviewed patient's current orders and MAR    YES  PMH/SH reviewed - no change compared to H&P  ________________________________________________________________________  Care Plan discussed with:    Comments   Patient x    Family  x Son at bedside yesterday   RN x    Care Manager x    Consultant                       x Multidiciplinary team rounds were held today with , nursing, pharmacist and clinical coordinator. Patient's plan of care was discussed; medications were reviewed and discharge planning was addressed. ________________________________________________________________________  Total time 26 mins      Comments   >50% of visit spent in counseling and coordination of care x    ________________________________________________________________________  Radha Hodges MD     Procedures: see electronic medical records for all procedures/Xrays and details which were not copied into this note but were reviewed prior to creation of Plan. LABS:  I reviewed today's most current labs and imaging studies.   Pertinent labs include:  Recent Labs     01/28/23  0323   WBC 5.9   HGB 8.8*   HCT 28.9*          Recent Labs     01/30/23  0517 01/29/23  0147 01/28/23  1904 01/28/23  0323     --  143 144   K 3.3*  --  3.0* 3.4*   *  --  111* 112*   CO2 26  --  26 24   *  --  146* 143*   BUN 26*  --  23* 25*   CREA 0.92  -- 0. 97 0.93   CA 7.7*  --  7.7* 7.5*   MG  --  1.7  --  1.7   PHOS 3.1 3.4 3.1 3.7   ALB  --   --   --  1.7*   TBILI  --   --   --  1.1*   ALT  --   --   --  10*         Signed: Morgan Faulkner MD

## 2023-01-30 NOTE — PROGRESS NOTES
[facility] [place] at [address] [place] [address] [address] [facility]  W: [phone] F: [phone]      Reason for Visit:   Madelaine Morel is a 67 y.o. male who is seen in consultation at the request of Dr. Lakisha Gaona for evaluation of Hodgkin lymphoma. He is now admitted to [facility] for initiation of systemic chemotherapy. Hematology / Oncology Treatment History:     Hematological/Oncological Diagnosis: Classic Hodgkin lymphoma    Date of Diagnosis: 2021    Treatment course: Oncology Flowsheet 1/19/2023 1/19/2023   Day, Cycle Day 1, Cycle 1     bleomycin (BLEOCIN) IV 1 Units/m2 = 1.7 Units 5 Units/m2 = 8.6 Units   dacarbazine (DTIC) .5 mg/m2 = 452 mg     DOXOrubicin (ADRIAMYCIN) IV 6.25 mg/m2 = 10.8 mg     vinBLAStine (VELBAN) IV 3 mg/m2 = 5.16 mg       Supportive Care Flowsheet 8/26/2022 9/3/2022 9/12/2022 9/16/2022   Day, Cycle Day 8, Cycle 1 Day 15, Cycle 1 Day 22, Cycle 1 Day 29, Cycle 1   iron sucrose (VENOFER)  mg 200 mg 200 mg 200 mg           History of Present Illness:     66year old with hx of hypertension, seasonal allergies, presents with worsening normocytic aneima of unclear etiology. He was diagnosed late December 2022 with Classic Hodgkin Lymphoma. Inpatient pllan is to start systemic therapy while for close monitoring given TLS with elevated uric acid and high risk for further deterioration after systemic therapy. While admitted we plan to obtain bone marrow biopsy, MRI of the brain, CT of the chest abdomen and pelvis, dopplers of the lower extremities, place port for systemic therapy and obtain PFTs. Interval History:     1/18/2023 : Patient seen at bedside with wife. Discussed doppler results were negative for DVT. Plan for port placement and bone marrow biopsy today. Hopefully will start chemotherapy in AM.     1/20/2023 Patient seen at bedside today with wife. Encouraged PO intake other than chicken broth. Currently receiving chemo. 1/23/2023: Patient seen at bedside today with his wife. He was unable to participate in conversation, very lethargic. Wife was very tearful this morning as she gave specific details on his condition over the weekend. Patient was diagnosed with aspiration pneumonia and became very confused and agitated at times. She is very worried that he has not been eating and has been very lethargic. We discussed small improvements as he is now beginning to become more alert and oriented. Hopeful today that he will be able to eat and has a pending speech consult. 1/24/2023: Patient seen at bedside this morning, lethargic and currently wearing mitts. Per nursing he became very agitated overnight and pulled the [place] needle out of his port. He has been given his low-dose Seroquel and still remained agitated, was given a very small dose of IV Ativan. Patient is now resting comfortably. There is no family at bedside. Called to bedside around 1500 for concerns in change in patient condition. Suspected re-aspiration. CXR ordered and stat lasix given. Transfer to PCU. Long discussion regarding goals of care give patient continued decline. Discussed concern for very guarded short term prognosis. 1/25/2023 : Patient lethargic and responsive to sternal rub. Long discussion with wife regarding patient condition that continues to decline. Wife very concerned that about nutrition as patient has not been eating and is not able to tolerate PO. Extensive discussion regarding increased risk of infection with TPN. Wife continues to request IV nutrition although she acknowledges the risks. 1/26/2023: Patient seen at bedside today with his son and wife present. Patient had returned from modified barium swallow. Palliative also just met with patient and family. Patient appears much more alert today although remains confused.   Successful modified barium swallow, speech therapy recommending puréed diet with thin liquids. 2023 : Patient seen at bedside, no family present. More alert today but remains confused. Attempted to assist him with breakfast, he was not much interested. 2023: Patient seen at bedside with RN, no family present. Per chart review patient has been hallucinating over the weekend and taking in little PO. He is very alert today and remains confused. Review of Systems: A complete review of systems was obtained, negative except as described above.     Past Medical History:   Diagnosis Date    Allergic rhinitis, cause unspecified 2013    Arthritis     knees    Asthma     as a child    Eczema     on lower back waistline on the back    Environmental allergies     GERD (gastroesophageal reflux disease)     occastionally but resolved since 60 pound weight loss    Hodgkin's lymphoma ([address]     Hypertension     Psychiatric disorder     anxiety and depression      Past Surgical History:   Procedure Laterality Date    HX TONSILLECTOMY      HX WISDOM TEETH EXTRACTION        Social History     Tobacco Use    Smoking status: Former     Packs/day: 1.00     Years: 2.00     Pack years: 2.00     Types: Cigarettes     Quit date: 1966     Years since quittin.1    Smokeless tobacco: Never   Substance Use Topics    Alcohol use: No      Family History   Problem Relation Age of Onset    Hypertension Mother     Hypertension Father     Heart Disease Father     Alcohol abuse Father     Hypertension Brother     No Known Problems Brother      Current Facility-Administered Medications   Medication Dose Route Frequency    potassium chloride SR (KLOR-CON 10) tablet 20 mEq  20 mEq Oral NOW    cefepime (MAXIPIME) 2,000 mg in 0.9% sodium chloride (MBP/ADV) 100 mL MBP  2,000 mg IntraVENous Q12H    TPN ADULT - PERIPHERAL   IntraVENous CONTINUOUS    balsam peru-castor oiL (VENELEX) ointment   Topical BID    ondansetron (ZOFRAN) injection 4 mg  4 mg IntraVENous BID    dextrose 5% infusion  75 mL/hr IntraVENous CONTINUOUS    sodium chloride (NS) flush 5-40 mL  5-40 mL IntraVENous Q8H    sodium chloride (NS) flush 5-40 mL  5-40 mL IntraVENous PRN    LORazepam (ATIVAN) injection 0.5 mg  0.5 mg IntraVENous Q4H PRN    fluconazole (DIFLUCAN) 200mg/100 mL IVPB (premix)  200 mg IntraVENous DAILY    heparin (porcine) injection 5,000 Units  5,000 Units SubCUTAneous Q8H    HYDROmorphone (DILAUDID) injection 0.5-1 mg  0.5-1 mg IntraVENous Q4H PRN    naloxone (NARCAN) injection 1 mg  1 mg IntraVENous Q5MIN PRN    pantoprazole (PROTONIX) 40 mg in 0.9% sodium chloride 10 mL injection  40 mg IntraVENous DAILY    lidocaine 4 % patch 1 Patch  1 Patch TransDERmal Q24H    metroNIDAZOLE (FLAGYL) IVPB premix 500 mg  500 mg IntraVENous Q12H    melatonin tablet 3 mg  3 mg Oral QHS PRN    prochlorperazine (COMPAZINE) injection 10 mg  10 mg IntraVENous Q6H PRN    glucose chewable tablet 16 g  4 Tablet Oral PRN    glucagon (GLUCAGEN) injection 1 mg  1 mg IntraMUSCular PRN    dextrose 10 % infusion 0-250 mL  0-250 mL IntraVENous PRN    alum-mag hydroxide-simeth (MYLANTA) oral suspension 30 mL  30 mL Oral Q4H PRN    alcohol 62% (NOZIN) nasal  1 Ampule  1 Ampule Topical Q12H    ondansetron (ZOFRAN) injection 4 mg  4 mg IntraVENous Q4H PRN    acetaminophen (TYLENOL) tablet 650 mg  650 mg Oral Q6H PRN    polyethylene glycol (MIRALAX) packet 17 g  17 g Oral QHS PRN    allopurinoL (ZYLOPRIM) tablet 100 mg  100 mg Oral BID      Allergies   Allergen Reactions    Codeine Other (comments)     Pt states unknown reaction. Pcn [Penicillins] Rash     Received Ancef 1/18/2023 without issue.             Physical Exam:   Visit Vitals  BP (!) 149/75   Pulse 87   Temp 98.4 °F (36.9 °C)   Resp 19   Ht 5' 5\" (1.651 m)   Wt 152 lb 12.5 oz (69.3 kg)   SpO2 98%   BMI 25.42 kg/m²     ECOG PS: 0  General: Alert, confused, ill and frail appearing, severely cachectic   Mental  status: Alert and confused, no insight    HENT: NCAT Neck: no visualized mass   Resp: no respiratory distress   Neuro: no gross deficits   Skin: no discoloration or lesions of concern on visible areas   Psychiatric: Confused            Results:     Lab Results   Component Value Date/Time    WBC 5.9 01/28/2023 03:23 AM    HGB 8.8 (L) 01/28/2023 03:23 AM    HCT 28.9 (L) 01/28/2023 03:23 AM    PLATELET 725 26/24/7677 03:23 AM    MCV 96.3 01/28/2023 03:23 AM    ABS. NEUTROPHILS 3.7 01/28/2023 03:23 AM     Lab Results   Component Value Date/Time    Sodium 142 01/30/2023 05:17 AM    Potassium 3.3 (L) 01/30/2023 05:17 AM    Chloride 110 (H) 01/30/2023 05:17 AM    CO2 26 01/30/2023 05:17 AM    Glucose 151 (H) 01/30/2023 05:17 AM    BUN 26 (H) 01/30/2023 05:17 AM    Creatinine 0.92 01/30/2023 05:17 AM    GFR est AA >60 07/26/2022 01:45 PM    GFR est non-AA >60 07/26/2022 01:45 PM    Calcium 7.7 (L) 01/30/2023 05:17 AM    Sodium,  10/07/2022 10:16 AM    Potassium, POC 3.9 10/07/2022 10:16 AM    Chloride,  10/07/2022 10:16 AM    Glucose (POC) 169 (H) 01/29/2023 04:59 PM    Creatinine, POC 1.1 10/07/2022 10:16 AM    Calcium, ionized (POC) 1.22 10/07/2022 10:16 AM     Lab Results   Component Value Date/Time    Bilirubin, total 1.1 (H) 01/28/2023 03:23 AM    ALT (SGPT) 10 (L) 01/28/2023 03:23 AM    Alk. phosphatase 209 (H) 01/28/2023 03:23 AM    Protein, total 5.1 (L) 01/28/2023 03:23 AM    Albumin 1.7 (L) 01/28/2023 03:23 AM    Globulin 3.4 01/28/2023 03:23 AM     CT Results (most recent):  Results from Hospital Encounter encounter on 01/17/23    CT HEAD WO CONT    Narrative  Indication:  worsening AMS    Comparison: CT 1/22/2023    Findings: 5 mm axial images were obtained from the skull base through the  vertex. CT dose reduction was achieved through the use of a standardized protocol  tailored for this examination and automatic exposure control for dose  modulation. The ventricles and cortical sulci are prominent, compatible with age related  volume loss. There is no evidence of intracranial hemorrhage, mass, mass effect,  or acute infarct. There is periventricular white matter disease. No extra-axial  fluid collections are seen. The visualized paranasal sinuses and mastoid air  cells are clear. The orbital structures are unremarkable. No osseous  abnormalities are seen. Impression  1. No evidence of acute infarct or intracranial hemorrhage. No significant  change. 2. Mild periventricular white matter disease is likely secondary to chronic  small vessel ischemic changes. No imaging of spleen      Pathology:           ==========================================================================   * * *FINAL PATHOLOGIC DIAGNOSIS* * *     <<<<<       Lymph node, left supraclavicular lymph node, excision:        Classic Hodgkin lymphoma. See comment.     >>>>>      * * *Comment* * *     <<<<<  The histologic section has an enlarged lymph node with a thickened capsule   and effacement of normal yady architecture by a vaguely nodular   proliferation with few sclerotic bands. Numerous Hodgkin/Levi-Esteban   cells are present within a background of small lymphocytes and   eosinophils. The Hodgkin Levi-Esteban cells are positive for CD30, CD15   and PAX5 (subset, dim) and are negative for CD45, ALK, CD20, EMA,   Granzyme, MUM1, CD43 and CD3. In situ hybridization for Tania-Barr viral   RNA is negative. The small lymphocytes are comprised of CD3 and CD5   positive T cells with few scattered B cells identified. Assessment and Recommendations:     Classic Hodgkin lymphoma, advanced  -At present, the patient has clinical symptoms of diarrhea, abdominal discomfort, discoloration of his lower extremities, findings may be related to widely distributed lymphadenopathy. Splenomegaly may be causing liver dysfunction.     -Discussed the risks and benefits of ABVD chemotherapy in the OP setting, please see clinic note for further detail.       -CT chest/abd/pelvis IMPRESSION  1. Mediastinal, hilar, upper abdominal, retroperitoneal, and iliac chain  adenopathy with multiple splenic lesions most consistent with lymphoma. Metastatic disease or other inflammatory/infectious process is less likely. Retroperitoneal nodes are amenable to percutaneous biopsy. 2.  Indeterminate, possibly benign segment 6 hepatic hypodensity. Considerfurther evaluation with MRI. -MRI Brain   IMPRESSION  1. Evaluation is significantly limited by patient positioning. No evidence of intracranial metastases. No acute intracranial abnormality. 2. Generalized parenchymal volume loss and mild chronic microvascular ischemic disease. Small chronic infarcts as above. -Appreciate General Surgery input - port placed prior to chemo start   - PFTs completed on 11/18  -S/p Bone Marrow biopsy 1/20   -S/p  chemotherapy with C#1 on 1/20 of ABVD (Doxorubicin 25mg/m2, Bleomycin 10 units/m2, Vinblastine 6mg/m2, Dacarbazine 375mg/m2 on days 1 and 15 every 28 days), with plans for 6 cycles (further cycles to be OP). -Further systemic therapy on hold pending goals of care discussion and overall improvement of delirium. Lymphoma is responding well to treatment as LFTs are normalizing. Unfortunately, treatment is not complicated by ongoing delirium. This would need to improve before resuming therapy.       Hyperbilirubinemia - resolved   -Etiology of his hyperbilirubinemia may be a consequence of impaired liver function as a consequence of lymphoma and splenomegaly.   -Trend CMP     Elevated alkaline phosphatase - improving   -The patient likely has disease involvement of the bone  -Last Alk phos 209 (prior to treatment 600+)     Tumor Lysis Syndrome - resolved   -Component of TLS given extensive disease burden  -Received Rasburicase prior to chemo      Mild Hypercalcemia  - resolved   -Related to disease burden  -Corrected Ca 8.6  -Monitor for now      Acute Kidney Injury - resolved   -Related to TLS  -S/p dose of Rasburicase 1/17  -Follow renal function     Anasarca  Severe Protein Malnutrition   Bilateral LE Swelling bilateral lower extremity Dopplers ruled out DVT  -Likely r/t to malignancy and malnutrition   -Prealbumin 3.2 - encouraged increased PO. Patient has only been eating chicken broth for several weeks per wife. -Appreciate Dietician input   -Patient has had very poor intake over the last several days with delirium. -TPN started by hospitalist, continued although able to take some PO    Generalized Weakness  Failure to Thrive   -R/t malignancy and severe malnutrition   -PT/OT consults -to be decided either home with 24/7 care versus SNF at discharge    Aspiration PNA   Hospitalized Delirium  -Antibiotics managed by primary team   -CT head completed, no acute findings  -Appreciate speech therapy input, able to complete modified barium swallow today with recommendations for puréed diet and thin liquids   > TPN ordered, hopefully as patient is now more alert his mentation will continue to improve to allow for more p.o. intake  > Appreciate palliative care input - continuing goals of care discussion with family       Signed By: Marshal Allen NP      I have personally seen and evaluated the patient in conjunction with Jane Moreland NP. I find the patient's history and physical exam are consistent with the NP's documentation. I agree with the above assessment and plan, which I have modified as needed. Patient seen and evaluated at bedside, seen to be pleasantly confused. He appears to have waxing and waning delirium. On evaluation he is able to articulate his concerns however he is still confused at times. For example, the patient picked up the remote control and trying to call his wife. Labs reviewed, appear to be improving. Today I had a long conversation with the patient's wife over the phone, we discussed treatment options that include Adcetris monotherapy every 3 weeks.   The patient and his family are concerned about toxicity profile from ABVD and do not wish to continue multiagent chemotherapy. Plan for management of hospital-acquired delirium. Once stable, he should be discharged to home or rehab. Focus of care at this point should be on optimal rehabilitation and transition to rehab or home PT. appreciate neurology, no evidence of seizure activity on EEG. Findings more consistent with metabolic encephalopathy.     Oncology will continue to follow, please call with questions or concerns      Cheyv Blanco MD    Attending Medical Oncologist   [facility]

## 2023-01-30 NOTE — PROGRESS NOTES
Music Therapy Assessment  [facility]    Domitila Lucas [id number]     1950  67 y.o.  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    Hx of Bell's palsy 02/19/2017    Essential hypertension 02/19/2017    Allergic rhinitis, cause unspecified 12/11/2013        Date: 1/30/2023            Total Time (in minutes): 40          MRM 2 CARDIOPULMONARY CARE    Mental Status:   [x] Alert [  ] Forgetful [x]  Confused  [  ] Minimally responsive  [  ] Sleeping    Communication Status: [  ] Impaired Speech [  ] Nonverbal -N/A    Physical Status:   [x] Oxygen in use  [x] Hard of Hearing [  ] Vision Impaired  [  ] Ambulatory  [  ] Ambulatory with assistance [  ] Non-ambulatory     Music Preferences, Background: Pt named the Beatbc and Valerialaurita Yeungard, [address] and José Verma - as music he enjoys. 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 Clinical Problem addressed: Emotional and social support for pt/family. Goal(s) met in session:  Physical/Pain management (Scale of 1-10):    Pre-session rating: Pt denied pain. Post-session rating: Pt didn't report on pain.   [  ] Increased relaxation   [  ] Affected breathing patterns  [  ] Decreased muscle tension   [  ] Decreased agitation  [  ] Affected heart rate    [  ] Increased alertness     Emotional/Psychological:  [x] Increased self-expression   [  ] Decreased aggressive behavior   [  ] Decreased feelings of stress  [  ] Discussed healthy coping skills     [  ] Improved mood    [  ] Decreased withdrawn behavior     Social:  [  ] Decreased feelings of isolation/loneliness [x] Positive social interaction   [x] Provided support and/or comfort for family/friends    Spiritual:  [  ] Spiritual support    [  ] Expressed peace  [  ] Expressed ronaldo    [  ] Discussed beliefs    Techniques Utilized (Check all that apply):   [  ] Procedural support MT [x] Music for relaxation [x] Patient preferred music  [  ] Evelin analysis  [  ] Song choice  [x] Music for validation  [  ] Entrainment  [  ] Movement to music [  ] Guided visualization  [  ] Janneth Marks  [  ] Patient instrument playing [  ] Noemíwayadira Sallie writing  [  ] Khushboo Pierce along   [  ] Silvino Richter  [  ] Sensory stimulation  [x] Active Listening  [  ] Music for spiritual support [  ] Making of CDs as gifts    Session Observations:  Referral from Palliative Dr. Saran Camacho and [address] [facility], Palliative . Patient (pt) was alert lying in bed. His brother Dylan Christy and his sister-in-law (Rodriguez's spouse) were at bedside. The pt appeared to have difficulty hearing this music therapist (MT) until the MT stood close to the pt's ear and spoke loudly. The pt's brother confirmed the pt is hard of hearing and also that he has some confusion. MT observed the pt's periods of confusion throughout the session, while considering that his difficulty hearing could be playing into this. The pt's brother redirected the pt to the music or the conversation at hand at times throughout the session. The MT asked the pt how he was feeling, and then about his music preferences and music background. Pt shared about these, and his affect brightened as he spoke about how he used to play rhythm guitar. MT provided active listening as the pt shared and reminisced about playing and recording music with his brother.  MT sang and played with guitar the Beatles song Ob-Juanita, Hugh-La-Da after the pt said he often played Beatles songs on his guitar. The pt spoke to the MT while the MT was providing music during the first, and each of the songs that followed in the session. MT sang and played as loudly as possible to help the pt hear the music. MT was unsure if the pt's speaking during the music was a result of his hard of hearing condition or of his confusion. MT responded by continuing to play the chords progressions of the song while pausing singing to actively listen and respond to what the pt was saying. Eventually, MT finished each song. The pt listened best to the [employer] that the MT sang and played with guitar. His affect brightened as he listened and he expressed enjoyment in the music. MT concluded the session singing and playing with guitar the Ariana Rondon (after pt mentioned this band) song Zackn' on a Jet Plane. Pt and his family members expressed much gratitude for the session.     Akbar Wong MT-BC (Music Therapist-Board Certified)  Spiritual Care Department  Referral-based service

## 2023-01-31 ENCOUNTER — DOCUMENTATION ONLY (OUTPATIENT)
Dept: ONCOLOGY | Age: 73
End: 2023-01-31

## 2023-01-31 LAB
ANION GAP SERPL CALC-SCNC: 7 MMOL/L (ref 5–15)
BUN SERPL-MCNC: 25 MG/DL (ref 6–20)
BUN/CREAT SERPL: 30 (ref 12–20)
CALCIUM SERPL-MCNC: 7.5 MG/DL (ref 8.5–10.1)
CHLORIDE SERPL-SCNC: 107 MMOL/L (ref 97–108)
CO2 SERPL-SCNC: 27 MMOL/L (ref 21–32)
CREAT SERPL-MCNC: 0.83 MG/DL (ref 0.7–1.3)
GLUCOSE BLD STRIP.AUTO-MCNC: 117 MG/DL (ref 65–117)
GLUCOSE BLD STRIP.AUTO-MCNC: 145 MG/DL (ref 65–117)
GLUCOSE SERPL-MCNC: 140 MG/DL (ref 65–100)
PHOSPHATE SERPL-MCNC: 2.6 MG/DL (ref 2.6–4.7)
POTASSIUM SERPL-SCNC: 3.4 MMOL/L (ref 3.5–5.1)
SERVICE CMNT-IMP: ABNORMAL
SERVICE CMNT-IMP: NORMAL
SODIUM SERPL-SCNC: 141 MMOL/L (ref 136–145)

## 2023-01-31 PROCEDURE — 84100 ASSAY OF PHOSPHORUS: CPT

## 2023-01-31 PROCEDURE — 74011250636 HC RX REV CODE- 250/636: Performed by: INTERNAL MEDICINE

## 2023-01-31 PROCEDURE — 74011250637 HC RX REV CODE- 250/637: Performed by: SURGERY

## 2023-01-31 PROCEDURE — 36415 COLL VENOUS BLD VENIPUNCTURE: CPT

## 2023-01-31 PROCEDURE — 74011000250 HC RX REV CODE- 250: Performed by: INTERNAL MEDICINE

## 2023-01-31 PROCEDURE — 82962 GLUCOSE BLOOD TEST: CPT

## 2023-01-31 PROCEDURE — 74011000250 HC RX REV CODE- 250: Performed by: STUDENT IN AN ORGANIZED HEALTH CARE EDUCATION/TRAINING PROGRAM

## 2023-01-31 PROCEDURE — 99232 SBSQ HOSP IP/OBS MODERATE 35: CPT | Performed by: PSYCHIATRY & NEUROLOGY

## 2023-01-31 PROCEDURE — 65660000001 HC RM ICU INTERMED STEPDOWN

## 2023-01-31 PROCEDURE — 97116 GAIT TRAINING THERAPY: CPT

## 2023-01-31 PROCEDURE — 74011250637 HC RX REV CODE- 250/637: Performed by: INTERNAL MEDICINE

## 2023-01-31 PROCEDURE — 97530 THERAPEUTIC ACTIVITIES: CPT

## 2023-01-31 PROCEDURE — 80048 BASIC METABOLIC PNL TOTAL CA: CPT

## 2023-01-31 PROCEDURE — 92526 ORAL FUNCTION THERAPY: CPT

## 2023-01-31 PROCEDURE — 74011000258 HC RX REV CODE- 258: Performed by: INTERNAL MEDICINE

## 2023-01-31 PROCEDURE — 74011250636 HC RX REV CODE- 250/636

## 2023-01-31 PROCEDURE — 74011250637 HC RX REV CODE- 250/637: Performed by: NURSE PRACTITIONER

## 2023-01-31 RX ORDER — CIPROFLOXACIN HYDROCHLORIDE 3.5 MG/ML
1 SOLUTION/ DROPS TOPICAL
Status: DISCONTINUED | OUTPATIENT
Start: 2023-01-31 | End: 2023-02-07 | Stop reason: HOSPADM

## 2023-01-31 RX ADMIN — ALLOPURINOL 100 MG: 100 TABLET ORAL at 08:24

## 2023-01-31 RX ADMIN — CIPROFLOXACIN 1 DROP: 3 SOLUTION OPHTHALMIC at 18:27

## 2023-01-31 RX ADMIN — HEPARIN SODIUM 5000 UNITS: 5000 INJECTION INTRAVENOUS; SUBCUTANEOUS at 03:32

## 2023-01-31 RX ADMIN — SODIUM CHLORIDE, PRESERVATIVE FREE 10 ML: 5 INJECTION INTRAVENOUS at 21:16

## 2023-01-31 RX ADMIN — ALLOPURINOL 100 MG: 100 TABLET ORAL at 18:05

## 2023-01-31 RX ADMIN — OLANZAPINE 5 MG: 5 TABLET, ORALLY DISINTEGRATING ORAL at 22:32

## 2023-01-31 RX ADMIN — CASTOR OIL AND BALSAM, PERU: 788; 87 OINTMENT TOPICAL at 08:39

## 2023-01-31 RX ADMIN — Medication 1 AMPULE: at 09:06

## 2023-01-31 RX ADMIN — SODIUM CHLORIDE, PRESERVATIVE FREE 10 ML: 5 INJECTION INTRAVENOUS at 06:01

## 2023-01-31 RX ADMIN — SODIUM CHLORIDE, PRESERVATIVE FREE 10 ML: 5 INJECTION INTRAVENOUS at 18:29

## 2023-01-31 RX ADMIN — CIPROFLOXACIN 1 DROP: 3 SOLUTION OPHTHALMIC at 23:28

## 2023-01-31 RX ADMIN — CASTOR OIL AND BALSAM, PERU: 788; 87 OINTMENT TOPICAL at 21:16

## 2023-01-31 RX ADMIN — Medication 1 AMPULE: at 21:16

## 2023-01-31 RX ADMIN — CIPROFLOXACIN 1 DROP: 3 SOLUTION OPHTHALMIC at 21:15

## 2023-01-31 RX ADMIN — HEPARIN SODIUM 5000 UNITS: 5000 INJECTION INTRAVENOUS; SUBCUTANEOUS at 21:15

## 2023-01-31 RX ADMIN — MAGNESIUM SULFATE HEPTAHYDRATE: 500 INJECTION, SOLUTION INTRAMUSCULAR; INTRAVENOUS at 18:31

## 2023-01-31 NOTE — PROGRESS NOTES
Hospitalist Progress Note    NAME: Paradise Crisostomo   :  1950   MRN:  [id number]       Assessment / Plan:  Advanced Hodgkin lymphoma POA  - With splenomegaly lymphadenopathy  - Status postchemotherapy   - Hematology oncology team are following, input is appreciated  Poor prognosis per oncology discussed - pt and family aware, Palliative following  IP PT/OT eval noted- SNF recommended if family interested for reconditioning- Palliative to followup with family  noted-- wanted neurology eval for encephalopathy before considering transition to Hospice  Cont TPN for now  Replenish lytes as needed    R eye Conjunctivitis  Abx eye drops ordered today    Tumor lysis syndrome POA  -Continue following uric acid level- 3.6 last  (stable), no further check needed  - Hematology oncology recommending allopurinol  - Follow CMP very closely  - NILTON, hypercalcemia as evidence of tumor lysis syndrome    Hyperbilirubinemia  Alkaline phosphatase elevated  - Likely secondary to lymphoma/splenomegaly    Pseudo hypocalcemia  - Calcium 7.9, albumin 1.8, corrected calcium on the higher end    Aspiration pneumonia POA  Acute hypoxic respiratory failure POA- resolved, now on RA  - Continue cefepime and metronidazole  - Aspiration precautions  MBS noted - cleared for Pureed diet and thin liquids- started PO -- but pt not eating much consistently  Cont TPN for now till palliative follows up after neuro recc in    Acute metabolic encephalopathy POA- more stable in past 24-48 hrs it seems  - Likely due to worsening metabolic status in the setting of a tumor lysis syndrome  - Follow mentation very closely  -Very awake again today and following all commands but not oriented  - MBS normal, speech therapy recommending puréed food,  IP Neurology consulted per palliative recommendations ()- EEG done, pt encephalopathic but no seizures noted    GERD  - Continue home meds    Severe protein calorie malnutrition POA  - Likely secondary to advanced lymphoma and decreased p.o. intake  - Nutrition consult input is appreciated  Started on TPN -- renew per Oncology recc, PO started 1/26,if continues safely can taper off TPN if PO intake improved-- will watch over the weekend to see progress/decline as per wife/family, check lytes Q 12 and replenish as needed per RD recc to prevent refeeding syndrome    Cont TPN for now till palliative follow up again after neuro workup completed      25.0 - 29.9 Overweight / Body mass index is 24.54 kg/m². Estimated discharge date: 2/3? Barriers: Clinical improvement versus Hospice considertion, TPN weaning if PO improves, SNF? Versus Hospice TBD    Code status: DNR  Prophylaxis: Hep SQ  Recommended Disposition:  TBD     Subjective:     Patient was seen and examined. No acute events overnight. Discussed with RN overnight events. All patient's questions were answered. \"I am fine\"    Patient's wife and son at bedside, all questions were answered. Review of Systems:  Symptom Y/N Comments  Symptom Y/N Comments   Fever/Chills    Chest Pain     Poor Appetite    Edema     Cough    Abdominal Pain     Sputum    Joint Pain     SOB/SALMERON    Pruritis/Rash     Nausea/vomit    Tolerating PT/OT     Diarrhea    Tolerating Diet     Constipation    Other       Could NOT obtain due to: Alert and more oriented but still confused         Objective:     VITALS:   Last 24hrs VS reviewed since prior progress note.  Most recent are:  Patient Vitals for the past 24 hrs:   Temp Pulse Resp BP SpO2   01/31/23 1100 98.5 °F (36.9 °C) 65 -- (!) 159/61 100 %   01/31/23 0715 98.3 °F (36.8 °C) (!) 58 16 (!) 166/71 97 %   01/31/23 0600 -- 74 -- (!) 160/64 100 %   01/31/23 0500 -- 72 -- (!) 166/88 95 %   01/31/23 0400 97.6 °F (36.4 °C) 74 16 (!) 147/82 97 %   01/31/23 0300 -- 81 -- (!) 161/79 93 %   01/31/23 0200 -- 81 -- (!) 161/75 95 %   01/31/23 0100 -- 92 -- (!) 167/74 96 %   01/31/23 0000 98.8 °F (37.1 °C) 84 -- (!) 166/68 98 %   01/30/23 2300 -- (!) 59 -- (!) 150/71 95 %   01/30/23 2200 -- 85 -- (!) 162/91 --   01/30/23 2100 -- 95 -- (!) 159/72 99 %   01/30/23 2016 98.8 °F (37.1 °C) 87 16 (!) 157/75 100 %   01/30/23 1431 98 °F (36.7 °C) 92 18 (!) 160/82 98 %   01/30/23 1315 98 °F (36.7 °C) 88 20 (!) 157/82 97 %         Intake/Output Summary (Last 24 hours) at 1/31/2023 1300  Last data filed at 1/31/2023 0400  Gross per 24 hour   Intake 1483.3 ml   Output --   Net 1483.3 ml          I had a face to face encounter and independently examined this patient on 1/31/2023, as outlined below:  PHYSICAL EXAM:  General: Awake, alert, following commands  EENT: Anicteric sclerae. Resp:  CTA bilaterally, no wheezing or rales. No accessory muscle use  CV:  Regular  rhythm,  No edema  GI:  Soft, Non distended, Non tender. +Bowel sounds  Neurologic:  EOMs intact. No facial asymmetry. No aphasia or slurred speech. Symmetrical strength, Sensation grossly intact. Alert and oriented X 0. Psych:   Poor insight. Not anxious nor agitated  Skin:  No rashes. No jaundice    Reviewed most current lab test results and cultures  YES  Reviewed most current radiology test results   YES  Review and summation of old records today    NO  Reviewed patient's current orders and MAR    YES  PMH/ reviewed - no change compared to H&P  ________________________________________________________________________  Care Plan discussed with:    Comments   Patient x    Family  x Wife at bedside   RN x    Care Manager x    Consultant  x Palliative MD yesterday                    x Multidiciplinary team rounds were held today with , nursing, pharmacist and clinical coordinator. Patient's plan of care was discussed; medications were reviewed and discharge planning was addressed.      ________________________________________________________________________  Total time 26 mins      Comments   >50% of visit spent in counseling and coordination of care x ________________________________________________________________________  Morgan Faulkner MD     Procedures: see electronic medical records for all procedures/Xrays and details which were not copied into this note but were reviewed prior to creation of Plan. LABS:  I reviewed today's most current labs and imaging studies. Pertinent labs include:  No results for input(s): WBC, HGB, HCT, PLT, HGBEXT, HCTEXT, PLTEXT, HGBEXT, HCTEXT, PLTEXT in the last 72 hours.     Recent Labs     01/31/23  0322 01/30/23  0517 01/29/23  0147 01/28/23  1904    142  --  143   K 3.4* 3.3*  --  3.0*    110*  --  111*   CO2 27 26  --  26   * 151*  --  146*   BUN 25* 26*  --  23*   CREA 0.83 0.92  --  0.97   CA 7.5* 7.7*  --  7.7*   MG  --   --  1.7  --    PHOS 2.6 3.1 3.4 3.1         Signed: Morgan Faulkner MD

## 2023-01-31 NOTE — PROGRESS NOTES
Asked by Dr. Brendon Herring to call Ms. Lakeisha Cuevas for [employer]. Spoke with her on the phone but she would rather us speak in person with others present about Adcetris and the potential side effects. Will plan to meet her next time she is at the bedside with . [address] and bring over education packets. Will call the  [phone] or let the inpatient NP James Spivey know that she is onsite and I can come over and give education. Will be here 830 - 4 Wed. Thurs. And Friday.      For Pharmacy Admin Tracking Only    Program: Medical Group  CPA in place: Yes  Recommendation Provided To: Patient/Caregiver: 1 via Telephone  Intervention Detail: Device Training  Intervention Accepted By: Patient/Caregiver: 1  Time Spent (min): 10

## 2023-01-31 NOTE — PROGRESS NOTES
Transition of Care Plan:    RUR: 11%  Disposition: pending medical progress and palliative following for GOC -   If SNF or IPR: Date FOC offered:  Date FOC received:  Date authorization started with reference number:  Date authorization received and expires:  Accepting facility:  Follow up appointments:PCP/Specialist  DME needed:TBD   Transportation at Discharge:  [address] [place] [address] Avenue or means to access home:        IM Medicare Letter:will need prior to discharge  Is patient a  and connected with the [address]? N/a  If yes, was [employer] transfer form completed and [facility] notified? Caregiver Contact:Lidia Yanezcherelle - spouse - [phone]  Discharge Caregiver contacted prior to discharge? yes  Care Conference needed?:  not at this time     CM noted TPN off - attempting to start diet- but poor op intake- awaiting further input of specialist/attending and palliative regarding disposition plans at this time.  ENE Aranda

## 2023-01-31 NOTE — PROGRESS NOTES
Problem: Mobility Impaired (Adult and Pediatric)  Goal: *Acute Goals and Plan of Care (Insert Text)  Description: FUNCTIONAL STATUS PRIOR TO ADMISSION: Per wife report, pt with recent decline requiring transition to first floor set up and assist with ADLs. Wife states that pt uses [facility] for amb, but would benefit from RW which he owns, but has been reluctant to use. Of note, pt diagnosed with Hodgkin's lymphoma Dec 2022 and is now starting chemo. HOME SUPPORT PRIOR TO ADMISSION: The patient lived with wife who provides assist along with children. Physical Therapy Goals  Reviewed 1/26/2023 and remain appropriate for the next 7 days  Initiated 1/18/2023  1. Patient will move from supine to sit and sit to supine  in bed with independence within 7 day(s). 2.  Patient will transfer from bed to chair and chair to bed with supervision/set-up using the least restrictive device within 7 day(s). 3.  Patient will perform sit to stand with supervision/set-up within 7 day(s). 4.  Patient will ambulate with supervision/set-up for 50 feet with the least restrictive device within 7 day(s). Outcome: Progressing Towards Goal   PHYSICAL THERAPY TREATMENT  Patient: Lucrecia Whitaker (22 y.o. male)  Date: 1/31/2023  Diagnosis: Hodgkin lymphoma ([facility] [C81.90] <principal problem not specified>  Procedure(s) (LRB):  INFUSAPORT INSERTION (N/A) 13 Days Post-Op  Precautions: Bed Alarm, Fall, Aspiration (telesitter;sun downs; chemo; L chest wall port; 3 sm chronic CVAs)  Chart, physical therapy assessment, plan of care and goals were reviewed. ASSESSMENT  Patient continues with skilled PT services and is progressing towards goals, overall progress limited by AMS and confusion. Patient resting in bed upon arrival, agreeable to PT session. VSS during mobility on RA. Patient with tangential conversation though remaining pleasant during session, at times endorsing seeing objects that are not in the room.  Completes supine<>sit CgA-Min A for LE management. Demonstrates good sitting balance. Completes sit<>stand with CGA and max cuing for proper hand placement and positioning. Ambualtion x25ft with RW Min A and max cuing for walker placement. Patient demonstrates fluctuating gait pattern, at times with short shuffling steps and then progressing to step through gait pattern. Generally unsteady during ambulation and easily distractible requiring cuing to stay on task. Returned to supine with Min A for Jean's. Patient would benefit from SNF at discharge for continued functional progress. If family wishes to bring patient home, recommend continued use of RW and he will require 24/7 caregiver supervision. Current Level of Function Impacting Discharge (mobility/balance): Min A supine<>sit, CGA sit<>stand, Min A ambulation x25ft RW    Other factors to consider for discharge: AMS         PLAN :  Patient continues to benefit from skilled intervention to address the above impairments. Continue treatment per established plan of care. to address goals. Recommendation for discharge: (in order for the patient to meet his/her long term goals)  Therapy up to 5 days/week in SNF setting    This discharge recommendation:  Has been made in collaboration with the attending provider and/or case management    IF patient discharges home will need the following DME: rolling walker     SUBJECTIVE:   Patient stated I have been watching that man over there working. ... oh it might just be the tree.     OBJECTIVE DATA SUMMARY:   Critical Behavior:  Neurologic State: Confused, Drowsy  Orientation Level: Oriented to person, Disoriented to place, Disoriented to situation, Disoriented to time  Cognition: Decreased attention/concentration, Decreased command following, Poor safety awareness  Safety/Judgement: Decreased awareness of environment, Decreased awareness of need for assistance, Decreased awareness of need for safety, Lack of insight into deficits  Functional Mobility Training:  Bed Mobility:  Rolling: Contact guard assistance  Supine to Sit: Minimum assistance  Sit to Supine: Minimum assistance (LE's onto bed)  Scooting: Minimum assistance  Transfers:  Sit to Stand: Contact guard assistance  Stand to Sit: Contact guard assistance  Balance:  Sitting: Impaired  Sitting - Static: Good (unsupported)  Sitting - Dynamic: Good (unsupported)  Standing: Impaired  Standing - Static: Good;Constant support  Standing - Dynamic : Fair;Constant support  Ambulation/Gait Training:  Distance (ft): 25 Feet (ft)  Assistive Device: Walker, rolling;Gait belt  Ambulation - Level of Assistance: Minimal assistance  Gait Abnormalities: Decreased step clearance; Path deviations;Trunk sway increased  Speed/Briana: Pace decreased (<100 feet/min)  Step Length: Other (comment) (variable)    Pain Rating:  Denies pain    Activity Tolerance:   Fair, SpO2 stable on RA, and requires rest breaks    After treatment patient left in no apparent distress:   Supine in bed, Call bell within reach, Bed / chair alarm activated, Caregiver / family present, and Side rails x 3    COMMUNICATION/COLLABORATION:   The patients plan of care was discussed with: Registered nurse.      Sylvia Guthrie PT   Time Calculation: 26 mins

## 2023-01-31 NOTE — PROGRESS NOTES
Palliative Medicine SW Note    VIRIDIANAW reviewed chart and met Mrs. Milton Montero as she had just left patient's room. This writer offered support and she shared, at the verge of tears, that she was leaving to go home because her  had asked her to leave \"for my protection,\" and she reported that he has not eaten hardly at all, not even the special root beer she brought. She expressed feeling that \"if he eats, then I can take him home. \"    This writer provided empathetic listening, normalizing and validating her feelings of sadness and anxiety, encouraged her self care. She said she plans to contact their  for support and expressed not understanding what the doctor had told her today when he was in the room. LCSW reminded her that Dr. Abigail Phelan will be back tomorrow and plans to see them to discuss patient's condition and care. She expressed appreciation for support given. This writer sat with patient for over 30 minutes redirecting him and distracting him from trying to get oob and encouraging him to eat some of his lunch. When patient was actively trying to scoot out of the lower part of the bed, this writer hit nurse bell and tech assisted him in getting pulled up toward the hob. He was restless and agitated, expressing that some man was \"trying to get me\" and then saying he needed to get some clothes and [employer] is a store where I can get some clothes? \" LCSW provided calm, caring presence, held his hand for comfort and encouraged him to try to relax and watch tv. Patient said he wanted the orange juice and only took one bite of his 9g protein nutrition cup. He had eaten the chicken broth, but nothing else. LCSW called assigned nurse Leslie Amaya to report on visit and patient's restlessness. Goals/Plans:  Patient will have symptoms managed. Patient will be safe. Palliative team will continue to provide support and education and [address] discussion.   Palliative team will provide education and support as appropriate. Thank you for including Palliative team in the care of Mr. Roseline Martinez.     María Donaldson, [facility]  Palliative Medicine [phone]

## 2023-01-31 NOTE — PROGRESS NOTES
Neurology Note    Patient ID:  Dontrell Garcia  [id number]  65 y.o.  1950      Date of Consultation:  January 31, 2023      Assessment and Plan:    Patient is a pleasant 65-year-old gentleman with recent diagnosis of stage IV Hodgkin's lymphoma with multiple ongoing medical conditions who has developed a waxing and waning mental status since admission. His neurological examination does reveal decreased attention and concentration with no focal sensory or motor deficits. encephalopathy:  Differential includes metabolic encephalopathy, medication induced, hospital based delirium, decreased nutritional status, systemic impact of malignancy. Most like multifactorial is etiology    Recent head CT with no acute abnormalities  Brain MRI obtained earlier during the hospital stay revealed generalized volume loss and microvascular ischemic changes suggesting potential decreased cognitive reserve. The wife did admit that the patient has had some memory concerns for the few months prior to this admission. EEG with no subclinical seizures. There was an abundance of triphasic waves suggestive of metabolic/toxic contributions to his encephalopathy    Continue to correct metabolic derangements. Currently on TPN    Continue nonpharmacological measures for sundowning    Advanced Hodgkin's lymphoma:  After having a lengthy talk with the wife at the bedside today, I do not think she has a complete understanding of the patient's underlying medical condition and will need additional discussions with the primary team and palliative care. There is no other additional neurology recommendations at this time. If questions arise, please do not hesitate to contact me and I will return to see the patient.              Subjective: confusion       History of Present Illness:   Dontrell Garcia is a 67 y.o. male who was admitted to the hospital on January 17, 2023 due to progression of stage IV Hodgkin's lymphoma with tumor lysis syndrome. There was worsening kidney function and elevated uric acid. The patient was admitted for additional imaging and staging. The patient was started on chemotherapy. Hematology/oncology have been following along closely with the patient. It appears the patient has had a waxing and waning mental status over the past few days. It was noted that the patient was having intermittent hallucinations over the weekend and having decreased nutritional intake. Neurology was consulted for evaluation of a waxing and waning mental status. Overnight, there was no reported events. The patient's wife was at the bedside today. She did state that for the few months prior to admission, his mental status had been declining. Examples she game included: He had left the stove on. He had left the front door open. She noticed increasing forgetfulness. He also was having intermittent bouts of agitation at home as well. brain MRI completed on January 17, 2023. There is no evidence of acute abnormality. There is no evidence of intracranial metastasis. There was generalized volume loss with mild microvascular ischemic changes. head CT from January 24, 2023. There is no evidence of an acute stroke. There was mild periventricular white matter ischemic changes.   Past Medical History:   Diagnosis Date    Allergic rhinitis, cause unspecified 12/11/2013    Arthritis     knees    Asthma     as a child    Eczema     on lower back waistline on the back    Environmental allergies     GERD (gastroesophageal reflux disease)     occastionally but resolved since 60 pound weight loss    Hodgkin's lymphoma ([facility] [address] 2023    Hypertension     Psychiatric disorder     anxiety and depression        Past Surgical History:   Procedure Laterality Date    HX TONSILLECTOMY      HX WISDOM TEETH EXTRACTION          Family History   Problem Relation Age of Onset    Hypertension Mother     Hypertension Father     Heart Disease Father Alcohol abuse Father     Hypertension Brother     No Known Problems Brother         Social History     Tobacco Use    Smoking status: Former     Packs/day: 1.00     Years: 2.00     Pack years: 2.00     Types: Cigarettes     Quit date: 1966     Years since quittin.1    Smokeless tobacco: Never   Substance Use Topics    Alcohol use: No        Allergies   Allergen Reactions    Codeine Other (comments)     Pt states unknown reaction. Pcn [Penicillins] Rash     Received Ancef 2023 without issue.           Current Facility-Administered Medications   Medication Dose Route Frequency    TPN ADULT - PERIPHERAL   IntraVENous CONTINUOUS    TPN ADULT - PERIPHERAL   IntraVENous CONTINUOUS    OLANZapine (ZyPREXA zydis) disintegrating tablet 5 mg  5 mg Oral QHS    balsam peru-castor oiL (VENELEX) ointment   Topical BID    ondansetron (ZOFRAN) injection 4 mg  4 mg IntraVENous BID    dextrose 5% infusion  75 mL/hr IntraVENous CONTINUOUS    sodium chloride (NS) flush 5-40 mL  5-40 mL IntraVENous Q8H    sodium chloride (NS) flush 5-40 mL  5-40 mL IntraVENous PRN    LORazepam (ATIVAN) injection 0.5 mg  0.5 mg IntraVENous Q4H PRN    heparin (porcine) injection 5,000 Units  5,000 Units SubCUTAneous Q8H    HYDROmorphone (DILAUDID) injection 0.5-1 mg  0.5-1 mg IntraVENous Q4H PRN    naloxone (NARCAN) injection 1 mg  1 mg IntraVENous Q5MIN PRN    pantoprazole (PROTONIX) 40 mg in 0.9% sodium chloride 10 mL injection  40 mg IntraVENous DAILY    lidocaine 4 % patch 1 Patch  1 Patch TransDERmal Q24H    melatonin tablet 3 mg  3 mg Oral QHS PRN    prochlorperazine (COMPAZINE) injection 10 mg  10 mg IntraVENous Q6H PRN    glucose chewable tablet 16 g  4 Tablet Oral PRN    glucagon (GLUCAGEN) injection 1 mg  1 mg IntraMUSCular PRN    dextrose 10 % infusion 0-250 mL  0-250 mL IntraVENous PRN    alum-mag hydroxide-simeth (MYLANTA) oral suspension 30 mL  30 mL Oral Q4H PRN    alcohol 62% (NOZIN) nasal  1 Ampule  1 Ampule Topical Q12H    ondansetron (ZOFRAN) injection 4 mg  4 mg IntraVENous Q4H PRN    acetaminophen (TYLENOL) tablet 650 mg  650 mg Oral Q6H PRN    polyethylene glycol (MIRALAX) packet 17 g  17 g Oral QHS PRN    allopurinoL (ZYLOPRIM) tablet 100 mg  100 mg Oral BID         Review of Systems:      [x]Unable to obtain  ROS due to  [x]mental status change  []sedated   []intubated    Objective:     Visit Vitals  BP (!) 166/71   Pulse (!) 58   Temp 98.3 °F (36.8 °C)   Resp 16   Ht 5' 5\" (1.651 m)   Wt 147 lb 7.8 oz (66.9 kg)   SpO2 97%   BMI 24.54 kg/m²       Physical Exam:      General:  appears well nourished in no acute distress  Neck: no carotid bruits  Lungs: clear to auscultation  Heart:  no murmurs, regular rate  Lower extremity: peripheral pulses palpable and no edema  Skin: intact. Mild bruising    Neurological exam:    The patient is more somnolent today. He is arousable. He does follow simple one-step commands for me today. He had decreased attention and concentration today. Cranial nerves:   II-XII were tested    Perrrla  Visual fields were full  Eomi, no evidence of nystagmus  Facial sensation:  normal and symmetric  Facial motor: normal and symmetric  Hearing decreased  Tongue: midline without fasciculations    Motor: Tone normal  Pronator drift was absent  No evidence of fasciculations    Strength testing:  He had a difficult time with comprehending manual motor testing. He w/d all four extremities to  pain. There was no focal weakness that was apparent. Sensory:  Upper extremity: intact to pp  Lower extremity: intact to pp,   He was unable to cooperate with more detailed sensory testing. Reflexes:    Right Left  Biceps  2 2  Triceps 2 2  Brachiorad.  2 2  Patella  2 2  Achilles 1 1    Plantar response:  flexor bilaterally    Cerebellar testing:  no tremor apparent    Labs:     Lab Results   Component Value Date/Time    Hemoglobin A1c 4.8 12/12/2022 01:57 PM    Sodium 141 01/31/2023 03:22 AM Potassium 3.4 (L) 01/31/2023 03:22 AM    Chloride 107 01/31/2023 03:22 AM    Glucose 140 (H) 01/31/2023 03:22 AM    BUN 25 (H) 01/31/2023 03:22 AM    Creatinine 0.83 01/31/2023 03:22 AM    Calcium 7.5 (L) 01/31/2023 03:22 AM    WBC 5.9 01/28/2023 03:23 AM    HCT 28.9 (L) 01/28/2023 03:23 AM    HGB 8.8 (L) 01/28/2023 03:23 AM    PLATELET 160 51/46/0435 03:23 AM       Imaging:    Results from Hospital Encounter encounter on 01/17/23    MRI BRAIN W WO CONT    Narrative  EXAM:  MRI BRAIN W WO CONT    INDICATION:    Staging disease. Hodgkin's lymphoma. COMPARISON:  CT head 10/7/2022. CONTRAST: 15 ml ProHance. TECHNIQUE:  Multiplanar multisequence acquisition without and with contrast of the brain. FINDINGS:  Evaluation is significantly limited by patient positioning, with healed tilted  to the right. Generalized parenchymal volume loss with commensurate dilation of the sulci and  ventricular system. Few scattered periventricular and deep white matter T2/FLAIR  hyperintensities, consistent with mild chronic microvascular ischemic disease. Small chronic infarcts in the right basal ganglia, left thalamus, and right  cerebellum. There is no acute infarct, hemorrhage, extra-axial fluid collection,  or mass effect. There is no cerebellar tonsillar herniation. Expected arterial  flow-voids are present. No evidence of abnormal enhancement. The paranasal sinuses, mastoid air cells, and middle ears are clear. The orbital  contents are within normal limits. No significant osseous or scalp lesions are  identified. Impression  1. Evaluation is significantly limited by patient positioning. No evidence of  intracranial metastases. No acute intracranial abnormality. 2. Generalized parenchymal volume loss and mild chronic microvascular ischemic  disease. Small chronic infarcts as above.       Results from [place] encounter on 01/17/23    CT HEAD WO CONT    Narrative  Indication:  worsening AMS    Comparison: CT 1/22/2023    Findings: 5 mm axial images were obtained from the skull base through the  vertex. CT dose reduction was achieved through the use of a standardized protocol  tailored for this examination and automatic exposure control for dose  modulation. The ventricles and cortical sulci are prominent, compatible with age related  volume loss. There is no evidence of intracranial hemorrhage, mass, mass effect,  or acute infarct. There is periventricular white matter disease. No extra-axial  fluid collections are seen. The visualized paranasal sinuses and mastoid air  cells are clear. The orbital structures are unremarkable. No osseous  abnormalities are seen. Impression  1. No evidence of acute infarct or intracranial hemorrhage. No significant  change. 2. Mild periventricular white matter disease is likely secondary to chronic  small vessel ischemic changes. I spent  40   minutes providing care to this  acutely ill inpatient with > 50% of the time counseling and assisting in the coordination of care of the patient on the patient's hospital floor/unit.              Active Problems:    Essential hypertension (2/19/2017)      Iron deficiency anemia (8/8/2022)      Type 2 diabetes mellitus (12/28/2022)      Hodgkin lymphoma ([facility] [address] (1/12/2023)      Severe protein-calorie malnutrition ([facility] [address] (1/18/2023)                 Signed By:  Marcello Camacho DO FAAN    January 31, 2023

## 2023-01-31 NOTE — PROGRESS NOTES
Pharmacy TPN Management Note    TPN indication: Malnutrition    TPN type: Peripheral    Macronutrients:  Protein: 4.25 %  Dextrose: 10 %  Lipids: none    Rate: 42 mL/hr    Labs:  Recent Labs     23  0322 23  0517 23  0147 23  1904    142  --  143   K 3.4* 3.3*  --  3.0*    110*  --  111*   CO2 27 26  --  26   MG  --   --  1.7  --    PHOS 2.6 3.1 3.4 3.1   CA 7.5* 7.7*  --  7.7*   CREA 0.83 0.92  --  0.97   BUN 25* 26*  --  23*     No results for input(s): AST, ALT, AP in the last 72 hours. No lab exists for component: BILIRUBIN    No results for input(s): WBC, HGB, HCT, APTT, INR, HGBEXT, HCTEXT, INREXT, HGBEXT, HCTEXT, INREXT in the last 72 hours. No lab exists for component: TRIGLYCERIDE, PREALBUMIN      Electrolytes: This TPN contains the following electrolytes (total per liter): Na: 35 meq/L; K: 55 meq/L; Phos:20 mmol/L; M meq/L; Ca 4.5 meq/L      Other additives in TPN: none    Impression/Plan:   TPN macronutrient/rate changes: None  TPN electrolyte changes: none  TPN insulin changes: none     Pharmacy will continue to monitor enteral nutrition plan, electrolytes, renal function, and dietician recommendations and adjust parenteral nutrition as needed.     Thank you,  Ida Villanueva, [facility]

## 2023-02-01 ENCOUNTER — HOSPITAL ENCOUNTER (OUTPATIENT)
Dept: INFUSION THERAPY | Age: 73
End: 2023-02-01

## 2023-02-01 LAB
ANION GAP SERPL CALC-SCNC: 6 MMOL/L (ref 5–15)
BUN SERPL-MCNC: 23 MG/DL (ref 6–20)
BUN/CREAT SERPL: 32 (ref 12–20)
CALCIUM SERPL-MCNC: 8 MG/DL (ref 8.5–10.1)
CHLORIDE SERPL-SCNC: 107 MMOL/L (ref 97–108)
CO2 SERPL-SCNC: 27 MMOL/L (ref 21–32)
CREAT SERPL-MCNC: 0.73 MG/DL (ref 0.7–1.3)
GLUCOSE SERPL-MCNC: 138 MG/DL (ref 65–100)
PHOSPHATE SERPL-MCNC: 2.5 MG/DL (ref 2.6–4.7)
POTASSIUM SERPL-SCNC: 3.2 MMOL/L (ref 3.5–5.1)
SODIUM SERPL-SCNC: 140 MMOL/L (ref 136–145)

## 2023-02-01 PROCEDURE — 36415 COLL VENOUS BLD VENIPUNCTURE: CPT

## 2023-02-01 PROCEDURE — 97116 GAIT TRAINING THERAPY: CPT

## 2023-02-01 PROCEDURE — 80048 BASIC METABOLIC PNL TOTAL CA: CPT

## 2023-02-01 PROCEDURE — 97535 SELF CARE MNGMENT TRAINING: CPT

## 2023-02-01 PROCEDURE — 74011000258 HC RX REV CODE- 258: Performed by: INTERNAL MEDICINE

## 2023-02-01 PROCEDURE — 74011000250 HC RX REV CODE- 250

## 2023-02-01 PROCEDURE — 74011250637 HC RX REV CODE- 250/637: Performed by: PHYSICIAN ASSISTANT

## 2023-02-01 PROCEDURE — 74011250637 HC RX REV CODE- 250/637: Performed by: SURGERY

## 2023-02-01 PROCEDURE — 74011250637 HC RX REV CODE- 250/637: Performed by: NURSE PRACTITIONER

## 2023-02-01 PROCEDURE — 97530 THERAPEUTIC ACTIVITIES: CPT

## 2023-02-01 PROCEDURE — 74011000250 HC RX REV CODE- 250: Performed by: STUDENT IN AN ORGANIZED HEALTH CARE EDUCATION/TRAINING PROGRAM

## 2023-02-01 PROCEDURE — C9113 INJ PANTOPRAZOLE SODIUM, VIA: HCPCS

## 2023-02-01 PROCEDURE — 74011250637 HC RX REV CODE- 250/637: Performed by: INTERNAL MEDICINE

## 2023-02-01 PROCEDURE — 65660000001 HC RM ICU INTERMED STEPDOWN

## 2023-02-01 PROCEDURE — 74011250636 HC RX REV CODE- 250/636

## 2023-02-01 PROCEDURE — 74011000250 HC RX REV CODE- 250: Performed by: INTERNAL MEDICINE

## 2023-02-01 PROCEDURE — 84100 ASSAY OF PHOSPHORUS: CPT

## 2023-02-01 PROCEDURE — 74011250636 HC RX REV CODE- 250/636: Performed by: INTERNAL MEDICINE

## 2023-02-01 PROCEDURE — 74011250636 HC RX REV CODE- 250/636: Performed by: NURSE PRACTITIONER

## 2023-02-01 RX ADMIN — HEPARIN SODIUM 5000 UNITS: 5000 INJECTION INTRAVENOUS; SUBCUTANEOUS at 12:15

## 2023-02-01 RX ADMIN — CIPROFLOXACIN 1 DROP: 3 SOLUTION OPHTHALMIC at 14:27

## 2023-02-01 RX ADMIN — ONDANSETRON 4 MG: 2 INJECTION INTRAMUSCULAR; INTRAVENOUS at 09:18

## 2023-02-01 RX ADMIN — MAGNESIUM SULFATE HEPTAHYDRATE: 500 INJECTION, SOLUTION INTRAMUSCULAR; INTRAVENOUS at 18:47

## 2023-02-01 RX ADMIN — POTASSIUM BICARBONATE 40 MEQ: 782 TABLET, EFFERVESCENT ORAL at 06:12

## 2023-02-01 RX ADMIN — OLANZAPINE 5 MG: 5 TABLET, ORALLY DISINTEGRATING ORAL at 22:57

## 2023-02-01 RX ADMIN — HEPARIN SODIUM 5000 UNITS: 5000 INJECTION INTRAVENOUS; SUBCUTANEOUS at 22:57

## 2023-02-01 RX ADMIN — CASTOR OIL AND BALSAM, PERU: 788; 87 OINTMENT TOPICAL at 22:59

## 2023-02-01 RX ADMIN — CIPROFLOXACIN 1 DROP: 3 SOLUTION OPHTHALMIC at 12:21

## 2023-02-01 RX ADMIN — Medication 1 AMPULE: at 09:26

## 2023-02-01 RX ADMIN — ONDANSETRON 4 MG: 2 INJECTION INTRAMUSCULAR; INTRAVENOUS at 18:00

## 2023-02-01 RX ADMIN — CIPROFLOXACIN 1 DROP: 3 SOLUTION OPHTHALMIC at 04:15

## 2023-02-01 RX ADMIN — SODIUM CHLORIDE, PRESERVATIVE FREE 10 ML: 5 INJECTION INTRAVENOUS at 22:57

## 2023-02-01 RX ADMIN — CIPROFLOXACIN 1 DROP: 3 SOLUTION OPHTHALMIC at 22:59

## 2023-02-01 RX ADMIN — CIPROFLOXACIN 1 DROP: 3 SOLUTION OPHTHALMIC at 05:07

## 2023-02-01 RX ADMIN — CIPROFLOXACIN 1 DROP: 3 SOLUTION OPHTHALMIC at 16:03

## 2023-02-01 RX ADMIN — CIPROFLOXACIN 1 DROP: 3 SOLUTION OPHTHALMIC at 09:26

## 2023-02-01 RX ADMIN — ALLOPURINOL 100 MG: 100 TABLET ORAL at 09:18

## 2023-02-01 RX ADMIN — SODIUM CHLORIDE, PRESERVATIVE FREE 10 ML: 5 INJECTION INTRAVENOUS at 14:27

## 2023-02-01 RX ADMIN — ALLOPURINOL 100 MG: 100 TABLET ORAL at 17:52

## 2023-02-01 RX ADMIN — CASTOR OIL AND BALSAM, PERU: 788; 87 OINTMENT TOPICAL at 09:26

## 2023-02-01 RX ADMIN — SODIUM CHLORIDE, PRESERVATIVE FREE 40 MG: 5 INJECTION INTRAVENOUS at 09:18

## 2023-02-01 RX ADMIN — HEPARIN SODIUM 5000 UNITS: 5000 INJECTION INTRAVENOUS; SUBCUTANEOUS at 03:20

## 2023-02-01 RX ADMIN — SODIUM CHLORIDE, PRESERVATIVE FREE 10 ML: 5 INJECTION INTRAVENOUS at 05:07

## 2023-02-01 RX ADMIN — CIPROFLOXACIN 1 DROP: 3 SOLUTION OPHTHALMIC at 02:49

## 2023-02-01 RX ADMIN — CIPROFLOXACIN 1 DROP: 3 SOLUTION OPHTHALMIC at 17:54

## 2023-02-01 NOTE — PROGRESS NOTES
Hospitalist Progress Note    NAME: Nathaniel Alanis   :  1950   MRN:  [id number]       Assessment / Plan:  Advanced Hodgkin lymphoma POA  - With splenomegaly lymphadenopathy  - Status postchemotherapy   - Hematology oncology team are following, input is appreciated. Systemic therapy on hold due to current illness. Would need to improve before resuming therapy. Poor prognosis per oncology discussed - pt and family aware, Palliative following  IP PT/OT eval noted- SNF recommended if family interested for reconditioning-   Cont TPN for now    R eye Conjunctivitis  Abx eye drops    Tumor lysis syndrome POA  - Hematology oncology recommending allopurinol  - NILTON, hypercalcemia as evidence of tumor lysis syndrome    Hyperbilirubinemia  Alkaline phosphatase elevated  - Likely secondary to lymphoma/splenomegaly    Pseudo hypocalcemia  - Calcium 7.9, albumin 1.8, corrected calcium on the higher end    Aspiration pneumonia POA  Acute hypoxic respiratory failure POA- resolved, now on RA  - Continue cefepime and metronidazole  - Aspiration precautions  MBS noted - cleared for Pureed diet and thin liquids- started PO -- but pt not eating much consistently  Cont TPN for now till palliative follows up after neuro recc in    Acute metabolic encephalopathy POA- more stable in past 24-48 hrs it seems  - Likely due to worsening metabolic status in the setting of a tumor lysis syndrome  - Follow mentation very closely  -mentation wax and wane. Was lethargic today  - MBS normal, speech therapy recommending puréed food,  IP Neurology consulted per palliative recommendations ()- EEG done, pt encephalopathic but no seizures noted. No further neurology recs at this time. Appreciate neuro evaluation.     GERD  - Continue home meds    Severe protein calorie malnutrition POA  - Likely secondary to advanced lymphoma and decreased p.o. intake  - Nutrition consult input is appreciated  -cont' TPN -- renew per Oncology [facility], PO started 1/26 but does not appears to be eating  -monitor for refeeding syndrome    Cont TPN for now till palliative follow up again     25.0 - 29.9 Overweight / Body mass index is 24.54 kg/m². Estimated discharge date: 2/3? Barriers: Clinical improvement versus Hospice considertion, TPN weaning if PO improves, SNF? Versus Hospice TBD    Code status: DNR  Prophylaxis: Hep SQ  Recommended Disposition:  TBD     Subjective:     Patient was seen and examined. No acute events overnight. Discussed with RN overnight events. All patient's questions were answered. lethargic    Review of Systems:  Symptom Y/N Comments  Symptom Y/N Comments   Fever/Chills    Chest Pain     Poor Appetite    Edema     Cough    Abdominal Pain     Sputum    Joint Pain     SOB/SALMERON    Pruritis/Rash     Nausea/vomit    Tolerating PT/OT     Diarrhea    Tolerating Diet     Constipation    Other       Could NOT obtain due to: Alert and more oriented but still confused         Objective:     VITALS:   Last 24hrs VS reviewed since prior progress note.  Most recent are:  Patient Vitals for the past 24 hrs:   Temp Pulse Resp BP SpO2   02/01/23 1311 97.7 °F (36.5 °C) 73 16 131/64 97 %   02/01/23 1100 -- -- -- 131/64 --   02/01/23 1055 -- -- -- 115/68 --   02/01/23 1048 -- -- -- 133/74 --   02/01/23 1046 -- -- -- 131/75 --   02/01/23 1044 -- -- -- (!) 140/69 --   02/01/23 1041 -- -- -- (!) 150/75 --   02/01/23 0831 97.7 °F (36.5 °C) 68 20 (!) 168/82 99 %   02/01/23 0300 97.5 °F (36.4 °C) 67 16 (!) 157/69 97 %   01/31/23 2300 97.8 °F (36.6 °C) 71 16 (!) 162/63 97 %   01/31/23 1933 97.8 °F (36.6 °C) 82 16 (!) 165/97 97 %   01/31/23 1608 97.7 °F (36.5 °C) (!) 55 -- (!) 176/60 99 %         Intake/Output Summary (Last 24 hours) at 2/1/2023 1446  Last data filed at 2/1/2023 1428  Gross per 24 hour   Intake 50 ml   Output 575 ml   Net -525 ml          I had a face to face encounter and independently examined this patient on 2/1/2023, as outlined below:  PHYSICAL EXAM:  General: Awake, alert, following commands  EENT: Anicteric sclerae. Resp:  CTA bilaterally, no wheezing or rales. No accessory muscle use  CV:  Regular  rhythm,  No edema  GI:  Soft, Non distended, Non tender. +Bowel sounds  Neurologic:  EOMs intact. No facial asymmetry. No aphasia or slurred speech. Symmetrical strength, Sensation grossly intact. Alert and oriented X 0. Psych:   Poor insight. Not anxious nor agitated  Skin:  No rashes. No jaundice    Reviewed most current lab test results and cultures  YES  Reviewed most current radiology test results   YES  Review and summation of old records today    NO  Reviewed patient's current orders and MAR    YES  PMH/ reviewed - no change compared to H&P  ________________________________________________________________________  Care Plan discussed with:    Comments   Patient x    Family      RN x    Care Manager     Consultant  x Palliative MD yesterday                     Multidiciplinary team rounds were held today with , nursing, pharmacist and clinical coordinator. Patient's plan of care was discussed; medications were reviewed and discharge planning was addressed. ________________________________________________________________________  Total time 26 mins      Comments   >50% of visit spent in counseling and coordination of care x    ________________________________________________________________________  Makeda Acuña MD     Procedures: see electronic medical records for all procedures/Xrays and details which were not copied into this note but were reviewed prior to creation of Plan. LABS:  I reviewed today's most current labs and imaging studies. Pertinent labs include:  No results for input(s): WBC, HGB, HCT, PLT, HGBEXT, HCTEXT, PLTEXT, HGBEXT, HCTEXT, PLTEXT in the last 72 hours.     Recent Labs     02/01/23  0233 01/31/23  0322 01/30/23  0517    141 142   K 3.2* 3.4* 3.3*    107 110* CO2 27 27 26   * 140* 151*   BUN 23* 25* 26*   CREA 0.73 0.83 0.92   CA 8.0* 7.5* 7.7*   PHOS 2.5* 2.6 3.1         Signed: Felecia Cordero MD

## 2023-02-01 NOTE — PROGRESS NOTES
Problem: Mobility Impaired (Adult and Pediatric)  Goal: *Acute Goals and Plan of Care (Insert Text)  Description: FUNCTIONAL STATUS PRIOR TO ADMISSION: Per wife report, pt with recent decline requiring transition to first floor set up and assist with ADLs. Wife states that pt uses [facility] for amb, but would benefit from RW which he owns, but has been reluctant to use. Of note, pt diagnosed with Hodgkin's lymphoma Dec 2022 and is now starting chemo. HOME SUPPORT PRIOR TO ADMISSION: The patient lived with wife who provides assist along with children. Physical Therapy Goals  Reviewed 1/26/2023 and remain appropriate for the next 7 days  Initiated 1/18/2023  1. Patient will move from supine to sit and sit to supine  in bed with independence within 7 day(s). 2.  Patient will transfer from bed to chair and chair to bed with supervision/set-up using the least restrictive device within 7 day(s). 3.  Patient will perform sit to stand with supervision/set-up within 7 day(s). 4.  Patient will ambulate with supervision/set-up for 50 feet with the least restrictive device within 7 day(s). Outcome: Progressing Towards Goal   PHYSICAL THERAPY TREATMENT  Patient: Rea Avila (68 y.o. male)  Date: 2/1/2023  Diagnosis: [facility] ([facility] [C81.90] <principal problem not specified>  Procedure(s) (LRB):  INFUSAPORT INSERTION (N/A) 14 Days Post-Op  Precautions: Bed Alarm, Fall, Aspiration (telesitter;sun downs; chemo; L chest wall port; 3 sm chronic CVAs)  Chart, physical therapy assessment, plan of care and goals were reviewed. ASSESSMENT  Patient continues with skilled PT services and is slowly progressing towards goals. Patient continues to demonstrate limited functional mobility and decreased independence secondary to impaired balance, generalized weakness, impaired gait mechanics, increased confusion, symptomatic orthostatic hypotension, increased drowsiness, and decreased activity tolerance.  Received supine in bed asleep, requiring increased time to arouse with tactile and verbal cueing. Agreeable to mobilize. VS noted below for session. Patient with decreased initiation, motor processing and planning, and attention/concentration, requiring multimodal cueing for initiation and frequent redirection during activity to stay on task. Ambulated short distance in room with min A and additional assist for RW management, however did experience mod LOB to L, requiring increased assist to recover. Pt with c/o worsening dizziness with decline in BP, therefore was returned supine in bed. BP recovered quickly and patient was left supine in bed with all needs met, RN aware, VSS, and RN student present. Continue to recommend SNF rehab at discharge. 02/01/23 1041 02/01/23 1044 02/01/23 1046   Vital Signs   BP (!) 150/75 (!) 140/69 131/75   MAP (Calculated) 100 93 94   BP 1 Location Left upper arm Left upper arm Left upper arm   BP 1 Method Automatic Automatic Automatic   BP Patient Position Supine  (HOB elevated) Sitting Standing  (initial standing)      02/01/23 1048 02/01/23 1055 02/01/23 1100   Vital Signs   /74 115/68 131/64   MAP (Calculated) 94 84 86   BP 1 Location Left upper arm Left upper arm Left upper arm   BP 1 Method Automatic Automatic Automatic   BP Patient Position Standing  (after side steps at EOB) Standing  (post-ambulation) Supine  (post-activity)       Current Level of Function Impacting Discharge (mobility/balance): min A    Other factors to consider for discharge: increased risk for falls; decline from baseline mobility         PLAN :  Patient continues to benefit from skilled intervention to address the above impairments. Continue treatment per established plan of care. to address goals.     Recommendation for discharge: (in order for the patient to meet his/her long term goals)  Therapy up to 5 days/week in SNF setting    This discharge recommendation:  Has been made in collaboration with the attending provider and/or case management    IF patient discharges home will need the following DME: to be determined (TBD)       SUBJECTIVE:   Patient stated Just a little cloudy.     OBJECTIVE DATA SUMMARY:   Critical Behavior:  Neurologic State: Alert (periodic  confusion)  Orientation Level: Oriented X4  Cognition: Follows commands  Safety/Judgement: Decreased awareness of environment, Decreased awareness of need for assistance, Decreased awareness of need for safety, Lack of insight into deficits  Functional Mobility Training:  Bed Mobility:  Rolling: Minimum assistance  Supine to Sit: Minimum assistance  Sit to Supine: Contact guard assistance  Scooting: Contact guard assistance        Transfers:  Sit to Stand: Minimum assistance;Assist x1;Additional time (VCs for safe hand placement)  Stand to Sit: Contact guard assistance                             Balance:  Sitting: Impaired  Sitting - Static: Good (unsupported)  Sitting - Dynamic: Good (unsupported)  Standing: Impaired; With support  Standing - Static: Good;Constant support; Fair  Standing - Dynamic : Fair;Constant support  Ambulation/Gait Training:  Distance (ft): 30 Feet (ft)  Assistive Device: Gait belt;Walker, rolling  Ambulation - Level of Assistance: Minimal assistance; Adaptive equipment; Additional time (Mod A for one episode of LOB to L)        Gait Abnormalities: Decreased step clearance; Path deviations              Speed/Briana: Pace decreased (<100 feet/min); Slow;Fluctuations  Step Length: Left shortened;Right shortened     Pain Rating:  No pain complaints    Activity Tolerance:   Fair, SpO2 stable on RA, requires rest breaks, and signs and symptoms of orthostatic hypotension    After treatment patient left in no apparent distress:   Supine in bed, Call bell within reach, Bed / chair alarm activated, Caregiver / family present, and Side rails x 3    COMMUNICATION/COLLABORATION:   The patients plan of care was discussed with: Physical therapist, Occupational therapist, and RN, RN student .      Michelle Nagy, PT, DPT   Time Calculation: 23 mins

## 2023-02-01 NOTE — PROGRESS NOTES
Pharmacy TPN Management Note    TPN indication: Malnutrition    TPN type: Peripheral    Macronutrients:  Protein: 4.25 %  Dextrose: 10 %  Lipids: none    Rate: 42 mL/hr    Labs:  Recent Labs     23  0233 23  0322 23  0517    141 142   K 3.2* 3.4* 3.3*    107 110*   CO2 27 27 26   PHOS 2.5* 2.6 3.1   CA 8.0* 7.5* 7.7*   CREA 0.73 0.83 0.92   BUN 23* 25* 26*     No results for input(s): AST, ALT, AP in the last 72 hours. No lab exists for component: BILIRUBIN    No results for input(s): WBC, HGB, HCT, APTT, INR, HGBEXT, HCTEXT, INREXT, HGBEXT, HCTEXT, INREXT in the last 72 hours. No lab exists for component: TRIGLYCERIDE, PREALBUMIN      Electrolytes: This TPN contains the following electrolytes (total per liter): Na: 35 meq/L; K: 55 meq/L; Phos:20 mmol/L; M meq/L; Ca 4.5 meq/L      Other additives in TPN: none    Impression/Plan:   TPN macronutrient/rate changes: None  TPN electrolyte changes: none  TPN insulin changes: none     Pharmacy will continue to monitor enteral nutrition plan, electrolytes, renal function, and dietician recommendations and adjust parenteral nutrition as needed.     Thank you,  Helga Chen, PHARMD

## 2023-02-01 NOTE — PROGRESS NOTES
Problem: Patient Education: Go to Patient Education Activity  Goal: Patient/Family Education  Outcome: Progressing Towards Goal     Problem: Chemotherapy, Day 1  Goal: Off Pathway (Use only if patient is Off Pathway)  Outcome: Progressing Towards Goal  Goal: Activity/Safety  Outcome: Progressing Towards Goal  Goal: Consults, if ordered  Outcome: Progressing Towards Goal  Goal: Diagnostic Test/Procedures  Outcome: Progressing Towards Goal  Goal: Nutrition/Diet  Outcome: Progressing Towards Goal  Goal: Discharge Planning  Outcome: Progressing Towards Goal  Goal: Medications  Outcome: Progressing Towards Goal  Goal: Respiratory  Outcome: Progressing Towards Goal  Goal: Treatments/Interventions/Procedures  Outcome: Progressing Towards Goal  Goal: Psychosocial  Outcome: Progressing Towards Goal  Goal: *Optimal pain control at patient's stated goal  Outcome: Progressing Towards Goal  Goal: *Hemodynamically stable  Outcome: Progressing Towards Goal  Goal: *Adequate oxygenation  Outcome: Progressing Towards Goal  Goal: *Chemotherapy regimen is initiated  Outcome: Progressing Towards Goal  Goal: *Patient and family verbalize understanding of plan of care  Outcome: Progressing Towards Goal     Problem: Chemotherapy, Day 2  Goal: Off Pathway (Use only if patient is Off Pathway)  Outcome: Progressing Towards Goal  Goal: Activity/Safety  Outcome: Progressing Towards Goal  Goal: Consults, if ordered  Outcome: Progressing Towards Goal  Goal: Diagnostic Test/Procedures  Outcome: Progressing Towards Goal  Goal: Nutrition/Diet  Outcome: Progressing Towards Goal  Goal: Discharge Planning  Outcome: Progressing Towards Goal  Goal: Medications  Outcome: Progressing Towards Goal  Goal: Respiratory  Outcome: Progressing Towards Goal  Goal: Treatments/Interventions/Procedures  Outcome: Progressing Towards Goal  Goal: Psychosocial  Outcome: Progressing Towards Goal  Goal: *Optimal pain control at patient's stated goal  Outcome: Progressing Towards Goal  Goal: *Hemodynamically stable  Outcome: Progressing Towards Goal  Goal: *Adequate oxygenation  Outcome: Progressing Towards Goal  Goal: *Chemotherapy regimen is initiated  Outcome: Progressing Towards Goal  Goal: *Effective side effect management  Outcome: Progressing Towards Goal     Problem: Chemotherapy, Day 3 to Discharge  Goal: Off Pathway (Use only if patient is Off Pathway)  Outcome: Progressing Towards Goal  Goal: Activity/Safety  Outcome: Progressing Towards Goal  Goal: Consults, if ordered  Outcome: Progressing Towards Goal  Goal: Diagnostic Test/Procedures  Outcome: Progressing Towards Goal  Goal: Nutrition/Diet  Outcome: Progressing Towards Goal  Goal: Discharge Planning  Outcome: Progressing Towards Goal  Goal: Medications  Outcome: Progressing Towards Goal  Goal: Respiratory  Outcome: Progressing Towards Goal  Goal: Treatments/Interventions/Procedures  Outcome: Progressing Towards Goal  Goal: Psychosocial  Outcome: Progressing Towards Goal  Goal: *Optimal pain control at patient's stated goal  Outcome: Progressing Towards Goal  Goal: *Hemodynamically stable  Outcome: Progressing Towards Goal  Goal: *Adequate oxygenation  Outcome: Progressing Towards Goal     Problem: Chemotherapy Discharge Outcomes  Goal: *Verbalizes understanding and describes prescribed diet  Outcome: Progressing Towards Goal  Goal: *Describes follow-up/return visits to physicians  Outcome: Progressing Towards Goal  Goal: *Describes home care/support arrangements established based on need  Outcome: Progressing Towards Goal  Goal: *Describes available resources and support systems  Outcome: Progressing Towards Goal  Goal: *Anxiety reduced or absent  Outcome: Progressing Towards Goal  Goal: *Understands and describes signs and symptoms to report to providers(Stroke Metric)  Outcome: Progressing Towards Goal  Goal: *Hemodynamically stable  Outcome: Progressing Towards Goal  Goal: *Tolerating diet  Outcome: Progressing Towards Goal  Goal: *Verbalizes understanding and describes medication purposes and frequencies  Outcome: Progressing Towards Goal  Goal: *Venous access site with positive blood return and without signs and symptoms of infection  Outcome: Progressing Towards Goal  Goal: *Verbalizes understanding of bowel regimen  Outcome: Progressing Towards Goal     Problem: Falls - Risk of  Goal: *Absence of Falls  Description: Document Randy Fall Risk and appropriate interventions in the flowsheet. Outcome: Progressing Towards Goal  Note: Fall Risk Interventions:  Mobility Interventions: Assess mobility with egress test    Mentation Interventions: Adequate sleep, hydration, pain control    Medication Interventions: Assess postural VS orthostatic hypotension    Elimination Interventions: Bed/chair exit alarm, Call light in reach (telesitter)              Problem: Patient Education: Go to Patient Education Activity  Goal: Patient/Family Education  Outcome: Progressing Towards Goal     Problem: Pressure Injury - Risk of  Goal: *Prevention of pressure injury  Description: Document Matthew Scale and appropriate interventions in the flowsheet.   Outcome: Progressing Towards Goal  Note: Pressure Injury Interventions:  Sensory Interventions: Assess changes in LOC    Moisture Interventions: Absorbent underpads    Activity Interventions: Increase time out of bed    Mobility Interventions: Assess need for specialty bed    Nutrition Interventions: Document food/fluid/supplement intake    Friction and Shear Interventions: Feet elevated on foot rest, Apply protective barrier, creams and emollients, HOB 30 degrees or less, Lift team/patient mobility team                Problem: Patient Education: Go to Patient Education Activity  Goal: Patient/Family Education  Outcome: Progressing Towards Goal     Problem: Patient Education: Go to Patient Education Activity  Goal: Patient/Family Education  Outcome: Progressing Towards Goal     Problem: Patient Education: Go to Patient Education Activity  Goal: Patient/Family Education  Outcome: Progressing Towards Goal

## 2023-02-01 NOTE — PROGRESS NOTES
0700: Bedside shift change report given to Jeannine King (oncoming nurse) by Yolanda Moran (offgoing nurse). Report included the following information SBAR and Kardex. End of Shift Note    Bedside shift change report given to [employer] (oncoming nurse) by Andriy Sahu (offgoing nurse). Report included the following information SBAR and Kardex    Shift worked:  day     Shift summary and any significant changes:     Labile mentation alert to lethargic, cooperative to agitated  disoriented    poor oral intake       Concerns for physician to address:       Zone phone for oncoming shift:          Activity:  Activity Level: Bed Rest  Number times ambulated in hallways past shift: 0  Number of times OOB to chair past shift: 1    Cardiac:   Cardiac Monitoring: Yes      Cardiac Rhythm: Sinus Rhythm    Access:  Current line(s): port     Genitourinary:   Urinary status: voiding, incontinent, and external catheter    Respiratory:   O2 Device: None (Room air)  Chronic home O2 use?: NO  Incentive spirometer at bedside: NO       GI:  Last Bowel Movement Date: 01/30/23  Current diet:  ADULT ORAL NUTRITION SUPPLEMENT Lunch, Dinner; Frozen Supplement  TPN ADULT - PERIPHERAL  ADULT DIET Dysphagia - Pureed; Patient likes chicken broth, decaf coffee instead of tea every meal, splenda and creamer, likes megan and vanilla ice cream  Passing flatus: YES  Tolerating current diet: YES       Pain Management:   Patient states pain is manageable on current regimen: YES    Skin:  Matthew Score: 17  Interventions: speciality bed, increase time out of bed, internal/external urinary devices, and nutritional support     Patient Safety:  Fall Score:  Total Score: 3  Interventions: bed/chair alarm, assistive device (walker, cane, etc), gripper socks, pt to call before getting OOB, stay with me (per policy), and tele sitter    High Fall Risk: Yes    Length of Stay:  Expected LOS: 9d 21h  Actual LOS: [facility]

## 2023-02-01 NOTE — PROGRESS NOTES
Problem: Self Care Deficits Care Plan (Adult)  Goal: *Acute Goals and Plan of Care (Insert Text)  Description: FUNCTIONAL STATUS PRIOR TO ADMISSION: Pt has had recent decline in function, moving to a first floor setup in his home. Patient uses [facility] for functional mobility, wife reports he has a RW but has not been using it. He receives assistance for ADLs as needed from wife. HOME SUPPORT: The patient lived with his wife who provides assistance. Occupational Therapy Goals  Initiated 1/18/2023; Weekly Re-assessment 1/26/2023; Weekly reassessment 2/1/2023- All goals continued  1. Patient will perform grooming with supervision/set-up in standing within 7 day(s). 2.  Patient will perform upper body dressing with supervision/set-up within 7 day(s). 3.  Patient will perform toilet transfers with supervision/set-up within 7 day(s). 4.  Patient will perform all aspects of toileting with supervision/set-up within 7 day(s). 5.  Patient will participate in upper extremity therapeutic exercise/activities with independence for 5 minutes within 7 day(s). 6.  Patient will utilize energy conservation techniques during functional activities with verbal cues within 7 day(s). Outcome: Progressing Towards Goal   OCCUPATIONAL THERAPY TREATMENT/WEEKLY RE-EVALUATION  Patient: Rea Avila (2 y.o. male)  Date: 2/1/2023  Diagnosis: [facility] [C81.90] <principal problem not specified>  Procedure(s) (LRB):  INFUSAPORT INSERTION (N/A) 14 Days Post-Op  Precautions: Bed Alarm, Fall, Aspiration (telesitter;sun downs; chemo; L chest wall port; 3 sm chronic CVAs)  Chart, occupational therapy assessment, plan of care, and goals were reviewed. ASSESSMENT  Based on the objective data described below, patient continues to slowly progress in therapy with all goals remaining appropriated based on his current functional status.  He continues to be limited by confusion, general weakness, orthostatic hypotension, impaired balance, decreased insight/safety awareness, and decreased activity tolerance. Patient received sleeping soundly in bed and cleared for therapy by nursing. He opened eyes to multimodal cuing and agreed to participate in session. Patient completed supine > sit with min assist and demonstrated good sitting balance while completing simple grooming task. He required total assist to don socks this session as he was too unsafe to attempt and demonstrated decreased processing during task. Patient completed sit > stand with min assist and reported increased dizziness with drop in BP noted. Patient was able to take lateral side steps using rolling walker with contact guard assist with BP remaining stable. He was able to ambulate around bed with min assist to manage rolling walker and redirection to stay on task. Additional time provided for mobility trial. Deferred bathroom mobility this session secondary to patient reported feeling \"foggy in the head\". Patient returned to EOB and noted orthostatic drop in BP following mobility trial. Patient was able to return to supine with contact guard assist with BP recovering. Patient was left semisupine in bed with all needs met, student nurse present in room, and bed alarmed. Patient would continue to benefit from skilled OT services during acute hospital stay.  Recommend patient discharge to SNF rehab.      02/01/23 1041 02/01/23 1044 02/01/23 1046   Vital Signs   BP (!) 150/75 (!) 140/69 131/75   MAP (Calculated) 100 93 94   BP 1 Location Left upper arm Left upper arm Left upper arm   BP 1 Method Automatic Automatic Automatic   BP Patient Position Supine  (HOB elevated) Sitting Standing  (initial standing)        02/01/23 1048 02/01/23 1055 02/01/23 1100   Vital Signs   /74 115/68 131/64   MAP (Calculated) 94 84 86   BP 1 Location Left upper arm Left upper arm Left upper arm   BP 1 Method Automatic Automatic Automatic   BP Patient Position Standing  (after side steps at EOB) Standing  (post-ambulation) Supine  (post-activity)        Current Level of Function Impacting Discharge (ADLs): up to total assist for ADLs, contact guard to min assist for functional mobility using rolling walker     Other factors to consider for discharge: fall risk, orthostatic hypotension, confused          PLAN :  Goals have been updated based on progression since last assessment. Patient continues to benefit from skilled intervention to address the above impairments. Continue to follow patient 3 times a week to address goals. Recommendation for discharge: (in order for the patient to meet his/her long term goals)  Therapy up to 5 days/week in SNF setting    This discharge recommendation:  Has been made in collaboration with the attending provider and/or case management    Equipment recommendations for successful discharge (if) home: TBD in SNF rehab       SUBJECTIVE:   Patient stated My head just feels so foggy.     OBJECTIVE DATA SUMMARY:     Cognitive/Behavioral Status:  Neurologic State: Alert  Orientation Level: Oriented to person;Oriented to place; Disoriented to situation;Disoriented to time  Cognition: Follows commands; Impaired decision making;Poor safety awareness  Perception: Appears intact  Perseveration: Perseverates during conversation  Safety/Judgement: Awareness of environment;Decreased insight into deficits; Fall prevention    Hearing: Auditory  Auditory Impairment: Hard of hearing, bilateral  Hearing Aids/Status: Does not own    Vision/Perceptual:    Corrective Lenses: Glasses    Range of Motion:  AROM: Generally decreased, functional    Strength:  Strength: Generally decreased, functional    Coordination:  Coordination: Generally decreased, functional  Fine Motor Skills-Upper: Left Intact; Right Intact    Gross Motor Skills-Upper: Left Intact; Right Intact    Tone & Sensation:  Tone: Normal    Functional Mobility and Transfers for ADLs:  Bed Mobility:  Rolling: Minimum assistance  Supine to Sit: Minimum assistance  Sit to Supine: Contact guard assistance  Scooting: Contact guard assistance    Transfers:  Sit to Stand: Minimum assistance;Assist x1;Additional time (VCs for safe hand placement)    Balance:  Sitting: Impaired  Sitting - Static: Good (unsupported)  Sitting - Dynamic: Good (unsupported)  Standing: Impaired; With support  Standing - Static: Good;Constant support; Fair  Standing - Dynamic : Fair;Constant support    ADL Assessment:  Feeding: Independent  Oral Facial Hygiene/Grooming: Setup (seated)  Bathing: Moderate assistance  Upper Body Dressing: Minimum assistance  Lower Body Dressing: Total assistance  Toileting: Maximum assistance    ADL Intervention and task modifications:    Grooming  Position Performed: Seated edge of bed  Washing Face: Set-up  Washing Hands: Set-up  Cues: Verbal cues provided    Lower Body Dressing Assistance  Socks: Total assistance (dependent)  Position Performed: Seated edge of bed  Cues: Don;Physical assistance; Tactile cues provided;Verbal cues provided    Cognitive Retraining  Safety/Judgement: Awareness of environment;Decreased insight into deficits; Fall prevention    Functional Measure:    Barthel Index:  Bathin  Bladder: 5  Bowels: 5  Groomin  Dressin  Feeding: 10  Mobility: 0  Stairs: 0  Toilet Use: 5  Transfer (Bed to Chair and Back): 10  Total: 40/100      The Barthel ADL Index: Guidelines  1. The index should be used as a record of what a patient does, not as a record of what a patient could do. 2. The main aim is to establish degree of independence from any help, physical or verbal, however minor and for whatever reason. 3. The need for supervision renders the patient not independent. 4. A patient's performance should be established using the best available evidence. Asking the patient, friends/relatives and nurses are the usual sources, but direct observation and common sense are also important.  However direct testing is not needed. 5. Usually the patient's performance over the preceding 24-48 hours is important, but occasionally longer periods will be relevant. 6. Middle categories imply that the patient supplies over 50 per cent of the effort. 7. Use of aids to be independent is allowed. Score Interpretation (from [address])    Independent   60-79 Minimally independent   40-59 Partially dependent   20-39 Very dependent   <20 Totally dependent     -Miriam Cabezas., Barthel, DJOSE ENRIQUE. (1965). Functional evaluation: the Barthel Index. [address] ([address] (1997). The Barthel activities of daily living index: self-reporting versus actual performance in the old (> or = 75 years). Journal [address] 45(7), 14 [facility], J...F, Odette Shickley., [place]. (1999). Measuring the change in disability after inpatient rehabilitation; comparison of the responsiveness of the Barthel Index and Functional [place] Measure. Journal of Neurology, Neurosurgery, and Psychiatry, 66(4), 655-135. Maria Esther Manning, NANNY, TODD Adams, & Analisa Rosen, MShielaA. (2004) Assessment of post-stroke quality of life in cost-effectiveness studies: The usefulness of the Barthel Index and the EuroQoL-5D. Quality of Life Research, 13, 427-43     Pain:  Patient did not c/o pain during session. Activity Tolerance:   Fair, requires rest breaks, and signs and symptoms of orthostatic hypotension    After treatment patient left in no apparent distress:   Supine in bed, Call bell within reach, Bed / chair alarm activated, Side rails x 3, and RN present in room    COMMUNICATION/COLLABORATION:   The patients plan of care was discussed with: Physical therapist and Registered nurse.      JOMAR Clarke/L  Time Calculation: 24 mins

## 2023-02-02 LAB
ANION GAP SERPL CALC-SCNC: 5 MMOL/L (ref 5–15)
BUN SERPL-MCNC: 20 MG/DL (ref 6–20)
BUN/CREAT SERPL: 26 (ref 12–20)
CALCIUM SERPL-MCNC: 7.9 MG/DL (ref 8.5–10.1)
CHLORIDE SERPL-SCNC: 106 MMOL/L (ref 97–108)
CO2 SERPL-SCNC: 29 MMOL/L (ref 21–32)
CREAT SERPL-MCNC: 0.77 MG/DL (ref 0.7–1.3)
GLUCOSE SERPL-MCNC: 133 MG/DL (ref 65–100)
MAGNESIUM SERPL-MCNC: 1.7 MG/DL (ref 1.6–2.4)
PHOSPHATE SERPL-MCNC: 2.6 MG/DL (ref 2.6–4.7)
POTASSIUM SERPL-SCNC: 3.4 MMOL/L (ref 3.5–5.1)
SODIUM SERPL-SCNC: 140 MMOL/L (ref 136–145)

## 2023-02-02 PROCEDURE — 74011250636 HC RX REV CODE- 250/636: Performed by: NURSE PRACTITIONER

## 2023-02-02 PROCEDURE — 80048 BASIC METABOLIC PNL TOTAL CA: CPT

## 2023-02-02 PROCEDURE — 84100 ASSAY OF PHOSPHORUS: CPT

## 2023-02-02 PROCEDURE — 74011250637 HC RX REV CODE- 250/637: Performed by: NURSE PRACTITIONER

## 2023-02-02 PROCEDURE — 36415 COLL VENOUS BLD VENIPUNCTURE: CPT

## 2023-02-02 PROCEDURE — 74011250637 HC RX REV CODE- 250/637: Performed by: INTERNAL MEDICINE

## 2023-02-02 PROCEDURE — 74011000250 HC RX REV CODE- 250

## 2023-02-02 PROCEDURE — 97535 SELF CARE MNGMENT TRAINING: CPT | Performed by: OCCUPATIONAL THERAPIST

## 2023-02-02 PROCEDURE — 74011000250 HC RX REV CODE- 250: Performed by: STUDENT IN AN ORGANIZED HEALTH CARE EDUCATION/TRAINING PROGRAM

## 2023-02-02 PROCEDURE — 83735 ASSAY OF MAGNESIUM: CPT

## 2023-02-02 PROCEDURE — 74011250636 HC RX REV CODE- 250/636

## 2023-02-02 PROCEDURE — 74011250637 HC RX REV CODE- 250/637: Performed by: SURGERY

## 2023-02-02 PROCEDURE — 97116 GAIT TRAINING THERAPY: CPT

## 2023-02-02 PROCEDURE — C9113 INJ PANTOPRAZOLE SODIUM, VIA: HCPCS

## 2023-02-02 PROCEDURE — 65660000001 HC RM ICU INTERMED STEPDOWN

## 2023-02-02 RX ORDER — PANTOPRAZOLE SODIUM 40 MG/1
40 TABLET, DELAYED RELEASE ORAL
Status: DISCONTINUED | OUTPATIENT
Start: 2023-02-03 | End: 2023-02-02

## 2023-02-02 RX ORDER — POTASSIUM CHLORIDE 20 MEQ/1
40 TABLET, EXTENDED RELEASE ORAL
Status: COMPLETED | OUTPATIENT
Start: 2023-02-02 | End: 2023-02-02

## 2023-02-02 RX ADMIN — SODIUM CHLORIDE, PRESERVATIVE FREE 40 MG: 5 INJECTION INTRAVENOUS at 09:00

## 2023-02-02 RX ADMIN — CIPROFLOXACIN 1 DROP: 3 SOLUTION OPHTHALMIC at 09:01

## 2023-02-02 RX ADMIN — CIPROFLOXACIN 1 DROP: 3 SOLUTION OPHTHALMIC at 00:23

## 2023-02-02 RX ADMIN — SODIUM CHLORIDE, PRESERVATIVE FREE 10 ML: 5 INJECTION INTRAVENOUS at 13:24

## 2023-02-02 RX ADMIN — ONDANSETRON 4 MG: 2 INJECTION INTRAMUSCULAR; INTRAVENOUS at 17:47

## 2023-02-02 RX ADMIN — HEPARIN SODIUM 5000 UNITS: 5000 INJECTION INTRAVENOUS; SUBCUTANEOUS at 12:55

## 2023-02-02 RX ADMIN — CIPROFLOXACIN 1 DROP: 3 SOLUTION OPHTHALMIC at 02:14

## 2023-02-02 RX ADMIN — CIPROFLOXACIN 1 DROP: 3 SOLUTION OPHTHALMIC at 16:23

## 2023-02-02 RX ADMIN — POTASSIUM CHLORIDE 40 MEQ: 1500 TABLET, EXTENDED RELEASE ORAL at 09:01

## 2023-02-02 RX ADMIN — CASTOR OIL AND BALSAM, PERU: 788; 87 OINTMENT TOPICAL at 09:12

## 2023-02-02 RX ADMIN — Medication 1 AMPULE: at 09:00

## 2023-02-02 RX ADMIN — CIPROFLOXACIN 1 DROP: 3 SOLUTION OPHTHALMIC at 22:09

## 2023-02-02 RX ADMIN — Medication 1 AMPULE: at 22:03

## 2023-02-02 RX ADMIN — CIPROFLOXACIN 1 DROP: 3 SOLUTION OPHTHALMIC at 12:56

## 2023-02-02 RX ADMIN — ALLOPURINOL 100 MG: 100 TABLET ORAL at 17:48

## 2023-02-02 RX ADMIN — SODIUM CHLORIDE, PRESERVATIVE FREE 10 ML: 5 INJECTION INTRAVENOUS at 22:13

## 2023-02-02 RX ADMIN — CIPROFLOXACIN 1 DROP: 3 SOLUTION OPHTHALMIC at 06:59

## 2023-02-02 RX ADMIN — HEPARIN SODIUM 5000 UNITS: 5000 INJECTION INTRAVENOUS; SUBCUTANEOUS at 22:04

## 2023-02-02 RX ADMIN — CIPROFLOXACIN 1 DROP: 3 SOLUTION OPHTHALMIC at 17:48

## 2023-02-02 RX ADMIN — OLANZAPINE 5 MG: 5 TABLET, ORALLY DISINTEGRATING ORAL at 22:04

## 2023-02-02 RX ADMIN — HEPARIN SODIUM 5000 UNITS: 5000 INJECTION INTRAVENOUS; SUBCUTANEOUS at 07:00

## 2023-02-02 RX ADMIN — CIPROFLOXACIN 1 DROP: 3 SOLUTION OPHTHALMIC at 10:21

## 2023-02-02 RX ADMIN — CIPROFLOXACIN 1 DROP: 3 SOLUTION OPHTHALMIC at 03:59

## 2023-02-02 RX ADMIN — CIPROFLOXACIN 1 DROP: 3 SOLUTION OPHTHALMIC at 14:30

## 2023-02-02 RX ADMIN — ONDANSETRON 4 MG: 2 INJECTION INTRAMUSCULAR; INTRAVENOUS at 09:01

## 2023-02-02 RX ADMIN — CASTOR OIL AND BALSAM, PERU: 788; 87 OINTMENT TOPICAL at 22:14

## 2023-02-02 RX ADMIN — ALLOPURINOL 100 MG: 100 TABLET ORAL at 09:01

## 2023-02-02 NOTE — PROGRESS NOTES
Palliative Medicine SW Note    LCSW made joint visit with Dr. Elizabeth Worley, who met with patient's brother Dora Gracia and his wife Julio Downs outside the room while this writer sat with patient providing calm, caring presence, attentive listening, encouraging his self expression and encouraging him to eat something. He did not eat or drink anything from his lunch tray during visit. Patient was alert and oriented x2 with confusion, reporting he had be \"put out on the patio in the snow\" earlier today and then saying he was at home and pointing to the room window saying it was the doorway to his house. LCSW encouraged him to share what he likes because he said he did not like any of the food here. He said he likes black eyed peas and pork and beans. His brother said he had eaten some soup yesterday and they plan to make some black eyed peas to bring another time. Patient's spouse called this writer for emotional support. She is home due to Covid-19 and expressed that patient cannot return to the home until she and her son are well. Her daughter, who was not exposed to Covid-19 is planning to visit in her place. LCSW provided empathetic, reflective listening, encouraging her self care and providing teaching in relaxation breathing to cope with anxiety. Mrs. Kelly Reardon expressed a desire for patient's PT frequency to be increased and for him to get a shower and shampoo, if possible. Goals/Plans:  Patient's symptoms will be managed. Patient's nutritional status will improve. (Family is planning to bring in some of his favorite foods, pureed and offer several small meals per day.)  Patient may benefit from SNF rehabilitation, if family is in agreement and members are able to visit him regularly. Patient and family will coordinate post-hospital plans with Care Manager. Palliative team will continue to provide education and support to patient and family and discuss GOC as appropriate.     Thank you for including Palliative team in the care of Mr. Roseline Martinez.     María Donaldson [facility]  Palliative Medicine [phone])

## 2023-02-02 NOTE — PROGRESS NOTES
Problem: Self Care Deficits Care Plan (Adult)  Goal: *Acute Goals and Plan of Care (Insert Text)  Description: FUNCTIONAL STATUS PRIOR TO ADMISSION: Pt has had recent decline in function, moving to a first floor setup in his home. Patient uses [facility] for functional mobility, wife reports he has a RW but has not been using it. He receives assistance for ADLs as needed from wife. HOME SUPPORT: The patient lived with his wife who provides assistance. Occupational Therapy Goals  Initiated 1/18/2023; Weekly Re-assessment 1/26/2023; Weekly reassessment 2/1/2023- All goals continued  1. Patient will perform grooming with supervision/set-up in standing within 7 day(s). 2.  Patient will perform upper body dressing with supervision/set-up within 7 day(s). 3.  Patient will perform toilet transfers with supervision/set-up within 7 day(s). 4.  Patient will perform all aspects of toileting with supervision/set-up within 7 day(s). 5.  Patient will participate in upper extremity therapeutic exercise/activities with independence for 5 minutes within 7 day(s). 6.  Patient will utilize energy conservation techniques during functional activities with verbal cues within 7 day(s). Outcome: Progressing Towards Goal    OCCUPATIONAL THERAPY TREATMENT  Patient: Karthik Brooks (65 y.o. male)  Date: 2/2/2023  Diagnosis: Hodgkin lymphoma ([facility] [C81.90] <principal problem not specified>  Procedure(s) (LRB):  INFUSAPORT INSERTION (N/A) 15 Days Post-Op  Precautions: Bed Alarm, Fall, Aspiration (telesitter;sun downs; chemo; L chest wall port; 3 sm chronic CVAs)  Chart, occupational therapy assessment, plan of care, and goals were reviewed. ASSESSMENT  Patient continues with skilled OT services and is slowly progressing towards goals. Patient was pleasantly confused this session. He is oriented to person and \"hospital\"; provided re-orientation.  Patient required cues throughout for encouragement; initially declined, but was agreeable with minimal encouragement. He requested to return to bed at the end of the session. Left with alarm activated and call bell/phone in reach. He will benefit from continue skilled OT treatment. PLAN :  Patient continues to benefit from skilled intervention to address the above impairments. Continue treatment per established plan of care to address goals. Recommendation for discharge: (in order for the patient to meet his/her long term goals)  Therapy up to 5 days/week in SNF setting    This discharge recommendation:  Has been made in collaboration with the attending provider and/or case management    IF patient discharges home will need the following DME: TBD       SUBJECTIVE:   Patient stated I don't really want to get up right now.     OBJECTIVE DATA SUMMARY:   Cognitive/Behavioral Status:  Neurologic State: Alert;Confused  Orientation Level: Disoriented to time;Disoriented to situation;Oriented to person;Oriented to place  Cognition: Impaired decision making;Memory loss;Poor safety awareness  Perception: Appears intact          Functional Mobility and Transfers for ADLs:  Bed Mobility:  Supine to Sit: Minimum assistance  Sit to Supine: Contact guard assistance; Additional time  Scooting: Stand-by assistance; Additional time    Transfers:  Sit to Stand: Contact guard assistance;Minimum assistance (verbal cues for safety and hand placement)  Functional Transfers  Toilet Transfer : Contact guard assistance;Minimum assistance  Cues: Verbal cues provided;Visual cues provided; Tactile cues provided  Adaptive Equipment: Walker (comment)       Balance:  Sitting: Intact  Sitting - Static: Good (unsupported)  Sitting - Dynamic: Good (unsupported)  Standing: Impaired  Standing - Static: Good;Constant support  Standing - Dynamic : Fair;Constant support    ADL Intervention:       Grooming  Position Performed: Standing (at sink in room)  Washing Face: Contact guard assistance  Washing Hands: Contact guard assistance  Cues: Verbal cues provided;Visual cues provided    Lower Body Dressing Assistance  Socks: Moderate assistance  Position Performed: Seated edge of bed  Cues: Don;Verbal cues provided;Visual cues provided; Tactile cues provided    Toileting  Bladder Hygiene: Supervision  Bowel Hygiene: Moderate assistance  Clothing Management: Moderate assistance  Cues: Verbal cues provided;Visual cues provided; Tactile cues provided    Cognitive Retraining  Orientation Retraining: Reorienting;Time;Situation  Problem Solving: Identifying the problem;General alternative solution  Organizing/Sequencing: Breaking task down    Pain:  Not a significant factor this session. Activity Tolerance:   Fair and requires rest breaks    After treatment patient left in no apparent distress:   Supine in bed, Call bell within reach, Bed / chair alarm activated, and Side rails x 3    COMMUNICATION/COLLABORATION:   The patients plan of care was discussed with: Physical therapist and Registered nurse.      Sterling Solid, OTR/L  Time Calculation: 24 mins

## 2023-02-02 NOTE — PROGRESS NOTES
Problem: Mobility Impaired (Adult and Pediatric)  Goal: *Acute Goals and Plan of Care (Insert Text)  Description: FUNCTIONAL STATUS PRIOR TO ADMISSION: Per wife report, pt with recent decline requiring transition to first floor set up and assist with ADLs. Wife states that pt uses SPC for amb, but would benefit from RW which he owns, but has been reluctant to use. Of note, pt diagnosed with Hodgkin's lymphoma Dec 2022 and is now starting chemo. HOME SUPPORT PRIOR TO ADMISSION: The patient lived with wife who provides assist along with children. Physical Therapy goals  Initiated 1/18/2023, Reviewed 2/2/23, goals still remain appropriate  1. Patient will move from supine to sit and sit to supine  in bed with independence within 7 day(s). 2.  Patient will transfer from bed to chair and chair to bed with supervision/set-up using the least restrictive device within 7 day(s). 3.  Patient will perform sit to stand with supervision/set-up within 7 day(s). 4.  Patient will ambulate with supervision/set-up for 50 feet with the least restrictive device within 7 day(s). Physical Therapy Goals  Reviewed 1/26/2023 and remain appropriate for the next 7 days  Initiated 1/18/2023  1. Patient will move from supine to sit and sit to supine  in bed with independence within 7 day(s). 2.  Patient will transfer from bed to chair and chair to bed with supervision/set-up using the least restrictive device within 7 day(s). 3.  Patient will perform sit to stand with supervision/set-up within 7 day(s). 4.  Patient will ambulate with supervision/set-up for 50 feet with the least restrictive device within 7 day(s).      Outcome: Progressing Towards Goal     PHYSICAL THERAPY TREATMENT: WEEKLY REASSESSMENT  Patient: Khang Rothman (56 y.o. male)  Date: 2/2/2023  Primary Diagnosis: Hodgkin lymphoma ([facility] [address] [C81.90]  Procedure(s) (LRB):  INFUSAPORT INSERTION (N/A) 15 Days Post-Op   Precautions:   Bed Alarm, Fall, Aspiration (telesitter;sun downs; chemo; L chest wall port; 3 sm chronic CVAs)      ASSESSMENT  Patient continues with skilled PT services and is slowly progressing towards goals. Patient was semi-supine in bed, very agreeable to work with therapy. Patient reported R shoulder pain today, states he may have slept on something wrong. Due to the pain, he was keeping his arm close to his body and required more assistance with bed mobility than previous therapy sessions. He required minimum to moderate assistance x 2 for supine-sit with additional time due to not being able to use RUE to assist. Sitting balance remains intact. He performed sit<>stand transfer with contact guard assist, however he pulled up on RW to stand, despite verbal cues to push up to stand. He ambulated 27' using RW with contact guard assist. He had 1 LOB when trying to sit down on the bed when he wasn't backed up completely to it, required assist from therapist to prevent fall. He was left supine in bed, call bell within reach, call alarm activated, no complaints. Continue to recommend SNF upon discharge. Patient's progression toward goals since last assessment: he required increased assist for bed mobility today due to reports of R shoulder pain, he remains CGA for sit<>stand transfer and ambulating using RW    Current Level of Function Impacting Discharge (mobility/balance): minimum to moderate assist for bed mobility    Functional Outcome Measure: The patient scored 40 on the Barthel outcome measure    Other factors to consider for discharge: confusion, needs assist for mobility         PLAN :  Goals have been updated based on progression since last assessment. Patient continues to benefit from skilled intervention to address the above impairments.     Recommendations and Planned Interventions: bed mobility training, transfer training, gait training, therapeutic exercises, neuromuscular re-education, patient and family training/education, and therapeutic activities      Frequency/Duration: Patient will be followed by physical therapy:  3 times a week to address goals. Recommendation for discharge: (in order for the patient to meet his/her long term goals)  Therapy up to 5 days/week in SNF setting    This discharge recommendation:  Has been made in collaboration with the attending provider and/or case management    IF patient discharges home will need the following DME: to be determined (TBD)         SUBJECTIVE:   Patient stated [facility] [place] was very active when I was young.     OBJECTIVE DATA SUMMARY:   HISTORY:    Past Medical History:   Diagnosis Date    Allergic rhinitis, cause unspecified 12/11/2013    Arthritis     knees    Asthma     as a child    Eczema     on lower back waistline on the back    Environmental allergies     GERD (gastroesophageal reflux disease)     occastionally but resolved since 60 pound weight loss    Hodgkin's lymphoma ([facility] [address] 2023    Hypertension     Psychiatric disorder     anxiety and depression     Past Surgical History:   Procedure Laterality Date    HX TONSILLECTOMY      HX WISDOM TEETH EXTRACTION         Personal factors and/or comorbidities impacting plan of care: hodgkin's lymphoma    Home Situation  Home Environment: Private residence  One/Two Story Residence: Two story, live on 1st floor  Living Alone: No (lives with wife)  Support Systems: Spouse/Significant Other, Other Family Member(s)  Patient Expects to be Discharged to[de-identified] Skilled nursing facility  Current DME Used/Available at Home: [place], straight, Walker, rolling    EXAMINATION/PRESENTATION/DECISION MAKING:   Critical Behavior:  Neurologic State: Alert, Confused  Orientation Level: Disoriented to time, Disoriented to situation, Oriented to person, Oriented to place  Cognition: Impaired decision making, Memory loss, Poor safety awareness  Safety/Judgement: Awareness of environment, Decreased insight into deficits, Fall prevention  Hearing:   Auditory  Auditory Impairment: Hard of hearing, bilateral  Hearing Aids/Status: Does not own    Range Of Motion:  AROM: Generally decreased, functional    Strength:    Strength: Generally decreased, functional    Tone & Sensation:   Tone: Normal     Coordination:  Coordination: Generally decreased, functional  Vision:      Functional Mobility:  Bed Mobility:     Supine to Sit: Minimum assistance; Moderate assistance; Additional time  Sit to Supine: Contact guard assistance  Scooting: Contact guard assistance; Additional time  Transfers:  Sit to Stand: Contact guard assistance  Stand to Sit: Contact guard assistance    Balance:   Sitting: Intact  Sitting - Static: Good (unsupported)  Sitting - Dynamic: Good (unsupported)  Standing: Impaired  Standing - Static: Good;Constant support  Standing - Dynamic : Good;Constant support  Ambulation/Gait Training:  Distance (ft): 30 Feet (ft)  Assistive Device: Gait belt;Walker, rolling  Ambulation - Level of Assistance: Contact guard assistance  Gait Abnormalities: Decreased step clearance  Speed/Briana: Pace decreased (<100 feet/min); Slow  Step Length: Left shortened;Right shortened      Therapeutic Exercises:   none    Functional Measure:  Barthel 40/100      Pain Rating:  none    Activity Tolerance:   Fair    After treatment patient left in no apparent distress:   Supine in bed, Call bell within reach, and Bed / chair alarm activated    COMMUNICATION/EDUCATION:   The patients plan of care was discussed with: Registered nurse. Fall prevention education was provided and the patient/caregiver indicated understanding., Patient/family have participated as able in goal setting and plan of care. , and Patient/family agree to work toward stated goals and plan of care.     Thank you for this referral.  Marsha Hurtado PT   Time Calculation: 19 mins

## 2023-02-02 NOTE — PROGRESS NOTES
Transition of Care Plan:    RUR: 10%   Disposition: SNF then LTC - palliative following as well- family choices pending    Barrier: Family declining SNF and declining patient to go home with home health- Attending MD made aware. If SNF or IPR: Date FOC offered: 2/2/23  Date FOC received:  Date authorization started with reference number:  Date authorization received and expires:  Accepting facility:  Follow up appointments:  DME needed:  Transportation at Discharge: [address] [place] [address] or means to access home: wife        IM Medicare Letter:will need prior to discharge  Is patient a [place] and connected with the [place]? N/A  If yes, was [place] transfer form completed and [facility] notified? Caregiver Contact:Lidia Samson Husbands - spouse - [phone]  Discharge Caregiver contacted prior to discharge? yes  Care Conference needed?:       no    CM spoke with wife earlier today - she has Covid and not feeling well-does not have adequate funds for LTC - will need to be screened for LTC Medicaid - unable to get list to wife - she asked CM to call Di daughter to talk with her and provide SNF list  - CM called Di at [phone] - CM to leave list at bedside for them to review choices and that MD indicated patient may be ready for discharge tomorrow and would need to obtain authorization. Received call from wife and she is declining patient moving to the next level of care - she and all the other family have Covid and due to patient's dx and lack of vaccination status wife is not able to take patient to home with home health and she is also concerned about him going to a SNF at this time. CM to make attending aware.  ENE Ashford

## 2023-02-02 NOTE — PROGRESS NOTES
Hospitalist Progress Note    NAME: Maria L Avendano   :  1950   MRN:  [id number]       Assessment / Plan:  Advanced Hodgkin lymphoma POA  - With splenomegaly lymphadenopathy  - Status postchemotherapy   - Hematology oncology team are following, input is appreciated. Systemic therapy on hold due to current illness. Would need to improve before resuming therapy. Poor prognosis per oncology discussed - pt and family aware, Palliative following  IP PT/OT eval noted- SNF recommended if family interested for reconditioning-   Stop TPN,  PO intake improving  Spoke to oncology Dr Donnie Cuevas, will discuss further chemo rx in the outpt setting  PT/OT, working towards discharging hopefully tomorrow  Discussed with RN to dc tele     R eye Conjunctivitis  Abx eye drops    Tumor lysis syndrome POA  - Hematology oncology recommending allopurinol  - NILTON, hypercalcemia as evidence of tumor lysis syndrome    Hyperbilirubinemia  Alkaline phosphatase elevated  - Likely secondary to lymphoma/splenomegaly    Pseudo hypocalcemia  - Calcium 7.9, albumin 1.8, corrected calcium on the higher end    Aspiration pneumonia POA  Acute hypoxic respiratory failure POA- resolved, now on RA  - Continue cefepime and metronidazole  - Aspiration precautions  MBS noted - cleared for Pureed diet and thin liquids- started PO -- but pt not eating much consistently  Will stop TPN today, encourage po intake     Acute metabolic encephalopathy POA- more stable in past 24-48 hrs it seems  - Likely due to worsening metabolic status in the setting of a tumor lysis syndrome  - Follow mentation very closely  -he is AAOx2 today, moving all exts  - MBS normal, speech therapy recommending puréed food,  IP Neurology consulted per palliative recommendations ()- EEG done, pt encephalopathic but no seizures noted. No further neurology recs at this time. Appreciate neuro evaluation.     GERD  - Continue home meds    Severe protein calorie malnutrition POA  - Likely secondary to advanced lymphoma and decreased p.o. intake  - Nutrition consult input is appreciated   --s/p TPN, cont' to encourage po intake  -monitor for refeeding syndrome    Cont TPN for now till palliative follow up again     25.0 - 29.9 Overweight / Body mass index is 25.68 kg/m². Estimated discharge date: 2/3 pending SNF    Code status: DNR  Prophylaxis: Hep SQ  Recommended Disposition:  TBD     Subjective:     Patient was seen and examined. No acute events overnight. Much more awake, NAD. Able to answer some questions appropriately. Review of Systems:  Symptom Y/N Comments  Symptom Y/N Comments   Fever/Chills    Chest Pain     Poor Appetite    Edema     Cough    Abdominal Pain     Sputum    Joint Pain     SOB/SALMERON    Pruritis/Rash     Nausea/vomit    Tolerating PT/OT     Diarrhea    Tolerating Diet     Constipation    Other       Could NOT obtain due to: Alert and more oriented but still confused         Objective:     VITALS:   Last 24hrs VS reviewed since prior progress note. Most recent are:  Patient Vitals for the past 24 hrs:   Temp Pulse Resp BP SpO2   02/02/23 1100 97.2 °F (36.2 °C) 67 16 (!) 169/79 100 %   02/02/23 0800 97.3 °F (36.3 °C) 97 16 (!) 158/82 98 %   02/02/23 0300 98 °F (36.7 °C) 69 16 (!) 147/59 99 %   02/01/23 2300 98.2 °F (36.8 °C) 100 16 (!) 150/71 99 %   02/01/23 2000 -- 61 -- (!) 133/57 93 %   02/01/23 1900 98 °F (36.7 °C) 66 16 (!) 160/66 97 %   02/01/23 1500 98 °F (36.7 °C) 86 -- (!) 152/68 --   02/01/23 1311 97.7 °F (36.5 °C) 73 16 131/64 97 %         Intake/Output Summary (Last 24 hours) at 2/2/2023 1220  Last data filed at 2/2/2023 6349  Gross per 24 hour   Intake --   Output 825 ml   Net -825 ml          I had a face to face encounter and independently examined this patient on 2/2/2023, as outlined below:  PHYSICAL EXAM:  General: Awake, alert, following commands  EENT: Anicteric sclerae.    Resp:  CTA bilaterally, no wheezing or rales. No accessory muscle use  CV:  Regular  rhythm,  No edema  GI:  Soft, Non distended, Non tender. +Bowel sounds  Neurologic:  EOMs intact. No facial asymmetry. No aphasia or slurred speech. Symmetrical strength, Sensation grossly intact. Alert and oriented X2. Psych:   Poor insight. Not anxious nor agitated  Skin:  No rashes. No jaundice    Reviewed most current lab test results and cultures  YES  Reviewed most current radiology test results   YES  Review and summation of old records today    NO  Reviewed patient's current orders and MAR    YES  PMH/SH reviewed - no change compared to H&P  ________________________________________________________________________  Care Plan discussed with:    Comments   Patient x    Family      RN x    Care Manager     Consultant                        Multidiciplinary team rounds were held today with , nursing, pharmacist and clinical coordinator. Patient's plan of care was discussed; medications were reviewed and discharge planning was addressed. ________________________________________________________________________  Total time 26 mins      Comments   >50% of visit spent in counseling and coordination of care x    ________________________________________________________________________  Suyapa Metcalf MD     Procedures: see electronic medical records for all procedures/Xrays and details which were not copied into this note but were reviewed prior to creation of Plan. LABS:  I reviewed today's most current labs and imaging studies. Pertinent labs include:  No results for input(s): WBC, HGB, HCT, PLT, HGBEXT, HCTEXT, PLTEXT, HGBEXT, HCTEXT, PLTEXT in the last 72 hours.     Recent Labs     02/02/23  0335 02/01/23  0233 01/31/23  0322    140 141   K 3.4* 3.2* 3.4*    107 107   CO2 29 27 27   * 138* 140*   BUN 20 23* 25*   CREA 0.77 0.73 0.83   CA 7.9* 8.0* 7.5*   MG 1.7  --   --    PHOS 2.6 2.5* 2.6         Signed: Suyapa Metcalf MD

## 2023-02-02 NOTE — PROGRESS NOTES
Palliative Medicine Consult  Jorge Alberto: [phone])    Patient Name: Eddie Bolton  YOB: 1950    Date of Initial Consult: 1/25/23  Date of Today's Visit: 2/2/2023  Reason for Consult: Care decisions  Requesting Provider: Jarad Matthews   Primary Care Physician: Kacie Esteves MD     SUMMARY:   Eddie Bolton is a 67 y.o. with a past history of HTN, new diagnosis (12/22) classic Hodgkin Lymphoma, who was admitted on 1/17/2023 from home for initiation of systemic chemotherapy. Since admission he underwent BMB, PORT placement, ultrasound which was negative for DVT and was started on ABVD chemotherapy on 1/20/23. His hospital course was complicated by poor PO intake. He also developed lethargy and AMS felt to be due to aspiration pneumonia. Update 1/30/23: TPN started by primary team. Pt remains confused. Diet started but pt is not taking much PO. Update 2/2/23: further therapy on hold until pt follows up outpatient. Pt reviewed by neurology who feels the delirium is likely multifactorial 2/2 acute illness, cancer, chemo and nutrition    Current medical issues leading to Palliative Medicine involvement include: Hodgkins Lymphoma, delirium, poor po intake. Social: pt is  to wife, Sarah Tate. He has three children Annette Cisneros. He lives at home with his son Yemi Gonzalez and wife Doreen Saleem. PALLIATIVE DIAGNOSES:   Palliative care encounter  Goals of care  Delirium  Poor PO intake/severe protein calorie malnutrition  Hodgkin lymphoma       PLAN:   Chart reviewed, discussed case with Jarad Matthews NP and Dr. Harjinder Macias. Met with wife Doreen Saleem, son Yemi Gonzalez, at bedside. GOC: discussed with wife Doreen Saleem over the phone as she is quarantined with COVID. She expresses that their ultimate goals include continuing PT to improve strength, continuing to encourage PO intake to improve nutritional status.  The ultimate goal is to follow up with oncology outpatient to pursue further treatment of the cancer  Also discussed with pt's brother Jesica Dailey the medical care to date and the overall goals of care  Wife does express worries about the ability to care for him at home due to his debility and confusion. Delirium: with hallucinations. Likely 2/2 acute hospitalization, aspiration pneumonia. Reviewed by neurology. Continue low dose zyprexa ODT at bedtime; would not continue this on discharge  Encourage PO intake as able  Hodgkin Lymphoma: receiving chemo as per oncology  Initial consult note routed to primary continuity provider and/or primary health care team members  Communicated plan of care with: Palliative IDTR[facility] 192 Team     GOALS OF CARE / TREATMENT PREFERENCES:     GOALS OF CARE:  Patient/Health Care Proxy Stated Goals: Prolong life    TREATMENT PREFERENCES:   Code Status: DNR    Patient and family's personal goals include: continuation of therapy to prolong life        Advance Care Planning:  [x] [facility] has updated the ACP Navigator with [address] and Patient Capacity      Advance Care Planning 1/18/2023   Patient's Healthcare Decision Maker is: -   Confirm Advance Directive None   Patient Would Like to Complete Advance Directive No               Other:    As far as possible, the palliative care team has discussed with patient / health care proxy about goals of care / treatment preferences for patient.      HISTORY:     History obtained from: patient, wife    CHIEF COMPLAINT: 'I see a spider\"    HPI/SUBJECTIVE:    The patient is:   [x] Verbal and participatory  [] Non-participatory due to:     Clinical Pain Assessment (nonverbal scale for severity on nonverbal patients):   Clinical Pain Assessment  Severity: 0     Activity (Movement): Lying quietly, normal position    Duration: for how long has pt been experiencing pain (e.g., 2 days, 1 month, years)  Frequency: how often pain is an issue (e.g., several times per day, once every few days, constant)     FUNCTIONAL ASSESSMENT:     Palliative Performance Scale (PPS):  PPS: 30       PSYCHOSOCIAL/SPIRITUAL SCREENING:     Palliative IDT has assessed this patient for cultural preferences / practices and a referral made as appropriate to needs (Cultural Services, Patient Advocacy, Ethics, etc.)    Any spiritual / Anglican concerns:  [] Yes /  [x] No   If \"Yes\" to discuss with pastoral care during IDT     Does caregiver feel burdened by caring for their loved one:   [] Yes /  [x] No /  [] No Caregiver Present/Available [] No Caregiver [] Pt [facility] at [facility]  If \"Yes\" to discuss with social work during IDT    Anticipatory grief assessment:   [x] Normal  / [] Maladaptive     If \"Maladaptive\" to discuss with social work during IDT    ESAS Anxiety: Anxiety: 0    ESAS Depression: Depression: 0        REVIEW OF SYSTEMS:     Positive and pertinent negative findings in ROS are noted above in HPI. The following systems were [x] reviewed / [] unable to be reviewed as noted in HPI  Other findings are noted below. Systems: constitutional, ears/nose/mouth/throat, respiratory, gastrointestinal, genitourinary, musculoskeletal, integumentary, neurologic, psychiatric, endocrine. Positive findings noted below. Modified ESAS Completed by: provider   Fatigue: 0 Drowsiness: 0   Depression: 0 Pain: 0   Anxiety: 0 Nausea: 0   Anorexia: 1 Dyspnea: 0           Stool Occurrence(s): 1        PHYSICAL EXAM:     From RN flowsheet:  Wt Readings from Last 3 Encounters:   02/02/23 70 kg (154 lb 5.2 oz)   01/06/23 64.5 kg (142 lb 3.2 oz)   12/28/22 69.6 kg (153 lb 6.4 oz)     Blood pressure (!) 169/79, pulse 67, temperature 97.2 °F (36.2 °C), resp. rate 16, height 5' 5\" (1.651 m), weight 70 kg (154 lb 5.2 oz), SpO2 100 %.     Pain Scale 1: Numeric (0 - 10)  Pain Intensity 1: 0  Pain Onset 1: acute  Pain Location 1: Back  Pain Orientation 1: Lower  Pain Description 1: Aching  Pain Intervention(s) 1: Medication (see MAR)  Last bowel movement, if known:     Constitutional: sleeping  ENMT: no nasal discharge, moist mucous membranes  Cardiovascular: regular rhythm, distal pulses intact  Respiratory: breathing not labored, symmetric  Gastrointestinal: non distended  Skin: warm, dry  Neurologic: following commands, moving all extremities not oriented  Psychiatric: , + hallucinations  Other:       HISTORY:     Active Problems:    Essential hypertension (2017)      Iron deficiency anemia (2022)      Type 2 diabetes mellitus (2022)      Hodgkin lymphoma ([facility] (2023)      Severe protein-calorie malnutrition ([facility] (2023)    Past Medical History:   Diagnosis Date    Allergic rhinitis, cause unspecified 2013    Arthritis     knees    Asthma     as a child    Eczema     on lower back waistline on the back    Environmental allergies     GERD (gastroesophageal reflux disease)     occastionally but resolved since 60 pound weight loss    Hodgkin's lymphoma ([facility]     Hypertension     Psychiatric disorder     anxiety and depression      Past Surgical History:   Procedure Laterality Date    HX TONSILLECTOMY      HX WISDOM TEETH EXTRACTION        Family History   Problem Relation Age of Onset    Hypertension Mother     Hypertension Father     Heart Disease Father     Alcohol abuse Father     Hypertension Brother     No Known Problems Brother       History reviewed, no pertinent family history. Social History     Tobacco Use    Smoking status: Former     Packs/day: 1.00     Years: 2.00     Pack years: 2.00     Types: Cigarettes     Quit date: 1966     Years since quittin.1    Smokeless tobacco: Never   Substance Use Topics    Alcohol use: No     Allergies   Allergen Reactions    Codeine Other (comments)     Pt states unknown reaction. Pcn [Penicillins] Rash     Received Ancef 2023 without issue.       Current Facility-Administered Medications   Medication Dose Route Frequency    TPN ADULT - PERIPHERAL IntraVENous CONTINUOUS    ciprofloxacin HCl (CILOXAN) 0.3 % ophthalmic solution 1 Drop  1 Drop Right Eye Q2H    OLANZapine (ZyPREXA zydis) disintegrating tablet 5 mg  5 mg Oral QHS    balsam peru-castor oiL (VENELEX) ointment   Topical BID    ondansetron (ZOFRAN) injection 4 mg  4 mg IntraVENous BID    dextrose 5% infusion  75 mL/hr IntraVENous CONTINUOUS    sodium chloride (NS) flush 5-40 mL  5-40 mL IntraVENous Q8H    sodium chloride (NS) flush 5-40 mL  5-40 mL IntraVENous PRN    LORazepam (ATIVAN) injection 0.5 mg  0.5 mg IntraVENous Q4H PRN    heparin (porcine) injection 5,000 Units  5,000 Units SubCUTAneous Q8H    HYDROmorphone (DILAUDID) injection 0.5-1 mg  0.5-1 mg IntraVENous Q4H PRN    naloxone (NARCAN) injection 1 mg  1 mg IntraVENous Q5MIN PRN    lidocaine 4 % patch 1 Patch  1 Patch TransDERmal Q24H    melatonin tablet 3 mg  3 mg Oral QHS PRN    prochlorperazine (COMPAZINE) injection 10 mg  10 mg IntraVENous Q6H PRN    glucose chewable tablet 16 g  4 Tablet Oral PRN    glucagon (GLUCAGEN) injection 1 mg  1 mg IntraMUSCular PRN    dextrose 10 % infusion 0-250 mL  0-250 mL IntraVENous PRN    alum-mag hydroxide-simeth (MYLANTA) oral suspension 30 mL  30 mL Oral Q4H PRN    alcohol 62% (NOZIN) nasal  1 Ampule  1 Ampule Topical Q12H    ondansetron (ZOFRAN) injection 4 mg  4 mg IntraVENous Q4H PRN    acetaminophen (TYLENOL) tablet 650 mg  650 mg Oral Q6H PRN    polyethylene glycol (MIRALAX) packet 17 g  17 g Oral QHS PRN    allopurinoL (ZYLOPRIM) tablet 100 mg  100 mg Oral BID          LAB AND IMAGING FINDINGS:     Lab Results   Component Value Date/Time    WBC 5.9 01/28/2023 03:23 AM    HGB 8.8 (L) 01/28/2023 03:23 AM    PLATELET 973 66/07/9816 03:23 AM     Lab Results   Component Value Date/Time    Sodium 140 02/02/2023 03:35 AM    Potassium 3.4 (L) 02/02/2023 03:35 AM    Chloride 106 02/02/2023 03:35 AM    CO2 29 02/02/2023 03:35 AM    BUN 20 02/02/2023 03:35 AM    Creatinine 0.77 02/02/2023 03:35 AM Calcium 7.9 (L) 02/02/2023 03:35 AM    Magnesium 1.7 02/02/2023 03:35 AM    Phosphorus 2.6 02/02/2023 03:35 AM      Lab Results   Component Value Date/Time    Alk. phosphatase 209 (H) 01/28/2023 03:23 AM    Protein, total 5.1 (L) 01/28/2023 03:23 AM    Albumin 1.7 (L) 01/28/2023 03:23 AM    Globulin 3.4 01/28/2023 03:23 AM    GGT 65 01/22/2023 01:32 PM     Lab Results   Component Value Date/Time    INR 1.4 (H) 01/17/2023 08:28 AM    Prothrombin time 14.6 (H) 01/17/2023 08:28 AM    aPTT 35.4 (H) 01/17/2023 08:28 AM      Lab Results   Component Value Date/Time    Iron 43 01/09/2023 02:32 PM    TIBC 207 (L) 01/09/2023 02:32 PM    Iron % saturation 21 01/09/2023 02:32 PM    Ferritin 1,912 (H) 01/09/2023 02:32 PM      No results found for: PH, PCO2, PO2  No components found for: GLPOC   No results found for: CPK, CKMB             Total time: 40 minutes  Counseling / coordination time, spent as noted above:   > 50% counseling / coordination?:     Prolonged service was provided for  []30 min   []75 min in face to face time in the presence of the patient, spent as noted above. Time Start:   Time End:   Note: this can only be billed with 73506 (initial) or 56912 (follow up). If multiple start / stop times, list each separately.

## 2023-02-02 NOTE — PROGRESS NOTES
Nonbillable chart review note    The patient continues to have waxing and waning mentation. Overall hospitalization and discharge planning limited by hospital-acquired delirium. No plan for additional inpatient chemotherapy. The patient has a Hodgkin lymphoma which is highly treatable if the patient is able to tolerate treatment. Treatment options are available that are easier to tolerate including monotherapy with brentuximab as an outpatient. He is currently working with physical therapy which will guide discharge planning    Oncology will sign off for now, as encephalopathy appears to be secondary to hospital-acquired delirium with likely underlying dementia. Plan for follow-up in the outpatient setting upon acute care discharge.       Connor Maldonado MD    Attending [address] [place] [address]

## 2023-02-03 LAB
ANION GAP SERPL CALC-SCNC: 6 MMOL/L (ref 5–15)
BUN SERPL-MCNC: 19 MG/DL (ref 6–20)
BUN/CREAT SERPL: 26 (ref 12–20)
CALCIUM SERPL-MCNC: 7.8 MG/DL (ref 8.5–10.1)
CHLORIDE SERPL-SCNC: 106 MMOL/L (ref 97–108)
CO2 SERPL-SCNC: 28 MMOL/L (ref 21–32)
COMMENT, HOLDF: NORMAL
CREAT SERPL-MCNC: 0.72 MG/DL (ref 0.7–1.3)
GLUCOSE SERPL-MCNC: 112 MG/DL (ref 65–100)
MAGNESIUM SERPL-MCNC: 1.7 MG/DL (ref 1.6–2.4)
PHOSPHATE SERPL-MCNC: 2.7 MG/DL (ref 2.6–4.7)
POTASSIUM SERPL-SCNC: 3.9 MMOL/L (ref 3.5–5.1)
SAMPLES BEING HELD,HOLD: NORMAL
SARS-COV-2 RDRP RESP QL NAA+PROBE: NOT DETECTED
SODIUM SERPL-SCNC: 140 MMOL/L (ref 136–145)
SOURCE, COVRS: NORMAL

## 2023-02-03 PROCEDURE — 87635 SARS-COV-2 COVID-19 AMP PRB: CPT

## 2023-02-03 PROCEDURE — 74011000250 HC RX REV CODE- 250: Performed by: STUDENT IN AN ORGANIZED HEALTH CARE EDUCATION/TRAINING PROGRAM

## 2023-02-03 PROCEDURE — 74011250637 HC RX REV CODE- 250/637: Performed by: SURGERY

## 2023-02-03 PROCEDURE — 74011250636 HC RX REV CODE- 250/636: Performed by: INTERNAL MEDICINE

## 2023-02-03 PROCEDURE — 77010033678 HC OXYGEN DAILY

## 2023-02-03 PROCEDURE — 83735 ASSAY OF MAGNESIUM: CPT

## 2023-02-03 PROCEDURE — 74011250637 HC RX REV CODE- 250/637: Performed by: NURSE PRACTITIONER

## 2023-02-03 PROCEDURE — 94760 N-INVAS EAR/PLS OXIMETRY 1: CPT

## 2023-02-03 PROCEDURE — 74011250636 HC RX REV CODE- 250/636

## 2023-02-03 PROCEDURE — 36415 COLL VENOUS BLD VENIPUNCTURE: CPT

## 2023-02-03 PROCEDURE — 80048 BASIC METABOLIC PNL TOTAL CA: CPT

## 2023-02-03 PROCEDURE — 74011000250 HC RX REV CODE- 250

## 2023-02-03 PROCEDURE — 84100 ASSAY OF PHOSPHORUS: CPT

## 2023-02-03 PROCEDURE — 92610 EVALUATE SWALLOWING FUNCTION: CPT

## 2023-02-03 PROCEDURE — 74011250636 HC RX REV CODE- 250/636: Performed by: NURSE PRACTITIONER

## 2023-02-03 PROCEDURE — 65660000001 HC RM ICU INTERMED STEPDOWN

## 2023-02-03 RX ADMIN — SODIUM CHLORIDE, PRESERVATIVE FREE 10 ML: 5 INJECTION INTRAVENOUS at 15:29

## 2023-02-03 RX ADMIN — CIPROFLOXACIN 1 DROP: 3 SOLUTION OPHTHALMIC at 16:01

## 2023-02-03 RX ADMIN — CIPROFLOXACIN 1 DROP: 3 SOLUTION OPHTHALMIC at 20:00

## 2023-02-03 RX ADMIN — SODIUM CHLORIDE, PRESERVATIVE FREE 10 ML: 5 INJECTION INTRAVENOUS at 06:28

## 2023-02-03 RX ADMIN — ONDANSETRON 4 MG: 2 INJECTION INTRAMUSCULAR; INTRAVENOUS at 17:01

## 2023-02-03 RX ADMIN — DEXTROSE MONOHYDRATE 75 ML/HR: 50 INJECTION, SOLUTION INTRAVENOUS at 12:23

## 2023-02-03 RX ADMIN — CIPROFLOXACIN 1 DROP: 3 SOLUTION OPHTHALMIC at 09:31

## 2023-02-03 RX ADMIN — ONDANSETRON 4 MG: 2 INJECTION INTRAMUSCULAR; INTRAVENOUS at 09:32

## 2023-02-03 RX ADMIN — CASTOR OIL AND BALSAM, PERU: 788; 87 OINTMENT TOPICAL at 09:33

## 2023-02-03 RX ADMIN — CIPROFLOXACIN 1 DROP: 3 SOLUTION OPHTHALMIC at 22:29

## 2023-02-03 RX ADMIN — CIPROFLOXACIN 1 DROP: 3 SOLUTION OPHTHALMIC at 12:35

## 2023-02-03 RX ADMIN — CIPROFLOXACIN 1 DROP: 3 SOLUTION OPHTHALMIC at 08:00

## 2023-02-03 RX ADMIN — Medication 1 AMPULE: at 22:25

## 2023-02-03 RX ADMIN — SODIUM CHLORIDE, PRESERVATIVE FREE 10 ML: 5 INJECTION INTRAVENOUS at 22:27

## 2023-02-03 RX ADMIN — HEPARIN SODIUM 5000 UNITS: 5000 INJECTION INTRAVENOUS; SUBCUTANEOUS at 22:25

## 2023-02-03 RX ADMIN — HEPARIN SODIUM 5000 UNITS: 5000 INJECTION INTRAVENOUS; SUBCUTANEOUS at 12:24

## 2023-02-03 RX ADMIN — ALLOPURINOL 100 MG: 100 TABLET ORAL at 17:01

## 2023-02-03 RX ADMIN — ALLOPURINOL 100 MG: 100 TABLET ORAL at 09:32

## 2023-02-03 RX ADMIN — CIPROFLOXACIN 1 DROP: 3 SOLUTION OPHTHALMIC at 04:30

## 2023-02-03 RX ADMIN — CIPROFLOXACIN 1 DROP: 3 SOLUTION OPHTHALMIC at 18:00

## 2023-02-03 RX ADMIN — HEPARIN SODIUM 5000 UNITS: 5000 INJECTION INTRAVENOUS; SUBCUTANEOUS at 04:00

## 2023-02-03 RX ADMIN — CIPROFLOXACIN 1 DROP: 3 SOLUTION OPHTHALMIC at 05:15

## 2023-02-03 RX ADMIN — CIPROFLOXACIN 1 DROP: 3 SOLUTION OPHTHALMIC at 01:46

## 2023-02-03 RX ADMIN — Medication 1 AMPULE: at 12:23

## 2023-02-03 RX ADMIN — CASTOR OIL AND BALSAM, PERU: 788; 87 OINTMENT TOPICAL at 22:28

## 2023-02-03 NOTE — PROGRESS NOTES
Hospitalist Progress Note    NAME: Viktoria Stapleton   :  1950   MRN:  [id number]       Assessment / Plan:  Advanced Hodgkin lymphoma POA  - With splenomegaly lymphadenopathy  - Status postchemotherapy   - Hematology oncology team are following, input is appreciated. Systemic therapy on hold due to current illness. Would need to improve before resuming therapy. Poor prognosis per oncology discussed - pt and family aware, Palliative following  IP PT/OT eval noted- SNF recommended if family interested for reconditioning-   Stop TPN,  PO intake improving  Spoke to oncology Dr Virginia Khan, will discuss further chemo rx in the outpt setting  PT/OT, stable for discharge    I spoke to the pt's wife and answer all questions she had. R eye Conjunctivitis  Abx eye drops    Tumor lysis syndrome POA  - Hematology oncology recommending allopurinol  - NILTON, hypercalcemia as evidence of tumor lysis syndrome    Hyperbilirubinemia  Alkaline phosphatase elevated  - Likely secondary to lymphoma/splenomegaly    Pseudo hypocalcemia  - Calcium 7.9, albumin 1.8, corrected calcium on the higher end    Aspiration pneumonia POA  Acute hypoxic respiratory failure POA- resolved, now on RA  - Continue cefepime and metronidazole  - Aspiration precautions  MBS noted - cleared for Pureed diet and thin liquids- started PO -- but pt not eating much consistently  Will stop TPN today, encourage po intake     Acute metabolic encephalopathy POA- more stable in past 24-48 hrs it seems  - Likely due to worsening metabolic status in the setting of a tumor lysis syndrome  -mentation waxing and waning   - MBS normal, speech therapy recommending puréed food,  IP Neurology consulted per palliative recommendations ()- EEG done, pt encephalopathic but no seizures noted. No further neurology recs at this time. Appreciate neuro evaluation.     GERD  - Continue home meds    Severe protein calorie malnutrition POA  - Likely secondary to advanced lymphoma and decreased p.o. intake  - Nutrition consult input is appreciated   --s/p TPN, cont' to encourage po intake  -monitor for refeeding syndrome    Cont TPN for now till palliative follow up again     25.0 - 29.9 Overweight / Body mass index is 24.47 kg/m². Estimated discharge date: 2/3 pending SNF    Code status: DNR  Prophylaxis: Hep SQ  Recommended Disposition:  TBD     Subjective:     Patient was seen and examined. No acute events overnight. Review of Systems:  Symptom Y/N Comments  Symptom Y/N Comments   Fever/Chills    Chest Pain     Poor Appetite    Edema     Cough    Abdominal Pain     Sputum    Joint Pain     SOB/SALMERON    Pruritis/Rash     Nausea/vomit    Tolerating PT/OT     Diarrhea    Tolerating Diet     Constipation    Other       Could NOT obtain due to: confused         Objective:     VITALS:   Last 24hrs VS reviewed since prior progress note. Most recent are:  Patient Vitals for the past 24 hrs:   Temp Pulse Resp BP SpO2   02/03/23 0800 97.9 °F (36.6 °C) 79 18 (!) 151/74 --   02/03/23 0404 98.2 °F (36.8 °C) (!) 57 18 130/64 99 %   02/02/23 2300 99.1 °F (37.3 °C) 69 18 (!) 157/59 99 %   02/02/23 1958 98.6 °F (37 °C) 82 18 (!) 152/63 100 %   02/02/23 1500 97.8 °F (36.6 °C) 65 18 (!) 160/65 100 %   02/02/23 1100 97.2 °F (36.2 °C) 67 16 (!) 169/79 100 %         Intake/Output Summary (Last 24 hours) at 2/3/2023 6562  Last data filed at 2/2/2023 1958  Gross per 24 hour   Intake 1336.3 ml   Output 2000 ml   Net -663.7 ml          I had a face to face encounter and independently examined this patient on 2/3/2023, as outlined below:  PHYSICAL EXAM:  General: Awake, alert, following commands  EENT:  Anicteric sclerae. Resp:  CTA bilaterally, no wheezing or rales. No accessory muscle use  CV:  Regular  rhythm,  No edema  GI:  Soft, Non distended, Non tender. +Bowel sounds  Neurologic:  EOMs intact. No facial asymmetry. No aphasia or slurred speech.   Symmetrical strength, Sensation grossly intact. Psych:   Poor insight. Not anxious nor agitated  Skin:  No rashes. No jaundice    Reviewed most current lab test results and cultures  YES  Reviewed most current radiology test results   YES  Review and summation of old records today    NO  Reviewed patient's current orders and MAR    YES  PMH/SH reviewed - no change compared to H&P  ________________________________________________________________________  Care Plan discussed with:    Comments   Patient x    Family      RN x    Care Manager     Consultant                        Multidiciplinary team rounds were held today with , nursing, pharmacist and clinical coordinator. Patient's plan of care was discussed; medications were reviewed and discharge planning was addressed. ________________________________________________________________________  Total time 26 mins      Comments   >50% of visit spent in counseling and coordination of care x    ________________________________________________________________________  Jessi Akers MD     Procedures: see electronic medical records for all procedures/Xrays and details which were not copied into this note but were reviewed prior to creation of Plan. LABS:  I reviewed today's most current labs and imaging studies. Pertinent labs include:  No results for input(s): WBC, HGB, HCT, PLT, HGBEXT, HCTEXT, PLTEXT, HGBEXT, HCTEXT, PLTEXT in the last 72 hours.     Recent Labs     02/03/23  0517 02/02/23  0335 02/01/23  0233    140 140   K 3.9 3.4* 3.2*    106 107   CO2 28 29 27   * 133* 138*   BUN 19 20 23*   CREA 0.72 0.77 0.73   CA 7.8* 7.9* 8.0*   MG 1.7 1.7  --    PHOS 2.7 2.6 2.5*         Signed: Jessi Akers MD

## 2023-02-03 NOTE — PROGRESS NOTES
0700: End of Shift Note    Bedside shift change report given to STARR Duncan (oncoming nurse) by Marielena Monahan RN (offgoing nurse). Report included the following information SBAR, Kardex, ED Summary, and Cardiac Rhythm Sinus; Sinus [place]    Shift worked:  [id number]     Shift summary and any significant changes:    -Pt became more confused beginning at 0400. Attempted to get up out of bed several times, but was re-directible.      Concerns for physician to address: -     Zone phone for oncoming shift:       Marielena Monahan RN

## 2023-02-03 NOTE — PROGRESS NOTES
Problem: Dysphagia (Adult)  Goal: *Acute Goals and Plan of Care (Insert Text)  Description: Speech Pathology Goals  Initiated 2/3/2023    1. Patient will tolerate Easy to Chew/Thin Liquid diet without signs of aspiration or adverse effects within 7 days. Outcome: Progressing Towards Goal     SPEECH LANGUAGE PATHOLOGY BEDSIDE SWALLOW EVALUATION  Patient: Hanna Ruggiero (62 y.o. male)  Date: 2/3/2023  Primary Diagnosis: Hodgkin lymphoma ([facility] [address] [C81.90]  Procedure(s) (LRB):  INFUSAPORT INSERTION (N/A) 16 Days Post-Op   Precautions: Bed Alarm, Fall, Aspiration (telesitter;sun downs; chemo; L chest wall port; 3 sm chronic CVAs)    ASSESSMENT :  SLP re-consulted on this date. Spoke with MD who reports patient's wife requesting swallow re-evaluation. Patient received alert and upright in bed, however Ox1. Attempted to re-assess patient who declined all PO trials, specifically for solid trials stating \"I can't swallow that. \"     Following SLP exit from patient's room, RN asked this writer to re-enter room to discuss with family who had just arrived at bedside. Discussed with patient's brother and sister-in-law re: MBS on 1/26, which revealed \"mild to moderate oral dysphagia and a grossly functional pharyngeal swallow. \" Patient seen by SLP in follow up for dysphagia treatment x2 however, mental status and poor participation remained a barrier for diet liberalization. With encouragement from family, patient agreeable to PO trials at bedside on this date. Patient demonstrated mildly prolonged mastication, however achieved grossly efficient oral clearance given a liquid wash. No signs of aspiration observed. Would recommend Soft&Bite-Sized/Thin Liquid diet given patient's acute mentation and for ease with self-feeding, however given family's request for a regular diet, consider Easy to Chew/Thin Liquid diet. Spoke with RN re: SLP recommendations.     Patient will benefit from skilled intervention to address the above impairments. Patients rehabilitation potential is considered to be fair. PLAN :  Recommendations and Planned Interventions:  -- Easy to Chew/Thin Liquid diet  -- Can allow bread, per SLP  -- Will benefit from meal set-up and 1:1 feeding assistance  -- Alternate liquids/solids    Frequency/Duration: Patient will be followed by speech-language pathology 1 time a week to address goals. Discharge Recommendations: To Be Determined     SUBJECTIVE:   Patient stated this doesn't have any salt re: saltine cracker.     OBJECTIVE:     Past Medical History:   Diagnosis Date    Allergic rhinitis, cause unspecified 12/11/2013    Arthritis     knees    Asthma     as a child    Eczema     on lower back waistline on the back    Environmental allergies     GERD (gastroesophageal reflux disease)     occastionally but resolved since 60 pound weight loss    Hodgkin's lymphoma ([facility] [address] 2023    Hypertension     Psychiatric disorder     anxiety and depression     Past Surgical History:   Procedure Laterality Date    HX TONSILLECTOMY      HX WISDOM TEETH EXTRACTION       Prior Level of Function/Home Situation:   Home Situation  Home Environment: Private residence  One/Two Story Residence: Two story, live on 1st floor  Living Alone: No (lives with wife)  Support Systems: Spouse/Significant Other, Other Family Member(s)  Patient Expects to be Discharged to[de-identified] Skilled nursing facility  Current DME Used/Available at Home: [place], straight, Walker, rolling    Diet prior to admission:   Current Diet: Puree/Thin Liquid     Cognitive and Communication Status:  Neurologic State: Alert, Confused  Orientation Level: Oriented to person, Disoriented to place, Disoriented to situation, Disoriented to time  Cognition: Impaired decision making, Decreased attention/concentration, Decreased command following  Perception: Appears intact  Perseveration: Perseverates during conversation  Safety/Judgement: Decreased awareness of environment, Decreased awareness of need for assistance, Decreased awareness of need for safety, Decreased insight into deficits    Oral Assessment:  Oral Assessment  Labial: No impairment  Dentition: Natural;Limited  Oral Hygiene: Moist oral mucosa  Lingual: No impairment  Mandible: No impairment    P.O. Trials:  Patient Position: Upright in bed  Vocal quality prior to P.O.: No impairment  Consistency Presented: Thin liquid; Solid  How Presented: Self-fed/presented;Cup/sip     Bolus Acceptance: No impairment  Bolus Formation/Control: Impaired  Type of Impairment: Delayed  Propulsion: Delayed (# of seconds)  Oral Residue: None     Laryngeal Elevation: Other (comment) (WFL per MBS on 1/26)  Aspiration Signs/Symptoms: None     Effective Modifications: Alternate liquids/solids                NOMS:   The NOMS functional outcome measure was used to quantify this patient's level of swallowing impairment. Based on the NOMS, the patient was determined to be at level 6 for swallow function. NOMS Swallowing Levels:  Level 1 (CN): NPO  Level 2 (CM): NPO but takes consistency in therapy  Level 3 (CL): Takes less than 50% of nutrition p.o. and continues with nonoral feedings; and/or safe with mod cues; and/or max diet restriction  Level 4 (CK): Safe swallow but needs mod cues; and/or mod diet restriction; and/or still requires some nonoral feeding/supplements  Level 5 (CJ): Safe swallow with min diet restriction; and/or needs min cues  Level 6 (CI): Independent with p.o.; rare cues; usually self cues; may need to avoid some foods or needs extra time  Level 7 ([address] [place] [address]): Independent for all p.o.  ADELA. (2003). National Outcomes Measurement System (NOMS): Adult Speech-Language Pathology User's Guide.      Pain:  Pain Scale 1: Numeric (0 - 10)  Pain Intensity 1: 0       After treatment:   Patient left in no apparent distress in bed, Call bell within reach, Nursing notified, and Caregiver / family present    COMMUNICATION/EDUCATION:   The patient's plan of care including recommendations, planned interventions, and recommended diet changes were discussed with: Registered nurse, Physician, and Patient's brother and sister-in-law . Patient/family have participated as able in goal setting and plan of care. Patient/family agree to work toward stated goals and plan of care.     Thank you for this referral.  Rafa gR SLP  Time Calculation: 20 mins

## 2023-02-03 NOTE — PROGRESS NOTES
Problem: Patient Education: Go to Patient Education Activity  Goal: Patient/Family Education  Outcome: Progressing Towards Goal     Problem: Chemotherapy, Day 1  Goal: Off Pathway (Use only if patient is Off Pathway)  Outcome: Progressing Towards Goal  Goal: Activity/Safety  Outcome: Progressing Towards Goal  Goal: Consults, if ordered  Outcome: Progressing Towards Goal  Goal: Diagnostic Test/Procedures  Outcome: Progressing Towards Goal  Goal: Nutrition/Diet  Outcome: Progressing Towards Goal  Goal: Discharge Planning  Outcome: Progressing Towards Goal  Goal: Medications  Outcome: Progressing Towards Goal  Goal: Respiratory  Outcome: Progressing Towards Goal  Goal: Treatments/Interventions/Procedures  Outcome: Progressing Towards Goal  Goal: Psychosocial  Outcome: Progressing Towards Goal  Goal: *Optimal pain control at patient's stated goal  Outcome: Progressing Towards Goal  Goal: *Hemodynamically stable  Outcome: Progressing Towards Goal  Goal: *Adequate oxygenation  Outcome: Progressing Towards Goal  Goal: *Chemotherapy regimen is initiated  Outcome: Progressing Towards Goal  Goal: *Patient and family verbalize understanding of plan of care  Outcome: Progressing Towards Goal     Problem: Chemotherapy, Day 2  Goal: Off Pathway (Use only if patient is Off Pathway)  Outcome: Progressing Towards Goal  Goal: Activity/Safety  Outcome: Progressing Towards Goal  Goal: Consults, if ordered  Outcome: Progressing Towards Goal  Goal: Diagnostic Test/Procedures  Outcome: Progressing Towards Goal  Goal: Nutrition/Diet  Outcome: Progressing Towards Goal  Goal: Discharge Planning  Outcome: Progressing Towards Goal  Goal: Medications  Outcome: Progressing Towards Goal  Goal: Respiratory  Outcome: Progressing Towards Goal  Goal: Treatments/Interventions/Procedures  Outcome: Progressing Towards Goal  Goal: Psychosocial  Outcome: Progressing Towards Goal  Goal: *Optimal pain control at patient's stated goal  Outcome: Progressing Towards Goal  Goal: *Hemodynamically stable  Outcome: Progressing Towards Goal  Goal: *Adequate oxygenation  Outcome: Progressing Towards Goal  Goal: *Chemotherapy regimen is initiated  Outcome: Progressing Towards Goal  Goal: *Effective side effect management  Outcome: Progressing Towards Goal     Problem: Chemotherapy, Day 3 to Discharge  Goal: Off Pathway (Use only if patient is Off Pathway)  Outcome: Progressing Towards Goal  Goal: Activity/Safety  Outcome: Progressing Towards Goal  Goal: Consults, if ordered  Outcome: Progressing Towards Goal  Goal: Diagnostic Test/Procedures  Outcome: Progressing Towards Goal  Goal: Nutrition/Diet  Outcome: Progressing Towards Goal  Goal: Discharge Planning  Outcome: Progressing Towards Goal  Goal: Medications  Outcome: Progressing Towards Goal  Goal: Respiratory  Outcome: Progressing Towards Goal  Goal: Treatments/Interventions/Procedures  Outcome: Progressing Towards Goal  Goal: Psychosocial  Outcome: Progressing Towards Goal  Goal: *Optimal pain control at patient's stated goal  Outcome: Progressing Towards Goal  Goal: *Hemodynamically stable  Outcome: Progressing Towards Goal  Goal: *Adequate oxygenation  Outcome: Progressing Towards Goal     Problem: Chemotherapy Discharge Outcomes  Goal: *Verbalizes understanding and describes prescribed diet  Outcome: Progressing Towards Goal  Goal: *Describes follow-up/return visits to physicians  Outcome: Progressing Towards Goal  Goal: *Describes home care/support arrangements established based on need  Outcome: Progressing Towards Goal  Goal: *Describes available resources and support systems  Outcome: Progressing Towards Goal  Goal: *Anxiety reduced or absent  Outcome: Progressing Towards Goal  Goal: *Understands and describes signs and symptoms to report to providers(Stroke Metric)  Outcome: Progressing Towards Goal  Goal: *Hemodynamically stable  Outcome: Progressing Towards Goal  Goal: *Tolerating diet  Outcome: Progressing Towards Goal  Goal: *Verbalizes understanding and describes medication purposes and frequencies  Outcome: Progressing Towards Goal  Goal: *Venous access site with positive blood return and without signs and symptoms of infection  Outcome: Progressing Towards Goal  Goal: *Verbalizes understanding of bowel regimen  Outcome: Progressing Towards Goal     Problem: Falls - Risk of  Goal: *Absence of Falls  Description: Document Randy Fall Risk and appropriate interventions in the flowsheet. Outcome: Progressing Towards Goal  Note: Fall Risk Interventions:  Mobility Interventions: Bed/chair exit alarm, Communicate number of staff needed for ambulation/transfer    Mentation Interventions: Adequate sleep, hydration, pain control, Evaluate medications/consider consulting pharmacy, Familiar objects from home, Family/sitter at bedside    Medication Interventions: Bed/chair exit alarm, Patient to call before getting OOB, Evaluate medications/consider consulting pharmacy    Elimination Interventions: Call light in reach, Bed/chair exit alarm, Patient to call for help with toileting needs, Stay With Me (per policy)              Problem: Patient Education: Go to Patient Education Activity  Goal: Patient/Family Education  Outcome: Progressing Towards Goal     Problem: Pressure Injury - Risk of  Goal: *Prevention of pressure injury  Description: Document Matthew Scale and appropriate interventions in the flowsheet.   Outcome: Progressing Towards Goal     Problem: Patient Education: Go to Patient Education Activity  Goal: Patient/Family Education  Outcome: Progressing Towards Goal     Problem: Patient Education: Go to Patient Education Activity  Goal: Patient/Family Education  Outcome: Progressing Towards Goal     Problem: Patient Education: Go to Patient Education Activity  Goal: Patient/Family Education  Outcome: Progressing Towards Goal     Problem: Patient Education: Go to Patient Education Activity  Goal: Patient/Family Education  Outcome: Progressing Towards Goal     Problem: Patient Education: Go to Patient Education Activity  Goal: Patient/Family Education  Outcome: Progressing Towards Goal     Problem: Infection - Risk of, Central Venous Catheter-Associated Bloodstream Infection  Goal: *Absence of infection signs and symptoms  Outcome: Progressing Towards Goal     Problem: Patient Education: Go to Patient Education Activity  Goal: Patient/Family Education  Outcome: Progressing Towards Goal

## 2023-02-03 NOTE — PROGRESS NOTES
Comprehensive Nutrition Assessment    Type and Reason for Visit: Reassess    Nutrition Recommendations/Plan:   Continue diet per SLP  Continue Magic cups/Thrive frozen supplements as ordered  Please document % meals and supplements consumed in flowsheet I/O's under intake      Malnutrition Assessment:  Malnutrition Status:  Severe malnutrition (01/18/23 1450)    Context:  Chronic illness     Findings of the 6 clinical characteristics of malnutrition:   Energy Intake:  75% or less est energy requirements for 1 month or longer  Weight Loss:  Greater than 20% over 1 year     Body Fat Loss:  Severe body fat loss,     Muscle Mass Loss:  Severe muscle mass loss,    Fluid Accumulation:  Severe, Extremities   Strength:  Not performed     Nutrition Assessment:     Chart reviewed. Pt sleeping at time of visit, but family at bedside provided info. His diet was advanced per SLP this morning and we are hoping this will help increase his PO intake. PPN was stopped yesterday as PO intake was starting to improve. Noted pt is medically stable for discharge, pending SNF placement. Cancer tx is on hold at this time. He has continued to lose weight throughout admission d/t inadequate nutrition and catabolic illness. Patient Vitals for the past 168 hrs:   % Diet Eaten   02/02/23 1816 1 - 25%   02/02/23 1300 1 - 25%   02/02/23 0830 1 - 25%   02/01/23 0831 1 - 25%     Wt Readings from Last 5 Encounters:   02/03/23 66.7 kg (147 lb 0.8 oz)   01/06/23 64.5 kg (142 lb 3.2 oz)   12/28/22 69.6 kg (153 lb 6.4 oz)   12/28/22 68.7 kg (151 lb 6.4 oz)   12/23/22 72.4 kg (159 lb 9.6 oz)   ]    Nutrition Related Findings:    Labs: reviewed. Meds: zofran. Edema: 1+ genital, trace BLE. BM 1/31.      Wound Type: None    Current Nutrition Intake & Therapies:  Average Meal Intake: 1-25%  Average Supplement Intake: 1-25%  ADULT ORAL NUTRITION SUPPLEMENT Lunch, Dinner; Frozen Supplement  ADULT DIET Easy to Chew; Patient likes chicken broth, decaf coffee instead of tea every meal, splenda and creamer, likes megan and vanilla ice cream    Anthropometric Measures:  Height: 5' 5\" (165.1 cm)  Ideal Body Weight (IBW): 136 lbs (62 kg)     Current Body Wt:  66.7 kg (147 lb 0.8 oz), 107.8 % IBW. Bed scale  Current BMI (kg/m2): 24.5        Weight Adjustment: No adjustment                 BMI Category: Normal weight (BMI 22.0-24.9) age over 72    Estimated Daily Nutrient Needs:  Energy Requirements Based On: Formula  Weight Used for Energy Requirements: Current  Energy (kcal/day): 1740 kcals (BMR x 1. 3AF)  Weight Used for Protein Requirements: Current  Protein (g/day): 80-93g (1.2-1.5g/kg)  Method Used for Fluid Requirements: 1 ml/kcal  Fluid (ml/day): 1700mL    Nutrition Diagnosis:   Severe malnutrition, In context of chronic illness related to catabolic illness, inadequate protein-energy intake as evidenced by Criteria as identified in malnutrition assessment, NPO or clear liquid status due to medical condition  Dx continues. Nutrition Interventions:   Food and/or Nutrient Delivery: Continue current diet, Continue oral nutrition supplement  Nutrition Education/Counseling: No recommendations at this time  Coordination of Nutrition Care: Continue to monitor while inpatient       Goals:  Previous Goal Met: Progressing toward goal(s)  Goals: PO intake 50% or greater, by next RD assessment       Nutrition Monitoring and Evaluation:   Behavioral-Environmental Outcomes: None identified  Food/Nutrient Intake Outcomes: Diet advancement/tolerance, Food and nutrient intake, Supplement intake, Enteral nutrition intake/tolerance, Parenteral nutrition intake/tolerance  Physical Signs/Symptoms Outcomes: Biochemical data, GI status, Fluid status or edema, Weight, Nutrition focused physical findings    Discharge Planning:     Too soon to determine    Miesha Eric, [address]  Contact: [phone]

## 2023-02-03 NOTE — PROGRESS NOTES
End of Shift Note    Bedside shift change report given to Doctor Petar Starr (oncoming nurse) by Dayo Addison RN (offgoing nurse). Report included the following information Kardex, MAR, Recent Results, and Cardiac Rhythm Sinus Rhythm    Shift worked:  7A-7P     Shift summary and any significant changes:     PPN discontinued. Patient more alert     Concerns for physician to address:  None     Zone phone for oncoming shift:   None       Activity:  Activity Level: Bed Rest  Number times ambulated in hallways past shift: 0  Number of times OOB to chair past shift: 0    Cardiac:   Cardiac Monitoring: Yes      Cardiac Rhythm: Sinus Rhythm    Access:  Current line(s): port     Genitourinary:   Urinary status: external catheter    Respiratory:   O2 Device: None (Room air)  Chronic home O2 use?: NO  Incentive spirometer at bedside: NO       GI:  Last Bowel Movement Date: 01/31/23  Current diet:  ADULT ORAL NUTRITION SUPPLEMENT Lunch, Dinner; Frozen Supplement  ADULT DIET Dysphagia - Pureed; Patient likes chicken broth, decaf coffee instead of tea every meal, splenda and creamer, likes megan and vanilla ice cream  Passing flatus: YES  Tolerating current diet: YES       Pain Management:   Patient states pain is manageable on current regimen: YES    Skin:  Matthew Score: 13  Interventions: turn team and increase time out of bed    Patient Safety:  Fall Score:  Total Score: 3  Interventions: bed/chair alarm  High Fall Risk: Yes    Length of Stay:  Expected LOS: 9d 21h  Actual LOS: [address], RN

## 2023-02-03 NOTE — PROGRESS NOTES
Transition of Care Plan:     RUR: 10%   Disposition: [facility] - SNF then LTC - palliative following as well- family choices pending    [facility] - [facility] denied patient     Barrier: Family declining SNF and declining patient to go home with home health- Attending MD made aware. If SNF or IPR: Date FOC offered: 2/2/23  Date FOC received:  Date authorization started with reference number:  Date authorization received and expires:  Accepting facility:  Follow up appointments:  DME needed:  Transportation at Discharge: [address] [place] [address] or means to access home: wife        IM Medicare Letter:will need prior to discharge  Is patient a [place] and connected with the [place]? N/A  If yes, was [place] transfer form completed and VA notified? Caregiver Contact:Lidia Sorto - spouse - [phone]  Discharge Caregiver contacted prior to discharge? yes  Care Conference needed?:       no    CM spoke with wife - only willing to give choice of [facility] IPR - explained need for patient to be able to tolerate 3 hours of rehab. MD made aware - referral to be made to [facility] for IPR - will await evaluation at this time. ENE Nicole    CM has asked wife to get with family and still provide 5 choices of SNF rehab for back up plan. ENE Nicole     Spoke with Ana Fanta with [facility] IPR - patient does not meet criteria for IPR - she will let wife know - CM awaiting family SNF choices at this time.  ENE Nicole

## 2023-02-03 NOTE — PROGRESS NOTES
Problem: Patient Education: Go to Patient Education Activity  Goal: Patient/Family Education  Outcome: Progressing Towards Goal     Problem: Chemotherapy, Day 1  Goal: Consults, if ordered  Outcome: Progressing Towards Goal  Goal: Diagnostic Test/Procedures  Outcome: Progressing Towards Goal  Goal: Nutrition/Diet  Outcome: Progressing Towards Goal  Goal: Discharge Planning  Outcome: Progressing Towards Goal     Problem: Patient Education: Go to Patient Education Activity  Goal: Patient/Family Education  Outcome: Progressing Towards Goal     Problem: Patient Education: Go to Patient Education Activity  Goal: Patient/Family Education  Outcome: Progressing Towards Goal     Problem: Patient Education: Go to Patient Education Activity  Goal: Patient/Family Education  Outcome: Progressing Towards Goal

## 2023-02-03 NOTE — PROGRESS NOTES
6:12P  Son called upset with a list of SNF options. Son stated his mother was upset and that the hospital was sending his father to a facility die. Director attempted to empathize with his feelings but son cut the Director off and provided the list of SNF options from the list provided by the CM. Choices are [address] of [facility],  [address], [place] [facility] caicedo, and [address] [facility]. Referrals sent via [address] and [facility]. Weekend CM to follow up on acceptance.  Director contacted MD to ensure spouse made aware of pt being medically stable. MD contacted spouse to make her aware this afternoon. Director contacted pt spouse due to challenges with obtaining SNF options. Spouse was explained that the pt is medically stable for discharge and we need to move forward with the discharge plan. Spouse stated she wanted pt to go [facility]. Spouse informed [facility] and [facility] are not the recommended level of care. Spouse had SNF list and was refusing to send pt to SNF with low Medicare rating. Director kept reiterating the need to move forward and she could look at [facility] online since offices would not be open. Spouse stated she would review list and provide options. Director informed spouse a return call would be made tomorrow morning. CM to follow up with spouse in the morning on SNF options. Spouse will be issued a HINN if she continues to delay discharge.     ENE Lima

## 2023-02-03 NOTE — PROGRESS NOTES
Palliative Medicine SW Note    LCSW stopped in to see patient and offer support. Patient was alert and orientedx2 with his brother Katlyn Neumann and georgiana Juana Davenport present and encouraging him to eat his lunch now that SLT has cleared him to have advanced diet. He was eating his meat and looking forward to eating the mashed potatoes and carrots on his tray. Family said that spouse and daughter have a list of SNFs which they are to choose from for next steps. Thank you for including Palliative team in the care of Mr. Karina Garland.     Noemi Kline [facility]  Palliative Medicine [phone] ([phone])

## 2023-02-03 NOTE — PROGRESS NOTES
0700: End of Shift Note    Bedside shift change report given to *** (oncoming nurse) by Amy Patel RN (offgoing nurse).   Report included the following information SBAR, Kardex, ED Summary, Intake/Output, MAR, and Cardiac Rhythm NSR    Shift worked:  [id number]     Shift summary and any significant changes:    -     Concerns for physician to address: -     Zone phone for oncoming shift:           Amy Patel RN

## 2023-02-04 LAB
ANION GAP SERPL CALC-SCNC: 3 MMOL/L (ref 5–15)
BUN SERPL-MCNC: 15 MG/DL (ref 6–20)
BUN/CREAT SERPL: 19 (ref 12–20)
CALCIUM SERPL-MCNC: 7.9 MG/DL (ref 8.5–10.1)
CHLORIDE SERPL-SCNC: 103 MMOL/L (ref 97–108)
CO2 SERPL-SCNC: 30 MMOL/L (ref 21–32)
CREAT SERPL-MCNC: 0.8 MG/DL (ref 0.7–1.3)
GLUCOSE SERPL-MCNC: 122 MG/DL (ref 65–100)
PHOSPHATE SERPL-MCNC: 2.7 MG/DL (ref 2.6–4.7)
POTASSIUM SERPL-SCNC: 3.5 MMOL/L (ref 3.5–5.1)
SODIUM SERPL-SCNC: 136 MMOL/L (ref 136–145)

## 2023-02-04 PROCEDURE — 80048 BASIC METABOLIC PNL TOTAL CA: CPT

## 2023-02-04 PROCEDURE — 36415 COLL VENOUS BLD VENIPUNCTURE: CPT

## 2023-02-04 PROCEDURE — 65660000001 HC RM ICU INTERMED STEPDOWN

## 2023-02-04 PROCEDURE — 74011250636 HC RX REV CODE- 250/636: Performed by: NURSE PRACTITIONER

## 2023-02-04 PROCEDURE — 74011250637 HC RX REV CODE- 250/637: Performed by: SURGERY

## 2023-02-04 PROCEDURE — 74011250637 HC RX REV CODE- 250/637: Performed by: INTERNAL MEDICINE

## 2023-02-04 PROCEDURE — 74011250637 HC RX REV CODE- 250/637: Performed by: NURSE PRACTITIONER

## 2023-02-04 PROCEDURE — 74011000250 HC RX REV CODE- 250: Performed by: STUDENT IN AN ORGANIZED HEALTH CARE EDUCATION/TRAINING PROGRAM

## 2023-02-04 PROCEDURE — 74011250636 HC RX REV CODE- 250/636

## 2023-02-04 PROCEDURE — 84100 ASSAY OF PHOSPHORUS: CPT

## 2023-02-04 PROCEDURE — 74011000250 HC RX REV CODE- 250

## 2023-02-04 PROCEDURE — 74011250636 HC RX REV CODE- 250/636: Performed by: INTERNAL MEDICINE

## 2023-02-04 RX ADMIN — HEPARIN SODIUM 5000 UNITS: 5000 INJECTION INTRAVENOUS; SUBCUTANEOUS at 06:02

## 2023-02-04 RX ADMIN — DEXTROSE MONOHYDRATE 75 ML/HR: 50 INJECTION, SOLUTION INTRAVENOUS at 17:04

## 2023-02-04 RX ADMIN — Medication 1 AMPULE: at 20:40

## 2023-02-04 RX ADMIN — CIPROFLOXACIN 1 DROP: 3 SOLUTION OPHTHALMIC at 00:26

## 2023-02-04 RX ADMIN — SODIUM CHLORIDE, PRESERVATIVE FREE 10 ML: 5 INJECTION INTRAVENOUS at 21:00

## 2023-02-04 RX ADMIN — CIPROFLOXACIN 1 DROP: 3 SOLUTION OPHTHALMIC at 20:42

## 2023-02-04 RX ADMIN — CIPROFLOXACIN 1 DROP: 3 SOLUTION OPHTHALMIC at 05:00

## 2023-02-04 RX ADMIN — CIPROFLOXACIN 1 DROP: 3 SOLUTION OPHTHALMIC at 02:24

## 2023-02-04 RX ADMIN — ALLOPURINOL 100 MG: 100 TABLET ORAL at 09:27

## 2023-02-04 RX ADMIN — CIPROFLOXACIN 1 DROP: 3 SOLUTION OPHTHALMIC at 12:05

## 2023-02-04 RX ADMIN — ALLOPURINOL 100 MG: 100 TABLET ORAL at 17:05

## 2023-02-04 RX ADMIN — CIPROFLOXACIN 1 DROP: 3 SOLUTION OPHTHALMIC at 15:35

## 2023-02-04 RX ADMIN — OLANZAPINE 5 MG: 5 TABLET, ORALLY DISINTEGRATING ORAL at 21:00

## 2023-02-04 RX ADMIN — CIPROFLOXACIN 1 DROP: 3 SOLUTION OPHTHALMIC at 23:55

## 2023-02-04 RX ADMIN — DEXTROSE MONOHYDRATE 75 ML/HR: 50 INJECTION, SOLUTION INTRAVENOUS at 02:19

## 2023-02-04 RX ADMIN — ONDANSETRON 4 MG: 2 INJECTION INTRAMUSCULAR; INTRAVENOUS at 08:16

## 2023-02-04 RX ADMIN — ONDANSETRON 4 MG: 2 INJECTION INTRAMUSCULAR; INTRAVENOUS at 17:05

## 2023-02-04 RX ADMIN — CIPROFLOXACIN 1 DROP: 3 SOLUTION OPHTHALMIC at 06:03

## 2023-02-04 RX ADMIN — CIPROFLOXACIN 1 DROP: 3 SOLUTION OPHTHALMIC at 14:05

## 2023-02-04 RX ADMIN — CIPROFLOXACIN 1 DROP: 3 SOLUTION OPHTHALMIC at 08:16

## 2023-02-04 RX ADMIN — OLANZAPINE 5 MG: 5 TABLET, ORALLY DISINTEGRATING ORAL at 00:22

## 2023-02-04 RX ADMIN — SODIUM CHLORIDE, PRESERVATIVE FREE 10 ML: 5 INJECTION INTRAVENOUS at 06:02

## 2023-02-04 RX ADMIN — CASTOR OIL AND BALSAM, PERU: 788; 87 OINTMENT TOPICAL at 20:43

## 2023-02-04 RX ADMIN — SODIUM CHLORIDE, PRESERVATIVE FREE 10 ML: 5 INJECTION INTRAVENOUS at 14:06

## 2023-02-04 RX ADMIN — CIPROFLOXACIN 1 DROP: 3 SOLUTION OPHTHALMIC at 10:00

## 2023-02-04 RX ADMIN — CASTOR OIL AND BALSAM, PERU: 788; 87 OINTMENT TOPICAL at 08:16

## 2023-02-04 RX ADMIN — CIPROFLOXACIN 1 DROP: 3 SOLUTION OPHTHALMIC at 22:14

## 2023-02-04 RX ADMIN — HEPARIN SODIUM 5000 UNITS: 5000 INJECTION INTRAVENOUS; SUBCUTANEOUS at 20:40

## 2023-02-04 RX ADMIN — Medication 1 AMPULE: at 08:17

## 2023-02-04 RX ADMIN — CIPROFLOXACIN 1 DROP: 3 SOLUTION OPHTHALMIC at 17:06

## 2023-02-04 NOTE — PROGRESS NOTES
Problem: Pressure Injury - Risk of  Goal: *Prevention of pressure injury  Description: Document Matthew Scale and appropriate interventions in the flowsheet.   Outcome: Progressing Towards Goal  Note: Pressure Injury Interventions:  Sensory Interventions: Pad between skin to skin, Monitor skin under medical devices, Minimize linen layers, Maintain/enhance activity level, Keep linens dry and wrinkle-free, Float heels    Moisture Interventions: Internal/External urinary devices, Apply protective barrier, creams and emollients, Absorbent underpads, Check for incontinence Q2 hours and as needed, Moisture barrier, Minimize layers    Activity Interventions: PT/OT evaluation, Increase time out of bed, Pressure redistribution bed/mattress(bed type)    Mobility Interventions: Float heels, HOB 30 degrees or less, Pressure redistribution bed/mattress (bed type), Assess need for specialty bed    Nutrition Interventions: Document food/fluid/supplement intake, Offer support with meals,snacks and hydration    Friction and Shear Interventions: Apply protective barrier, creams and emollients, Minimize layers, Transferring/repositioning devices, Feet elevated on foot rest                Problem: Patient Education: Go to Patient Education Activity  Goal: Patient/Family Education  Outcome: Progressing Towards Goal     Problem: Patient Education: Go to Patient Education Activity  Goal: Patient/Family Education  Outcome: Progressing Towards Goal     Problem: Patient Education: Go to Patient Education Activity  Goal: Patient/Family Education  Outcome: Progressing Towards Goal     Problem: Patient Education: Go to Patient Education Activity  Goal: Patient/Family Education  Outcome: Progressing Towards Goal

## 2023-02-04 NOTE — PROGRESS NOTES
End of Shift Note    Bedside shift change report given to RN (oncoming nurse) by Tong Malik (offgoing nurse). Report included the following information SBAR, Kardex, Procedure Summary, Intake/Output, MAR, and Recent Results    Shift worked: 7-7pm     Shift summary and any significant changes:     No significant changes.      Concerns for physician to address:       Zone phone for oncoming shift:

## 2023-02-04 NOTE — PROGRESS NOTES
End of Shift Note    Bedside shift change report given to RN (oncoming nurse) by Giovanna Bhat (offgoing nurse). Report included the following information SBAR, Kardex, Procedure Summary, Intake/Output, MAR, and Recent Results    Shift worked: 7-7pm     Shift summary and any significant changes:     No significant changes. Pt on room air, VS are stable.     Concerns for physician to address:       Zone phone for oncoming shift:              Giovanna Bhat

## 2023-02-04 NOTE — PROGRESS NOTES
1406 -  contacted family back for patient who would like to add [facility] as a SNF option. TRAVON spoke with patient's son, Alcon Teran, who said his mother called earlier. CM notified Alcon Teran that [facility] a bed available and Alcon Teran would like to check with [facility] first since they spoke with the facility earlier and they said they had beds available. Referral sent via cclink to [facility] for SNF. Insurance authorization will be needed as well.       TOMEKA MackN, RN    Care Management  [phone]

## 2023-02-04 NOTE — PROGRESS NOTES
Hospitalist Progress Note    NAME: Gato Michael   :  1950   MRN:  [id number]       Assessment / Plan:  Advanced Hodgkin lymphoma POA  - With splenomegaly lymphadenopathy  - Status postchemotherapy   - Hematology oncology team are following, input is appreciated. Systemic therapy on hold due to current illness. Would need to improve before resuming therapy. Poor prognosis per oncology discussed - pt and family aware, Palliative following  IP PT/OT eval noted- SNF recommended if family interested for reconditioning-   Stop TPN,  PO intake improving  Spoke to oncology Dr Gerda Ortiz, he will discuss further chemo rx in the outpt setting  PT/OT, stable for discharge    I spoke to the pt's wife and answer all questions she had. R eye Conjunctivitis  Abx eye drops    Tumor lysis syndrome POA  - Hematology oncology recommending allopurinol  - NILTON, hypercalcemia as evidence of tumor lysis syndrome    Hyperbilirubinemia  Alkaline phosphatase elevated  - Likely secondary to lymphoma/splenomegaly    Pseudo hypocalcemia  - Calcium 7.9, albumin 1.8, corrected calcium on the higher end    Aspiration pneumonia POA  Acute hypoxic respiratory failure POA- resolved, now on RA  - Continue cefepime and metronidazole  - Aspiration precautions  MBS noted - cleared for Pureed diet and thin liquids- started PO -- but pt not eating much consistently  Will stop TPN today, encourage po intake     Acute metabolic encephalopathy POA- more stable in past 24-48 hrs it seems  - Likely due to worsening metabolic status in the setting of a tumor lysis syndrome  -mentation waxing and waning. He is AAOx3 today, conversant  - MBS normal, speech therapy recommending puréed food. IP Neurology consulted per palliative recommendations ()- EEG done, pt encephalopathic but no seizures noted. No further neurology recs at this time. Appreciate neuro evaluation.     GERD  - Continue home meds    Severe protein calorie malnutrition POA  - Likely secondary to advanced lymphoma and decreased p.o. intake  - Nutrition consult input is appreciated   --s/p TPN, cont' to encourage po intake  -monitor for refeeding syndrome    Cont TPN for now till palliative follow up again     25.0 - 29.9 Overweight / Body mass index is 24.47 kg/m². Estimated discharge date: stable for discharge pending placement    Code status: DNR  Prophylaxis: Hep SQ  Recommended Disposition:  TBD     Subjective:     Patient was seen and examined. Pt is awake and is AAOx3 this AM.      Review of Systems:  Symptom Y/N Comments  Symptom Y/N Comments   Fever/Chills    Chest Pain     Poor Appetite    Edema     Cough    Abdominal Pain     Sputum    Joint Pain     SOB/SALMERON    Pruritis/Rash     Nausea/vomit    Tolerating PT/OT     Diarrhea    Tolerating Diet     Constipation    Other       Could NOT obtain due to:          Objective:     VITALS:   Last 24hrs VS reviewed since prior progress note. Most recent are:  Patient Vitals for the past 24 hrs:   Temp Pulse Resp BP SpO2   02/04/23 1148 97.6 °F (36.4 °C) 76 16 (!) 141/67 99 %   02/04/23 0809 97.8 °F (36.6 °C) 85 -- (!) 159/87 97 %   02/04/23 0245 97.9 °F (36.6 °C) 85 -- (!) 158/81 98 %   02/03/23 2309 98.3 °F (36.8 °C) 87 18 136/68 97 %   02/03/23 1907 98.7 °F (37.1 °C) 94 16 136/78 97 %   02/03/23 1500 97.7 °F (36.5 °C) 95 16 (!) 109/48 98 %         Intake/Output Summary (Last 24 hours) at 2/4/2023 1308  Last data filed at 2/4/2023 8334  Gross per 24 hour   Intake 120 ml   Output 1800 ml   Net -1680 ml          I had a face to face encounter and independently examined this patient on 2/4/2023, as outlined below:  PHYSICAL EXAM:  General: Awake, alert, following commands  EENT:  Anicteric sclerae. Resp:  CTA bilaterally, no wheezing or rales. No accessory muscle use  CV:  Regular  rhythm,  No edema  GI:  Soft, Non distended, Non tender. +Bowel sounds  Neurologic:  EOMs intact. No facial asymmetry.  No aphasia or slurred speech. Symmetrical strength, Sensation grossly intact. Psych:   Poor insight. Not anxious nor agitated  Skin:  No rashes. No jaundice    Reviewed most current lab test results and cultures  YES  Reviewed most current radiology test results   YES  Review and summation of old records today    NO  Reviewed patient's current orders and MAR    YES  PMH/SH reviewed - no change compared to H&P  ________________________________________________________________________  Care Plan discussed with:    Comments   Patient x    Family      RN x    Care Manager     Consultant                        Multidiciplinary team rounds were held today with , nursing, pharmacist and clinical coordinator. Patient's plan of care was discussed; medications were reviewed and discharge planning was addressed. ________________________________________________________________________  Total time 26 mins      Comments   >50% of visit spent in counseling and coordination of care x    ________________________________________________________________________  Yadira Cleveland MD     Procedures: see electronic medical records for all procedures/Xrays and details which were not copied into this note but were reviewed prior to creation of Plan. LABS:  I reviewed today's most current labs and imaging studies. Pertinent labs include:  No results for input(s): WBC, HGB, HCT, PLT, HGBEXT, HCTEXT, PLTEXT, HGBEXT, HCTEXT, PLTEXT in the last 72 hours.     Recent Labs     02/04/23  0227 02/03/23  0517 02/02/23  0335    140 140   K 3.5 3.9 3.4*    106 106   CO2 30 28 29   * 112* 133*   BUN 15 19 20   CREA 0.80 0.72 0.77   CA 7.9* 7.8* 7.9*   MG  --  1.7 1.7   PHOS 2.7 2.7 2.6         Signed: Yadira Cleveland MD

## 2023-02-05 LAB
ANION GAP SERPL CALC-SCNC: 4 MMOL/L (ref 5–15)
BUN SERPL-MCNC: 11 MG/DL (ref 6–20)
BUN/CREAT SERPL: 14 (ref 12–20)
CALCIUM SERPL-MCNC: 7.9 MG/DL (ref 8.5–10.1)
CHLORIDE SERPL-SCNC: 103 MMOL/L (ref 97–108)
CO2 SERPL-SCNC: 30 MMOL/L (ref 21–32)
COMMENT, HOLDF: NORMAL
CREAT SERPL-MCNC: 0.78 MG/DL (ref 0.7–1.3)
GLUCOSE SERPL-MCNC: 122 MG/DL (ref 65–100)
MAGNESIUM SERPL-MCNC: 1.6 MG/DL (ref 1.6–2.4)
PHOSPHATE SERPL-MCNC: 2.8 MG/DL (ref 2.6–4.7)
POTASSIUM SERPL-SCNC: 3.2 MMOL/L (ref 3.5–5.1)
SAMPLES BEING HELD,HOLD: NORMAL
SODIUM SERPL-SCNC: 137 MMOL/L (ref 136–145)

## 2023-02-05 PROCEDURE — 74011000250 HC RX REV CODE- 250

## 2023-02-05 PROCEDURE — 74011250636 HC RX REV CODE- 250/636

## 2023-02-05 PROCEDURE — 74011250637 HC RX REV CODE- 250/637: Performed by: NURSE PRACTITIONER

## 2023-02-05 PROCEDURE — 84100 ASSAY OF PHOSPHORUS: CPT

## 2023-02-05 PROCEDURE — 74011250636 HC RX REV CODE- 250/636: Performed by: NURSE PRACTITIONER

## 2023-02-05 PROCEDURE — 36415 COLL VENOUS BLD VENIPUNCTURE: CPT

## 2023-02-05 PROCEDURE — 74011250637 HC RX REV CODE- 250/637: Performed by: INTERNAL MEDICINE

## 2023-02-05 PROCEDURE — 74011250636 HC RX REV CODE- 250/636: Performed by: INTERNAL MEDICINE

## 2023-02-05 PROCEDURE — 80048 BASIC METABOLIC PNL TOTAL CA: CPT

## 2023-02-05 PROCEDURE — 65660000001 HC RM ICU INTERMED STEPDOWN

## 2023-02-05 PROCEDURE — 83735 ASSAY OF MAGNESIUM: CPT

## 2023-02-05 PROCEDURE — 74011250637 HC RX REV CODE- 250/637: Performed by: SURGERY

## 2023-02-05 PROCEDURE — 74011000250 HC RX REV CODE- 250: Performed by: INTERNAL MEDICINE

## 2023-02-05 PROCEDURE — 74011000250 HC RX REV CODE- 250: Performed by: STUDENT IN AN ORGANIZED HEALTH CARE EDUCATION/TRAINING PROGRAM

## 2023-02-05 RX ADMIN — CIPROFLOXACIN 1 DROP: 3 SOLUTION OPHTHALMIC at 06:22

## 2023-02-05 RX ADMIN — CIPROFLOXACIN 1 DROP: 3 SOLUTION OPHTHALMIC at 12:12

## 2023-02-05 RX ADMIN — CIPROFLOXACIN 1 DROP: 3 SOLUTION OPHTHALMIC at 21:30

## 2023-02-05 RX ADMIN — CIPROFLOXACIN 1 DROP: 3 SOLUTION OPHTHALMIC at 17:33

## 2023-02-05 RX ADMIN — Medication 1 AMPULE: at 21:28

## 2023-02-05 RX ADMIN — CIPROFLOXACIN 1 DROP: 3 SOLUTION OPHTHALMIC at 02:58

## 2023-02-05 RX ADMIN — SODIUM CHLORIDE, PRESERVATIVE FREE 10 ML: 5 INJECTION INTRAVENOUS at 21:30

## 2023-02-05 RX ADMIN — CIPROFLOXACIN 1 DROP: 3 SOLUTION OPHTHALMIC at 08:00

## 2023-02-05 RX ADMIN — ONDANSETRON 4 MG: 2 INJECTION INTRAMUSCULAR; INTRAVENOUS at 17:32

## 2023-02-05 RX ADMIN — HEPARIN SODIUM 5000 UNITS: 5000 INJECTION INTRAVENOUS; SUBCUTANEOUS at 13:27

## 2023-02-05 RX ADMIN — CIPROFLOXACIN 1 DROP: 3 SOLUTION OPHTHALMIC at 19:45

## 2023-02-05 RX ADMIN — SODIUM CHLORIDE, PRESERVATIVE FREE 10 ML: 5 INJECTION INTRAVENOUS at 13:27

## 2023-02-05 RX ADMIN — DEXTROSE MONOHYDRATE 75 ML/HR: 50 INJECTION, SOLUTION INTRAVENOUS at 09:42

## 2023-02-05 RX ADMIN — ONDANSETRON 4 MG: 2 INJECTION INTRAMUSCULAR; INTRAVENOUS at 09:32

## 2023-02-05 RX ADMIN — POTASSIUM BICARBONATE 40 MEQ: 782 TABLET, EFFERVESCENT ORAL at 09:34

## 2023-02-05 RX ADMIN — HEPARIN SODIUM 5000 UNITS: 5000 INJECTION INTRAVENOUS; SUBCUTANEOUS at 04:56

## 2023-02-05 RX ADMIN — CIPROFLOXACIN 1 DROP: 3 SOLUTION OPHTHALMIC at 14:00

## 2023-02-05 RX ADMIN — HEPARIN SODIUM 5000 UNITS: 5000 INJECTION INTRAVENOUS; SUBCUTANEOUS at 19:44

## 2023-02-05 RX ADMIN — ALLOPURINOL 100 MG: 100 TABLET ORAL at 17:32

## 2023-02-05 RX ADMIN — Medication 1 AMPULE: at 09:44

## 2023-02-05 RX ADMIN — CIPROFLOXACIN 1 DROP: 3 SOLUTION OPHTHALMIC at 09:34

## 2023-02-05 RX ADMIN — CASTOR OIL AND BALSAM, PERU: 788; 87 OINTMENT TOPICAL at 21:28

## 2023-02-05 RX ADMIN — CIPROFLOXACIN 1 DROP: 3 SOLUTION OPHTHALMIC at 23:35

## 2023-02-05 RX ADMIN — SODIUM CHLORIDE, PRESERVATIVE FREE 10 ML: 5 INJECTION INTRAVENOUS at 06:20

## 2023-02-05 RX ADMIN — OLANZAPINE 5 MG: 5 TABLET, ORALLY DISINTEGRATING ORAL at 21:28

## 2023-02-05 RX ADMIN — ALLOPURINOL 100 MG: 100 TABLET ORAL at 09:33

## 2023-02-05 RX ADMIN — CIPROFLOXACIN 1 DROP: 3 SOLUTION OPHTHALMIC at 04:52

## 2023-02-05 RX ADMIN — CASTOR OIL AND BALSAM, PERU: 788; 87 OINTMENT TOPICAL at 08:00

## 2023-02-05 NOTE — PROGRESS NOTES
Hospitalist Progress Note    NAME: Viktoria Stapleton   :  1950   MRN:  [id number]       Assessment / Plan:  Advanced Hodgkin lymphoma POA  - With splenomegaly lymphadenopathy  - Status postchemotherapy   - Hematology oncology team are following, input is appreciated. Systemic therapy on hold due to current illness. Would need to improve before resuming therapy. Poor prognosis per oncology discussed - pt and family aware, Palliative following  IP PT/OT eval noted- SNF recommended if family interested for reconditioning-   Stop TPN,  PO intake improving  Spoke to oncology Dr Virginia Khan, he will discuss further chemo rx in the outpt setting  PT/OT, stable for discharge    R eye Conjunctivitis  Abx eye drops    Tumor lysis syndrome POA  - Hematology oncology recommending allopurinol  - NILTON, hypercalcemia as evidence of tumor lysis syndrome    Hyperbilirubinemia  Alkaline phosphatase elevated  - Likely secondary to lymphoma/splenomegaly    Pseudo hypocalcemia  - Calcium 7.9, albumin 1.8, corrected calcium on the higher end    Aspiration pneumonia POA  Acute hypoxic respiratory failure POA- resolved, now on RA  - Continue cefepime and metronidazole  - Aspiration precautions  MBS noted - cleared for Pureed diet and thin liquids- started PO -- but pt not eating much consistently  Will stop TPN today, encourage po intake     Acute metabolic encephalopathy POA- more stable in past 24-48 hrs it seems  - Likely due to worsening metabolic status in the setting of a tumor lysis syndrome  -mentation waxing and waning. He is AAOx3 today, conversant  - MBS normal, speech therapy recommending puréed food. IP Neurology consulted per palliative recommendations ()- EEG done, pt encephalopathic but no seizures noted. No further neurology recs at this time. Appreciate neuro evaluation.     GERD  - Continue home meds    Severe protein calorie malnutrition POA  - Likely secondary to advanced lymphoma and decreased p.o. intake  - Nutrition consult input is appreciated   --s/p TPN, cont' to encourage po intake  -monitor for refeeding syndrome    Cont TPN for now till palliative follow up again     25.0 - 29.9 Overweight / Body mass index is 23.48 kg/m². Estimated discharge date: stable for discharge pending placement    Code status: DNR  Prophylaxis: Hep SQ  Recommended Disposition:  TBD     Subjective:     Patient was seen and examined. Pt confused overnight, pulled out access on collette cath. He was not agitated. He was AAOx3 this AM, conversant. Review of Systems:  Symptom Y/N Comments  Symptom Y/N Comments   Fever/Chills    Chest Pain     Poor Appetite    Edema     Cough    Abdominal Pain     Sputum    Joint Pain     SOB/SALMERON    Pruritis/Rash     Nausea/vomit    Tolerating PT/OT     Diarrhea    Tolerating Diet     Constipation    Other       Could NOT obtain due to:          Objective:     VITALS:   Last 24hrs VS reviewed since prior progress note. Most recent are:  Patient Vitals for the past 24 hrs:   Temp Pulse Resp BP SpO2   02/05/23 1112 97.3 °F (36.3 °C) 90 -- (!) 143/74 98 %   02/05/23 0710 -- 63 -- 126/81 99 %   02/05/23 0348 97.9 °F (36.6 °C) 86 22 (!) 142/70 98 %   02/04/23 2233 98.4 °F (36.9 °C) (!) 109 16 (!) 162/77 98 %   02/04/23 1915 98 °F (36.7 °C) 87 16 (!) 134/55 100 %         Intake/Output Summary (Last 24 hours) at 2/5/2023 1259  Last data filed at 2/5/2023 0600  Gross per 24 hour   Intake 2387.5 ml   Output 1000 ml   Net 1387.5 ml          I had a face to face encounter and independently examined this patient on 2/5/2023, as outlined below:  PHYSICAL EXAM:  General: Awake, alert, following commands  EENT:  Anicteric sclerae. Resp:  CTA bilaterally, no wheezing or rales. No accessory muscle use  CV:  Regular  rhythm,  No edema  GI:  Soft, Non distended, Non tender. +Bowel sounds  Neurologic:  EOMs intact. No facial asymmetry. No aphasia or slurred speech.   Symmetrical strength, Sensation grossly intact. Psych:   Poor insight. Not anxious nor agitated  Skin:  No rashes. No jaundice    Reviewed most current lab test results and cultures  YES  Reviewed most current radiology test results   YES  Review and summation of old records today    NO  Reviewed patient's current orders and MAR    YES  PMH/SH reviewed - no change compared to H&P  ________________________________________________________________________  Care Plan discussed with:    Comments   Patient x    Family      RN x    Care Manager     Consultant                        Multidiciplinary team rounds were held today with , nursing, pharmacist and clinical coordinator. Patient's plan of care was discussed; medications were reviewed and discharge planning was addressed. ________________________________________________________________________  Total time 26 mins      Comments   >50% of visit spent in counseling and coordination of care x    ________________________________________________________________________  Momo Jarvis MD     Procedures: see electronic medical records for all procedures/Xrays and details which were not copied into this note but were reviewed prior to creation of Plan. LABS:  I reviewed today's most current labs and imaging studies. Pertinent labs include:  No results for input(s): WBC, HGB, HCT, PLT, HGBEXT, HCTEXT, PLTEXT, HGBEXT, HCTEXT, PLTEXT in the last 72 hours.     Recent Labs     02/05/23  0257 02/04/23  0227 02/03/23  0517    136 140   K 3.2* 3.5 3.9    103 106   CO2 30 30 28   * 122* 112*   BUN 11 15 19   CREA 0.78 0.80 0.72   CA 7.9* 7.9* 7.8*   MG 1.6  --  1.7   PHOS 2.8 2.7 2.7         Signed: Momo Jarvis MD

## 2023-02-05 NOTE — PROGRESS NOTES
End of Shift Note    Bedside shift change report given to STARR Duncan (oncoming nurse) by Bushra Frank RN (offgoing nurse). Report included the following information SBAR, Kardex, Procedure Summary, Intake/Output, MAR, and Recent Results    Shift worked: 7p-7a     Shift summary and any significant changes:     No significant changes.      Concerns for physician to address:       Zone phone for oncoming shift:

## 2023-02-05 NOTE — PROGRESS NOTES
End of Shift Note    Bedside shift change report given to RN (oncoming nurse) by Cris Gallo (offgoing nurse). Report included the following information SBAR, Kardex, Procedure Summary, Intake/Output, MAR, and Recent Results    Shift worked: 7-7pm     Shift summary and any significant changes:     No significant changes. Pt pulled port access. Notified Dr. Sandy Scott. MD will discuss with oncology if port needs to be accessed. Peripheral IV for now. Poor PO intake. Pt consumes about 15-20% of tray(max).      Concerns for physician to address:       Zone phone for oncoming shift:

## 2023-02-05 NOTE — PROGRESS NOTES
Problem: Falls - Risk of  Goal: *Absence of Falls  Description: Document Ayah Larsen Fall Risk and appropriate interventions in the flowsheet. Outcome: Progressing Towards Goal  Note: Fall Risk Interventions:  Mobility Interventions: Bed/chair exit alarm, Patient to call before getting OOB, Utilize walker, cane, or other assistive device, Strengthening exercises (ROM-active/passive)    Mentation Interventions: Bed/chair exit alarm, Adequate sleep, hydration, pain control, Reorient patient, Update white board, Toileting rounds    Medication Interventions: Bed/chair exit alarm, Patient to call before getting OOB    Elimination Interventions: Bed/chair exit alarm, Call light in reach, Toileting schedule/hourly rounds              Problem: Pressure Injury - Risk of  Goal: *Prevention of pressure injury  Description: Document Matthew Scale and appropriate interventions in the flowsheet. Outcome: Progressing Towards Goal  Note: Pressure Injury Interventions:  Sensory Interventions: Assess changes in LOC, Float heels, Minimize linen layers, Monitor skin under medical devices, Pad between skin to skin, Pressure redistribution bed/mattress (bed type), Turn and reposition approx. every two hours (pillows and wedges if needed)    Moisture Interventions: Apply protective barrier, creams and emollients, Absorbent underpads, Check for incontinence Q2 hours and as needed, Internal/External urinary devices    Activity Interventions: Pressure redistribution bed/mattress(bed type)    Mobility Interventions: Float heels, Turn and reposition approx.  every two hours(pillow and wedges)    Nutrition Interventions: Document food/fluid/supplement intake, Discuss nutritional consult with provider, Offer support with meals,snacks and hydration    Friction and Shear Interventions: Feet elevated on foot rest, Apply protective barrier, creams and emollients, Transferring/repositioning devices                Problem: Patient Education: Go to Patient Education Activity  Goal: Patient/Family Education  Outcome: Progressing Towards Goal     Problem: Patient Education: Go to Patient Education Activity  Goal: Patient/Family Education  Outcome: Progressing Towards Goal     Problem: Patient Education: Go to Patient Education Activity  Goal: Patient/Family Education  Outcome: Progressing Towards Goal     Problem: Patient Education: Go to Patient Education Activity  Goal: Patient/Family Education  Outcome: Progressing Towards Goal

## 2023-02-06 LAB
ANION GAP SERPL CALC-SCNC: 6 MMOL/L (ref 5–15)
BUN SERPL-MCNC: 9 MG/DL (ref 6–20)
BUN/CREAT SERPL: 11 (ref 12–20)
CALCIUM SERPL-MCNC: 8.1 MG/DL (ref 8.5–10.1)
CHLORIDE SERPL-SCNC: 103 MMOL/L (ref 97–108)
CO2 SERPL-SCNC: 30 MMOL/L (ref 21–32)
COMMENT, HOLDF: NORMAL
CREAT SERPL-MCNC: 0.79 MG/DL (ref 0.7–1.3)
GLUCOSE BLD STRIP.AUTO-MCNC: 122 MG/DL (ref 65–117)
GLUCOSE SERPL-MCNC: 125 MG/DL (ref 65–100)
MAGNESIUM SERPL-MCNC: 1.7 MG/DL (ref 1.6–2.4)
PHOSPHATE SERPL-MCNC: 2.9 MG/DL (ref 2.6–4.7)
POTASSIUM SERPL-SCNC: 3.1 MMOL/L (ref 3.5–5.1)
SAMPLES BEING HELD,HOLD: NORMAL
SERVICE CMNT-IMP: ABNORMAL
SODIUM SERPL-SCNC: 139 MMOL/L (ref 136–145)

## 2023-02-06 PROCEDURE — 36415 COLL VENOUS BLD VENIPUNCTURE: CPT

## 2023-02-06 PROCEDURE — 80048 BASIC METABOLIC PNL TOTAL CA: CPT

## 2023-02-06 PROCEDURE — 74011250637 HC RX REV CODE- 250/637: Performed by: INTERNAL MEDICINE

## 2023-02-06 PROCEDURE — 97535 SELF CARE MNGMENT TRAINING: CPT

## 2023-02-06 PROCEDURE — 65660000001 HC RM ICU INTERMED STEPDOWN

## 2023-02-06 PROCEDURE — 74011250637 HC RX REV CODE- 250/637: Performed by: NURSE PRACTITIONER

## 2023-02-06 PROCEDURE — 74011250636 HC RX REV CODE- 250/636

## 2023-02-06 PROCEDURE — 97530 THERAPEUTIC ACTIVITIES: CPT

## 2023-02-06 PROCEDURE — 84100 ASSAY OF PHOSPHORUS: CPT

## 2023-02-06 PROCEDURE — 97116 GAIT TRAINING THERAPY: CPT

## 2023-02-06 PROCEDURE — 74011000250 HC RX REV CODE- 250: Performed by: STUDENT IN AN ORGANIZED HEALTH CARE EDUCATION/TRAINING PROGRAM

## 2023-02-06 PROCEDURE — 83735 ASSAY OF MAGNESIUM: CPT

## 2023-02-06 PROCEDURE — 74011250636 HC RX REV CODE- 250/636: Performed by: INTERNAL MEDICINE

## 2023-02-06 PROCEDURE — 74011250636 HC RX REV CODE- 250/636: Performed by: NURSE PRACTITIONER

## 2023-02-06 PROCEDURE — 74011250637 HC RX REV CODE- 250/637: Performed by: SURGERY

## 2023-02-06 PROCEDURE — 82962 GLUCOSE BLOOD TEST: CPT

## 2023-02-06 RX ORDER — MAGNESIUM SULFATE HEPTAHYDRATE 40 MG/ML
2 INJECTION, SOLUTION INTRAVENOUS ONCE
Status: COMPLETED | OUTPATIENT
Start: 2023-02-06 | End: 2023-02-06

## 2023-02-06 RX ORDER — LIDOCAINE 4 G/100G
1 PATCH TOPICAL EVERY 24 HOURS
Qty: 5 PATCH | Refills: 0 | Status: SHIPPED
Start: 2023-02-06

## 2023-02-06 RX ORDER — OLANZAPINE 5 MG/1
5 TABLET, ORALLY DISINTEGRATING ORAL
Qty: 30 TABLET | Refills: 0 | Status: SHIPPED
Start: 2023-02-06 | End: 2023-03-08

## 2023-02-06 RX ADMIN — CIPROFLOXACIN 1 DROP: 3 SOLUTION OPHTHALMIC at 03:26

## 2023-02-06 RX ADMIN — CIPROFLOXACIN 1 DROP: 3 SOLUTION OPHTHALMIC at 14:45

## 2023-02-06 RX ADMIN — DEXTROSE MONOHYDRATE 75 ML/HR: 50 INJECTION, SOLUTION INTRAVENOUS at 19:55

## 2023-02-06 RX ADMIN — CIPROFLOXACIN 1 DROP: 3 SOLUTION OPHTHALMIC at 23:47

## 2023-02-06 RX ADMIN — ONDANSETRON 4 MG: 2 INJECTION INTRAMUSCULAR; INTRAVENOUS at 10:48

## 2023-02-06 RX ADMIN — HEPARIN SODIUM 5000 UNITS: 5000 INJECTION INTRAVENOUS; SUBCUTANEOUS at 20:14

## 2023-02-06 RX ADMIN — Medication 1 AMPULE: at 10:46

## 2023-02-06 RX ADMIN — SODIUM CHLORIDE, PRESERVATIVE FREE 10 ML: 5 INJECTION INTRAVENOUS at 18:45

## 2023-02-06 RX ADMIN — Medication 1 AMPULE: at 21:48

## 2023-02-06 RX ADMIN — CASTOR OIL AND BALSAM, PERU: 788; 87 OINTMENT TOPICAL at 21:48

## 2023-02-06 RX ADMIN — CIPROFLOXACIN 1 DROP: 3 SOLUTION OPHTHALMIC at 19:55

## 2023-02-06 RX ADMIN — POTASSIUM BICARBONATE 40 MEQ: 782 TABLET, EFFERVESCENT ORAL at 10:46

## 2023-02-06 RX ADMIN — ALLOPURINOL 100 MG: 100 TABLET ORAL at 10:48

## 2023-02-06 RX ADMIN — CASTOR OIL AND BALSAM, PERU: 788; 87 OINTMENT TOPICAL at 10:47

## 2023-02-06 RX ADMIN — SODIUM CHLORIDE, PRESERVATIVE FREE 10 ML: 5 INJECTION INTRAVENOUS at 21:48

## 2023-02-06 RX ADMIN — DEXTROSE MONOHYDRATE 75 ML/HR: 50 INJECTION, SOLUTION INTRAVENOUS at 01:48

## 2023-02-06 RX ADMIN — CIPROFLOXACIN 1 DROP: 3 SOLUTION OPHTHALMIC at 10:47

## 2023-02-06 RX ADMIN — HEPARIN SODIUM 5000 UNITS: 5000 INJECTION INTRAVENOUS; SUBCUTANEOUS at 12:10

## 2023-02-06 RX ADMIN — ONDANSETRON 4 MG: 2 INJECTION INTRAMUSCULAR; INTRAVENOUS at 18:45

## 2023-02-06 RX ADMIN — CIPROFLOXACIN 1 DROP: 3 SOLUTION OPHTHALMIC at 08:17

## 2023-02-06 RX ADMIN — ALLOPURINOL 100 MG: 100 TABLET ORAL at 19:03

## 2023-02-06 RX ADMIN — MAGNESIUM SULFATE HEPTAHYDRATE 2 G: 40 INJECTION, SOLUTION INTRAVENOUS at 10:46

## 2023-02-06 RX ADMIN — HEPARIN SODIUM 5000 UNITS: 5000 INJECTION INTRAVENOUS; SUBCUTANEOUS at 03:26

## 2023-02-06 RX ADMIN — CIPROFLOXACIN 1 DROP: 3 SOLUTION OPHTHALMIC at 18:44

## 2023-02-06 RX ADMIN — CIPROFLOXACIN 1 DROP: 3 SOLUTION OPHTHALMIC at 21:48

## 2023-02-06 RX ADMIN — OLANZAPINE 5 MG: 5 TABLET, ORALLY DISINTEGRATING ORAL at 21:48

## 2023-02-06 RX ADMIN — CIPROFLOXACIN 1 DROP: 3 SOLUTION OPHTHALMIC at 05:50

## 2023-02-06 NOTE — PROGRESS NOTES
Problem: Mobility Impaired (Adult and Pediatric)  Goal: *Acute Goals and Plan of Care (Insert Text)  Description: FUNCTIONAL STATUS PRIOR TO ADMISSION: Per wife report, pt with recent decline requiring transition to first floor set up and assist with ADLs. Wife states that pt uses SPC for amb, but would benefit from RW which he owns, but has been reluctant to use. Of note, pt diagnosed with Hodgkin's lymphoma Dec 2022 and is now starting chemo. HOME SUPPORT PRIOR TO ADMISSION: The patient lived with wife who provides assist along with children. Physical Therapy goals  Initiated 1/18/2023, Reviewed 2/2/23, goals still remain appropriate  1. Patient will move from supine to sit and sit to supine  in bed with independence within 7 day(s). 2.  Patient will transfer from bed to chair and chair to bed with supervision/set-up using the least restrictive device within 7 day(s). 3.  Patient will perform sit to stand with supervision/set-up within 7 day(s). 4.  Patient will ambulate with supervision/set-up for 50 feet with the least restrictive device within 7 day(s). Physical Therapy Goals  Reviewed 1/26/2023 and remain appropriate for the next 7 days  Initiated 1/18/2023  1. Patient will move from supine to sit and sit to supine  in bed with independence within 7 day(s). 2.  Patient will transfer from bed to chair and chair to bed with supervision/set-up using the least restrictive device within 7 day(s). 3.  Patient will perform sit to stand with supervision/set-up within 7 day(s). 4.  Patient will ambulate with supervision/set-up for 50 feet with the least restrictive device within 7 day(s).      Outcome: Progressing Towards Goal  PHYSICAL THERAPY TREATMENT  Patient: Khang Rothman (44 y.o. male)  Date: 2/6/2023  Diagnosis: [facility] [C81.90] <principal problem not specified>  Procedure(s) (LRB):  INFUSAPORT INSERTION (N/A) 19 Days Post-Op  Precautions: Bed Alarm, Fall, Aspiration (telesitter;sun downs; chemo; L chest wall port; 3 sm chronic CVAs)  Chart, physical therapy assessment, plan of care and goals were reviewed. ASSESSMENT  Patient continues with skilled PT services and is progressing towards goals. Pt was received in supine with wife at bedside and cleared by nursing to mobilize. Pt needs to be constantly redirected and increased cues. Came tot he EOB with overall mod A. Provided RW and stood with CGA, needed verbal cues for hand placement. He was able to take a few steps to get to the chair with overall CGA and shuffling steps. He was left sitting up with LEs elevated. Vitals stable. Current Level of Function Impacting Discharge (mobility/balance): CGA    Other factors to consider for discharge:          PLAN :  Patient continues to benefit from skilled intervention to address the above impairments. Continue treatment per established plan of care. to address goals. Recommendation for discharge: (in order for the patient to meet his/her long term goals)  Therapy up to 5 days/week in SNF setting    This discharge recommendation:  Has been made in collaboration with the attending provider and/or case management    IF patient discharges home will need the following DME: to be determined (TBD)       SUBJECTIVE:   Patient stated it feels good to sit up.     OBJECTIVE DATA SUMMARY:   Critical Behavior:  Neurologic State: Confused  Orientation Level: Oriented to person, Oriented to place, Disoriented to situation  Cognition: Memory loss, Follows commands  Safety/Judgement: Decreased awareness of environment, Decreased awareness of need for assistance, Decreased awareness of need for safety, Decreased insight into deficits  Functional Mobility Training:  Bed Mobility:  Rolling: Contact guard assistance  Supine to Sit: Moderate assistance     Scooting:  Moderate assistance        Transfers:  Sit to Stand: Contact guard assistance  Stand to Sit: Contact guard assistance Balance:  Sitting: Impaired  Sitting - Static: Good (unsupported)  Sitting - Dynamic: Fair (occasional)  Standing: Impaired  Standing - Static: Fair;Constant support  Standing - Dynamic : Fair;Constant support  Ambulation/Gait Training:  Distance (ft): 5 Feet (ft)  Assistive Device: Gait belt;Walker, rolling  Ambulation - Level of Assistance: Contact guard assistance        Gait Abnormalities: Decreased step clearance;Shuffling gait              Speed/Briana: Pace decreased (<100 feet/min); Shuffled  Step Length: Left shortened;Right shortened                Activity Tolerance:   Good    After treatment patient left in no apparent distress:   Sitting in chair, Call bell within reach, Bed / chair alarm activated, and Caregiver / family present    COMMUNICATION/COLLABORATION:   The patients plan of care was discussed with: Occupational therapist and Registered nurse.      Royal Audelia PT, DPT   Time Calculation: 24 mins

## 2023-02-06 NOTE — PROGRESS NOTES
Transition of Care Plan:    RUR:10%  Disposition: [facility] - authorization pending  If SNF or IPR: Date FOC offered:2/2/23  Date FOC received:2/3/23  Date authorization started with reference number:  Date authorization received and expires:  Accepting facility:  Follow up appointments:Specialist  DME needed:per rehab  Transportation at [address] [place] [address] or means to access home:    wife    IM Medicare Letter:delivered 2/6/23  Is patient a [place] and connected with the [facility]? If yes, was [employer] transfer form completed and [facility] notified? Caregiver Contact:n/a  Discharge Caregiver contacted prior to discharge? [facility] - [phone]  Care Conference needed?:     no      CM spoke with Hugh Camacho wife - explained that [facility] denied patient and that [facility] can accept patient once authorization is approved. CM made [facility] supervisor aware of need to start authorization today. Patient will need [facility] transport. Spoke with Natalia macias and [facility] are willing to accept patient.   ENE La

## 2023-02-06 NOTE — PROGRESS NOTES
Hospitalist Progress Note    NAME: Eulah Eisenmenger   :  1950   MRN:  [id number]       Assessment / Plan:  Advanced Hodgkin lymphoma POA  - With splenomegaly lymphadenopathy  - Status postchemotherapy   - Hematology oncology team are following, input is appreciated. Systemic therapy on hold due to current illness. Would need to improve before resuming therapy. Poor prognosis per oncology discussed - pt and family aware, Palliative following  IP PT/OT eval noted- SNF recommended if family interested for reconditioning-   Stop TPN,  PO intake improving  Spoke to oncology Dr Vandana Waldron, he will discuss further chemo rx in the outpt setting  PT/OT, stable for discharge    R eye Conjunctivitis  Abx eye drops    Tumor lysis syndrome POA  - Hematology oncology recommending allopurinol  - NILTON, hypercalcemia as evidence of tumor lysis syndrome    Hyperbilirubinemia  Alkaline phosphatase elevated  - Likely secondary to lymphoma/splenomegaly    Pseudo hypocalcemia  - Calcium 7.9, albumin 1.8, corrected calcium on the higher end    Aspiration pneumonia POA  Acute hypoxic respiratory failure POA- resolved, now on RA  - Continue cefepime and metronidazole  - Aspiration precautions  MBS noted - cleared for Pureed diet and thin liquids- started PO -- but pt not eating much consistently  Will stop TPN today, encourage po intake     Acute metabolic encephalopathy POA- more stable in past 24-48 hrs it seems  - Likely due to worsening metabolic status in the setting of a tumor lysis syndrome  -mentation waxing and waning. He is AAOx3 today, conversant  - MBS normal, speech therapy recommending puréed food. IP Neurology consulted per palliative recommendations ()- EEG done, pt encephalopathic but no seizures noted. No further neurology recs at this time. Appreciate neuro evaluation.     GERD  - Continue home meds    Severe protein calorie malnutrition POA  - Likely secondary to advanced lymphoma and decreased p.o. intake  - Nutrition consult input is appreciated   --s/p TPN, cont' to encourage po intake  -monitor for refeeding syndrome    Cont TPN for now till palliative follow up again     25.0 - 29.9 Overweight / Body mass index is 23.48 kg/m². Estimated discharge date: stable for discharge pending placement    Code status: DNR  Prophylaxis: Hep SQ  Recommended Disposition:  TBD     Subjective:     Patient was seen and examined. NAD. Not confuse today. Review of Systems:  Symptom Y/N Comments  Symptom Y/N Comments   Fever/Chills    Chest Pain     Poor Appetite    Edema     Cough    Abdominal Pain     Sputum    Joint Pain     SOB/SALMERON    Pruritis/Rash     Nausea/vomit    Tolerating PT/OT     Diarrhea    Tolerating Diet     Constipation    Other       Could NOT obtain due to:          Objective:     VITALS:   Last 24hrs VS reviewed since prior progress note. Most recent are:  Patient Vitals for the past 24 hrs:   Temp Pulse Resp BP SpO2   02/06/23 1135 -- 98 -- 136/65 --   02/06/23 1132 97.6 °F (36.4 °C) (!) 108 18 119/69 98 %   02/06/23 1131 -- (!) 117 -- 119/69 --   02/06/23 1130 -- (!) 117 -- 129/83 --   02/06/23 0759 97.3 °F (36.3 °C) 97 18 (!) 148/80 96 %   02/06/23 0749 -- 96 -- (!) 151/90 97 %   02/06/23 0311 98.1 °F (36.7 °C) (!) 59 18 (!) 164/76 97 %   02/05/23 2239 98.3 °F (36.8 °C) 80 18 (!) 141/81 98 %   02/05/23 1922 97.5 °F (36.4 °C) 88 18 135/75 99 %   02/05/23 1456 97.5 °F (36.4 °C) 87 -- (!) 145/64 97 %         Intake/Output Summary (Last 24 hours) at 2/6/2023 1307  Last data filed at 2/6/2023 0285  Gross per 24 hour   Intake 360 ml   Output 1600 ml   Net -1240 ml          I had a face to face encounter and independently examined this patient on 2/6/2023, as outlined below:  PHYSICAL EXAM:  General: Awake, alert, following commands  EENT:  Anicteric sclerae. Resp:  CTA bilaterally, no wheezing or rales.   No accessory muscle use  CV:  Regular  rhythm,  No edema  GI:  Soft, Non distended, Non tender. +Bowel sounds  Neurologic:  EOMs intact. No facial asymmetry. No aphasia or slurred speech. Symmetrical strength, Sensation grossly intact. Psych:   Poor insight. Not anxious nor agitated  Skin:  No rashes. No jaundice    Reviewed most current lab test results and cultures  YES  Reviewed most current radiology test results   YES  Review and summation of old records today    NO  Reviewed patient's current orders and MAR    YES  PMH/SH reviewed - no change compared to H&P  ________________________________________________________________________  Care Plan discussed with:    Comments   Patient x    Family      RN x    Care Manager     Consultant                        Multidiciplinary team rounds were held today with , nursing, pharmacist and clinical coordinator. Patient's plan of care was discussed; medications were reviewed and discharge planning was addressed. ________________________________________________________________________  Total time 26 mins      Comments   >50% of visit spent in counseling and coordination of care x    ________________________________________________________________________  Beck Vyas MD     Procedures: see electronic medical records for all procedures/Xrays and details which were not copied into this note but were reviewed prior to creation of Plan. LABS:  I reviewed today's most current labs and imaging studies. Pertinent labs include:  No results for input(s): WBC, HGB, HCT, PLT, HGBEXT, HCTEXT, PLTEXT, HGBEXT, HCTEXT, PLTEXT in the last 72 hours.     Recent Labs     02/06/23  0326 02/05/23  0257 02/04/23  0227    137 136   K 3.1* 3.2* 3.5    103 103   CO2 30 30 30   * 122* 122*   BUN 9 11 15   CREA 0.79 0.78 0.80   CA 8.1* 7.9* 7.9*   MG 1.7 1.6  --    PHOS 2.9 2.8 2.7         Signed: Beck Vyas MD

## 2023-02-06 NOTE — PROGRESS NOTES
Palliative Medicine SW Note    LCSW visited patient who was sitting up in chair with legs elevated, lunch tray in front of him which he had eaten very little from so far. Spouse was present and encouraging him to eat and drink. He was alert and oriented x2 with pleasant confusion and easily redirected. This writer provided calm, caring presence, encouraged him to eat, and to engage in life review with his wife about their dates where they fed the squirrels at [facility]. Spouse is awaiting word for when patient will be discharged to [facility].  LCSW affirmed her care of patient and encouraged her self care. She expressed appreciation for Palliative team support during hospitalization. Mrs. Dipak Sommers signed a DDNR which will be signed by MD and scanned to his chart. Thank you for including Palliative team in the care of Mr. Loyde Spatz.     Douglas Gamboa, [facility]  Palliative Medicine [phone] ([phone])

## 2023-02-06 NOTE — DISCHARGE SUMMARY
Discharge Summary      Name: Roseline Martinez  [id number]  YOB: 1950 (Age: 67 y.o.)   Date of Admission: 1/17/2023  Date of Discharge: 2/6/2023  Attending Physician: Kerri Vogt MD    Discharge Diagnosis:   Advanced Hodgkin lymphoma    R eye Conjunctivitis   Tumor lysis syndrome   Hyperbilirubinemia  Alkaline phosphatase  Pseudo hypocalcemia  Aspiration pneumonia POA  Acute hypoxic respiratory failure    Acute metabolic encephalopathy   GERD  Severe protein calorie malnutrition     Consultations:  IP CONSULT TO GENERAL SURGERY  IP CONSULT TO HOSPITALIST  IP CONSULT TO PALLIATIVE CARE - PROVIDER  IP CONSULT TO PRIMARY CARE PROVIDER  IP CONSULT TO NEUROLOGY      Brief Admission History/Reason for Admission Per Mendoza Suresh MD:     [address] Problems:   Advanced Hodgkin lymphoma POA with splenomegaly lymphadenopathy. Pt is status postchemotherapy January 20. Systemic therapy on hold due to current illness. Would need to improve before resuming therapy. Poor prognosis per oncology discussed - pt and family aware, Palliative evaluated pt. For now, family wants to pursue treatment if available. Hospice was discussed, however they are not ready at the moment. Pt was placed on TPN while inpt due to poor po intake. Now weaned off. Liberalize diet. Spoke to oncology Dr Kerry Vyas, he will discuss further chemo rx in the outpt setting  Today patient is seen and examined, his vital signs are stable, his lab work is stable and he was cleared by all consultant parties including oncology (spoke to Dr Kerry Vyas) to be discharged for outpatient follow-up.       R eye Conjunctivitis  S/p Abx eye drops     Tumor lysis syndrome POA  Hematology oncology evaluated, treated with allopurinol  NILTON, hypercalcemia as evidence of tumor lysis syndrome     Hyperbilirubinemia  Alkaline phosphatase elevated  Likely secondary to lymphoma/splenomegaly     Pseudo hypocalcemia  Calcium 7.9, albumin 1.8, corrected calcium on the higher end     Aspiration pneumonia POA  Acute hypoxic respiratory failure POA  Completed IV cefepime and metronidazole. MBS noted 1/26- cleared for Pureed diet and thin liquids- started PO 1/26-- but pt not eating much consistently  He was on TPN short term which was weaned off when he started to have incr po intake. Will cont' to encourage po intake outpt. He is stable on RA. Acute metabolic encephalopathy POA  Likely due to worsening metabolic status in the setting of a tumor lysis syndrome. He is AAOx3 during round today but mentation does seem to wax and wane. MBS normal, speech therapy recommending puréed food. IP Neurology consulted per palliative recommendations (1/30)- EEG done, pt encephalopathic but no seizures noted. No further neurology recs at this time. Appreciate neuro evaluation. GERD  Continue home meds    Severe protein calorie malnutrition POA  Likely secondary to advanced lymphoma and decreased p.o. intake   Nutrition consult input is appreciated  S/p TPN, cont' to encourage po intake       Discharge Exam:  Patient seen and examined by me on discharge day. Pertinent Findings:  Visit Vitals  BP (!) 148/80   Pulse 97   Temp 98.1 °F (36.7 °C)   Resp 18   Ht 5' 5\" (1.651 m)   Wt 64 kg (141 lb 1.5 oz)   SpO2 96%   BMI 23.48 kg/m²     Gen:    Not in distress  Chest: Clear lungs  CVS:   Regular rhythm. No edema  Abd:  Soft, not distended, not tender    Discharge/Recent Laboratory Results:  Recent Labs     02/06/23  0326      K 3.1*      CO2 30   BUN 9   *   CA 8.1*   PHOS 2.9   MG 1.7     No results for input(s): HGB, HCT, WBC, PLT, HGBEXT, HCTEXT, PLTEXT in the last 72 hours. Discharge Medications:  Current Discharge Medication List        START taking these medications    Details   lidocaine 4 % patch 1 Patch by TransDERmal route every twenty-four (24) hours.   Qty: 5 Patch, Refills: 0  Start date: 2/6/2023      OLANZapine (ZyPREXA zydis) 5 mg disintegrating tablet Take 1 Tablet by mouth nightly for 30 days. Qty: 30 Tablet, Refills: 0  Start date: 2/6/2023, End date: 3/8/2023           CONTINUE these medications which have NOT CHANGED    Details   triamcinolone acetonide (KENALOG) 0.1 % topical cream Apply  to affected area two (2) times daily as needed for Skin Irritation. use thin layer      ondansetron (ZOFRAN ODT) 4 mg disintegrating tablet Take 1 Tablet by mouth every eight (8) hours as needed for Nausea or Vomiting. Qty: 30 Tablet, Refills: 1    Associated Diagnoses: Nausea and vomiting, unspecified vomiting type      cetirizine-pseudoePHEDrine (ZyrTEC-D) 5-120 mg Tb12 Take 1 Tab by mouth two (2) times a day.   Qty: 60 Tab, Refills: 1           STOP taking these medications       allopurinoL (ZYLOPRIM) 100 mg tablet Comments:   Reason for Stopping:         predniSONE (DELTASONE) 10 mg tablet Comments:   Reason for Stopping:         bumetanide (BUMEX) 2 mg tablet Comments:   Reason for Stopping:                   DISPOSITION:    Home with Family:    Home with HH/PT/OT/RN:    SNF/LTC: x   BRODY:    OTHER:          Follow up with:   PCP : Brandy Humphrey MD  Follow-up Information       Follow up With Specialties Details Why Marco Haro MD Hematology and Oncology, Internal Medicine Physician, Hematology Physician, Oncology Follow up in 1 week(s)  [address]  [facility] [address]  [address] [phone]                Total time in minutes spent coordinating this discharge (includes going over instructions, follow-up, prescriptions, and preparing report for sign off to her PCP) :  35minutes

## 2023-02-06 NOTE — PROGRESS NOTES
The Palliative Medicine team was consulted as part of your / your loved one's care in the hospital. Our team is a supportive service; we strive to relieve suffering and improve quality of life. You identified the following goal(s) as your main focus for healthcare: Patient/Health Care Proxy Stated Goals: Prolong life    We reviewed advance care planning information, which includes the following:    Advance Care Planning 1/18/2023   Patient's Healthcare Decision Maker is: -   Confirm Advance Directive None   Patient Would Like to Complete Advance Directive No       We reviewed / discussed your code status as: DNR        [facility] means do NOT perform CPR in the event of cardiac arrest     Because of the importance of this information, we are providing you with a printed copy to share with other healthcare providers after this hospitalization is complete. If any of the above information is incomplete or incorrect, please contact Radha Barbosa LCSW or Dr. Oliver Segundo of the Palliative Medicine team at [phone].

## 2023-02-06 NOTE — PROGRESS NOTES
Problem: Self Care Deficits Care Plan (Adult)  Goal: *Acute Goals and Plan of Care (Insert Text)  Description: FUNCTIONAL STATUS PRIOR TO ADMISSION: Pt has had recent decline in function, moving to a first floor setup in his home. Patient uses [facility] for functional mobility, wife reports he has a RW but has not been using it. He receives assistance for ADLs as needed from wife. HOME SUPPORT: The patient lived with his wife who provides assistance. Occupational Therapy Goals  Initiated 1/18/2023; Weekly Re-assessment 1/26/2023; Weekly reassessment 2/1/2023- All goals continued  1. Patient will perform grooming with supervision/set-up in standing within 7 day(s). 2.  Patient will perform upper body dressing with supervision/set-up within 7 day(s). 3.  Patient will perform toilet transfers with supervision/set-up within 7 day(s). 4.  Patient will perform all aspects of toileting with supervision/set-up within 7 day(s). 5.  Patient will participate in upper extremity therapeutic exercise/activities with independence for 5 minutes within 7 day(s). 6.  Patient will utilize energy conservation techniques during functional activities with verbal cues within 7 day(s). Outcome: Progressing Towards Goal  OCCUPATIONAL THERAPY TREATMENT  Patient: Maria L Avendano (50 y.o. male)  Date: 2/6/2023  Diagnosis: Hodgkin lymphoma ([facility] [address] [C81.90] <principal problem not specified>  Procedure(s) (LRB):  INFUSAPORT INSERTION (N/A) 19 Days Post-Op  Precautions: Bed Alarm, Fall, Aspiration (telesitter;sun downs; chemo; L chest wall port; 3 sm chronic CVAs)  Chart, occupational therapy assessment, plan of care, and goals were reviewed. ASSESSMENT  Patient continues with skilled OT services and is progressing towards goals. Pt received supine in bed, agreeable to participate. He required frequent multimodal cues for redirection and attention to task throughout session.  Pt transferred supine>sit with mod A and demo'd good sitting balance EOB. Required total A to don socks due to LE weakness and decreased activity tolerance. Using RW pt stood and performed stand pivot transfer to chair with CGA-min A and cues for safe transfer technique and hand placement. He performed grooming ADL in sitting with setup and remained in chair at end of session with needs met and VSS. Patient continues to benefit from skilled intervention to address the above impairments. Continue to recommend SNF rehab at discharge. Current Level of Function Impacting Discharge (ADLs): CGA-mod A transfers, setup-total A ADLs    Other factors to consider for discharge: fall risk, below PLOF         PLAN :  Patient continues to benefit from skilled intervention to address the above impairments. Continue treatment per established plan of care to address goals. Recommendation for discharge: (in order for the patient to meet his/her long term goals)  Therapy up to 5 days/week in SNF setting    This discharge recommendation:  Has been made in collaboration with the attending provider and/or case management    IF patient discharges home will need the following DME: TBD       SUBJECTIVE:   Patient stated It feels good to sit up.     OBJECTIVE DATA SUMMARY:   Cognitive/Behavioral Status:  Neurologic State: Confused  Orientation Level: Oriented to person;Oriented to place; Disoriented to situation  Cognition: Memory loss; Follows commands             Functional Mobility and Transfers for ADLs:  Bed Mobility:  Rolling: Contact guard assistance  Supine to Sit: Moderate assistance  Scooting:  Moderate assistance    Transfers:  Sit to Stand: Contact guard assistance          Balance:  Sitting: Impaired  Sitting - Static: Good (unsupported)  Sitting - Dynamic: Fair (occasional)  Standing: Impaired  Standing - Static: Fair;Constant support  Standing - Dynamic : Fair;Constant support    ADL Intervention:       Grooming  Position Performed: Seated in chair  Washing Hands: Set-up          Lower Body Dressing Assistance  Socks: Total assistance (dependent)    Activity Tolerance:   Good    After treatment patient left in no apparent distress:   Sitting in chair, Call bell within reach, Bed / chair alarm activated, and Caregiver / family present    COMMUNICATION/COLLABORATION:   The patients plan of care was discussed with: Physical therapist and Registered nurse.      Leslye Hammans, OT  Time Calculation: 23 mins

## 2023-02-06 NOTE — PROGRESS NOTES
Pharmacist Discharge Medication Reconciliation      Significant PMH:   Past Medical History:   Diagnosis Date    Allergic rhinitis, cause unspecified 12/11/2013    Arthritis     knees    Asthma     as a child    Eczema     on lower back waistline on the back    Environmental allergies     GERD (gastroesophageal reflux disease)     occastionally but resolved since 60 pound weight loss    Hodgkin's lymphoma ([facility] 2023    Hypertension     Psychiatric disorder     anxiety and depression     Encounter Diagnoses:   Encounter Diagnoses   Name Primary? Other classical Hodgkin lymphoma, unspecified body region ([facility] [C81.70 (ICD-10-CM)] Yes    Tumor lysis syndrome [E88.3 (ICD-10-CM)]     Hodgkin lymphoma, unspecified Hodgkin lymphoma type, unspecified body region [facility])     Delirium [R41.0 (ICD-10-CM)]     Palliative care encounter [Z51.5 (ICD-10-CM)]     Goals of care, counseling/discussion [C28.92 (ICD-10-CM)]     Severe protein-calorie malnutrition ([facility] K[place].Lorna (ICD-10-CM)]     Encephalopathy [G93.40 (ICD-10-CM)]      Allergies: Codeine and Pcn [penicillins]    Admission date: 1/17/2023     Discharge Medications:   Current Discharge Medication List        START taking these medications    Details   lidocaine 4 % patch 1 Patch by TransDERmal route every twenty-four (24) hours. Qty: 5 Patch, Refills: 0  Start date: 2/6/2023      OLANZapine (ZyPREXA zydis) 5 mg disintegrating tablet Take 1 Tablet by mouth nightly for 30 days. Qty: 30 Tablet, Refills: 0  Start date: 2/6/2023, End date: 3/8/2023           CONTINUE these medications which have NOT CHANGED    Details   triamcinolone acetonide (KENALOG) 0.1 % topical cream Apply  to affected area two (2) times daily as needed for Skin Irritation. use thin layer      ondansetron (ZOFRAN ODT) 4 mg disintegrating tablet Take 1 Tablet by mouth every eight (8) hours as needed for Nausea or Vomiting.   Qty: 30 Tablet, Refills: 1    Associated Diagnoses: Nausea and vomiting, unspecified vomiting type      cetirizine-pseudoePHEDrine (ZyrTEC-D) 5-120 mg Tb12 Take 1 Tab by mouth two (2) times a day. Qty: 60 Tab, Refills: 1           STOP taking these medications       allopurinoL (ZYLOPRIM) 100 mg tablet Comments:   Reason for Stopping:         predniSONE (DELTASONE) 10 mg tablet Comments:   Reason for Stopping:         bumetanide (BUMEX) 2 mg tablet Comments:   Reason for Stopping:               The patient's chart, MAR and AVS were reviewed by Nara William [address] [place] [address].     Discharging Provider: Crispin Shell MD     Thank you,     Nara William, [address] [place] [address]

## 2023-02-07 VITALS
TEMPERATURE: 97.3 F | WEIGHT: 141.09 LBS | BODY MASS INDEX: 23.51 KG/M2 | HEART RATE: 84 BPM | OXYGEN SATURATION: 98 % | RESPIRATION RATE: 16 BRPM | HEIGHT: 65 IN | DIASTOLIC BLOOD PRESSURE: 69 MMHG | SYSTOLIC BLOOD PRESSURE: 127 MMHG

## 2023-02-07 LAB
ANION GAP SERPL CALC-SCNC: 3 MMOL/L (ref 5–15)
BUN SERPL-MCNC: 8 MG/DL (ref 6–20)
BUN/CREAT SERPL: 10 (ref 12–20)
CALCIUM SERPL-MCNC: 8 MG/DL (ref 8.5–10.1)
CHLORIDE SERPL-SCNC: 102 MMOL/L (ref 97–108)
CO2 SERPL-SCNC: 30 MMOL/L (ref 21–32)
CREAT SERPL-MCNC: 0.8 MG/DL (ref 0.7–1.3)
GLUCOSE SERPL-MCNC: 126 MG/DL (ref 65–100)
MAGNESIUM SERPL-MCNC: 2.1 MG/DL (ref 1.6–2.4)
PHOSPHATE SERPL-MCNC: 2.7 MG/DL (ref 2.6–4.7)
POTASSIUM SERPL-SCNC: 3.4 MMOL/L (ref 3.5–5.1)
SODIUM SERPL-SCNC: 135 MMOL/L (ref 136–145)

## 2023-02-07 PROCEDURE — 74011250636 HC RX REV CODE- 250/636

## 2023-02-07 PROCEDURE — 74011250637 HC RX REV CODE- 250/637: Performed by: NURSE PRACTITIONER

## 2023-02-07 PROCEDURE — 84100 ASSAY OF PHOSPHORUS: CPT

## 2023-02-07 PROCEDURE — 74011250636 HC RX REV CODE- 250/636: Performed by: INTERNAL MEDICINE

## 2023-02-07 PROCEDURE — 36415 COLL VENOUS BLD VENIPUNCTURE: CPT

## 2023-02-07 PROCEDURE — 74011250636 HC RX REV CODE- 250/636: Performed by: NURSE PRACTITIONER

## 2023-02-07 PROCEDURE — 80048 BASIC METABOLIC PNL TOTAL CA: CPT

## 2023-02-07 PROCEDURE — 74011000250 HC RX REV CODE- 250: Performed by: STUDENT IN AN ORGANIZED HEALTH CARE EDUCATION/TRAINING PROGRAM

## 2023-02-07 PROCEDURE — 83735 ASSAY OF MAGNESIUM: CPT

## 2023-02-07 PROCEDURE — 74011250637 HC RX REV CODE- 250/637: Performed by: SURGERY

## 2023-02-07 PROCEDURE — 97535 SELF CARE MNGMENT TRAINING: CPT

## 2023-02-07 RX ORDER — ALLOPURINOL 100 MG/1
100 TABLET ORAL 2 TIMES DAILY
Qty: 60 TABLET | Refills: 0 | Status: SHIPPED
Start: 2023-02-07

## 2023-02-07 RX ADMIN — CIPROFLOXACIN 1 DROP: 3 SOLUTION OPHTHALMIC at 03:45

## 2023-02-07 RX ADMIN — Medication 1 AMPULE: at 09:37

## 2023-02-07 RX ADMIN — SODIUM CHLORIDE, PRESERVATIVE FREE 10 ML: 5 INJECTION INTRAVENOUS at 05:35

## 2023-02-07 RX ADMIN — HEPARIN SODIUM 5000 UNITS: 5000 INJECTION INTRAVENOUS; SUBCUTANEOUS at 04:13

## 2023-02-07 RX ADMIN — HEPARIN SODIUM 5000 UNITS: 5000 INJECTION INTRAVENOUS; SUBCUTANEOUS at 12:14

## 2023-02-07 RX ADMIN — CIPROFLOXACIN 1 DROP: 3 SOLUTION OPHTHALMIC at 05:36

## 2023-02-07 RX ADMIN — ALLOPURINOL 100 MG: 100 TABLET ORAL at 10:08

## 2023-02-07 RX ADMIN — ONDANSETRON 4 MG: 2 INJECTION INTRAMUSCULAR; INTRAVENOUS at 09:42

## 2023-02-07 RX ADMIN — DEXTROSE MONOHYDRATE 75 ML/HR: 50 INJECTION, SOLUTION INTRAVENOUS at 09:29

## 2023-02-07 RX ADMIN — CIPROFLOXACIN 1 DROP: 3 SOLUTION OPHTHALMIC at 12:14

## 2023-02-07 RX ADMIN — CASTOR OIL AND BALSAM, PERU: 788; 87 OINTMENT TOPICAL at 09:30

## 2023-02-07 RX ADMIN — CIPROFLOXACIN 1 DROP: 3 SOLUTION OPHTHALMIC at 10:15

## 2023-02-07 RX ADMIN — CIPROFLOXACIN 1 DROP: 3 SOLUTION OPHTHALMIC at 08:50

## 2023-02-07 NOTE — PROGRESS NOTES
Hospitalist Progress Note    NAME: Lesvia Gabriel   :  1950   MRN:  [id number]       Assessment / Plan:  Advanced Hodgkin lymphoma POA  - With splenomegaly lymphadenopathy  - Status postchemotherapy   - Hematology oncology team are following, input is appreciated. Systemic therapy on hold due to current illness. Would need to improve before resuming therapy. Poor prognosis per oncology discussed - pt and family aware, Palliative following  IP PT/OT eval noted- SNF recommended if family interested for reconditioning-   Stop TPN,  PO intake improving  Spoke to oncology Dr Octavio Whitaker, he will discuss further chemo rx in the outpt setting  PT/OT, stable for discharge    R eye Conjunctivitis  Abx eye drops    Tumor lysis syndrome POA  - Hematology oncology recommending allopurinol  - NILTON, hypercalcemia as evidence of tumor lysis syndrome    Hyperbilirubinemia  Alkaline phosphatase elevated  - Likely secondary to lymphoma/splenomegaly    Pseudo hypocalcemia  - Calcium 7.9, albumin 1.8, corrected calcium on the higher end    Aspiration pneumonia POA  Acute hypoxic respiratory failure POA- resolved, now on RA  - Continue cefepime and metronidazole  - Aspiration precautions  MBS noted - cleared for Pureed diet and thin liquids- started PO -- but pt not eating much consistently  Will stop TPN today, encourage po intake     Acute metabolic encephalopathy POA- more stable in past 24-48 hrs it seems  - Likely due to worsening metabolic status in the setting of a tumor lysis syndrome  -mentation waxing and waning. He is AAOx3 today, conversant  - MBS normal, speech therapy recommending puréed food. IP Neurology consulted per palliative recommendations ()- EEG done, pt encephalopathic but no seizures noted. No further neurology recs at this time. Appreciate neuro evaluation.     GERD  - Continue home meds    Severe protein calorie malnutrition POA  - Likely secondary to advanced lymphoma and decreased p.o. intake  - Nutrition consult input is appreciated   --s/p TPN, cont' to encourage po intake  -monitor for refeeding syndrome    Cont TPN for now till palliative follow up again     25.0 - 29.9 Overweight / Body mass index is 23.48 kg/m². Estimated discharge date: stable for discharge pending placement    Code status: DNR  Prophylaxis: Hep SQ  Recommended Disposition:  TBD     Subjective:     Patient was seen and examined. NAD. Review of Systems:  Symptom Y/N Comments  Symptom Y/N Comments   Fever/Chills    Chest Pain     Poor Appetite    Edema     Cough    Abdominal Pain     Sputum    Joint Pain     SOB/SALMERON    Pruritis/Rash     Nausea/vomit    Tolerating PT/OT     Diarrhea    Tolerating Diet     Constipation    Other       Could NOT obtain due to:          Objective:     VITALS:   Last 24hrs VS reviewed since prior progress note. Most recent are:  Patient Vitals for the past 24 hrs:   Temp Pulse Resp BP SpO2   02/07/23 0737 97.5 °F (36.4 °C) 75 16 (!) 145/66 99 %   02/07/23 0702 -- 60 16 (!) 147/50 98 %   02/07/23 0400 97.6 °F (36.4 °C) 88 16 (!) 140/69 96 %   02/06/23 2336 97.5 °F (36.4 °C) 88 16 (!) 140/72 98 %   02/06/23 1957 97.6 °F (36.4 °C) 91 16 (!) 141/65 99 %   02/06/23 1453 97.6 °F (36.4 °C) 75 16 (!) 136/57 98 %   02/06/23 1135 -- 98 -- 136/65 --   02/06/23 1132 97.6 °F (36.4 °C) (!) 108 18 119/69 98 %   02/06/23 1131 -- (!) 117 -- 119/69 --   02/06/23 1130 -- (!) 117 -- 129/83 --         Intake/Output Summary (Last 24 hours) at 2/7/2023 0914  Last data filed at 2/7/2023 0400  Gross per 24 hour   Intake --   Output 800 ml   Net -800 ml          I had a face to face encounter and independently examined this patient on 2/7/2023, as outlined below:  PHYSICAL EXAM:  General: Awake, alert, following commands  EENT:  Anicteric sclerae. Resp:  CTA bilaterally, no wheezing or rales. No accessory muscle use  CV:  Regular  rhythm,  No edema  GI:  Soft, Non distended, Non tender.   +Bowel sounds  Neurologic:  EOMs intact. No facial asymmetry. No aphasia or slurred speech. Symmetrical strength, Sensation grossly intact. Psych:   Poor insight. Not anxious nor agitated  Skin:  No rashes. No jaundice    Reviewed most current lab test results and cultures  YES  Reviewed most current radiology test results   YES  Review and summation of old records today    NO  Reviewed patient's current orders and MAR    YES  PMH/SH reviewed - no change compared to H&P  ________________________________________________________________________  Care Plan discussed with:    Comments   Patient x    Family      RN x    Care Manager     Consultant                        Multidiciplinary team rounds were held today with , nursing, pharmacist and clinical coordinator. Patient's plan of care was discussed; medications were reviewed and discharge planning was addressed. ________________________________________________________________________  Total time 26 mins      Comments   >50% of visit spent in counseling and coordination of care x    ________________________________________________________________________  Laura Mcfarland MD     Procedures: see electronic medical records for all procedures/Xrays and details which were not copied into this note but were reviewed prior to creation of Plan. LABS:  I reviewed today's most current labs and imaging studies. Pertinent labs include:  No results for input(s): WBC, HGB, HCT, PLT, HGBEXT, HCTEXT, PLTEXT, HGBEXT, HCTEXT, PLTEXT in the last 72 hours.     Recent Labs     02/07/23  0342 02/06/23  0326 02/05/23  0257   * 139 137   K 3.4* 3.1* 3.2*    103 103   CO2 30 30 30   * 125* 122*   BUN 8 9 11   CREA 0.80 0.79 0.78   CA 8.0* 8.1* 7.9*   MG 2.1 1.7 1.6   PHOS 2.7 2.9 2.8         Signed: Laura Mcfarland MD

## 2023-02-07 NOTE — PROGRESS NOTES
Music Therapy Assessment  [address]    Hanna Ruggiero [id number]     1950  67 y.o.  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    Hx of Bell's palsy 02/19/2017    Essential hypertension 02/19/2017    Allergic rhinitis, cause unspecified 12/11/2013        Date: 2/6/2023            Total Time (in minutes): 35          MRM 2 CARDIOPULMONARY CARE    Mental Status:   [x] Alert [x] Forgetful [x]  Confused  [  ] Minimally responsive  [  ] Sleeping    Communication Status: [  ] Impaired Speech [  ] Nonverbal -N/A    Physical Status:   [  ] Oxygen in use  [x] Hard of Hearing [  ] Vision Impaired  [  ] Ambulatory  [  ] Ambulatory with assistance [  ] Non-ambulatory     Music Preferences, Background: Pt named the Beatles and Camila Legacy, Xochitl Luther and José Verma - as music he enjoys. 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 Clinical Problem addressed: Emotional and social support. Goal(s) met in session:  Physical/Pain management (Scale of 1-10):    Pre-session rating: Pt denied pain. Post-session rating: Pt denied pain.   [  ] Increased relaxation   [  ] Affected breathing patterns  [  ] Decreased muscle tension   [  ] Decreased agitation  [  ] Affected heart rate    [  ] Increased alertness     Emotional/Psychological:  [x] Increased self-expression   [  ] Decreased aggressive behavior   [  ] Decreased feelings of stress  [  ] Discussed healthy coping skills [x] Improved mood    [  ] Decreased withdrawn behavior     Social:  [  ] Decreased feelings of isolation/loneliness [x] Positive social interaction   [  ] Provided support and/or comfort for family/friends    Spiritual:  [  ] Spiritual support    [  ] Expressed peace  [  ] Expressed ronaldo    [  ] Discussed beliefs    Techniques Utilized (Check all that apply):   [  ] Procedural support MT [  ] Music for relaxation [x] Patient preferred music  [  ] Evelin analysis  [x] Song choice  [x] Music for validation  [  ] Entrainment  [  ] Movement to music [  ] Guided visualization  [  ] Navi Sullivan  [  ] Patient instrument playing [  ] Sullivan Renetta writing  [  ] Adal Romance along   [  ] Noralyn Verduzco  [  ] Sensory stimulation  [x] Active Listening  [  ] Music for spiritual support [  ] Making of CDs as gifts    Session Observations:  F/up visit; Patient (pt) was lying in bed with his eyes closed. He easily opened them when this music therapist (MT) knocked on his door and spoke his name. He was eager to have a music therapy session today, and engaged in sharing about his love of music, his vinyl record collection, and more about his music background playing the [employer]. MT provided active listening and then sang and played the ReferralMD song In My Life to offer the pt validation through music in response to what he'd shared. Pt expressed enjoyment in the music and asked to next hear a song from another era of the [employer]. MT sang and played Eight Days a Week with Kil[employer]. Pt's affect brightened as he listened. He said a song he's always loved by the Rebecca Diallo is Let It Be. MT sang and played this with Kilopassr. Pt expressed gratitude for the session, and he denied further needs at this time when MT asked about these.     LAMAR ScalesBC (Music Therapist-Board Certified)  Spiritual Care Department  Referral-based service

## 2023-02-07 NOTE — PROGRESS NOTES
Problem: Self Care Deficits Care Plan (Adult)  Goal: *Acute Goals and Plan of Care (Insert Text)  Description: FUNCTIONAL STATUS PRIOR TO ADMISSION: Pt has had recent decline in function, moving to a first floor setup in his home. Patient uses [facility] for functional mobility, wife reports he has a RW but has not been using it. He receives assistance for ADLs as needed from wife. HOME SUPPORT: The patient lived with his wife who provides assistance. Occupational Therapy Goals  Initiated 1/18/2023; Weekly Re-assessment 1/26/2023; Weekly reassessment 2/1/2023- All goals continued  1. Patient will perform grooming with supervision/set-up in standing within 7 day(s). 2.  Patient will perform upper body dressing with supervision/set-up within 7 day(s). 3.  Patient will perform toilet transfers with supervision/set-up within 7 day(s). 4.  Patient will perform all aspects of toileting with supervision/set-up within 7 day(s). 5.  Patient will participate in upper extremity therapeutic exercise/activities with independence for 5 minutes within 7 day(s). 6.  Patient will utilize energy conservation techniques during functional activities with verbal cues within 7 day(s). Outcome: Progressing Towards Goal    OCCUPATIONAL THERAPY TREATMENT  Patient: nAna Mckeon (12 y.o. male)  Date: 2/7/2023  Diagnosis: Hodgkin lymphoma ([facility] [C81.90] <principal problem not specified>  Procedure(s) (LRB):  INFUSAPORT INSERTION (N/A) 20 Days Post-Op  Precautions: Bed Alarm, Fall, Aspiration (telesitter;sun downs; chemo; L chest wall port; 3 sm chronic CVAs)  Chart, occupational therapy assessment, plan of care, and goals were reviewed. ASSESSMENT  Patient continues with skilled OT services and is progressing towards goals. AAOx3-4, oriented x4 at end of session but remains confused and easily re-directable. The patient needed CGA to min A due to 1 LOB during transfers.  He was set/up assistance for seated tasks, mod to total A for dressing due to both balance and sequencing deficits. He was motivated and pleasant. Recommend SNF at [place], acute care OT will continue to follow. Current Level of Function Impacting Discharge (ADLs): mod to total A dressing/bathing, set/up seated ADLs    Other factors to consider for discharge: supportive wife         PLAN :  Patient continues to benefit from skilled intervention to address the above impairments. Continue treatment per established plan of care to address goals. Recommend with staff: Yoni Hines as able    Recommend next OT session: follow POC    Recommendation for discharge: (in order for the patient to meet his/her long term goals)  Therapy up to 5 days/week in SNF setting    This discharge recommendation:  Has not yet been discussed the attending provider and/or case management    IF patient discharges home will need the following DME: shower chair and walker: rolling       SUBJECTIVE:   Patient stated I think this is a night club that plays folk rock.     OBJECTIVE DATA SUMMARY:   Cognitive/Behavioral Status:  Neurologic State: Alert;Confused  Orientation Level: Oriented to person;Oriented to situation;Disoriented to place;Oriented to time  Cognition: Decreased attention/concentration;Decreased command following             Functional Mobility and Transfers for ADLs:  Bed Mobility:  Rolling: Contact guard assistance  Supine to Sit: Contact guard assistance  Sit to Supine: Contact guard assistance    Transfers:  Sit to Stand: Contact guard assistance  Functional Transfers  Toilet Transfer : Minimum assistance  Bed to Chair: Minimum assistance    Balance:  Sitting: Intact  Sitting - Static: Good (unsupported)  Sitting - Dynamic: Good (unsupported)  Standing: Impaired  Standing - Static: Fair;Constant support  Standing - Dynamic : Fair;Constant support    ADL Intervention:  Pt drowsy and asleep when entering room needing lots of cues to alert but did not alert until after sitting EOB.  Once EOB pt was alert and improved in conversation. Feeding  Feeding Assistance: Set-up; Supervision  Container Management: Minimum assistance    Grooming  Grooming Assistance: Set-up  Position Performed: Seated in chair      Upper [address]: Maximum assistance    Lower Body Dressing Assistance  Socks: Total assistance (dependent)    Toileting  Toileting Assistance: Total assistance(dependent)  Bladder Hygiene: Total assistance (dependent)    Cognitive Retraining  Orientation Retraining: Situation;Place        Pain:  None reported     Activity Tolerance:   Good    After treatment patient left in no apparent distress:   Sitting in chair, Heels elevated for pressure relief, Call bell within reach, and Bed / chair alarm activated    COMMUNICATION/COLLABORATION:   The patients plan of care was discussed with: Physical therapist and Registered nurse.      Jayne Blanchard  Time Calculation: 39 mins

## 2023-02-07 NOTE — PROGRESS NOTES
Transition of Care Plan to SNF/Rehab    Communication to Patient/Family:  Met with patient and family and they are agreeable to the transition plan. The Plan for Transition of Care is related to the following treatment goals: SNF    The Patient and/or patient representative was provided with a choice of provider and agrees  with the discharge plan. Yes [x] No []    A Freedom of choice list was provided with basic dialogue that supports the patient's individualized plan of care/goals and shares the quality data associated with the providers. Yes [x] No []    SNF/Rehab Transition:  Patient has been accepted to [facility] SNF/Rehab and meets criteria for admission. Patient will transported by Hospital to home and expected to leave at 2pm.     Communication to SNF/Rehab:  Bedside RN, Derick Aguiar, has been notified to update the transition plan to the facility and call report (phone number). [phone] to room# 69. Discharge information has been updated on the AVS. And communicated to facility via [employer], or CC link. Chart opened via cc link for [facility]. Nursing Please include all hard scripts for controlled substances, med rec and dc summary, and AVS in packet. Reviewed and confirmed with facility, Ej Victor at [facility] , can manage the patient care needs for the following:     Adriana Balloon with (X) only those applicable:  Medication:  [x]Medications are available at the facility  []IV Antibiotics    []Controlled Substance - hard copies available sent.   []Weekly Labs    Equipment:  []CPAP/BiPAP  []Wound Vacuum  []Jolly or Urinary Device  []PICC/Central Line  []Nebulizer  []Ventilator    Treatment:  []Isolation (for MRSA, VRE, etc.)  []Surgical Drain Management  []Tracheostomy Care  []Dressing Changes  []Dialysis with transportation  []PEG Care  []Oxygen  []Daily Weights for Heart Failure    Dietary:  []Any diet limitations  []Tube Feedings   []Total Parenteral Management (TPN)    Financial Resources:  []Medicaid Application Completed    []UAI Completed and copy given to pt/family  and copy given to pt/family  []A screening has previously been completed. []Level II Completed    [] Private pay individual who will not become   financially eligible for Medicaid within 6 months from admission to a [address] [place] [address] facility. [] Individual refused to have screening conducted. []Medicaid Application Completed    []The screening denied because it was determined individual did not need/did not qualify for nursing facility level of care. [] Out of state residents seeking direct admission to a [address] facility. [] Individuals who are inpatients of an out of state hospital, or in state or out of state veterans/ hospital and seek direct admission to a [address] facility  [] Individuals who are pateints or residents of a state owned/operated facility that is licensed by Department of [employer] (DBS) and seek direct admission to [address] [place] [address]  [] A screening not required for enrollment in [facility] services as set out in [address] [place] [address] 79-  [] [facility]) staff shall perform screenings of the [facility] clients. Advanced Care Plan:  []Surrogate Decision Maker of Care  []POA  []Communicated Code Status and copy sent.     Other:   DNR

## 2023-02-07 NOTE — PROGRESS NOTES
0700 Bedside shift change report given to [address] (oncoming nurse) by Fausto Velasquez (offgoing nurse). Report included the following information SBAR, Kardex, ED Summary, MAR, Recent Results, and Cardiac Rhythm NSR. .       End of Shift Note    Bedside shift change report given to Fausto Velasquez (oncoming nurse) by Dean Wilkerson (offgoing nurse). Report included the following information SBAR and Kardex    Shift worked:  2925-3162     Shift summary and any significant changes:     Magnesium IV given  Effer K given patient was only able to drink about half. Notified MD.   Discharge ordered pending authorization. Patient worked with PT/OT. Concerns for physician to address:  None      Zone phone for oncoming shift:   [phone]       Activity:  Activity Level: Up with Assistance  Number times ambulated in hallways past shift: 0  Number of times OOB to chair past shift: 1    Cardiac:   Cardiac Monitoring: Yes      Cardiac Rhythm: Sinus Rhythm    Access:  Current line(s): PIV     Genitourinary:   Urinary status: voiding, incontinent, and external catheter    Respiratory:   O2 Device: None (Room air)  Chronic home O2 use?: NO  Incentive spirometer at bedside: NO       GI:  Last Bowel Movement Date: 02/02/23  Current diet:  ADULT ORAL NUTRITION SUPPLEMENT Lunch, Dinner; Frozen Supplement  ADULT DIET Easy to Chew; Patient likes chicken broth, decaf coffee instead of tea every meal, splenda and creamer, likes megan and vanilla ice cream  Passing flatus: YES  Tolerating current diet: NO       Pain Management:   Patient states pain is manageable on current regimen: YES    Skin:  Matthew Score: 15  Interventions: turn team, speciality bed, float heels, increase time out of bed, PT/OT consult, internal/external urinary devices, and nutritional support     Patient Safety:  Fall Score:  Total Score: 4  Interventions: bed/chair alarm, assistive device (walker, cane, etc), gripper socks, pt to call before getting OOB, and stay with me (per policy)  High Fall Risk: Yes    Length of Stay:  Expected LOS: 9d 21h  Actual LOS: [facility]

## 2023-02-07 NOTE — PROGRESS NOTES
Hospital to [facility] [address]                                                                        67 y.o.   male    [address] [facility]   Room: 2164/01    MRM 2 CARDIOPULMONARY CARE  Unit Phone# :  [phone]      [facility]  MRM 2 CARDIOPULMONARY CARE  [address]  [place] [address]  Dept: [phone]  Loc: [phone]                    SITUATION     Admitted:  1/17/2023         Attending Provider:  Tyler Vazquez MD       Consultations:  IP CONSULT TO GENERAL SURGERY  IP CONSULT TO HOSPITALIST  IP CONSULT TO PALLIATIVE CARE - PROVIDER  IP CONSULT TO PRIMARY CARE PROVIDER  IP CONSULT TO NEUROLOGY    PCP:  Fatmata Esposito MD   [phone]    Treatment Team: Attending Provider: Tyler Vazquez MD; Utilization Review: Aminah Muller; Care Manager: Olesya Patel; Consulting Provider: Jalen Crawley [facility]; Consulting Provider: Andrea Perez MD; Care Manager: ENE Navas; Primary Nurse: Jalen Gutiérrez; Occupational Therapist: Kenyatta Quesada    Admitting Dx:  Hodgkin lymphoma [facility]) [C81.90]       Principal Problem: <principal problem not specified>    20 Days Post-Op of   Procedure(s): INFUSAPORT INSERTION   BY: Antwan Goldberg MD             ON: 1/18/2023                  Code Status: DNR                Advance Directives:   Advance Care Planning 1/18/2023   Patient's Healthcare Decision Maker is: -   Confirm Advance Directive None   Patient Would Like to Complete Advance Directive No    (Send w/patient)   Not Received       Isolation:  There are currently no Active Isolations       MDRO: No current active infections    Pain Medications given:  None     Last dose:    Special Equipment needed: no  Type of equipment:      (Not currently on dialysis)  (Not currently on dialysis)  (Not currently on dialysis)     BACKGROUND     Allergies: Allergies   Allergen Reactions    Codeine Other (comments)     Pt states unknown reaction.     Pcn [Penicillins] Rash     Received Ancef 2023 without issue. Past Medical History:   Diagnosis Date    Allergic rhinitis, cause unspecified 2013    Arthritis     knees    Asthma     as a child    Eczema     on lower back waistline on the back    Environmental allergies     GERD (gastroesophageal reflux disease)     occastionally but resolved since 60 pound weight loss    Hodgkin's lymphoma ([facility] [address]     Hypertension     Psychiatric disorder     anxiety and depression       Past Surgical History:   Procedure Laterality Date    HX TONSILLECTOMY      HX WISDOM TEETH EXTRACTION         Medications Prior to Admission   Medication Sig    [] allopurinoL (ZYLOPRIM) 100 mg tablet Take 1 Tablet by mouth two (2) times a day for 14 days. Indications: an increase in uric acid due to cancer therapy, treatment to prevent acute gout attack    predniSONE (DELTASONE) 10 mg tablet Take 10 mg by mouth daily. (Patient not taking: Reported on 2023)    triamcinolone acetonide (KENALOG) 0.1 % topical cream Apply  to affected area two (2) times daily as needed for Skin Irritation. use thin layer    ondansetron (ZOFRAN ODT) 4 mg disintegrating tablet Take 1 Tablet by mouth every eight (8) hours as needed for Nausea or Vomiting. bumetanide (BUMEX) 2 mg tablet 2 tabs po daily for 3 days, then decrease to one daily after that. For fluid (Patient not taking: Reported on 2023)    cetirizine-pseudoePHEDrine (ZyrTEC-D) 5-120 mg Tb12 Take 1 Tab by mouth two (2) times a day. Hard scripts included in transfer packet no    Vaccinations: There is no immunization history on file for this patient. Readmission Risks:    Known Risks: The Charlson [employer] Index tool is an evidenced based tool that has more automatic generated information.  The tool looks at many different items such as the age of the patient, how many times they were admitted in the last calendar year, current length of stay in the hospital and their diagnosis. All of these items are pulled automatically from information documented in the chart from various places and will generate a score that predicts whether a patient is at low (less than 13), medium (13-20) or high (21 or greater) risk of being readmitted. ASSESSMENT                Temp: 97.3 °F (36.3 °C) (Simultaneous filing. User may not have seen previous data.) (02/07/23 1039) Pulse (Heart Rate): 84 (02/07/23 1039)     Resp Rate: 16 (Simultaneous filing.  User may not have seen previous data.) (02/07/23 1039)           BP: 127/69 (02/07/23 1039)     O2 Sat (%): 98 % (02/07/23 1039)     Weight: 64 kg (141 lb 1.5 oz)    Height: 5' 5\" (165.1 cm) (01/18/23 1441)       If above not within 1 hour of discharge:    BP:_____  P:____  R:____ T:_____ O2 Sat: ___%  O2: ______    Active Orders   Diet    ADULT DIET Easy to Chew; Patient likes chicken broth, decaf coffee instead of tea every meal, splenda and creamer, likes megan and vanilla ice cream         Orientation: only aware of  time, place, and person     Active Behaviors: None                                   Active Lines/Drains:  (Peg Tube / Jolly / CL or S/L?): no    Urinary Status: External catheter, Voiding, Incontinent     Last BM: Last Bowel Movement Date: 02/02/23     Skin Integrity: Wound (add Wound LDA)             Mobility: Slightly limited   Weight Bearing Status: WBAT (Weight Bearing as Tolerated)      Gait Training  Assistive Device: Gait belt, Walker, rolling  Ambulation - Level of Assistance: Contact guard assistance  Distance (ft): 5 Feet (ft)         Lab Results   Component Value Date/Time    Glucose 126 (H) 02/07/2023 03:42 AM    Hemoglobin A1c 4.8 12/12/2022 01:57 PM    INR 1.4 (H) 01/17/2023 08:28 AM    HGB 8.8 (L) 01/28/2023 03:23 AM    HGB 8.8 (L) 01/27/2023 06:27 AM        RECOMMENDATION     See After Visit Summary (AVS) for:  Discharge instructions  After [address] equipment needed (entered pre-discharge by Care Management)  Medication Reconciliation    Follow up Appointment(s)         Report given/sent by:  Ezra Murillo                    Verbal report given to: Nydia CLEMENTS  FAXED to:  Discharge documents sent with hosp to home staff. Copy of discharge paperwork given to patients wife.           Estimated discharge time:  2/7/2023 at 1400

## 2023-02-07 NOTE — PROGRESS NOTES
Transition of Care Plan:    RUR: 10%  [facility]    Report number to [phone] room# 71  If SNF or IPR: Date FOC offered:2/2/3  Date [address] received:2/3/23  Date authorization started with reference number: 2/7  Date authorization received and expires:    Auth number: [id number]. Effective from 2/7-2/13. Accepting facility: [facility]   Follow up appointments:PCP/Specialist  DME needed:per SNF rehab  Transportation at [place] [address] to home  at 2 pm  Keys or means to access home:        IM Medicare Letter:delivered 2/6/23  Is patient a  and connected with the [place]? N/a     If yes, was [employer] transfer form completed and VA notified? Caregiver Contact:Lidia Ovalleort - spouse - [phone]  Discharge Caregiver contacted prior to discharge? yes  Care Conference needed?:     no    CM staff received authorization from [facility] to home set up for 2 pm pick-up - wife made aware. Care Management Interventions  PCP Verified by CM: Yes  Palliative Care Criteria Met (RRAT>21 & CHF Dx)?: No  Mode of Transport at Discharge:  Other (see comment) (family)  Transition of Care Consult (CM Consult): Discharge Planning  MyChart Signup: No  Discharge Durable Medical Equipment: No  Health Maintenance Reviewed: Yes  Physical Therapy Consult: No  Occupational Therapy Consult: No  Speech Therapy Consult: No  Support Systems: Spouse/Significant Other, Other Family Member(s)  Confirm Follow Up Transport: Family  [employer] Information Provided?: No  Discharge Location  Patient Expects to be Discharged to[de-identified] Skilled nursing facility

## 2023-02-07 NOTE — PROGRESS NOTES
0700 Bedside shift change report given to [address] (oncoming nurse) by Alisa Murphy (offgoing nurse). Report included the following information SBAR and Kardex.

## 2023-02-09 ENCOUNTER — TELEPHONE (OUTPATIENT)
Dept: ONCOLOGY | Age: 73
End: 2023-02-09

## 2023-02-09 NOTE — TELEPHONE ENCOUNTER
Patient is in skilled nursing and will not be able to do his treatments at [place] [address] wife will call when she is aware of his release to get him rescheduled.

## 2023-02-15 ENCOUNTER — APPOINTMENT (OUTPATIENT)
Dept: INFUSION THERAPY | Age: 73
End: 2023-02-15

## 2023-02-15 ENCOUNTER — HOSPITAL ENCOUNTER (OUTPATIENT)
Dept: INFUSION THERAPY | Age: 73
End: 2023-02-15

## 2023-02-20 ENCOUNTER — TELEPHONE (OUTPATIENT)
Dept: ONCOLOGY | Age: 73
End: 2023-02-20

## 2023-02-20 NOTE — TELEPHONE ENCOUNTER
Carline Mcleod called & left a vm requesting to have Danilo Candi to forward the packet to her home because she didn't when she would see her. Stated that pt is @ [address] [place] [address] Christiano it wasn't her choice. Please call Carline Mcleod if you have any questions @ [phone].

## 2023-02-27 ENCOUNTER — TELEPHONE (OUTPATIENT)
Dept: FAMILY MEDICINE CLINIC | Age: 73
End: 2023-02-27

## 2023-02-27 ENCOUNTER — TELEPHONE (OUTPATIENT)
Dept: ONCOLOGY | Age: 73
End: 2023-02-27

## 2023-02-27 DIAGNOSIS — J20.9 ACUTE BRONCHITIS, UNSPECIFIED ORGANISM: Primary | ICD-10-CM

## 2023-02-27 RX ORDER — AZITHROMYCIN 250 MG/1
TABLET, FILM COATED ORAL
Qty: 6 TABLET | Refills: 0 | Status: SHIPPED | OUTPATIENT
Start: 2023-02-27

## 2023-02-27 RX ORDER — PROMETHAZINE HYDROCHLORIDE AND DEXTROMETHORPHAN HYDROBROMIDE 6.25; 15 MG/5ML; MG/5ML
5 SYRUP ORAL
Qty: 180 ML | Refills: 1 | Status: SHIPPED | OUTPATIENT
Start: 2023-02-27 | End: 2023-03-06

## 2023-02-27 NOTE — TELEPHONE ENCOUNTER
Patient's wife called and patient has returned home from skilled nursing but now has Covid. They are not sure when he was first diagnosed as he was in the home. Wife stated she was having a conversation with Pharm D about a possible alternative to chemo. Patient has an appt scheduled for 3/8 but may not be able to attend due to Covid. Perhaps this appt could be done as a VV to discuss next steps.

## 2023-02-27 NOTE — TELEPHONE ENCOUNTER
Spoke with Ms. Felix. They are home and tested positive for COVID on Sunday. Still have not received the information on Bretuximab but will call if they do not have it by Wednesday and will resend information.      For Pharmacy Admin Tracking Only    Program: Medical Group  CPA in place: Yes  Time Spent (min): 10

## 2023-02-27 NOTE — TELEPHONE ENCOUNTER
Pt wife Patrick Hsieh, requesting a call back because her  came home from rehab on Saturday and was tested for TB but not COVID. He tested positive for COVID this morning at home. She is requesting a call back at [phone].

## 2023-02-28 ENCOUNTER — APPOINTMENT (OUTPATIENT)
Dept: INFUSION THERAPY | Age: 73
End: 2023-02-28

## 2023-03-08 ENCOUNTER — HOSPITAL ENCOUNTER (OUTPATIENT)
Dept: INFUSION THERAPY | Age: 73
End: 2023-03-08

## 2023-03-10 ENCOUNTER — TELEPHONE (OUTPATIENT)
Dept: ONCOLOGY | Age: 73
End: 2023-03-10

## 2023-03-10 RX ORDER — SODIUM CHLORIDE 9 MG/ML
5-40 INJECTION INTRAVENOUS AS NEEDED
OUTPATIENT
Start: 2023-03-14

## 2023-03-10 RX ORDER — SODIUM CHLORIDE 0.9 % (FLUSH) 0.9 %
5-40 SYRINGE (ML) INJECTION AS NEEDED
OUTPATIENT
Start: 2023-03-14

## 2023-03-10 RX ORDER — SODIUM CHLORIDE 9 MG/ML
5-250 INJECTION, SOLUTION INTRAVENOUS AS NEEDED
OUTPATIENT
Start: 2023-03-14

## 2023-03-10 RX ORDER — HEPARIN 100 UNIT/ML
500 SYRINGE INTRAVENOUS AS NEEDED
Start: 2023-03-14

## 2023-03-10 NOTE — TELEPHONE ENCOUNTER
Spoke with pt's spouse. Per pt's spouse pt needs a port flush and and asking for Beta2 lab to be added on the existing labs. Pt scheduled for labs and port flush 3/14 at [address] [place] [address] putting in labs.

## 2023-03-10 NOTE — TELEPHONE ENCOUNTER
PHI verified and HIPPA verified by two patient identifiers. Return call placed to pt.'s spouse. Voicemail mssage left for pt's spouse to return nurse call.

## 2023-03-10 NOTE — TELEPHONE ENCOUNTER
Please call patient's wife, she said her  tested negative for covid now and is asking protocol on coming back. I told her I saw an opic appt on 03/29/23, she is concerned that is to late because the tape is coming off his port.

## 2023-03-14 ENCOUNTER — OFFICE VISIT (OUTPATIENT)
Dept: ONCOLOGY | Age: 73
End: 2023-03-14

## 2023-03-14 ENCOUNTER — HOSPITAL ENCOUNTER (OUTPATIENT)
Dept: INFUSION THERAPY | Age: 73
Discharge: HOME | End: 2023-03-14
Payer: MEDICARE

## 2023-03-14 ENCOUNTER — APPOINTMENT (OUTPATIENT)
Dept: INFUSION THERAPY | Age: 73
End: 2023-03-14

## 2023-03-14 VITALS
OXYGEN SATURATION: 100 % | SYSTOLIC BLOOD PRESSURE: 141 MMHG | DIASTOLIC BLOOD PRESSURE: 71 MMHG | TEMPERATURE: 97.8 F | HEART RATE: 84 BPM | RESPIRATION RATE: 18 BRPM

## 2023-03-14 VITALS
TEMPERATURE: 97.8 F | WEIGHT: 149 LBS | DIASTOLIC BLOOD PRESSURE: 71 MMHG | HEART RATE: 84 BPM | BODY MASS INDEX: 24.83 KG/M2 | RESPIRATION RATE: 18 BRPM | OXYGEN SATURATION: 100 % | SYSTOLIC BLOOD PRESSURE: 141 MMHG | HEIGHT: 65 IN

## 2023-03-14 DIAGNOSIS — R59.1 LYMPHADENOPATHY: ICD-10-CM

## 2023-03-14 DIAGNOSIS — C81.90 HODGKIN LYMPHOMA, UNSPECIFIED HODGKIN LYMPHOMA TYPE, UNSPECIFIED BODY REGION (HCC): Primary | ICD-10-CM

## 2023-03-14 LAB
ALBUMIN SERPL-MCNC: 2.2 G/DL (ref 3.5–5)
ALBUMIN/GLOB SERPL: 0.6 (ref 1.1–2.2)
ALP SERPL-CCNC: 101 U/L (ref 45–117)
ALT SERPL-CCNC: 17 U/L (ref 12–78)
ANION GAP SERPL CALC-SCNC: 6 MMOL/L (ref 5–15)
AST SERPL-CCNC: 17 U/L (ref 15–37)
BASOPHILS # BLD: 0 K/UL (ref 0–0.1)
BASOPHILS NFR BLD: 1 % (ref 0–1)
BILIRUB SERPL-MCNC: 0.7 MG/DL (ref 0.2–1)
BUN SERPL-MCNC: 16 MG/DL (ref 6–20)
BUN/CREAT SERPL: 21 (ref 12–20)
CALCIUM SERPL-MCNC: 7.6 MG/DL (ref 8.5–10.1)
CHLORIDE SERPL-SCNC: 107 MMOL/L (ref 97–108)
CO2 SERPL-SCNC: 29 MMOL/L (ref 21–32)
CREAT SERPL-MCNC: 0.78 MG/DL (ref 0.7–1.3)
DIFFERENTIAL METHOD BLD: ABNORMAL
EOSINOPHIL # BLD: 0.4 K/UL (ref 0–0.4)
EOSINOPHIL NFR BLD: 5 % (ref 0–7)
ERYTHROCYTE [DISTWIDTH] IN BLOOD BY AUTOMATED COUNT: 15.8 % (ref 11.5–14.5)
GLOBULIN SER CALC-MCNC: 4 G/DL (ref 2–4)
GLUCOSE SERPL-MCNC: 93 MG/DL (ref 65–100)
HCT VFR BLD AUTO: 23.9 % (ref 36.6–50.3)
HGB BLD-MCNC: 7.6 G/DL (ref 12.1–17)
IMM GRANULOCYTES # BLD AUTO: 0 K/UL (ref 0–0.04)
IMM GRANULOCYTES NFR BLD AUTO: 1 % (ref 0–0.5)
LDH SERPL L TO P-CCNC: 179 U/L (ref 85–241)
LYMPHOCYTES # BLD: 1.3 K/UL (ref 0.8–3.5)
LYMPHOCYTES NFR BLD: 18 % (ref 12–49)
MCH RBC QN AUTO: 29.7 PG (ref 26–34)
MCHC RBC AUTO-ENTMCNC: 31.8 G/DL (ref 30–36.5)
MCV RBC AUTO: 93.4 FL (ref 80–99)
MONOCYTES # BLD: 0.5 K/UL (ref 0–1)
MONOCYTES NFR BLD: 7 % (ref 5–13)
NEUTS SEG # BLD: 5 K/UL (ref 1.8–8)
NEUTS SEG NFR BLD: 68 % (ref 32–75)
NRBC # BLD: 0 K/UL (ref 0–0.01)
NRBC BLD-RTO: 0 PER 100 WBC
PLATELET # BLD AUTO: 272 K/UL (ref 150–400)
PMV BLD AUTO: 8.6 FL (ref 8.9–12.9)
POTASSIUM SERPL-SCNC: 2.9 MMOL/L (ref 3.5–5.1)
PROT SERPL-MCNC: 6.2 G/DL (ref 6.4–8.2)
RBC # BLD AUTO: 2.56 M/UL (ref 4.1–5.7)
SODIUM SERPL-SCNC: 142 MMOL/L (ref 136–145)
WBC # BLD AUTO: 7.3 K/UL (ref 4.1–11.1)

## 2023-03-14 PROCEDURE — G8420 CALC BMI NORM PARAMETERS: HCPCS | Performed by: STUDENT IN AN ORGANIZED HEALTH CARE EDUCATION/TRAINING PROGRAM

## 2023-03-14 PROCEDURE — 74011000250 HC RX REV CODE- 250: Performed by: STUDENT IN AN ORGANIZED HEALTH CARE EDUCATION/TRAINING PROGRAM

## 2023-03-14 PROCEDURE — 3077F SYST BP >= 140 MM HG: CPT | Performed by: STUDENT IN AN ORGANIZED HEALTH CARE EDUCATION/TRAINING PROGRAM

## 2023-03-14 PROCEDURE — 1101F PT FALLS ASSESS-DOCD LE1/YR: CPT | Performed by: STUDENT IN AN ORGANIZED HEALTH CARE EDUCATION/TRAINING PROGRAM

## 2023-03-14 PROCEDURE — G8427 DOCREV CUR MEDS BY ELIG CLIN: HCPCS | Performed by: STUDENT IN AN ORGANIZED HEALTH CARE EDUCATION/TRAINING PROGRAM

## 2023-03-14 PROCEDURE — 3017F COLORECTAL CA SCREEN DOC REV: CPT | Performed by: STUDENT IN AN ORGANIZED HEALTH CARE EDUCATION/TRAINING PROGRAM

## 2023-03-14 PROCEDURE — G8510 SCR DEP NEG, NO PLAN REQD: HCPCS | Performed by: STUDENT IN AN ORGANIZED HEALTH CARE EDUCATION/TRAINING PROGRAM

## 2023-03-14 PROCEDURE — 83615 LACTATE (LD) (LDH) ENZYME: CPT

## 2023-03-14 PROCEDURE — 74011250636 HC RX REV CODE- 250/636: Performed by: STUDENT IN AN ORGANIZED HEALTH CARE EDUCATION/TRAINING PROGRAM

## 2023-03-14 PROCEDURE — 99215 OFFICE O/P EST HI 40 MIN: CPT | Performed by: STUDENT IN AN ORGANIZED HEALTH CARE EDUCATION/TRAINING PROGRAM

## 2023-03-14 PROCEDURE — 3078F DIAST BP <80 MM HG: CPT | Performed by: STUDENT IN AN ORGANIZED HEALTH CARE EDUCATION/TRAINING PROGRAM

## 2023-03-14 PROCEDURE — 85025 COMPLETE CBC W/AUTO DIFF WBC: CPT

## 2023-03-14 PROCEDURE — 80053 COMPREHEN METABOLIC PANEL: CPT

## 2023-03-14 PROCEDURE — 82232 ASSAY OF BETA-2 PROTEIN: CPT

## 2023-03-14 PROCEDURE — 36591 DRAW BLOOD OFF VENOUS DEVICE: CPT

## 2023-03-14 PROCEDURE — G8536 NO DOC ELDER MAL SCRN: HCPCS | Performed by: STUDENT IN AN ORGANIZED HEALTH CARE EDUCATION/TRAINING PROGRAM

## 2023-03-14 PROCEDURE — 77030012965 HC NDL HUBR BBMI -A

## 2023-03-14 PROCEDURE — 1123F ACP DISCUSS/DSCN MKR DOCD: CPT | Performed by: STUDENT IN AN ORGANIZED HEALTH CARE EDUCATION/TRAINING PROGRAM

## 2023-03-14 RX ORDER — SODIUM CHLORIDE 0.9 % (FLUSH) 0.9 %
5-40 SYRINGE (ML) INJECTION AS NEEDED
Status: DISPENSED | OUTPATIENT
Start: 2023-03-14 | End: 2023-03-14

## 2023-03-14 RX ORDER — HEPARIN 100 UNIT/ML
500 SYRINGE INTRAVENOUS AS NEEDED
Status: ACTIVE | OUTPATIENT
Start: 2023-03-14 | End: 2023-03-14

## 2023-03-14 RX ORDER — OLANZAPINE 5 MG/1
TABLET ORAL
COMMUNITY
Start: 2023-02-08 | End: 2031-12-31

## 2023-03-14 RX ADMIN — SODIUM CHLORIDE, PRESERVATIVE FREE 10 ML: 5 INJECTION INTRAVENOUS at 10:10

## 2023-03-14 RX ADMIN — HEPARIN 500 UNITS: 100 SYRINGE at 10:10

## 2023-03-14 NOTE — PROGRESS NOTES
[address] [facility] [address]  W: [phone] F: [phone]      Reason for Visit:   Dary Quintana is a 67 y.o. male who is seen in consultation at the request of Dr. Caitlin Quevedo for evaluation of Hodgkin lymphoma      Hematology / Oncology Treatment History:     Hematological/Oncological Diagnosis: Classic Hodgkin lymphoma    Date of Diagnosis: 2021    Treatment course: Plan for ABVD chemotherapy      History of Present Illness:     66year old with hx of hypertension, seasonal allergies, presents with worsening normocytic aneima of unclear etiology. Hg trend below:       No other cytopenias. MCV is 89. Iron studies are normal.  B12, folate are normal. Reticulocyte count is low/normal at 1.4. Creatinine is normal at 1.10. Calcium is normal at 9.6. LFTs are normal. Per notes from PCP, stool guiac we negative in July. Alkaline phosphatase is elevated. Constitutional symptoms positive for easy bruising only. No night sweats, bone pain, unintentional weight loss. Family history reviewed, non contributory  Social history reviewed, non contributory. No alcohol use  12/28/22:      Marked clinical worsening since last evaluation, patient has had anasarca, severe fatigue, weight loss, nausea, vomiting that is worsened over the last few months. The patient was evaluated by his primary care physician who ordered CT chest abdomen pelvis that showed diffuse lymphadenopathy. Results from imaging shown below        Radiographic findings are highly suspicious for lymphoma. Patient is planned for surgical evaluation for excisional lymph node biopsy later on today. Patient's family also reports that he has been confused intermittently over the last few weeks. They are concerned that there might be a lymphoma infiltrating into the CNS. No focal deficits reported or seizure activity noted.   1/12/23: Since last evaluation, the patient has had clinical deterioration. He reports that he has discoloration of his lower extremities that appears to be vascular in nature. Pulses are intact bilaterally however he has redness and some discoloration of his left foot. No evidence of cellulitis or infection. The patient reports that he has decreased appetite, weight loss, as well as darkening of his urine. He does have scleral icterus. Interval History:     3/14/23:  Marked clinical improvement since last evaluation. He is eating more and has no new clinical worsening. Activity level is improved. He is now starting to have fluid retention again and some mild bruising. Review of Systems: A complete review of systems was obtained, negative except as described above.     Past Medical History:   Diagnosis Date    Allergic rhinitis, cause unspecified 2013    Arthritis     knees    Asthma     as a child    Eczema     on lower back waistline on the back    Environmental allergies     GERD (gastroesophageal reflux disease)     occastionally but resolved since 60 pound weight loss    Hodgkin's lymphoma ([facility] [address]     Hypertension     Psychiatric disorder     anxiety and depression      Past Surgical History:   Procedure Laterality Date    HX TONSILLECTOMY      HX WISDOM TEETH EXTRACTION        Social History     Tobacco Use    Smoking status: Former     Packs/day: 1.00     Years: 2.00     Pack years: 2.00     Types: Cigarettes     Quit date: 1966     Years since quittin.2    Smokeless tobacco: Never   Substance Use Topics    Alcohol use: No      Family History   Problem Relation Age of Onset    Hypertension Mother     Hypertension Father     Heart Disease Father     Alcohol abuse Father     Hypertension Brother     No Known Problems Brother      Current Outpatient Medications   Medication Sig    OLANZapine (ZyPREXA) 5 mg tablet Zyprexa 5 mg tablet   Give 1 tablet by mouth at bedtime for anxiety and depression    azithromycin (ZITHROMAX) 250 mg tablet Take 2 tablets today, then take 1 tablet daily    allopurinoL (ZYLOPRIM) 100 mg tablet Take 1 Tablet by mouth two (2) times a day. lidocaine 4 % patch 1 Patch by TransDERmal route every twenty-four (24) hours. triamcinolone acetonide (KENALOG) 0.1 % topical cream Apply  to affected area two (2) times daily as needed for Skin Irritation. use thin layer    ondansetron (ZOFRAN ODT) 4 mg disintegrating tablet Take 1 Tablet by mouth every eight (8) hours as needed for Nausea or Vomiting. cetirizine-pseudoePHEDrine (ZyrTEC-D) 5-120 mg Tb12 Take 1 Tab by mouth two (2) times a day. No current facility-administered medications for this visit. Facility-Administered Medications Ordered in Other Visits   Medication Dose Route Frequency    sodium chloride (NS) flush 5-40 mL  5-40 mL IntraVENous PRN    heparin (porcine) pf 500 Units  500 Units InterCATHeter PRN      Allergies   Allergen Reactions    Codeine Other (comments)     Pt states unknown reaction. Pcn [Penicillins] Rash     Received Ancef 1/18/2023 without issue.             Physical Exam:   Visit Vitals  BP (!) 141/71   Pulse 84   Temp 97.8 °F (36.6 °C)   Resp 18   Ht 5' 5\" (1.651 m)   Wt 149 lb (67.6 kg)   SpO2 100%   BMI 24.79 kg/m²     ECOG PS: 2  General: alert, cooperative, no distress, appears fatigued   Mental  status: normal mood, behavior, speech, dress, motor activity, and thought processes, able to follow commands   HENT: NCAT   Neck: no visualized mass   Resp: no respiratory distress   Neuro: no gross deficits   Skin: no discoloration or lesions of concern on visible areas   Psychiatric: normal affect, consistent with stated mood, no evidence of hallucinations           Results:     Lab Results   Component Value Date/Time    WBC 5.9 01/28/2023 03:23 AM    HGB 8.8 (L) 01/28/2023 03:23 AM    HCT 28.9 (L) 01/28/2023 03:23 AM    PLATELET 792 40/25/4692 03:23 AM    MCV 96.3 01/28/2023 03:23 AM ABS. NEUTROPHILS 3.7 01/28/2023 03:23 AM     Lab Results   Component Value Date/Time    Sodium 135 (L) 02/07/2023 03:42 AM    Potassium 3.4 (L) 02/07/2023 03:42 AM    Chloride 102 02/07/2023 03:42 AM    CO2 30 02/07/2023 03:42 AM    Glucose 126 (H) 02/07/2023 03:42 AM    BUN 8 02/07/2023 03:42 AM    Creatinine 0.80 02/07/2023 03:42 AM    GFR est AA >60 07/26/2022 01:45 PM    GFR est non-AA >60 07/26/2022 01:45 PM    Calcium 8.0 (L) 02/07/2023 03:42 AM    Sodium,  10/07/2022 10:16 AM    Potassium, POC 3.9 10/07/2022 10:16 AM    Chloride,  10/07/2022 10:16 AM    Glucose (POC) 122 (H) 02/06/2023 11:17 AM    Creatinine, POC 1.1 10/07/2022 10:16 AM    Calcium, ionized (POC) 1.22 10/07/2022 10:16 AM     Lab Results   Component Value Date/Time    Bilirubin, total 1.1 (H) 01/28/2023 03:23 AM    ALT (SGPT) 10 (L) 01/28/2023 03:23 AM    Alk. phosphatase 209 (H) 01/28/2023 03:23 AM    Protein, total 5.1 (L) 01/28/2023 03:23 AM    Albumin 1.7 (L) 01/28/2023 03:23 AM    Globulin 3.4 01/28/2023 03:23 AM     CT Results (most recent):  Results from Hospital Encounter encounter on 01/17/23    CT HEAD WO CONT    Narrative  Indication:  worsening AMS    Comparison: CT 1/22/2023    Findings: 5 mm axial images were obtained from the skull base through the  vertex. CT dose reduction was achieved through the use of a standardized protocol  tailored for this examination and automatic exposure control for dose  modulation. The ventricles and cortical sulci are prominent, compatible with age related  volume loss. There is no evidence of intracranial hemorrhage, mass, mass effect,  or acute infarct. There is periventricular white matter disease. No extra-axial  fluid collections are seen. The visualized paranasal sinuses and mastoid air  cells are clear. The orbital structures are unremarkable. No osseous  abnormalities are seen. Impression  1. No evidence of acute infarct or intracranial hemorrhage.  No significant  change. 2. Mild periventricular white matter disease is likely secondary to chronic  small vessel ischemic changes. No imaging of spleen      Pathology:           ==========================================================================   * * *FINAL PATHOLOGIC DIAGNOSIS* * *     <<<<<       Lymph node, left supraclavicular lymph node, excision:        Classic Hodgkin lymphoma. See comment.     >>>>>      * * *Comment* * *     <<<<<  The histologic section has an enlarged lymph node with a thickened capsule   and effacement of normal yady architecture by a vaguely nodular   proliferation with few sclerotic bands. Numerous Hodgkin/Levi-Esteban   cells are present within a background of small lymphocytes and   eosinophils. The Hodgkin Levi-Esteban cells are positive for CD30, CD15   and PAX5 (subset, dim) and are negative for CD45, ALK, CD20, EMA,   Granzyme, MUM1, CD43 and CD3. In situ hybridization for Tania-Barr viral   RNA is negative. The small lymphocytes are comprised of CD3 and CD5   positive T cells with few scattered B cells identified. Assessment and Recommendations:     # Classic Hodgkin lymphoma, advanced    - CD30 positive  - Overall findings are highly concerning for widely distributed Hodgkin lymphoma.    -Current ECOG is a 2    -At present, the patient has clinical symptoms of diarrhea, abdominal discomfort, discoloration of his lower extremities, findings may be related to widely distributed lymphadenopathy. Splenomegaly may be causing liver dysfunction.    -Plan for urgent abdominal ultrasound. Echocardiogram was completed 2 months ago. We discussed the risks and benefits of ABVD chemotherapy.  Potential side effects include, but are not limited to:  nausea, vomiting, diarrhea, constipation, taste changes, flu-like symptoms, allergic reaction, myelosuppression, infection, fatigue, alopecia, mucositis, neuropathy, cardiac toxicity, pulmonary toxicity, infertility, and rarely, death. The patient has consented to beginning chemotherapy. He was treated with 1 cycle of ABVD but had intolerance to treatment requiring extended hospitalization. Specifically he had extended delerium and a greater than 2 week hospitalization. Oncology Flowsheet 1/19/2023 1/19/2023   Day, Cycle Day 1, Cycle 1     bleomycin (BLEOCIN) IV 1 Units/m2 = 1.7 Units 5 Units/m2 = 8.6 Units   dacarbazine (DTIC) .5 mg/m2 = 452 mg     DOXOrubicin (ADRIAMYCIN) IV 6.25 mg/m2 = 10.8 mg     vinBLAStine (VELBAN) IV 3 mg/m2 = 5.16 mg       Supportive Care Flowsheet 8/26/2022 9/3/2022 9/12/2022 9/16/2022   Day, Cycle Day 8, Cycle 1 Day 15, Cycle 1 Day 22, Cycle 1 Day 29, Cycle 1   iron sucrose (VENOFER)  mg 200 mg 200 mg 200 mg     - after discussion today, plan to change treatment to Brentuximab monotherapy for his CD 30 positive lymphoma.     - the patient is frail and would benefit most from monotherapy of brentuuximab. Evidence based on data from   Below:     Phase 2 study of frontline brentuximab vedotin monotherapy in Hodgkin lymphoma patients aged 61 years and older. AU  Karine PAIGE, Miriam LIAO, Emil Swenson, Christiane RE, Beryle Roup, Martine ERIC, [facility], Nate MAJANO   SO  Blood. 2015 Dec;126(27):0664-559. Epub 2015 Sep 16. Outcomes in older patients with Hodgkin lymphoma (HL) tend to be poor following conventional chemotherapy regimens. Treatment-related toxicity is significant and comorbidities often limit therapeutic options. This phase 2, open-label study evaluated the efficacy and safety of brentuximab vedotin, a [id number] antibody-drug conjugate, as frontline therapy in 32 HL patients aged? 60 years. The objective response rate (BHAKTA) was 92%, with 73% achieving complete remission. All patients achieved stable disease or better, and had decreased tumor volume following treatment.  At the time of this analysis, the median duration of objective response for efficacy-evaluable patients (N = 26) was 9.1 months (range, 2.8 to 20.9+ months), median progression-free survival was 10.5 months (range, 2.6+ to 22.3+ months), and median overall survival had not been reached (range, 4.6+ to 24.9+ months). The observed adverse events (AEs) were generally consistent with the known safety profile of brentuximab vedotin. The most common AEs were peripheral sensory neuropathy (78%), fatigue (44%), and nausea (44%), and were?grade 2 for most patients. The incidence of grade 3 peripheral neuropathy events was relatively high (30% overall), particularly among patients with the known risk factors ofdiabetes and/or hypothyroidism (46% vs 14% for those without). However, these risk factors were not associated with delayed time to resolution/improvement of peripheral neuropathy. Preliminary data showed no substantial age-related changes in brentuximab vedotin pharmacokinetics. Brentuximab vedotin monotherapy may provide a frontline treatment option for older patients who cannot tolerate conventional combination chemotherapy    Start brentuximab monotherapy in 2 weeks. # Hyperbilirubinemia  -recheck today. Likely secondary to disease involvement of liver. # Elevated alkaline phosphatase  -The patient likely has disease involvement of the bone      He will need consent for brentuximab prior to treatment in 2 weeks.      Signed By: Ashley Vides MD      Attending Medical Oncologist   [facility]

## 2023-03-14 NOTE — PROGRESS NOTES
Oralia Nelson is a 67 y.o. male who presents for follow up of   Chief Complaint   Patient presents with    Follow-up     Hodgkin lymphoma       The patient reports no new clinical symptoms or new complaints since last clinic evaluation. 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 Overall pt is doing good. Pt has been eating more of high protein food. Has gained some weight. Pt has also retained some fluid and would like to know when can he drive again. Pt also has a red irritation on both his legs. No interval surgery or procedures reported    No reported new medication changes reported       Medications reviewed with the patient, and chart updated to reflect changes.

## 2023-03-14 NOTE — PROGRESS NOTES
[facility] Note:  Arrived - 1000  Labs obtained: CBC, CMP, LD, SBB, [id number]    Visit Vitals  BP (!) 141/71 (BP 1 Location: Left upper arm, BP Patient Position: Sitting)   Pulse 84   Temp 97.8 °F (36.6 °C)   Resp 18   SpO2 100%     See [facility] for pending lab results. Assessment - unchanged. Port accessed & flushed per protocol w/o difficulty. Rock needle removed. 1010 - Tolerated well. Pt denies any acute problems/changes. Discharged from [facility] ambulatory. No distress.  Next appt: 3/29/23 at 0900

## 2023-03-16 LAB — B2 MICROGLOB SERPL-MCNC: 5.1 MG/L (ref 0.6–2.4)

## 2023-03-20 ENCOUNTER — TELEPHONE (OUTPATIENT)
Dept: ONCOLOGY | Age: 73
End: 2023-03-20

## 2023-03-20 RX ORDER — ONDANSETRON 2 MG/ML
8 INJECTION INTRAMUSCULAR; INTRAVENOUS ONCE
Status: CANCELLED | OUTPATIENT
Start: 2023-03-21 | End: 2023-03-21

## 2023-03-20 RX ORDER — DIPHENHYDRAMINE HYDROCHLORIDE 50 MG/ML
50 INJECTION, SOLUTION INTRAMUSCULAR; INTRAVENOUS ONCE
Status: CANCELLED
Start: 2023-03-21 | End: 2023-03-21

## 2023-03-20 RX ORDER — ACETAMINOPHEN 325 MG/1
650 TABLET ORAL ONCE
Status: CANCELLED
Start: 2023-03-21 | End: 2023-03-21

## 2023-03-20 RX ORDER — SODIUM CHLORIDE 9 MG/ML
5-250 INJECTION, SOLUTION INTRAVENOUS AS NEEDED
Status: CANCELLED | OUTPATIENT
Start: 2023-03-21

## 2023-03-20 NOTE — TELEPHONE ENCOUNTER
Pt wife LVM stating when they saw Virgilio Madrid that he was concerend about his fluid retention and let him know if getting worse. It is getting worse, swelling in genital area and both legs and problems with stability because of his right ankle, she wanted to let us know in case we need to order some kind of medication for that. Please return call if need to speak to her. The pharmacy is "[facility] at [place].

## 2023-03-21 ENCOUNTER — HOSPITAL ENCOUNTER (OUTPATIENT)
Dept: INFUSION THERAPY | Age: 73
Discharge: HOME | End: 2023-03-21
Payer: MEDICARE

## 2023-03-21 ENCOUNTER — OFFICE VISIT (OUTPATIENT)
Dept: ONCOLOGY | Age: 73
End: 2023-03-21

## 2023-03-21 VITALS
BODY MASS INDEX: 26.16 KG/M2 | TEMPERATURE: 98.3 F | RESPIRATION RATE: 18 BRPM | WEIGHT: 157 LBS | HEART RATE: 94 BPM | HEIGHT: 65 IN | OXYGEN SATURATION: 100 % | SYSTOLIC BLOOD PRESSURE: 147 MMHG | DIASTOLIC BLOOD PRESSURE: 69 MMHG

## 2023-03-21 VITALS
HEIGHT: 65 IN | HEART RATE: 78 BPM | TEMPERATURE: 98.3 F | RESPIRATION RATE: 18 BRPM | BODY MASS INDEX: 26.74 KG/M2 | DIASTOLIC BLOOD PRESSURE: 78 MMHG | SYSTOLIC BLOOD PRESSURE: 149 MMHG | WEIGHT: 160.5 LBS | OXYGEN SATURATION: 100 %

## 2023-03-21 DIAGNOSIS — Z51.11 ENCOUNTER FOR ANTINEOPLASTIC CHEMOTHERAPY: ICD-10-CM

## 2023-03-21 DIAGNOSIS — C81.90 HODGKIN LYMPHOMA, UNSPECIFIED HODGKIN LYMPHOMA TYPE, UNSPECIFIED BODY REGION (HCC): Primary | ICD-10-CM

## 2023-03-21 DIAGNOSIS — R60.0 LOWER EXTREMITY EDEMA: ICD-10-CM

## 2023-03-21 DIAGNOSIS — E87.6 HYPOKALEMIA: ICD-10-CM

## 2023-03-21 LAB
ALBUMIN SERPL-MCNC: 2.3 G/DL (ref 3.5–5)
ALBUMIN/GLOB SERPL: 0.6 (ref 1.1–2.2)
ALP SERPL-CCNC: 96 U/L (ref 45–117)
ALT SERPL-CCNC: 18 U/L (ref 12–78)
ANION GAP SERPL CALC-SCNC: 3 MMOL/L (ref 5–15)
AST SERPL-CCNC: 15 U/L (ref 15–37)
BASOPHILS # BLD: 0.1 K/UL (ref 0–0.1)
BASOPHILS NFR BLD: 1 % (ref 0–1)
BILIRUB SERPL-MCNC: 0.4 MG/DL (ref 0.2–1)
BUN SERPL-MCNC: 14 MG/DL (ref 6–20)
BUN/CREAT SERPL: 17 (ref 12–20)
CALCIUM SERPL-MCNC: 8.2 MG/DL (ref 8.5–10.1)
CHLORIDE SERPL-SCNC: 106 MMOL/L (ref 97–108)
CO2 SERPL-SCNC: 30 MMOL/L (ref 21–32)
CREAT SERPL-MCNC: 0.83 MG/DL (ref 0.7–1.3)
DIFFERENTIAL METHOD BLD: ABNORMAL
EOSINOPHIL # BLD: 0.4 K/UL (ref 0–0.4)
EOSINOPHIL NFR BLD: 5 % (ref 0–7)
ERYTHROCYTE [DISTWIDTH] IN BLOOD BY AUTOMATED COUNT: 16.1 % (ref 11.5–14.5)
GLOBULIN SER CALC-MCNC: 3.9 G/DL (ref 2–4)
GLUCOSE SERPL-MCNC: 122 MG/DL (ref 65–100)
HAV IGM SER QL: NONREACTIVE
HBV CORE IGM SER QL: NONREACTIVE
HBV SURFACE AG SER QL: <0.1 INDEX
HBV SURFACE AG SER QL: NEGATIVE
HCT VFR BLD AUTO: 23.1 % (ref 36.6–50.3)
HCV AB SERPL QL IA: NONREACTIVE
HGB BLD-MCNC: 7.1 G/DL (ref 12.1–17)
IMM GRANULOCYTES # BLD AUTO: 0 K/UL (ref 0–0.04)
IMM GRANULOCYTES NFR BLD AUTO: 1 % (ref 0–0.5)
LYMPHOCYTES # BLD: 1.2 K/UL (ref 0.8–3.5)
LYMPHOCYTES NFR BLD: 17 % (ref 12–49)
MCH RBC QN AUTO: 29 PG (ref 26–34)
MCHC RBC AUTO-ENTMCNC: 30.7 G/DL (ref 30–36.5)
MCV RBC AUTO: 94.3 FL (ref 80–99)
MONOCYTES # BLD: 0.5 K/UL (ref 0–1)
MONOCYTES NFR BLD: 8 % (ref 5–13)
NEUTS SEG # BLD: 4.9 K/UL (ref 1.8–8)
NEUTS SEG NFR BLD: 68 % (ref 32–75)
NRBC # BLD: 0 K/UL (ref 0–0.01)
NRBC BLD-RTO: 0 PER 100 WBC
PLATELET # BLD AUTO: 315 K/UL (ref 150–400)
PMV BLD AUTO: 8 FL (ref 8.9–12.9)
POTASSIUM SERPL-SCNC: 3 MMOL/L (ref 3.5–5.1)
PROT SERPL-MCNC: 6.2 G/DL (ref 6.4–8.2)
RBC # BLD AUTO: 2.45 M/UL (ref 4.1–5.7)
SODIUM SERPL-SCNC: 139 MMOL/L (ref 136–145)
SP1: NORMAL
SP2: NORMAL
SP3: NORMAL
WBC # BLD AUTO: 7.1 K/UL (ref 4.1–11.1)

## 2023-03-21 PROCEDURE — 96375 TX/PRO/DX INJ NEW DRUG ADDON: CPT

## 2023-03-21 PROCEDURE — 74011250636 HC RX REV CODE- 250/636: Performed by: STUDENT IN AN ORGANIZED HEALTH CARE EDUCATION/TRAINING PROGRAM

## 2023-03-21 PROCEDURE — 3074F SYST BP LT 130 MM HG: CPT | Performed by: STUDENT IN AN ORGANIZED HEALTH CARE EDUCATION/TRAINING PROGRAM

## 2023-03-21 PROCEDURE — G8427 DOCREV CUR MEDS BY ELIG CLIN: HCPCS | Performed by: STUDENT IN AN ORGANIZED HEALTH CARE EDUCATION/TRAINING PROGRAM

## 2023-03-21 PROCEDURE — 1101F PT FALLS ASSESS-DOCD LE1/YR: CPT | Performed by: STUDENT IN AN ORGANIZED HEALTH CARE EDUCATION/TRAINING PROGRAM

## 2023-03-21 PROCEDURE — 80053 COMPREHEN METABOLIC PANEL: CPT

## 2023-03-21 PROCEDURE — G8510 SCR DEP NEG, NO PLAN REQD: HCPCS | Performed by: STUDENT IN AN ORGANIZED HEALTH CARE EDUCATION/TRAINING PROGRAM

## 2023-03-21 PROCEDURE — 77030012965 HC NDL HUBR BBMI -A

## 2023-03-21 PROCEDURE — 3078F DIAST BP <80 MM HG: CPT | Performed by: STUDENT IN AN ORGANIZED HEALTH CARE EDUCATION/TRAINING PROGRAM

## 2023-03-21 PROCEDURE — 1123F ACP DISCUSS/DSCN MKR DOCD: CPT | Performed by: STUDENT IN AN ORGANIZED HEALTH CARE EDUCATION/TRAINING PROGRAM

## 2023-03-21 PROCEDURE — 96377 APPLICATON ON-BODY INJECTOR: CPT

## 2023-03-21 PROCEDURE — 74011000250 HC RX REV CODE- 250: Performed by: NURSE PRACTITIONER

## 2023-03-21 PROCEDURE — 36415 COLL VENOUS BLD VENIPUNCTURE: CPT

## 2023-03-21 PROCEDURE — 3017F COLORECTAL CA SCREEN DOC REV: CPT | Performed by: STUDENT IN AN ORGANIZED HEALTH CARE EDUCATION/TRAINING PROGRAM

## 2023-03-21 PROCEDURE — G8417 CALC BMI ABV UP PARAM F/U: HCPCS | Performed by: STUDENT IN AN ORGANIZED HEALTH CARE EDUCATION/TRAINING PROGRAM

## 2023-03-21 PROCEDURE — 80074 ACUTE HEPATITIS PANEL: CPT

## 2023-03-21 PROCEDURE — 74011250637 HC RX REV CODE- 250/637: Performed by: STUDENT IN AN ORGANIZED HEALTH CARE EDUCATION/TRAINING PROGRAM

## 2023-03-21 PROCEDURE — 74011250637 HC RX REV CODE- 250/637: Performed by: NURSE PRACTITIONER

## 2023-03-21 PROCEDURE — 74011000258 HC RX REV CODE- 258: Performed by: STUDENT IN AN ORGANIZED HEALTH CARE EDUCATION/TRAINING PROGRAM

## 2023-03-21 PROCEDURE — 85025 COMPLETE CBC W/AUTO DIFF WBC: CPT

## 2023-03-21 PROCEDURE — G8536 NO DOC ELDER MAL SCRN: HCPCS | Performed by: STUDENT IN AN ORGANIZED HEALTH CARE EDUCATION/TRAINING PROGRAM

## 2023-03-21 PROCEDURE — 96413 CHEMO IV INFUSION 1 HR: CPT

## 2023-03-21 PROCEDURE — 99215 OFFICE O/P EST HI 40 MIN: CPT | Performed by: STUDENT IN AN ORGANIZED HEALTH CARE EDUCATION/TRAINING PROGRAM

## 2023-03-21 RX ORDER — DIPHENHYDRAMINE HYDROCHLORIDE 50 MG/ML
25 INJECTION, SOLUTION INTRAMUSCULAR; INTRAVENOUS AS NEEDED
Status: CANCELLED
Start: 2023-03-21

## 2023-03-21 RX ORDER — POTASSIUM CHLORIDE 750 MG/1
20 TABLET, EXTENDED RELEASE ORAL DAILY
Qty: 14 TABLET | Refills: 0 | Status: SHIPPED | OUTPATIENT
Start: 2023-03-21 | End: 2023-03-28

## 2023-03-21 RX ORDER — ONDANSETRON 2 MG/ML
8 INJECTION INTRAMUSCULAR; INTRAVENOUS ONCE
Status: COMPLETED | OUTPATIENT
Start: 2023-03-21 | End: 2023-03-21

## 2023-03-21 RX ORDER — ALBUTEROL SULFATE 0.83 MG/ML
2.5 SOLUTION RESPIRATORY (INHALATION) AS NEEDED
Status: CANCELLED
Start: 2023-03-21

## 2023-03-21 RX ORDER — ACETAMINOPHEN 325 MG/1
650 TABLET ORAL ONCE
Status: COMPLETED | OUTPATIENT
Start: 2023-03-21 | End: 2023-03-21

## 2023-03-21 RX ORDER — SODIUM CHLORIDE 9 MG/ML
5-40 INJECTION INTRAVENOUS AS NEEDED
Status: CANCELLED | OUTPATIENT
Start: 2023-03-21

## 2023-03-21 RX ORDER — SODIUM CHLORIDE 9 MG/ML
5-40 INJECTION INTRAVENOUS AS NEEDED
Status: CANCELLED | OUTPATIENT
Start: 2023-03-27

## 2023-03-21 RX ORDER — SODIUM CHLORIDE 9 MG/ML
5-250 INJECTION, SOLUTION INTRAVENOUS AS NEEDED
Status: CANCELLED | OUTPATIENT
Start: 2023-03-27

## 2023-03-21 RX ORDER — ONDANSETRON 2 MG/ML
8 INJECTION INTRAMUSCULAR; INTRAVENOUS AS NEEDED
Status: CANCELLED | OUTPATIENT
Start: 2023-03-21

## 2023-03-21 RX ORDER — DIPHENHYDRAMINE HYDROCHLORIDE 50 MG/ML
50 INJECTION, SOLUTION INTRAMUSCULAR; INTRAVENOUS ONCE
Status: COMPLETED | OUTPATIENT
Start: 2023-03-21 | End: 2023-03-21

## 2023-03-21 RX ORDER — SODIUM CHLORIDE 0.9 % (FLUSH) 0.9 %
5-40 SYRINGE (ML) INJECTION AS NEEDED
Status: CANCELLED | OUTPATIENT
Start: 2023-03-27

## 2023-03-21 RX ORDER — HYDROCORTISONE SODIUM SUCCINATE 100 MG/2ML
100 INJECTION, POWDER, FOR SOLUTION INTRAMUSCULAR; INTRAVENOUS AS NEEDED
Status: CANCELLED | OUTPATIENT
Start: 2023-03-21

## 2023-03-21 RX ORDER — SODIUM CHLORIDE 0.9 % (FLUSH) 0.9 %
10-40 SYRINGE (ML) INJECTION AS NEEDED
Status: DISCONTINUED | OUTPATIENT
Start: 2023-03-21 | End: 2023-03-22 | Stop reason: HOSPADM

## 2023-03-21 RX ORDER — HEPARIN 100 UNIT/ML
500 SYRINGE INTRAVENOUS AS NEEDED
Status: ACTIVE | OUTPATIENT
Start: 2023-03-21 | End: 2023-03-21

## 2023-03-21 RX ORDER — HEPARIN 100 UNIT/ML
500 SYRINGE INTRAVENOUS AS NEEDED
Status: CANCELLED
Start: 2023-03-21

## 2023-03-21 RX ORDER — POTASSIUM CHLORIDE 750 MG/1
40 TABLET, FILM COATED, EXTENDED RELEASE ORAL
Status: COMPLETED | OUTPATIENT
Start: 2023-03-21 | End: 2023-03-21

## 2023-03-21 RX ORDER — SODIUM CHLORIDE 9 MG/ML
5-250 INJECTION, SOLUTION INTRAVENOUS AS NEEDED
Status: CANCELLED | OUTPATIENT
Start: 2023-03-21

## 2023-03-21 RX ORDER — DIPHENHYDRAMINE HYDROCHLORIDE 50 MG/ML
50 INJECTION, SOLUTION INTRAMUSCULAR; INTRAVENOUS AS NEEDED
Status: CANCELLED
Start: 2023-03-21

## 2023-03-21 RX ORDER — HEPARIN 100 UNIT/ML
500 SYRINGE INTRAVENOUS AS NEEDED
Status: CANCELLED | OUTPATIENT
Start: 2023-03-27

## 2023-03-21 RX ORDER — SODIUM CHLORIDE 9 MG/ML
5-250 INJECTION, SOLUTION INTRAVENOUS AS NEEDED
Status: DISPENSED | OUTPATIENT
Start: 2023-03-21 | End: 2023-03-21

## 2023-03-21 RX ORDER — SODIUM CHLORIDE 0.9 % (FLUSH) 0.9 %
5-40 SYRINGE (ML) INJECTION AS NEEDED
Status: CANCELLED | OUTPATIENT
Start: 2023-03-21

## 2023-03-21 RX ORDER — ACETAMINOPHEN 325 MG/1
650 TABLET ORAL AS NEEDED
Status: CANCELLED
Start: 2023-03-21

## 2023-03-21 RX ORDER — EPINEPHRINE 1 MG/ML
0.3 INJECTION, SOLUTION, CONCENTRATE INTRAVENOUS AS NEEDED
Status: CANCELLED | OUTPATIENT
Start: 2023-03-21

## 2023-03-21 RX ADMIN — SODIUM CHLORIDE, PRESERVATIVE FREE 10 ML: 5 INJECTION INTRAVENOUS at 08:29

## 2023-03-21 RX ADMIN — ACETAMINOPHEN 650 MG: 325 TABLET ORAL at 10:18

## 2023-03-21 RX ADMIN — POTASSIUM CHLORIDE 40 MEQ: 750 TABLET, FILM COATED, EXTENDED RELEASE ORAL at 10:18

## 2023-03-21 RX ADMIN — ONDANSETRON 8 MG: 2 INJECTION INTRAMUSCULAR; INTRAVENOUS at 10:20

## 2023-03-21 RX ADMIN — DIPHENHYDRAMINE HYDROCHLORIDE 50 MG: 50 INJECTION, SOLUTION INTRAMUSCULAR; INTRAVENOUS at 10:18

## 2023-03-21 RX ADMIN — BRENTUXIMAB VEDOTIN 130 MG: 50 INJECTION, POWDER, LYOPHILIZED, FOR SOLUTION INTRAVENOUS at 10:50

## 2023-03-21 RX ADMIN — SODIUM CHLORIDE, PRESERVATIVE FREE 10 ML: 5 INJECTION INTRAVENOUS at 11:25

## 2023-03-21 RX ADMIN — HEPARIN 500 UNITS: 100 SYRINGE at 11:25

## 2023-03-21 RX ADMIN — SODIUM CHLORIDE 25 ML/HR: 9 INJECTION, SOLUTION INTRAVENOUS at 10:17

## 2023-03-21 RX ADMIN — PEGFILGRASTIM 6 MG: KIT SUBCUTANEOUS at 10:53

## 2023-03-21 NOTE — PROGRESS NOTES
Eddie Bolton is a 67 y.o. male who presents for follow up of   Chief Complaint   Patient presents with    Follow-up     Hodgkin lymphoma       The patient reports no new clinical symptoms or new complaints since last clinic evaluation. Pt reports he is doing good pt's appetite is very good pt has gained weight since last visit. Pt also has swelling in abdomen down to legs and rash that is on back and on abdomen that is itchy. No interval hospitalizations reported    No interval surgery or procedures reported    No reported new medication changes reported       Medications reviewed with the patient, and chart updated to reflect changes.

## 2023-03-21 NOTE — PROGRESS NOTES
Pt arrived to [facility] ambulatory in no acute distress at [facility] for Adcetris C1. Assessment unremarkable except fatigue and lower extremity swelling. L chest port accessed without issue and positive blood return noted. Labs obtained, CBC, CMP, Acute Hepatitis Panel. Patient Vitals for the past 12 hrs:   Temp Pulse Resp BP SpO2   03/21/23 1129 -- 78 18 (!) 149/78 --   03/21/23 0820 98.3 °F (36.8 °C) 94 18 (!) 147/69 100 %      Recent Results (from the past 12 hour(s))   CBC WITH AUTOMATED DIFF    Collection Time: 03/21/23  8:25 AM   Result Value Ref Range    WBC 7.1 4.1 - 11.1 K/uL    RBC 2.45 (L) 4.10 - 5.70 M/uL    HGB 7.1 (L) 12.1 - 17.0 g/dL    HCT 23.1 (L) 36.6 - 50.3 %    MCV 94.3 80.0 - 99.0 FL    MCH 29.0 26.0 - 34.0 PG    MCHC 30.7 30.0 - 36.5 g/dL    RDW 16.1 (H) 11.5 - 14.5 %    PLATELET 984 099 - 212 K/uL    MPV 8.0 (L) 8.9 - 12.9 FL    NRBC 0.0 0  WBC    ABSOLUTE NRBC 0.00 0.00 - 0.01 K/uL    NEUTROPHILS 68 32 - 75 %    LYMPHOCYTES 17 12 - 49 %    MONOCYTES 8 5 - 13 %    EOSINOPHILS 5 0 - 7 %    BASOPHILS 1 0 - 1 %    IMMATURE GRANULOCYTES 1 (H) 0.0 - 0.5 %    ABS. NEUTROPHILS 4.9 1.8 - 8.0 K/UL    ABS. LYMPHOCYTES 1.2 0.8 - 3.5 K/UL    ABS. MONOCYTES 0.5 0.0 - 1.0 K/UL    ABS. EOSINOPHILS 0.4 0.0 - 0.4 K/UL    ABS. BASOPHILS 0.1 0.0 - 0.1 K/UL    ABS. IMM. GRANS. 0.0 0.00 - 0.04 K/UL    DF AUTOMATED     METABOLIC PANEL, COMPREHENSIVE    Collection Time: 03/21/23  8:25 AM   Result Value Ref Range    Sodium 139 136 - 145 mmol/L    Potassium 3.0 (L) 3.5 - 5.1 mmol/L    Chloride 106 97 - 108 mmol/L    CO2 30 21 - 32 mmol/L    Anion gap 3 (L) 5 - 15 mmol/L    Glucose 122 (H) 65 - 100 mg/dL    BUN 14 6 - 20 MG/DL    Creatinine 0.83 0.70 - 1.30 MG/DL    BUN/Creatinine ratio 17 12 - 20      eGFR >60 >60 ml/min/1.73m2    Calcium 8.2 (L) 8.5 - 10.1 MG/DL    Bilirubin, total 0.4 0.2 - 1.0 MG/DL    ALT (SGPT) 18 12 - 78 U/L    AST (SGOT) 15 15 - 37 U/L    Alk.  phosphatase 96 45 - 117 U/L    Protein, total 6.2 (L) 6.4 - 8.2 g/dL    Albumin 2.3 (L) 3.5 - 5.0 g/dL    Globulin 3.9 2.0 - 4.0 g/dL    A-G Ratio 0.6 (L) 1.1 - 2.2       Low K reported to IRIS Berman NP. Orders received. Creatinine Clearance 78.3.     The following medications administered:  Medications Administered       0.9% sodium chloride infusion       Admin Date  03/21/2023 Action  New Bag Dose  25 mL/hr Rate  25 mL/hr Route  IntraVENous Administered By  Caren Zaman RN              acetaminophen (TYLENOL) tablet 650 mg       Admin Date  03/21/2023 Action  Given Dose  650 mg Route  Oral Administered By  Caren Zaman RN              brentuximab vedotin (ADCETRIS) 130 mg in 0.9% sodium chloride 100 mL, overfill volume 10 mL infusion       Admin Date  03/21/2023 Action  New Bag Dose  130 mg Rate  272 mL/hr Route  IntraVENous Administered By  Caren Zaman RN              diphenhydrAMINE (BENADRYL) injection 50 mg       Admin Date  03/21/2023 Action  Given Dose  50 mg Route  IntraVENous Administered By  Caren Zaman RN              heparin (porcine) pf 500 Units       Admin Date  03/21/2023 Action  Given Dose  500 Units Route  InterCATHeter Administered By  Caren Zaman RN              ondansetron [facility]) injection 8 mg       Admin Date  03/21/2023 Action  Given Dose  8 mg Route  IntraVENous Administered By  Caren Zaman RN              pegfilgrastim (NEULASTA) wearable SQ injector 6 mg       Admin Date  03/21/2023 Action  Given Dose  6 mg Route  SubCUTAneous Administered By  Caren Zaman RN              potassium chloride SR (KLOR-CON 10) tablet 40 mEq       Admin Date  03/21/2023 Action  Given Dose  40 mEq Route  Oral Administered By  Caren Zaman RN              sodium chloride (NS) flush 10-40 mL       Admin Date  03/21/2023 Action  Given Dose  10 mL Route  IntraVENous Administered By  Caren Zaman RN               Admin Date  03/21/2023 Action  Given Dose  10 mL Route  IntraVENous Administered By  Judith Ramirez GRECIA, RN                  Education provided to patient about Neulasta On Body Injector including: side effects, how/when to remove the device, as well as what to do in the event of device malfunction. Opportunity for questions was provided and all questions were answered. Patient verbalized understanding. Pt tolerated treatment well. Pt educated about chemotherapy, handouts given. Discharge instructions reviewed. Port flushed per policy and de-accessed, 2x2 and tape placed. Pt discharged ambulatory in no acute distress at 1130, accompanied by spouse. Next appointment 3/27/23 at 1130.

## 2023-03-21 NOTE — PROGRESS NOTES
[facility] at [address] [facility], [address]  W: [phone] F: [phone]      Reason for Visit:   Madelaine Morel is a 67 y.o. male who is seen in consultation at the request of Dr. Lakisha Gaona for evaluation of Hodgkin lymphoma. Seen today in follow-up for chemotherapy. Hematology / Oncology Treatment History:     Hematological/Oncological Diagnosis: Classic Hodgkin lymphoma    Date of Diagnosis: 2021    Treatment course:   1/9/23: Received 1 cycle of ABVD chemotherapy inpatient   3/21/23: start brentuximab monotherapy - today is Cycle 1 Day 1    History of Present Illness:     67 y.o. with hx of hypertension, seasonal allergies, presents with worsening normocytic aneima of unclear etiology.   -  No other cytopenias. MCV is 89. Iron studies are normal.  B12, folate are normal. Reticulocyte count is low/normal at 1.4. Creatinine is normal at 1.10. Calcium is normal at 9.6. LFTs are normal. Per notes from PCP, stool guiac we negative in July. Alkaline phosphatase is elevated. Constitutional symptoms positive for easy bruising only. No night sweats, bone pain, unintentional weight loss. Family history reviewed, non contributory  Social history reviewed, non contributory. No alcohol use    12/28/22:    Marked clinical worsening since last evaluation, patient has had anasarca, severe fatigue, weight loss, nausea, vomiting that is worsened over the last few months. The patient was evaluated by his primary care physician who ordered CT chest abdomen pelvis that showed diffuse lymphadenopathy. Results from imaging shown below        Radiographic findings are highly suspicious for lymphoma. Patient is planned for surgical evaluation for excisional lymph node biopsy later on today. Patient's family also reports that he has been confused intermittently over the last few weeks.   They are concerned that there might be a lymphoma infiltrating into the CNS. No focal deficits reported or seizure activity noted. 23: Since last evaluation, the patient has had clinical deterioration. He reports that he has discoloration of his lower extremities that appears to be vascular in nature. Pulses are intact bilaterally however he has redness and some discoloration of his left foot. No evidence of cellulitis or infection. The patient reports that he has decreased appetite, weight loss, as well as darkening of his urine. He does have scleral icterus. Interval History:   3/21/23:  Overall doing much better. He notes increased scrotal and lower extremity edema. Denies nausea or pain. Starting systemic chemotherapy with brentuximab today. Review of Systems: A complete review of systems was obtained, negative except as described above.     Past Medical History:   Diagnosis Date    Allergic rhinitis, cause unspecified 2013    Arthritis     knees    Asthma     as a child    Eczema     on lower back waistline on the back    Environmental allergies     GERD (gastroesophageal reflux disease)     occastionally but resolved since 60 pound weight loss    Hodgkin's lymphoma ([facility] [address]     Hypertension     Psychiatric disorder     anxiety and depression      Past Surgical History:   Procedure Laterality Date    HX TONSILLECTOMY      HX WISDOM TEETH EXTRACTION        Social History     Tobacco Use    Smoking status: Former     Packs/day: 1.00     Years: 2.00     Pack years: 2.00     Types: Cigarettes     Quit date: 1966     Years since quittin.3    Smokeless tobacco: Never   Substance Use Topics    Alcohol use: No      Family History   Problem Relation Age of Onset    Hypertension Mother     Hypertension Father     Heart Disease Father     Alcohol abuse Father     Hypertension Brother     No Known Problems Brother      Current Outpatient Medications   Medication Sig    potassium chloride (KLOR-CON M10) 10 mEq tablet Take 2 Tablets by mouth daily for 7 days. OLANZapine (ZyPREXA) 5 mg tablet Zyprexa 5 mg tablet   Give 1 tablet by mouth at bedtime for anxiety and depression    azithromycin (ZITHROMAX) 250 mg tablet Take 2 tablets today, then take 1 tablet daily    allopurinoL (ZYLOPRIM) 100 mg tablet Take 1 Tablet by mouth two (2) times a day. lidocaine 4 % patch 1 Patch by TransDERmal route every twenty-four (24) hours. triamcinolone acetonide (KENALOG) 0.1 % topical cream Apply  to affected area two (2) times daily as needed for Skin Irritation. use thin layer    ondansetron (ZOFRAN ODT) 4 mg disintegrating tablet Take 1 Tablet by mouth every eight (8) hours as needed for Nausea or Vomiting. cetirizine-pseudoePHEDrine (ZyrTEC-D) 5-120 mg Tb12 Take 1 Tab by mouth two (2) times a day. No current facility-administered medications for this visit. Facility-Administered Medications Ordered in Other Visits   Medication Dose Route Frequency    sodium chloride (NS) flush 10-40 mL  10-40 mL IntraVENous PRN    brentuximab vedotin (ADCETRIS) 130 mg in 0.9% sodium chloride 100 mL, overfill volume 10 mL infusion  1.8 mg/kg (Treatment Plan) IntraVENous ONCE    heparin (porcine) pf 500 Units  500 Units InterCATHeter PRN    0.9% sodium chloride infusion  5-250 mL/hr IntraVENous PRN    [COMPLETED] ondansetron (ZOFRAN) injection 8 mg  8 mg IntraVENous ONCE    pegfilgrastim (NEULASTA) wearable SQ injector 6 mg  6 mg SubCUTAneous ONCE      Allergies   Allergen Reactions    Codeine Other (comments)     Pt states unknown reaction. Pcn [Penicillins] Rash     Received Ancef 1/18/2023 without issue.           Physical Exam:   Visit Vitals  BP (!) 147/69   Pulse 94   Temp 98.3 °F (36.8 °C)   Resp 18   Ht 5' 5\" (1.651 m)   Wt 157 lb (71.2 kg)   SpO2 100%   BMI 26.13 kg/m²       Pain Scale: 0 - No pain/10      ECOG PS: 2  General: alert, cooperative, no distress, appears fatigued   Mental  status: normal mood, behavior, speech, dress, motor activity, and thought processes, able to follow commands   HENT: NCAT   Neck: no visualized mass   Resp: no respiratory distress   Neuro: no gross deficits   Skin: no discoloration or lesions of concern on visible areas   Psychiatric: normal affect, consistent with stated mood, no evidence of hallucinations     The above physical exam was reviewed and updated as needed on 03/21/23. Results:     Lab Results   Component Value Date/Time    WBC 7.1 03/21/2023 08:25 AM    HGB 7.1 (L) 03/21/2023 08:25 AM    HCT 23.1 (L) 03/21/2023 08:25 AM    PLATELET 515 38/41/8760 08:25 AM    MCV 94.3 03/21/2023 08:25 AM    ABS. NEUTROPHILS 4.9 03/21/2023 08:25 AM     Lab Results   Component Value Date/Time    Sodium 139 03/21/2023 08:25 AM    Potassium 3.0 (L) 03/21/2023 08:25 AM    Chloride 106 03/21/2023 08:25 AM    CO2 30 03/21/2023 08:25 AM    Glucose 122 (H) 03/21/2023 08:25 AM    BUN 14 03/21/2023 08:25 AM    Creatinine 0.83 03/21/2023 08:25 AM    GFR est AA >60 07/26/2022 01:45 PM    GFR est non-AA >60 07/26/2022 01:45 PM    Calcium 8.2 (L) 03/21/2023 08:25 AM    Sodium,  10/07/2022 10:16 AM    Potassium, POC 3.9 10/07/2022 10:16 AM    Chloride,  10/07/2022 10:16 AM    Glucose (POC) 122 (H) 02/06/2023 11:17 AM    Creatinine, POC 1.1 10/07/2022 10:16 AM    Calcium, ionized (POC) 1.22 10/07/2022 10:16 AM     Lab Results   Component Value Date/Time    Bilirubin, total 0.4 03/21/2023 08:25 AM    ALT (SGPT) 18 03/21/2023 08:25 AM    Alk. phosphatase 96 03/21/2023 08:25 AM    Protein, total 6.2 (L) 03/21/2023 08:25 AM    Albumin 2.3 (L) 03/21/2023 08:25 AM    Globulin 3.9 03/21/2023 08:25 AM     CT Results (most recent):  Results from Hospital Encounter encounter on 01/17/23    CT HEAD WO CONT    Narrative  Indication:  worsening AMS    Comparison: CT 1/22/2023    Findings: 5 mm axial images were obtained from the skull base through the  vertex.     CT dose reduction was achieved through the use of a standardized protocol  tailored for this examination and automatic exposure control for dose  modulation. The ventricles and cortical sulci are prominent, compatible with age related  volume loss. There is no evidence of intracranial hemorrhage, mass, mass effect,  or acute infarct. There is periventricular white matter disease. No extra-axial  fluid collections are seen. The visualized paranasal sinuses and mastoid air  cells are clear. The orbital structures are unremarkable. No osseous  abnormalities are seen. Impression  1. No evidence of acute infarct or intracranial hemorrhage. No significant  change. 2. Mild periventricular white matter disease is likely secondary to chronic  small vessel ischemic changes. No imaging of spleen      Pathology:       ==========================================================================   * * *FINAL PATHOLOGIC DIAGNOSIS* * *     <<<<<       Lymph node, left supraclavicular lymph node, excision:        Classic Hodgkin lymphoma. See comment.     >>>>>      * * *Comment* * *     <<<<<  The histologic section has an enlarged lymph node with a thickened capsule   and effacement of normal yady architecture by a vaguely nodular   proliferation with few sclerotic bands. Numerous Hodgkin/Levi-Esteban   cells are present within a background of small lymphocytes and   eosinophils. The Hodgkin Levi-Esteban cells are positive for CD30, CD15   and PAX5 (subset, dim) and are negative for CD45, ALK, CD20, EMA,   Granzyme, MUM1, CD43 and CD3. In situ hybridization for Tania-Barr viral   RNA is negative. The small lymphocytes are comprised of CD3 and CD5   positive T cells with few scattered B cells identified.      Assessment:     # Classic Hodgkin lymphoma, advanced    - CD30 positive  - Overall findings are highly concerning for widely distributed Hodgkin lymphoma.    -Current ECOG is a 2    -At present, the patient has clinical symptoms of diarrhea, abdominal discomfort, discoloration of his lower extremities, findings may be related to widely distributed lymphadenopathy. Splenomegaly may be causing liver dysfunction.    -Abdominal US on 1/16/23 shows splenomegaly and splenic masses; multiple enlarged nodes. Echocardiogram was completed 2 months ago. 1/9/23: Received 1 cycle of ABVD chemotherapy inpatient but had intolerance to treatment requiring extended hospitalization. Specifically he had extended delerium and a greater than 2 week hospitalization. - after discussion today, plan to change treatment to Brentuximab monotherapy for his CD 30 positive lymphoma.     - the patient is frail and would benefit most from monotherapy of brentuximab. - 3/21/23: starting brentuximab monotherapy - today is Cycle 1 Day 1  Adverse events: reviewed the expected side effects of treatment and ways to manage them. Labs reviewed: okay for treatment with cytotoxic chemotherapy today. Neulasta on-body    # Hyperbilirubinemia  - Likely secondary to disease involvement of liver. - Improved.     # Elevated alkaline phosphatase  -The patient likely has disease involvement of the bone    # Lower extremity edema, R>L  - bilateral dopplers    # Hypokalemia  - Potassium 40 mEq po in [facility] today  - Potassium 20 mEq po daily x 1 week    Plan:     > Start systemic chemotherapy with Brentuximab monotherapy - today is Cycle 1 Day 1  > Neulasta on-body  > Chemotherapy consent signed and scanned into EMR  > Bilateral LE dopplers  > Replete potassium  > Labs in 1 week with SBB  > Follow-up in 3 weeks    Signed By: Jocelyn Hillman NP Temp 98.3 °F (36.8 °C)   Resp 18   Ht 5' 5\" (1.651 m)   Wt 157 lb (71.2 kg)   SpO2 100%   BMI 26.13 kg/m²     General: alert, cooperative, no distress   Mental  status: normal mood, behavior, speech, dress, motor activity, and thought processes, able to follow commands   HENT: NCAT   Neck: no visualized mass   Resp: no respiratory distress   Neuro: no gross deficits   Skin: no discoloration or lesions of concern on visible areas   Psychiatric: normal affect, consistent with stated mood, no evidence of hallucinations     Diagnoses and all orders for this visit:    Hodgkin lymphoma, unspecified Hodgkin lymphoma type, [facility]  -     Cancel: 0.9% sodium chloride infusion; 5-250 mL/hr, IntraVENous, AS NEEDED, Starting on Tue 3/21/23, Until Discontinued  -     Cancel: acetaminophen (TYLENOL) tablet 650 mg; 650 mg, Oral, ONCE, 1 dose  -     Cancel: diphenhydrAMINE (BENADRYL) injection 50 mg; 50 mg, IntraVENous, ONCE, 1 dose  -     Cancel: ondansetron (ZOFRAN) injection 8 mg; 8 mg, IntraVENous, ONCE, 1 dose  -     Cancel: sodium chloride (NS) flush 5-40 mL; 5-40 mL, IntraVENous, AS NEEDED, Starting on Mon 3/27/23, Until Discontinued, Line Patency  -     Cancel: 0.9% sodium chloride injection 5-40 mL; 5-40 mL, IntraVENous, AS NEEDED, Starting on Mon 3/27/23, Until Discontinued, Line Flushing  -     Cancel: 0.9% sodium chloride infusion; 5-250 mL/hr, IntraVENous, AS NEEDED, Starting on Mon 3/27/23, Until Discontinued  -     Cancel: heparin (porcine) pf 500 Units; 500 Units, InterCATHeter, AS NEEDED, Starting on Mon 3/27/23, Until Discontinued, Line Flushing    Encounter for antineoplastic chemotherapy    Lower extremity edema  -     DUPLEX LOWER EXT VENOUS BILAT; Future    Hypokalemia  -     potassium chloride (KLOR-CON M10) 10 mEq tablet; Take 2 Tablets by mouth daily for 7 days. , Normal, Disp-14 Tablet, R-0    Other orders  -     alteplase (CATHFLO) 2 mg in sterile water (preservative free) 2 mL injection; 2 mg, InterCATHeter, AS NEEDED, Starting on Mon 3/27/23, Until Discontinued, occluded catheter lumen      The patient is currently receiving antineoplastic chemotherapy and/or immunotherapy. Labs reviewed, WBC count, ANC, hemoglobin, platelet counts reviewed. Creatinine and liver function reviewed, okay to proceed with antineoplastic chemotherapy/immunotherapy today. Graded toxicities from treatment detailed above.     Curz Alcantara MD    Attending [address] [place] [address]

## 2023-03-24 ENCOUNTER — TELEPHONE (OUTPATIENT)
Dept: ONCOLOGY | Age: 73
End: 2023-03-24

## 2023-03-27 ENCOUNTER — HOSPITAL ENCOUNTER (OUTPATIENT)
Dept: INFUSION THERAPY | Age: 73
Discharge: HOME | End: 2023-03-27
Payer: MEDICARE

## 2023-03-27 ENCOUNTER — HOSPITAL ENCOUNTER (OUTPATIENT)
Dept: ULTRASOUND IMAGING | Age: 73
Discharge: HOME | End: 2023-03-27
Attending: STUDENT IN AN ORGANIZED HEALTH CARE EDUCATION/TRAINING PROGRAM
Payer: MEDICARE

## 2023-03-27 ENCOUNTER — TELEPHONE (OUTPATIENT)
Dept: ONCOLOGY | Age: 73
End: 2023-03-27

## 2023-03-27 VITALS
WEIGHT: 149.9 LBS | SYSTOLIC BLOOD PRESSURE: 146 MMHG | BODY MASS INDEX: 24.94 KG/M2 | OXYGEN SATURATION: 99 % | TEMPERATURE: 97.7 F | RESPIRATION RATE: 18 BRPM | DIASTOLIC BLOOD PRESSURE: 71 MMHG

## 2023-03-27 DIAGNOSIS — C81.90 HODGKIN LYMPHOMA, UNSPECIFIED HODGKIN LYMPHOMA TYPE, UNSPECIFIED BODY REGION (HCC): Primary | ICD-10-CM

## 2023-03-27 DIAGNOSIS — R60.0 LOWER EXTREMITY EDEMA: ICD-10-CM

## 2023-03-27 LAB
ALBUMIN SERPL-MCNC: 2.8 G/DL (ref 3.5–5)
ALBUMIN/GLOB SERPL: 0.7 (ref 1.1–2.2)
ALP SERPL-CCNC: 215 U/L (ref 45–117)
ALT SERPL-CCNC: 22 U/L (ref 12–78)
ANION GAP SERPL CALC-SCNC: 3 MMOL/L (ref 5–15)
AST SERPL-CCNC: 26 U/L (ref 15–37)
BASOPHILS # BLD: 0 K/UL (ref 0–0.1)
BASOPHILS NFR BLD: 0 % (ref 0–1)
BILIRUB SERPL-MCNC: 0.4 MG/DL (ref 0.2–1)
BUN SERPL-MCNC: 18 MG/DL (ref 6–20)
BUN/CREAT SERPL: 18 (ref 12–20)
CALCIUM SERPL-MCNC: 8.8 MG/DL (ref 8.5–10.1)
CHLORIDE SERPL-SCNC: 104 MMOL/L (ref 97–108)
CO2 SERPL-SCNC: 31 MMOL/L (ref 21–32)
CREAT SERPL-MCNC: 0.99 MG/DL (ref 0.7–1.3)
DIFFERENTIAL METHOD BLD: ABNORMAL
EOSINOPHIL # BLD: 0.8 K/UL (ref 0–0.4)
EOSINOPHIL NFR BLD: 3 % (ref 0–7)
ERYTHROCYTE [DISTWIDTH] IN BLOOD BY AUTOMATED COUNT: 16.4 % (ref 11.5–14.5)
GLOBULIN SER CALC-MCNC: 4 G/DL (ref 2–4)
GLUCOSE SERPL-MCNC: 88 MG/DL (ref 65–100)
HCT VFR BLD AUTO: 24.3 % (ref 36.6–50.3)
HGB BLD-MCNC: 7.4 G/DL (ref 12.1–17)
IMM GRANULOCYTES # BLD AUTO: 0 K/UL (ref 0–0.04)
IMM GRANULOCYTES NFR BLD AUTO: 0 % (ref 0–0.5)
LYMPHOCYTES # BLD: 1.4 K/UL (ref 0.8–3.5)
LYMPHOCYTES NFR BLD: 5 % (ref 12–49)
MCH RBC QN AUTO: 29.2 PG (ref 26–34)
MCHC RBC AUTO-ENTMCNC: 30.5 G/DL (ref 30–36.5)
MCV RBC AUTO: 96 FL (ref 80–99)
MONOCYTES # BLD: 1.1 K/UL (ref 0–1)
MONOCYTES NFR BLD: 4 % (ref 5–13)
MYELOCYTES NFR BLD MANUAL: 1 %
NEUTS BAND NFR BLD MANUAL: 6 %
NEUTS SEG # BLD: 23.6 K/UL (ref 1.8–8)
NEUTS SEG NFR BLD: 81 % (ref 32–75)
NRBC # BLD: 0.02 K/UL (ref 0–0.01)
NRBC BLD-RTO: 0.1 PER 100 WBC
PLATELET # BLD AUTO: 208 K/UL (ref 150–400)
PMV BLD AUTO: 8.2 FL (ref 8.9–12.9)
POTASSIUM SERPL-SCNC: 3.7 MMOL/L (ref 3.5–5.1)
PROT SERPL-MCNC: 6.8 G/DL (ref 6.4–8.2)
RBC # BLD AUTO: 2.53 M/UL (ref 4.1–5.7)
RBC MORPH BLD: ABNORMAL
SODIUM SERPL-SCNC: 138 MMOL/L (ref 136–145)
WBC # BLD AUTO: 27.1 K/UL (ref 4.1–11.1)
WBC MORPH BLD: ABNORMAL

## 2023-03-27 PROCEDURE — 93970 EXTREMITY STUDY: CPT

## 2023-03-27 PROCEDURE — 85025 COMPLETE CBC W/AUTO DIFF WBC: CPT

## 2023-03-27 PROCEDURE — 36591 DRAW BLOOD OFF VENOUS DEVICE: CPT

## 2023-03-27 PROCEDURE — 77030012965 HC NDL HUBR BBMI -A

## 2023-03-27 PROCEDURE — 80053 COMPREHEN METABOLIC PANEL: CPT

## 2023-03-27 PROCEDURE — 74011250636 HC RX REV CODE- 250/636: Performed by: STUDENT IN AN ORGANIZED HEALTH CARE EDUCATION/TRAINING PROGRAM

## 2023-03-27 PROCEDURE — 74011000250 HC RX REV CODE- 250: Performed by: STUDENT IN AN ORGANIZED HEALTH CARE EDUCATION/TRAINING PROGRAM

## 2023-03-27 RX ORDER — OLANZAPINE 5 MG/1
5 TABLET ORAL
Qty: 30 TABLET | Refills: 2 | Status: SHIPPED | OUTPATIENT
Start: 2023-03-27

## 2023-03-27 RX ORDER — SODIUM CHLORIDE 0.9 % (FLUSH) 0.9 %
5-40 SYRINGE (ML) INJECTION AS NEEDED
Status: DISPENSED | OUTPATIENT
Start: 2023-03-27 | End: 2023-03-27

## 2023-03-27 RX ORDER — SODIUM CHLORIDE 9 MG/ML
5-40 INJECTION INTRAVENOUS AS NEEDED
Status: ACTIVE | OUTPATIENT
Start: 2023-03-27 | End: 2023-03-27

## 2023-03-27 RX ORDER — SODIUM CHLORIDE 9 MG/ML
5-250 INJECTION, SOLUTION INTRAVENOUS AS NEEDED
Status: DISPENSED | OUTPATIENT
Start: 2023-03-27 | End: 2023-03-27

## 2023-03-27 RX ORDER — HEPARIN 100 UNIT/ML
500 SYRINGE INTRAVENOUS AS NEEDED
Status: ACTIVE | OUTPATIENT
Start: 2023-03-27 | End: 2023-03-27

## 2023-03-27 RX ADMIN — HEPARIN 500 UNITS: 100 SYRINGE at 11:57

## 2023-03-27 RX ADMIN — SODIUM CHLORIDE, PRESERVATIVE FREE 10 ML: 5 INJECTION INTRAVENOUS at 11:56

## 2023-03-27 RX ADMIN — SODIUM CHLORIDE, PRESERVATIVE FREE 10 ML: 5 INJECTION INTRAVENOUS at 11:57

## 2023-03-27 NOTE — TELEPHONE ENCOUNTER
Patient's spouse said the psychologist advised they get this from the PCP. Last given while in a extended care facility by Dr. Erin Neal. Please sign if appropriate. Last Visit: 12/23/22 with MD Jennifer Reynaga  Next Appointment: none    Requested Prescriptions     Pending Prescriptions Disp Refills    OLANZapine (ZyPREXA) 5 mg tablet 30 Tablet 2     Sig: Take 1 Tablet by mouth nightly. Indications: psychosis         For Pharmacy Admin Tracking Only    Program: Medication Refill  CPA in place:   Recommendation Provided To:    Intervention Detail: New Rx: 1, reason: Patient Preference  Intervention Accepted By:   Lucy Arm Closed?:   Time Spent (min): 5

## 2023-03-27 NOTE — PROGRESS NOTES
[facility] Lab Note    4905 Pt arrived at [address] ambulatory and in no distress for labs. No new complaints voiced. Pt requested labs via port. Port accessed per protocol. Labs drawn. Port flushed and de-accessed. See [facility] for pended labs. Visit Vitals  BP (!) 146/71   Temp 97.7 °F (36.5 °C)   Resp 18   Wt 68 kg (149 lb 14.4 oz)   SpO2 99%   BMI 24.94 kg/m²       Medications received:  Medications Administered       0.9% sodium chloride injection 5-40 mL       Admin Date  03/27/2023 Action  Given Dose  10 mL Route  IntraVENous Administered By  Raphael Soares RN               Admin Date  03/27/2023 Action  Given Dose  10 mL Route  IntraVENous Administered By  Raphael Soares RN              heparin (porcine) pf 500 Units       Admin Date  03/27/2023 Action  Given Dose  500 Units Route  InterCATHeter Administered By  Raphael Soares, RN                      1200 Tolerated treatment well, no adverse reaction noted. D/Cd from [address] [place] [address] ambulatory and in no distress accompanied by family.   Next appt 4/11

## 2023-03-28 ENCOUNTER — APPOINTMENT (OUTPATIENT)
Dept: INFUSION THERAPY | Age: 73
End: 2023-03-28

## 2023-03-28 NOTE — TELEPHONE ENCOUNTER
Return call placed to pt's spouse. PHI verified and HIPPA verified by two patient identifiers. Spouse informed pt's labs are stable per Dr. Margaret Chadwick.

## 2023-03-29 ENCOUNTER — APPOINTMENT (OUTPATIENT)
Dept: INFUSION THERAPY | Age: 73
End: 2023-03-29

## 2023-04-04 RX ORDER — SODIUM CHLORIDE 9 MG/ML
5-250 INJECTION, SOLUTION INTRAVENOUS AS NEEDED
Status: CANCELLED | OUTPATIENT
Start: 2023-04-11

## 2023-04-04 RX ORDER — ALBUTEROL SULFATE 0.83 MG/ML
2.5 SOLUTION RESPIRATORY (INHALATION) AS NEEDED
Status: CANCELLED
Start: 2023-04-11

## 2023-04-04 RX ORDER — DIPHENHYDRAMINE HYDROCHLORIDE 50 MG/ML
50 INJECTION, SOLUTION INTRAMUSCULAR; INTRAVENOUS AS NEEDED
Status: CANCELLED
Start: 2023-04-11

## 2023-04-04 RX ORDER — HYDROCORTISONE SODIUM SUCCINATE 100 MG/2ML
100 INJECTION, POWDER, FOR SOLUTION INTRAMUSCULAR; INTRAVENOUS AS NEEDED
Status: CANCELLED | OUTPATIENT
Start: 2023-04-11

## 2023-04-04 RX ORDER — SODIUM CHLORIDE 9 MG/ML
5-40 INJECTION INTRAVENOUS AS NEEDED
Status: CANCELLED | OUTPATIENT
Start: 2023-04-11

## 2023-04-04 RX ORDER — DIPHENHYDRAMINE HYDROCHLORIDE 50 MG/ML
25 INJECTION, SOLUTION INTRAMUSCULAR; INTRAVENOUS AS NEEDED
Status: CANCELLED
Start: 2023-04-11

## 2023-04-04 RX ORDER — EPINEPHRINE 1 MG/ML
0.3 INJECTION, SOLUTION, CONCENTRATE INTRAVENOUS AS NEEDED
Status: CANCELLED | OUTPATIENT
Start: 2023-04-11

## 2023-04-04 RX ORDER — ACETAMINOPHEN 325 MG/1
650 TABLET ORAL AS NEEDED
Status: CANCELLED
Start: 2023-04-11

## 2023-04-04 RX ORDER — ONDANSETRON 2 MG/ML
8 INJECTION INTRAMUSCULAR; INTRAVENOUS AS NEEDED
Status: CANCELLED | OUTPATIENT
Start: 2023-04-11

## 2023-04-11 ENCOUNTER — HOSPITAL ENCOUNTER (OUTPATIENT)
Dept: INFUSION THERAPY | Age: 73
Discharge: HOME | End: 2023-04-11
Payer: MEDICARE

## 2023-04-11 VITALS
SYSTOLIC BLOOD PRESSURE: 125 MMHG | BODY MASS INDEX: 24.54 KG/M2 | WEIGHT: 147.3 LBS | TEMPERATURE: 98.1 F | HEART RATE: 82 BPM | OXYGEN SATURATION: 100 % | HEIGHT: 65 IN | RESPIRATION RATE: 16 BRPM | DIASTOLIC BLOOD PRESSURE: 65 MMHG

## 2023-04-11 DIAGNOSIS — C81.90 HODGKIN LYMPHOMA, UNSPECIFIED HODGKIN LYMPHOMA TYPE, UNSPECIFIED BODY REGION (HCC): Primary | ICD-10-CM

## 2023-04-11 LAB
ALBUMIN SERPL-MCNC: 3.1 G/DL (ref 3.5–5)
ALBUMIN/GLOB SERPL: 0.8 (ref 1.1–2.2)
ALP SERPL-CCNC: 107 U/L (ref 45–117)
ALT SERPL-CCNC: 29 U/L (ref 12–78)
ANION GAP SERPL CALC-SCNC: 2 MMOL/L (ref 5–15)
AST SERPL-CCNC: 23 U/L (ref 15–37)
BASO+EOS+MONOS # BLD AUTO: 0.7 K/UL (ref 0.2–1.2)
BASO+EOS+MONOS NFR BLD AUTO: 11 % (ref 3.2–16.9)
BILIRUB SERPL-MCNC: 0.5 MG/DL (ref 0.2–1)
BUN SERPL-MCNC: 25 MG/DL (ref 6–20)
BUN/CREAT SERPL: 26 (ref 12–20)
CALCIUM SERPL-MCNC: 8.8 MG/DL (ref 8.5–10.1)
CHLORIDE SERPL-SCNC: 110 MMOL/L (ref 97–108)
CO2 SERPL-SCNC: 28 MMOL/L (ref 21–32)
CREAT SERPL-MCNC: 0.95 MG/DL (ref 0.7–1.3)
DIFFERENTIAL METHOD BLD: ABNORMAL
ERYTHROCYTE [DISTWIDTH] IN BLOOD BY AUTOMATED COUNT: 18.5 % (ref 11.8–15.8)
GLOBULIN SER CALC-MCNC: 3.9 G/DL (ref 2–4)
GLUCOSE SERPL-MCNC: 126 MG/DL (ref 65–100)
HCT VFR BLD AUTO: 25.9 % (ref 36.6–50.3)
HGB BLD-MCNC: 8.3 G/DL (ref 12.1–17)
LYMPHOCYTES # BLD: 1.2 K/UL (ref 0.8–3.5)
LYMPHOCYTES NFR BLD: 18 % (ref 12–49)
MCH RBC QN AUTO: 30.1 PG (ref 26–34)
MCHC RBC AUTO-ENTMCNC: 32 G/DL (ref 30–36.5)
MCV RBC AUTO: 93.8 FL (ref 80–99)
NEUTS SEG # BLD: 5 K/UL (ref 1.8–8)
NEUTS SEG NFR BLD: 72 % (ref 32–75)
PLATELET # BLD AUTO: 368 K/UL (ref 150–400)
POTASSIUM SERPL-SCNC: 3.7 MMOL/L (ref 3.5–5.1)
PROT SERPL-MCNC: 7 G/DL (ref 6.4–8.2)
RBC # BLD AUTO: 2.76 M/UL (ref 4.1–5.7)
SODIUM SERPL-SCNC: 140 MMOL/L (ref 136–145)
WBC # BLD AUTO: 6.9 K/UL (ref 4.1–11.1)

## 2023-04-11 PROCEDURE — 96377 APPLICATON ON-BODY INJECTOR: CPT

## 2023-04-11 PROCEDURE — 96375 TX/PRO/DX INJ NEW DRUG ADDON: CPT

## 2023-04-11 PROCEDURE — 80053 COMPREHEN METABOLIC PANEL: CPT

## 2023-04-11 PROCEDURE — 74011250637 HC RX REV CODE- 250/637: Performed by: STUDENT IN AN ORGANIZED HEALTH CARE EDUCATION/TRAINING PROGRAM

## 2023-04-11 PROCEDURE — 74011250636 HC RX REV CODE- 250/636: Performed by: STUDENT IN AN ORGANIZED HEALTH CARE EDUCATION/TRAINING PROGRAM

## 2023-04-11 PROCEDURE — 74011000258 HC RX REV CODE- 258: Performed by: STUDENT IN AN ORGANIZED HEALTH CARE EDUCATION/TRAINING PROGRAM

## 2023-04-11 PROCEDURE — 74011000250 HC RX REV CODE- 250: Performed by: STUDENT IN AN ORGANIZED HEALTH CARE EDUCATION/TRAINING PROGRAM

## 2023-04-11 PROCEDURE — 85025 COMPLETE CBC W/AUTO DIFF WBC: CPT

## 2023-04-11 PROCEDURE — 96413 CHEMO IV INFUSION 1 HR: CPT

## 2023-04-11 PROCEDURE — 77030012965 HC NDL HUBR BBMI -A

## 2023-04-11 PROCEDURE — 36415 COLL VENOUS BLD VENIPUNCTURE: CPT

## 2023-04-11 RX ORDER — DIPHENHYDRAMINE HYDROCHLORIDE 50 MG/ML
50 INJECTION, SOLUTION INTRAMUSCULAR; INTRAVENOUS ONCE
Status: COMPLETED | OUTPATIENT
Start: 2023-04-11 | End: 2023-04-11

## 2023-04-11 RX ORDER — LORATADINE 10 MG/1
10 TABLET ORAL
COMMUNITY

## 2023-04-11 RX ORDER — ONDANSETRON 2 MG/ML
8 INJECTION INTRAMUSCULAR; INTRAVENOUS ONCE
Status: COMPLETED | OUTPATIENT
Start: 2023-04-11 | End: 2023-04-11

## 2023-04-11 RX ORDER — SODIUM CHLORIDE 9 MG/ML
5-250 INJECTION, SOLUTION INTRAVENOUS AS NEEDED
Status: DISPENSED | OUTPATIENT
Start: 2023-04-11 | End: 2023-04-11

## 2023-04-11 RX ORDER — HEPARIN 100 UNIT/ML
500 SYRINGE INTRAVENOUS AS NEEDED
Status: ACTIVE | OUTPATIENT
Start: 2023-04-11 | End: 2023-04-11

## 2023-04-11 RX ORDER — ACETAMINOPHEN 325 MG/1
650 TABLET ORAL ONCE
Status: COMPLETED | OUTPATIENT
Start: 2023-04-11 | End: 2023-04-11

## 2023-04-11 RX ORDER — SODIUM CHLORIDE 0.9 % (FLUSH) 0.9 %
5-40 SYRINGE (ML) INJECTION AS NEEDED
Status: DISPENSED | OUTPATIENT
Start: 2023-04-11 | End: 2023-04-11

## 2023-04-11 RX ADMIN — ACETAMINOPHEN 650 MG: 325 TABLET ORAL at 10:37

## 2023-04-11 RX ADMIN — DIPHENHYDRAMINE HYDROCHLORIDE 50 MG: 50 INJECTION, SOLUTION INTRAMUSCULAR; INTRAVENOUS at 10:37

## 2023-04-11 RX ADMIN — BRENTUXIMAB VEDOTIN 130 MG: 50 INJECTION, POWDER, LYOPHILIZED, FOR SOLUTION INTRAVENOUS at 11:15

## 2023-04-11 RX ADMIN — SODIUM CHLORIDE 25 ML/HR: 9 INJECTION, SOLUTION INTRAVENOUS at 10:37

## 2023-04-11 RX ADMIN — SODIUM CHLORIDE, PRESERVATIVE FREE 10 ML: 5 INJECTION INTRAVENOUS at 09:08

## 2023-04-11 RX ADMIN — PEGFILGRASTIM 6 MG: KIT SUBCUTANEOUS at 11:18

## 2023-04-11 RX ADMIN — Medication 500 UNITS: at 11:50

## 2023-04-11 RX ADMIN — ONDANSETRON 8 MG: 2 INJECTION INTRAMUSCULAR; INTRAVENOUS at 10:39

## 2023-04-11 RX ADMIN — SODIUM CHLORIDE, PRESERVATIVE FREE 10 ML: 5 INJECTION INTRAVENOUS at 11:50

## 2023-04-11 NOTE — PROGRESS NOTES
Pt arrived to [facility] ambulatory in no acute distress at 0900 for Adcetris C2. Assessment unremarkable except improved edema. L chest port accessed without issue and positive blood return noted. Labs obtained, CBCap, CMP. Visit Vitals  BP (!) 123/59 (BP 1 Location: Left upper arm, BP Patient Position: Sitting)   Pulse 85   Temp 98.1 °F (36.7 °C)   Resp 18   Ht 5' 5\" (1.651 m)   Wt 66.8 kg (147 lb 4.8 oz)   SpO2 100%   BMI 24.51 kg/m²     Recent Results (from the past 12 hour(s))   CBC WITH 3 PART DIFF    Collection Time: 04/11/23  9:04 AM   Result Value Ref Range    WBC 6.9 4.1 - 11.1 K/uL    RBC 2.76 (L) 4.10 - 5.70 M/uL    HGB 8.3 (L) 12.1 - 17.0 g/dL    HCT 25.9 (L) 36.6 - 50.3 %    MCV 93.8 80.0 - 99.0 FL    MCH 30.1 26.0 - 34.0 PG    MCHC 32.0 30.0 - 36.5 g/dL    RDW 18.5 (H) 11.8 - 15.8 %    PLATELET 789 173 - 604 K/uL    NEUTROPHILS 72 32 - 75 %    Mixed cells 11 3.2 - 16.9 %    LYMPHOCYTES 18 12 - 49 %    ABS. NEUTROPHILS 5.0 1.8 - 8.0 K/UL    ABS. MIXED CELLS 0.7 0.2 - 1.2 K/uL    ABS. LYMPHOCYTES 1.2 0.8 - 3.5 K/UL    DF AUTOMATED     METABOLIC PANEL, COMPREHENSIVE    Collection Time: 04/11/23  9:04 AM   Result Value Ref Range    Sodium 140 136 - 145 mmol/L    Potassium 3.7 3.5 - 5.1 mmol/L    Chloride 110 (H) 97 - 108 mmol/L    CO2 28 21 - 32 mmol/L    Anion gap 2 (L) 5 - 15 mmol/L    Glucose 126 (H) 65 - 100 mg/dL    BUN 25 (H) 6 - 20 MG/DL    Creatinine 0.95 0.70 - 1.30 MG/DL    BUN/Creatinine ratio 26 (H) 12 - 20      eGFR >60 >60 ml/min/1.73m2    Calcium 8.8 8.5 - 10.1 MG/DL    Bilirubin, total 0.5 0.2 - 1.0 MG/DL    ALT (SGPT) 29 12 - 78 U/L    AST (SGOT) 23 15 - 37 U/L    Alk.  phosphatase 107 45 - 117 U/L    Protein, total 7.0 6.4 - 8.2 g/dL    Albumin 3.1 (L) 3.5 - 5.0 g/dL    Globulin 3.9 2.0 - 4.0 g/dL    A-G Ratio 0.8 (L) 1.1 - 2.2           The following medications administered:  Medications Administered       0.9% sodium chloride infusion       Admin Date  04/11/2023 Action  New Bag Dose  25 mL/hr Rate  25 mL/hr Route  IntraVENous Administered By  Danica Gagnon RN              acetaminophen (TYLENOL) tablet 650 mg       Admin Date  04/11/2023 Action  Given Dose  650 mg Route  Oral Administered By  Danica Gagnon RN              brentuximab vedotin (ADCETRIS) 130 mg in 0.9% sodium chloride 150 mL, overfill volume 15 mL infusion       Admin Date  04/11/2023 Action  New Bag Dose  130 mg Rate  381.8 mL/hr Route  IntraVENous Administered By  Danica Gagnon RN              diphenhydrAMINE (BENADRYL) injection 50 mg       Admin Date  04/11/2023 Action  Given Dose  50 mg Route  IntraVENous Administered By  Danica Gagnon RN              heparin (porcine) pf 500 Units       Admin Date  04/11/2023 Action  Given Dose  500 Units Route  InterCATHeter Administered By  Danica Gagnon RN              ondansetron [facility]) injection 8 mg       Admin Date  04/11/2023 Action  Given Dose  8 mg Route  IntraVENous Administered By  Danica Gagnon RN              pegfilgrastim (NEULASTA) wearable SQ injector 6 mg       Admin Date  04/11/2023 Action  Given Dose  6 mg Route  SubCUTAneous Administered By  Danica Gagnon RN              sodium chloride (NS) flush 5-40 mL       Admin Date  04/11/2023 Action  Given Dose  10 mL Route  IntraVENous Administered By  Danica Gagnon RN               Admin Date  04/11/2023 Action  Given Dose  10 mL Route  IntraVENous Administered By  Danica Gagnon RN                  Visit Vitals  /65   Pulse 82   Temp 98.1 °F (36.7 °C)   Resp 16   Ht 5' 5\" (1.651 m)   Wt 66.8 kg (147 lb 4.8 oz)   SpO2 100%   BMI 24.51 kg/m²         Education provided to patient about Neulasta On Body Injector including: side effects, how/when to remove the device, as well as what to do in the event of device malfunction. Opportunity for questions was provided and all questions were answered. Patient verbalized understanding. Pt tolerated treatment well.  Port flushed per policy and de-accessed, 2x2 and tape placed. Pt discharged ambulatory in no acute distress at 1155, accompanied by spouse. Next appointment 5/2/23 at 1000.

## 2023-04-19 ENCOUNTER — APPOINTMENT (OUTPATIENT)
Dept: INFUSION THERAPY | Age: 73
End: 2023-04-19

## 2023-04-19 DIAGNOSIS — C81.90 HODGKIN LYMPHOMA, UNSPECIFIED HODGKIN LYMPHOMA TYPE, UNSPECIFIED BODY REGION (HCC): Primary | ICD-10-CM

## 2023-04-19 DIAGNOSIS — Z51.11 ENCOUNTER FOR ANTINEOPLASTIC CHEMOTHERAPY: ICD-10-CM

## 2023-04-19 RX ORDER — LIDOCAINE AND PRILOCAINE 25; 25 MG/G; MG/G
CREAM TOPICAL AS NEEDED
Qty: 30 G | Refills: 0 | Status: SHIPPED | OUTPATIENT
Start: 2023-04-19

## 2023-04-19 NOTE — TELEPHONE ENCOUNTER
Please call patient regarding a patch for lidocaine to put on pt's port 45min prior to using.   Please call

## 2023-04-24 RX ORDER — ACETAMINOPHEN 325 MG/1
650 TABLET ORAL AS NEEDED
Status: CANCELLED
Start: 2023-05-02

## 2023-04-24 RX ORDER — ONDANSETRON 2 MG/ML
8 INJECTION INTRAMUSCULAR; INTRAVENOUS AS NEEDED
Status: CANCELLED | OUTPATIENT
Start: 2023-05-02

## 2023-04-24 RX ORDER — SODIUM CHLORIDE 9 MG/ML
5-250 INJECTION, SOLUTION INTRAVENOUS AS NEEDED
Status: CANCELLED | OUTPATIENT
Start: 2023-05-02

## 2023-04-24 RX ORDER — HYDROCORTISONE SODIUM SUCCINATE 100 MG/2ML
100 INJECTION, POWDER, FOR SOLUTION INTRAMUSCULAR; INTRAVENOUS AS NEEDED
Status: CANCELLED | OUTPATIENT
Start: 2023-05-02

## 2023-04-24 RX ORDER — SODIUM CHLORIDE 0.9 % (FLUSH) 0.9 %
5-40 SYRINGE (ML) INJECTION AS NEEDED
Status: CANCELLED | OUTPATIENT
Start: 2023-05-02

## 2023-04-24 RX ORDER — DIPHENHYDRAMINE HYDROCHLORIDE 50 MG/ML
25 INJECTION, SOLUTION INTRAMUSCULAR; INTRAVENOUS AS NEEDED
Status: CANCELLED
Start: 2023-05-02

## 2023-04-24 RX ORDER — EPINEPHRINE 1 MG/ML
0.3 INJECTION, SOLUTION, CONCENTRATE INTRAVENOUS AS NEEDED
Status: CANCELLED | OUTPATIENT
Start: 2023-05-02

## 2023-04-24 RX ORDER — ALBUTEROL SULFATE 0.83 MG/ML
2.5 SOLUTION RESPIRATORY (INHALATION) AS NEEDED
Status: CANCELLED
Start: 2023-05-02

## 2023-04-24 RX ORDER — SODIUM CHLORIDE 9 MG/ML
5-40 INJECTION INTRAVENOUS AS NEEDED
Status: CANCELLED | OUTPATIENT
Start: 2023-05-02

## 2023-04-24 RX ORDER — DIPHENHYDRAMINE HYDROCHLORIDE 50 MG/ML
50 INJECTION, SOLUTION INTRAMUSCULAR; INTRAVENOUS AS NEEDED
Status: CANCELLED
Start: 2023-05-02

## 2023-05-02 ENCOUNTER — OFFICE VISIT (OUTPATIENT)
Dept: ONCOLOGY | Age: 73
End: 2023-05-02
Payer: MEDICARE

## 2023-05-02 ENCOUNTER — HOSPITAL ENCOUNTER (OUTPATIENT)
Dept: INFUSION THERAPY | Age: 73
Discharge: HOME | End: 2023-05-02
Payer: MEDICARE

## 2023-05-02 VITALS
BODY MASS INDEX: 24.32 KG/M2 | DIASTOLIC BLOOD PRESSURE: 65 MMHG | OXYGEN SATURATION: 99 % | HEART RATE: 82 BPM | HEIGHT: 65 IN | WEIGHT: 146 LBS | TEMPERATURE: 97.5 F | RESPIRATION RATE: 16 BRPM | SYSTOLIC BLOOD PRESSURE: 156 MMHG

## 2023-05-02 VITALS
OXYGEN SATURATION: 99 % | WEIGHT: 146.6 LBS | SYSTOLIC BLOOD PRESSURE: 155 MMHG | RESPIRATION RATE: 16 BRPM | HEART RATE: 73 BPM | TEMPERATURE: 97.5 F | BODY MASS INDEX: 24.4 KG/M2 | DIASTOLIC BLOOD PRESSURE: 70 MMHG

## 2023-05-02 DIAGNOSIS — C81.99 HODGKIN LYMPHOMA OF EXTRANODAL SITE EXCLUDING SPLEEN AND OTHER SOLID ORGANS, UNSPECIFIED HODGKIN LYMPHOMA TYPE (HCC): ICD-10-CM

## 2023-05-02 DIAGNOSIS — C81.90 HODGKIN LYMPHOMA, UNSPECIFIED HODGKIN LYMPHOMA TYPE, UNSPECIFIED BODY REGION (HCC): Primary | ICD-10-CM

## 2023-05-02 LAB
ALBUMIN SERPL-MCNC: 3.4 G/DL (ref 3.5–5)
ALBUMIN/GLOB SERPL: 0.9 (ref 1.1–2.2)
ALP SERPL-CCNC: 128 U/L (ref 45–117)
ALT SERPL-CCNC: 28 U/L (ref 12–78)
ANION GAP SERPL CALC-SCNC: 4 MMOL/L (ref 5–15)
AST SERPL-CCNC: 19 U/L (ref 15–37)
BASO+EOS+MONOS # BLD AUTO: 0.9 K/UL (ref 0.2–1.2)
BASO+EOS+MONOS NFR BLD AUTO: 11 % (ref 3.2–16.9)
BILIRUB SERPL-MCNC: 0.2 MG/DL (ref 0.2–1)
BUN SERPL-MCNC: 33 MG/DL (ref 6–20)
BUN/CREAT SERPL: 34 (ref 12–20)
CALCIUM SERPL-MCNC: 9.3 MG/DL (ref 8.5–10.1)
CHLORIDE SERPL-SCNC: 106 MMOL/L (ref 97–108)
CO2 SERPL-SCNC: 27 MMOL/L (ref 21–32)
CREAT SERPL-MCNC: 0.96 MG/DL (ref 0.7–1.3)
DIFFERENTIAL METHOD BLD: ABNORMAL
ERYTHROCYTE [DISTWIDTH] IN BLOOD BY AUTOMATED COUNT: 18.5 % (ref 11.8–15.8)
GLOBULIN SER CALC-MCNC: 4 G/DL (ref 2–4)
GLUCOSE SERPL-MCNC: 116 MG/DL (ref 65–100)
HCT VFR BLD AUTO: 27.5 % (ref 36.6–50.3)
HGB BLD-MCNC: 9.1 G/DL (ref 12.1–17)
LYMPHOCYTES # BLD: 1.3 K/UL (ref 0.8–3.5)
LYMPHOCYTES NFR BLD: 17 % (ref 12–49)
MCH RBC QN AUTO: 31 PG (ref 26–34)
MCHC RBC AUTO-ENTMCNC: 33.1 G/DL (ref 30–36.5)
MCV RBC AUTO: 93.5 FL (ref 80–99)
NEUTS SEG # BLD: 5.6 K/UL (ref 1.8–8)
NEUTS SEG NFR BLD: 72 % (ref 32–75)
PLATELET # BLD AUTO: 337 K/UL (ref 150–400)
POTASSIUM SERPL-SCNC: 3.7 MMOL/L (ref 3.5–5.1)
PROT SERPL-MCNC: 7.4 G/DL (ref 6.4–8.2)
RBC # BLD AUTO: 2.94 M/UL (ref 4.1–5.7)
SODIUM SERPL-SCNC: 137 MMOL/L (ref 136–145)
WBC # BLD AUTO: 7.8 K/UL (ref 4.1–11.1)

## 2023-05-02 PROCEDURE — 74011250636 HC RX REV CODE- 250/636: Performed by: STUDENT IN AN ORGANIZED HEALTH CARE EDUCATION/TRAINING PROGRAM

## 2023-05-02 PROCEDURE — 3077F SYST BP >= 140 MM HG: CPT | Performed by: STUDENT IN AN ORGANIZED HEALTH CARE EDUCATION/TRAINING PROGRAM

## 2023-05-02 PROCEDURE — G8427 DOCREV CUR MEDS BY ELIG CLIN: HCPCS | Performed by: STUDENT IN AN ORGANIZED HEALTH CARE EDUCATION/TRAINING PROGRAM

## 2023-05-02 PROCEDURE — 1123F ACP DISCUSS/DSCN MKR DOCD: CPT | Performed by: STUDENT IN AN ORGANIZED HEALTH CARE EDUCATION/TRAINING PROGRAM

## 2023-05-02 PROCEDURE — 77030012965 HC NDL HUBR BBMI -A

## 2023-05-02 PROCEDURE — 36415 COLL VENOUS BLD VENIPUNCTURE: CPT

## 2023-05-02 PROCEDURE — G8420 CALC BMI NORM PARAMETERS: HCPCS | Performed by: STUDENT IN AN ORGANIZED HEALTH CARE EDUCATION/TRAINING PROGRAM

## 2023-05-02 PROCEDURE — 74011000258 HC RX REV CODE- 258: Performed by: STUDENT IN AN ORGANIZED HEALTH CARE EDUCATION/TRAINING PROGRAM

## 2023-05-02 PROCEDURE — 96377 APPLICATON ON-BODY INJECTOR: CPT

## 2023-05-02 PROCEDURE — G8536 NO DOC ELDER MAL SCRN: HCPCS | Performed by: STUDENT IN AN ORGANIZED HEALTH CARE EDUCATION/TRAINING PROGRAM

## 2023-05-02 PROCEDURE — 96375 TX/PRO/DX INJ NEW DRUG ADDON: CPT

## 2023-05-02 PROCEDURE — 99215 OFFICE O/P EST HI 40 MIN: CPT | Performed by: STUDENT IN AN ORGANIZED HEALTH CARE EDUCATION/TRAINING PROGRAM

## 2023-05-02 PROCEDURE — 3017F COLORECTAL CA SCREEN DOC REV: CPT | Performed by: STUDENT IN AN ORGANIZED HEALTH CARE EDUCATION/TRAINING PROGRAM

## 2023-05-02 PROCEDURE — 85025 COMPLETE CBC W/AUTO DIFF WBC: CPT

## 2023-05-02 PROCEDURE — 1101F PT FALLS ASSESS-DOCD LE1/YR: CPT | Performed by: STUDENT IN AN ORGANIZED HEALTH CARE EDUCATION/TRAINING PROGRAM

## 2023-05-02 PROCEDURE — 74011250637 HC RX REV CODE- 250/637: Performed by: STUDENT IN AN ORGANIZED HEALTH CARE EDUCATION/TRAINING PROGRAM

## 2023-05-02 PROCEDURE — G8510 SCR DEP NEG, NO PLAN REQD: HCPCS | Performed by: STUDENT IN AN ORGANIZED HEALTH CARE EDUCATION/TRAINING PROGRAM

## 2023-05-02 PROCEDURE — 96413 CHEMO IV INFUSION 1 HR: CPT

## 2023-05-02 PROCEDURE — 80053 COMPREHEN METABOLIC PANEL: CPT

## 2023-05-02 PROCEDURE — 3078F DIAST BP <80 MM HG: CPT | Performed by: STUDENT IN AN ORGANIZED HEALTH CARE EDUCATION/TRAINING PROGRAM

## 2023-05-02 RX ORDER — ACETAMINOPHEN 325 MG/1
650 TABLET ORAL ONCE
Status: COMPLETED | OUTPATIENT
Start: 2023-05-02 | End: 2023-05-02

## 2023-05-02 RX ORDER — SODIUM CHLORIDE 9 MG/ML
5-250 INJECTION, SOLUTION INTRAVENOUS AS NEEDED
Status: DISPENSED | OUTPATIENT
Start: 2023-05-02 | End: 2023-05-02

## 2023-05-02 RX ORDER — HEPARIN 100 UNIT/ML
500 SYRINGE INTRAVENOUS AS NEEDED
Status: ACTIVE | OUTPATIENT
Start: 2023-05-02 | End: 2023-05-02

## 2023-05-02 RX ORDER — ONDANSETRON 2 MG/ML
8 INJECTION INTRAMUSCULAR; INTRAVENOUS ONCE
Status: COMPLETED | OUTPATIENT
Start: 2023-05-02 | End: 2023-05-02

## 2023-05-02 RX ORDER — DIPHENHYDRAMINE HYDROCHLORIDE 50 MG/ML
50 INJECTION, SOLUTION INTRAMUSCULAR; INTRAVENOUS ONCE
Status: COMPLETED | OUTPATIENT
Start: 2023-05-02 | End: 2023-05-02

## 2023-05-02 RX ADMIN — ONDANSETRON 8 MG: 2 INJECTION INTRAMUSCULAR; INTRAVENOUS at 11:51

## 2023-05-02 RX ADMIN — ACETAMINOPHEN 650 MG: 325 TABLET ORAL at 11:50

## 2023-05-02 RX ADMIN — SODIUM CHLORIDE 25 ML/HR: 9 INJECTION, SOLUTION INTRAVENOUS at 11:49

## 2023-05-02 RX ADMIN — Medication 500 UNITS: at 13:02

## 2023-05-02 RX ADMIN — PEGFILGRASTIM 6 MG: KIT SUBCUTANEOUS at 13:03

## 2023-05-02 RX ADMIN — DIPHENHYDRAMINE HYDROCHLORIDE 50 MG: 50 INJECTION, SOLUTION INTRAMUSCULAR; INTRAVENOUS at 11:53

## 2023-05-02 RX ADMIN — BRENTUXIMAB VEDOTIN 130 MG: 50 INJECTION, POWDER, LYOPHILIZED, FOR SOLUTION INTRAVENOUS at 12:26

## 2023-05-05 ENCOUNTER — TRANSCRIBE ORDERS (OUTPATIENT)
Facility: HOSPITAL | Age: 73
End: 2023-05-05

## 2023-05-05 DIAGNOSIS — C81.90 HODGKIN LYMPHOMA, UNSPECIFIED HODGKIN LYMPHOMA TYPE, UNSPECIFIED BODY REGION (HCC): Primary | ICD-10-CM

## 2023-05-05 DIAGNOSIS — C81.99 HODGKIN'S DISEASE, EXTRANODAL AND SOLID ORGAN SITES (HCC): ICD-10-CM

## 2023-05-08 ENCOUNTER — TELEPHONE (OUTPATIENT)
Age: 73
End: 2023-05-08

## 2023-05-08 NOTE — TELEPHONE ENCOUNTER
5/8/23 at 9:06 am - Voicemail message left by Alisia Gill stating that she is calling in reference to the patient's PET scan and that she saw on [facility] that the PET scan is for \"head and leg\" and asked for clarification on this and that she previously left a message that the patient's PET scan is scheduled for 5/25/23 at 8 am and that was the soonest appointment that was available and requested a call back at [phone]. Ana Rosa's message was received on 5/5/23 at 4:01 pm but on the message she stated that she did not expect a return call on 5/5/23. 5/8/23 at 4:56 pm - Per the nurse practitioner'sViolette's, request and to avoid delaying the patient's treatment, a request to scheduling has been sent to reschedule the PET scan before 5/23/23.

## 2023-05-10 ENCOUNTER — APPOINTMENT (OUTPATIENT)
Dept: INFUSION THERAPY | Age: 73
End: 2023-05-10

## 2023-05-11 ENCOUNTER — TELEPHONE (OUTPATIENT)
Age: 73
End: 2023-05-11

## 2023-05-12 ENCOUNTER — TELEPHONE (OUTPATIENT)
Age: 73
End: 2023-05-12

## 2023-05-12 DIAGNOSIS — C81.99 HODGKIN LYMPHOMA, UNSPECIFIED, EXTRANODAL AND SOLID ORGAN SITES (HCC): Primary | ICD-10-CM

## 2023-05-12 NOTE — TELEPHONE ENCOUNTER
----- Message from Zunilda Hebert MA sent at 5/12/2023 10:03 AM EDT -----  Subject: Message to Provider    QUESTIONS  Information for Provider? Pt's wife Tania Stanton called to inquire if the Beta2   lab test order has been received via fax by pt's Oncologist. Tania Stanton is   requesting a return call ASAP to verify when it is received so pt can have   this drawn ASAP. Please call Tania Stanton at [phone]. If no answer, okay to   leave a detailed voicemail. Tania Stanton asking when pt would be able to come in   to have this drawn as it needs done prior to 5/22.   ---------------------------------------------------------------------------  --------------  [id number]  [phone]; OK to leave message on voicemail  ---------------------------------------------------------------------------  --------------  SCRIPT ANSWERS  Relationship to Patient? Spouse/Partner  Representative Name? Tania Stanton - Wife  Is the representative on the Communication Release of Information (MICHELLE)   form in Epic?  Yes

## 2023-05-12 NOTE — TELEPHONE ENCOUNTER
----- Message from Sofie Swann MA sent at 5/12/2023 10:03 AM EDT -----  Subject: Message to Provider    QUESTIONS  Information for Provider? Pt's wife Enio Kathleen called to inquire if the Beta2   lab test order has been received via fax by pt's Oncologist. Enio Kathleen is   requesting a return call ASAP to verify when it is received so pt can have   this drawn ASAP. Please call Enio Kathleen at [phone]. If no answer, okay to   leave a detailed voicemail. Enio Kathleen asking when pt would be able to come in   to have this drawn as it needs done prior to 5/22. Brandon Benton INFO  [phone]  -------------------------------------------------------------------    Lab for Beta2 was ordered today. Pt wife requesting a call back from Dr. Ann Knight. [phone].

## 2023-05-12 NOTE — PROGRESS NOTES
PER JH FROM VICTORIA CAMACHO TO Jesusita Paul, RN FOR BETA2 TEST AS A ONE TIME ORDER. Pt's spouse calling because test was not done. Spouse asking can test be done at [facility] by pt's PCP. Lab order faxed to pt's PCP.

## 2023-05-15 ENCOUNTER — OFFICE VISIT (OUTPATIENT)
Age: 73
End: 2023-05-15

## 2023-05-15 DIAGNOSIS — C81.99 HODGKIN'S DISEASE, EXTRANODAL AND SOLID ORGAN SITES (HCC): Primary | ICD-10-CM

## 2023-05-17 ENCOUNTER — TELEPHONE (OUTPATIENT)
Age: 73
End: 2023-05-17

## 2023-05-17 DIAGNOSIS — C81.90 HODGKIN LYMPHOMA, UNSPECIFIED HODGKIN LYMPHOMA TYPE, UNSPECIFIED BODY REGION (HCC): Primary | ICD-10-CM

## 2023-05-17 LAB — B2 MICROGLOB SERPL-MCNC: 4.4 MG/L (ref 0.6–2.4)

## 2023-05-17 RX ORDER — DIPHENHYDRAMINE HYDROCHLORIDE 50 MG/ML
50 INJECTION INTRAMUSCULAR; INTRAVENOUS
Status: CANCELLED | OUTPATIENT
Start: 2023-05-23

## 2023-05-17 RX ORDER — SODIUM CHLORIDE 0.9 % (FLUSH) 0.9 %
5-40 SYRINGE (ML) INJECTION PRN
Status: CANCELLED | OUTPATIENT
Start: 2023-05-23

## 2023-05-17 RX ORDER — ACETAMINOPHEN 325 MG/1
650 TABLET ORAL ONCE
Status: CANCELLED | OUTPATIENT
Start: 2023-05-23 | End: 2023-05-23

## 2023-05-17 RX ORDER — ACETAMINOPHEN 325 MG/1
650 TABLET ORAL
Status: CANCELLED | OUTPATIENT
Start: 2023-05-23

## 2023-05-17 RX ORDER — SODIUM CHLORIDE 9 MG/ML
5-250 INJECTION, SOLUTION INTRAVENOUS PRN
Status: CANCELLED | OUTPATIENT
Start: 2023-05-23

## 2023-05-17 RX ORDER — ALBUTEROL SULFATE 90 UG/1
4 AEROSOL, METERED RESPIRATORY (INHALATION) PRN
Status: CANCELLED | OUTPATIENT
Start: 2023-05-23

## 2023-05-17 RX ORDER — EPINEPHRINE 1 MG/ML
0.3 INJECTION, SOLUTION, CONCENTRATE INTRAVENOUS PRN
Status: CANCELLED | OUTPATIENT
Start: 2023-05-23

## 2023-05-17 RX ORDER — ONDANSETRON 2 MG/ML
8 INJECTION INTRAMUSCULAR; INTRAVENOUS ONCE
Status: CANCELLED | OUTPATIENT
Start: 2023-05-23 | End: 2023-05-23

## 2023-05-17 RX ORDER — DIPHENHYDRAMINE HYDROCHLORIDE 50 MG/ML
50 INJECTION INTRAMUSCULAR; INTRAVENOUS ONCE
Status: CANCELLED | OUTPATIENT
Start: 2023-05-23 | End: 2023-05-23

## 2023-05-17 RX ORDER — SODIUM CHLORIDE 9 MG/ML
INJECTION, SOLUTION INTRAVENOUS CONTINUOUS
Status: CANCELLED | OUTPATIENT
Start: 2023-05-23

## 2023-05-17 RX ORDER — HEPARIN SODIUM (PORCINE) LOCK FLUSH IV SOLN 100 UNIT/ML 100 UNIT/ML
500 SOLUTION INTRAVENOUS PRN
Status: CANCELLED | OUTPATIENT
Start: 2023-05-23

## 2023-05-17 RX ORDER — ONDANSETRON 2 MG/ML
8 INJECTION INTRAMUSCULAR; INTRAVENOUS
Status: CANCELLED | OUTPATIENT
Start: 2023-05-23

## 2023-05-17 RX ORDER — MEPERIDINE HYDROCHLORIDE 25 MG/ML
12.5 INJECTION INTRAMUSCULAR; INTRAVENOUS; SUBCUTANEOUS PRN
Status: CANCELLED | OUTPATIENT
Start: 2023-05-23

## 2023-05-17 NOTE — TELEPHONE ENCOUNTER
----- Message from Aroldo Robert sent at 5/17/2023 12:55 PM EDT -----  Subject: Results Request    QUESTIONS  Results: Beta 2 Microglobulin, ; Ordered by: Sheela Ware   Date Performed: 2023-05-15  ---------------------------------------------------------------------------  --------------  Priyanka Corado INFO    [phone]; OK to leave message on voicemail  ---------------------------------------------------------------------------  --------------    -----------------------------------------------------------------------------------------    Spoke to the pt to let her know the results are still pending. She can be reached at [phone].

## 2023-05-22 ENCOUNTER — HOSPITAL ENCOUNTER (OUTPATIENT)
Facility: HOSPITAL | Age: 73
Discharge: HOME | End: 2023-05-25
Payer: MEDICARE

## 2023-05-22 VITALS — BODY MASS INDEX: 24.3 KG/M2 | WEIGHT: 146 LBS

## 2023-05-22 DIAGNOSIS — C81.90 HODGKIN LYMPHOMA, UNSPECIFIED HODGKIN LYMPHOMA TYPE, UNSPECIFIED BODY REGION (HCC): ICD-10-CM

## 2023-05-22 DIAGNOSIS — C81.99 HODGKIN'S DISEASE, EXTRANODAL AND SOLID ORGAN SITES (HCC): ICD-10-CM

## 2023-05-22 LAB
GLUCOSE BLD STRIP.AUTO-MCNC: 103 MG/DL (ref 65–117)
SERVICE CMNT-IMP: NORMAL

## 2023-05-22 PROCEDURE — 3430000000 HC RX DIAGNOSTIC RADIOPHARMACEUTICAL: Performed by: STUDENT IN AN ORGANIZED HEALTH CARE EDUCATION/TRAINING PROGRAM

## 2023-05-22 PROCEDURE — 78815 PET IMAGE W/CT SKULL-THIGH: CPT

## 2023-05-22 PROCEDURE — A9552 F18 FDG: HCPCS | Performed by: STUDENT IN AN ORGANIZED HEALTH CARE EDUCATION/TRAINING PROGRAM

## 2023-05-22 PROCEDURE — 82962 GLUCOSE BLOOD TEST: CPT

## 2023-05-22 RX ORDER — FLUDEOXYGLUCOSE F-18 500 MCI/ML
10 INJECTION INTRAVENOUS
Status: COMPLETED | OUTPATIENT
Start: 2023-05-22 | End: 2023-05-22

## 2023-05-22 RX ADMIN — FLUDEOXYGLUCOSE F-18 10 MILLICURIE: 500 INJECTION INTRAVENOUS at 08:23

## 2023-05-23 ENCOUNTER — OFFICE VISIT (OUTPATIENT)
Age: 73
End: 2023-05-23
Payer: MEDICARE

## 2023-05-23 ENCOUNTER — HOSPITAL ENCOUNTER (OUTPATIENT)
Facility: HOSPITAL | Age: 73
Setting detail: INFUSION SERIES
End: 2023-05-23
Payer: MEDICARE

## 2023-05-23 ENCOUNTER — APPOINTMENT (OUTPATIENT)
Dept: INFUSION THERAPY | Age: 73
End: 2023-05-23

## 2023-05-23 VITALS
RESPIRATION RATE: 18 BRPM | HEART RATE: 82 BPM | SYSTOLIC BLOOD PRESSURE: 131 MMHG | DIASTOLIC BLOOD PRESSURE: 70 MMHG | BODY MASS INDEX: 24.16 KG/M2 | HEIGHT: 65 IN | WEIGHT: 145 LBS | TEMPERATURE: 97.7 F

## 2023-05-23 VITALS
BODY MASS INDEX: 24.18 KG/M2 | DIASTOLIC BLOOD PRESSURE: 61 MMHG | SYSTOLIC BLOOD PRESSURE: 125 MMHG | HEART RATE: 72 BPM | TEMPERATURE: 97.7 F | WEIGHT: 145.3 LBS | RESPIRATION RATE: 18 BRPM

## 2023-05-23 DIAGNOSIS — Z51.11 ENCOUNTER FOR ANTINEOPLASTIC CHEMOTHERAPY: ICD-10-CM

## 2023-05-23 DIAGNOSIS — C81.90 HODGKIN LYMPHOMA, UNSPECIFIED HODGKIN LYMPHOMA TYPE, UNSPECIFIED BODY REGION (HCC): Primary | ICD-10-CM

## 2023-05-23 LAB
ALBUMIN SERPL-MCNC: 3.5 G/DL (ref 3.5–5)
ALBUMIN/GLOB SERPL: 1 (ref 1.1–2.2)
ALP SERPL-CCNC: 162 U/L (ref 45–117)
ALT SERPL-CCNC: 40 U/L (ref 12–78)
ANION GAP SERPL CALC-SCNC: 3 MMOL/L (ref 5–15)
AST SERPL-CCNC: 24 U/L (ref 15–37)
BASO+EOS+MONOS # BLD AUTO: 0.9 K/UL (ref 0.2–1.2)
BASO+EOS+MONOS NFR BLD AUTO: 12 % (ref 3.2–16.9)
BILIRUB SERPL-MCNC: 0.3 MG/DL (ref 0.2–1)
BUN SERPL-MCNC: 29 MG/DL (ref 6–20)
BUN/CREAT SERPL: 28 (ref 12–20)
CALCIUM SERPL-MCNC: 9.2 MG/DL (ref 8.5–10.1)
CHLORIDE SERPL-SCNC: 106 MMOL/L (ref 97–108)
CO2 SERPL-SCNC: 28 MMOL/L (ref 21–32)
CREAT SERPL-MCNC: 1.05 MG/DL (ref 0.7–1.3)
DIFFERENTIAL METHOD BLD: ABNORMAL
ERYTHROCYTE [DISTWIDTH] IN BLOOD BY AUTOMATED COUNT: 18 % (ref 11.8–15.8)
GLOBULIN SER CALC-MCNC: 3.6 G/DL (ref 2–4)
GLUCOSE SERPL-MCNC: 138 MG/DL (ref 65–100)
HCT VFR BLD AUTO: 27.8 % (ref 36.6–50.3)
HGB BLD-MCNC: 9.2 G/DL (ref 12.1–17)
LYMPHOCYTES # BLD: 0.9 K/UL (ref 0.8–3.5)
LYMPHOCYTES NFR BLD: 11 % (ref 12–49)
MCH RBC QN AUTO: 30.9 PG (ref 26–34)
MCHC RBC AUTO-ENTMCNC: 33.1 G/DL (ref 30–36.5)
MCV RBC AUTO: 93.3 FL (ref 80–99)
NEUTS SEG # BLD: 6 K/UL (ref 1.8–8)
NEUTS SEG NFR BLD: 77 % (ref 32–75)
PLATELET # BLD AUTO: 319 K/UL (ref 150–400)
POTASSIUM SERPL-SCNC: 3.8 MMOL/L (ref 3.5–5.1)
PROT SERPL-MCNC: 7.1 G/DL (ref 6.4–8.2)
RBC # BLD AUTO: 2.98 M/UL (ref 4.1–5.7)
SODIUM SERPL-SCNC: 137 MMOL/L (ref 136–145)
WBC # BLD AUTO: 7.8 K/UL (ref 4.1–11.1)

## 2023-05-23 PROCEDURE — 82232 ASSAY OF BETA-2 PROTEIN: CPT

## 2023-05-23 PROCEDURE — 6360000002 HC RX W HCPCS: Performed by: INTERNAL MEDICINE

## 2023-05-23 PROCEDURE — 96417 CHEMO IV INFUS EACH ADDL SEQ: CPT

## 2023-05-23 PROCEDURE — 2580000003 HC RX 258: Performed by: INTERNAL MEDICINE

## 2023-05-23 PROCEDURE — 99215 OFFICE O/P EST HI 40 MIN: CPT | Performed by: STUDENT IN AN ORGANIZED HEALTH CARE EDUCATION/TRAINING PROGRAM

## 2023-05-23 PROCEDURE — 80053 COMPREHEN METABOLIC PANEL: CPT

## 2023-05-23 PROCEDURE — 85025 COMPLETE CBC W/AUTO DIFF WBC: CPT

## 2023-05-23 PROCEDURE — 36415 COLL VENOUS BLD VENIPUNCTURE: CPT

## 2023-05-23 PROCEDURE — 6370000000 HC RX 637 (ALT 250 FOR IP): Performed by: INTERNAL MEDICINE

## 2023-05-23 PROCEDURE — 96413 CHEMO IV INFUSION 1 HR: CPT

## 2023-05-23 PROCEDURE — 96375 TX/PRO/DX INJ NEW DRUG ADDON: CPT

## 2023-05-23 PROCEDURE — 96377 APPLICATON ON-BODY INJECTOR: CPT

## 2023-05-23 RX ORDER — ACETAMINOPHEN 325 MG/1
650 TABLET ORAL ONCE
Status: COMPLETED | OUTPATIENT
Start: 2023-05-23 | End: 2023-05-23

## 2023-05-23 RX ORDER — SODIUM CHLORIDE 9 MG/ML
5-250 INJECTION, SOLUTION INTRAVENOUS PRN
Status: DISCONTINUED | OUTPATIENT
Start: 2023-05-23 | End: 2023-05-24 | Stop reason: HOSPADM

## 2023-05-23 RX ORDER — FLUTICASONE PROPIONATE 50 MCG
1 SPRAY, SUSPENSION (ML) NASAL DAILY
Qty: 32 G | Refills: 1 | Status: SHIPPED | OUTPATIENT
Start: 2023-05-23

## 2023-05-23 RX ORDER — DIPHENHYDRAMINE HYDROCHLORIDE 50 MG/ML
50 INJECTION INTRAMUSCULAR; INTRAVENOUS ONCE
Status: COMPLETED | OUTPATIENT
Start: 2023-05-23 | End: 2023-05-23

## 2023-05-23 RX ORDER — OLANZAPINE 5 MG/1
TABLET ORAL
COMMUNITY
Start: 2023-02-08 | End: 2031-12-31

## 2023-05-23 RX ORDER — HEPARIN SODIUM (PORCINE) LOCK FLUSH IV SOLN 100 UNIT/ML 100 UNIT/ML
500 SOLUTION INTRAVENOUS PRN
Status: DISCONTINUED | OUTPATIENT
Start: 2023-05-23 | End: 2023-05-24 | Stop reason: HOSPADM

## 2023-05-23 RX ORDER — ONDANSETRON 4 MG/1
TABLET, ORALLY DISINTEGRATING ORAL EVERY 8 HOURS PRN
COMMUNITY
Start: 2022-12-27

## 2023-05-23 RX ORDER — LORATADINE 10 MG/1
1 TABLET ORAL
COMMUNITY

## 2023-05-23 RX ORDER — ONDANSETRON 2 MG/ML
8 INJECTION INTRAMUSCULAR; INTRAVENOUS ONCE
Status: COMPLETED | OUTPATIENT
Start: 2023-05-23 | End: 2023-05-23

## 2023-05-23 RX ORDER — TRIAMCINOLONE ACETONIDE 1 MG/G
CREAM TOPICAL
COMMUNITY
Start: 2023-02-07 | End: 2031-12-31

## 2023-05-23 RX ORDER — ALLOPURINOL 100 MG/1
TABLET ORAL 2 TIMES DAILY
COMMUNITY
Start: 2023-02-07

## 2023-05-23 RX ORDER — SODIUM CHLORIDE 0.9 % (FLUSH) 0.9 %
5-40 SYRINGE (ML) INJECTION PRN
Status: DISCONTINUED | OUTPATIENT
Start: 2023-05-23 | End: 2023-05-24 | Stop reason: HOSPADM

## 2023-05-23 RX ORDER — LIDOCAINE AND PRILOCAINE 25; 25 MG/G; MG/G
CREAM TOPICAL PRN
COMMUNITY
Start: 2023-04-19

## 2023-05-23 RX ADMIN — BRENTUXIMAB VEDOTIN 120 MG: 50 INJECTION, POWDER, LYOPHILIZED, FOR SOLUTION INTRAVENOUS at 12:09

## 2023-05-23 RX ADMIN — DIPHENHYDRAMINE HYDROCHLORIDE 50 MG: 50 INJECTION, SOLUTION INTRAMUSCULAR; INTRAVENOUS at 11:31

## 2023-05-23 RX ADMIN — SODIUM CHLORIDE 25 ML/HR: 9 INJECTION, SOLUTION INTRAVENOUS at 11:27

## 2023-05-23 RX ADMIN — ONDANSETRON 8 MG: 2 INJECTION INTRAMUSCULAR; INTRAVENOUS at 11:29

## 2023-05-23 RX ADMIN — PEGFILGRASTIM 6 MG: KIT SUBCUTANEOUS at 12:35

## 2023-05-23 RX ADMIN — SODIUM CHLORIDE, PRESERVATIVE FREE 10 ML: 5 INJECTION INTRAVENOUS at 12:40

## 2023-05-23 RX ADMIN — ACETAMINOPHEN 650 MG: 325 TABLET ORAL at 11:28

## 2023-05-23 RX ADMIN — HEPARIN 500 UNITS: 100 SYRINGE at 12:41

## 2023-05-23 NOTE — PROGRESS NOTES
right arm. Patient aware that injection will start infusing 27 hours after application over 45 minutes. Patient understands that they are to take of onbody after 7pm on 5/24/23. Pt tolerated treatment well. Port flushed per policy and removed, 2x2 and bandaid placed. Pt discharged ambulatory in no acute distress at 1245, accompanied by spouse. Next appointment   Future Appointments   Date Time Provider Cindy Galindo   6/12/2023  9:00 AM [facility] MED2 [address]   6/12/2023  9:15 AM Charanjit Carmen MD ONC BS AMB   7/7/2023  9:00 AM JAY DUTTON [facility]    .

## 2023-05-23 NOTE — PROGRESS NOTES
Shannon Link is a 67 y.o. male who presents for follow up of    Chief Complaint   Patient presents with    Follow-up     Hodgkin lymphoma       The patient reports no new clinical symptoms or new complaints since last clinic evaluation. Pt continues to have fatigue, and low appetite. pt would like to review scans and labs. No interval hospitalizations reported    No interval surgery or procedures reported    No reported new medication changes reported       Medications reviewed with the patient, and chart updated to reflect changes.

## 2023-05-23 NOTE — PROGRESS NOTES
[facility] at [address] [facility], [place], [address]   W: [phone] F: [phone]           Reason for Visit:     Maciej Vines is a 67 y.o. male who is seen in consultation at the request of Dr. Georgina Quijano for evaluation of Hodgkin lymphoma. Seen today in follow-up for chemotherapy. Hematology / Oncology Treatment History:        Hematological/Oncological Diagnosis: Classic Hodgkin lymphoma      Date of Diagnosis: 2021      Treatment course:     1/9/23: Received 1 cycle of ABVD chemotherapy inpatient     3/21/23: start brentuximab monotherapy - today is Cycle 4 Day 1          History of Present Illness:       67 y.o. with hx of hypertension, seasonal allergies, presents with worsening normocytic aneima of unclear etiology. No other cytopenias. MCV is 89. Iron studies are normal.  B12, folate are normal. Reticulocyte count is low/normal at 1.4. Creatinine is normal at 1.10. Calcium is normal at 9.6. LFTs are normal. Per notes from PCP, stool guiac we negative in July. Alkaline phosphatase is elevated. Constitutional symptoms positive for easy bruising only. No night sweats, bone pain, unintentional weight loss. Family history reviewed, non contributory   Social history reviewed, non contributory. No alcohol use      12/28/22:     Marked clinical worsening since last evaluation, patient has had anasarca, severe fatigue, weight loss, nausea, vomiting that is worsened over the last few months. The patient was evaluated by his  primary care physician who ordered CT chest abdomen pelvis that showed diffuse lymphadenopathy. Results from imaging shown below                Radiographic findings are highly suspicious for lymphoma. Patient is planned for surgical evaluation for excisional lymph node biopsy later on today.       Patient's family also reports that he has been confused intermittently over

## 2023-05-25 ENCOUNTER — TELEPHONE (OUTPATIENT)
Age: 73
End: 2023-05-25

## 2023-05-25 LAB — B2 MICROGLOB SERPL-MCNC: 4.6 MG/L (ref 0.6–2.4)

## 2023-05-25 RX ORDER — LIDOCAINE 4 G/G
1 PATCH TOPICAL EVERY 24 HOURS
COMMUNITY
Start: 2023-02-06 | End: 2023-06-12

## 2023-05-25 RX ORDER — AZITHROMYCIN 250 MG/1
TABLET, FILM COATED ORAL
COMMUNITY
Start: 2023-02-27 | End: 2023-06-12

## 2023-05-31 ENCOUNTER — APPOINTMENT (OUTPATIENT)
Dept: INFUSION THERAPY | Age: 73
End: 2023-05-31

## 2023-06-05 RX ORDER — ONDANSETRON 2 MG/ML
8 INJECTION INTRAMUSCULAR; INTRAVENOUS
Status: CANCELLED | OUTPATIENT
Start: 2023-06-12

## 2023-06-05 RX ORDER — SODIUM CHLORIDE 9 MG/ML
5-250 INJECTION, SOLUTION INTRAVENOUS PRN
Status: CANCELLED | OUTPATIENT
Start: 2023-06-12

## 2023-06-05 RX ORDER — ACETAMINOPHEN 325 MG/1
650 TABLET ORAL
Status: CANCELLED | OUTPATIENT
Start: 2023-06-12

## 2023-06-05 RX ORDER — MEPERIDINE HYDROCHLORIDE 25 MG/ML
12.5 INJECTION INTRAMUSCULAR; INTRAVENOUS; SUBCUTANEOUS PRN
Status: CANCELLED | OUTPATIENT
Start: 2023-06-12

## 2023-06-05 RX ORDER — DIPHENHYDRAMINE HYDROCHLORIDE 50 MG/ML
50 INJECTION INTRAMUSCULAR; INTRAVENOUS
Status: CANCELLED | OUTPATIENT
Start: 2023-06-12

## 2023-06-05 RX ORDER — ALBUTEROL SULFATE 90 UG/1
4 AEROSOL, METERED RESPIRATORY (INHALATION) PRN
Status: CANCELLED | OUTPATIENT
Start: 2023-06-12

## 2023-06-05 RX ORDER — HEPARIN SODIUM (PORCINE) LOCK FLUSH IV SOLN 100 UNIT/ML 100 UNIT/ML
500 SOLUTION INTRAVENOUS PRN
Status: CANCELLED | OUTPATIENT
Start: 2023-06-12

## 2023-06-05 RX ORDER — SODIUM CHLORIDE 9 MG/ML
INJECTION, SOLUTION INTRAVENOUS CONTINUOUS
Status: CANCELLED | OUTPATIENT
Start: 2023-06-12

## 2023-06-05 RX ORDER — EPINEPHRINE 1 MG/ML
0.3 INJECTION, SOLUTION, CONCENTRATE INTRAVENOUS PRN
Status: CANCELLED | OUTPATIENT
Start: 2023-06-12

## 2023-06-06 ENCOUNTER — TELEPHONE (OUTPATIENT)
Age: 73
End: 2023-06-06

## 2023-06-07 NOTE — TELEPHONE ENCOUNTER
PHI verified and HIPPA verified by two patient identifiers. Return call placed to pt's spouse. Spouse given the option for pt to go to [facility] on Tuesday; 6/13 but pt's spouse refused. Pt's spouse decided pt will keep his appt at [facility] on Monday 6/12.

## 2023-06-12 ENCOUNTER — HOSPITAL ENCOUNTER (OUTPATIENT)
Facility: HOSPITAL | Age: 73
Setting detail: INFUSION SERIES
Discharge: HOME | End: 2023-06-12
Payer: MEDICARE

## 2023-06-12 VITALS
TEMPERATURE: 97.5 F | BODY MASS INDEX: 24.06 KG/M2 | RESPIRATION RATE: 17 BRPM | DIASTOLIC BLOOD PRESSURE: 68 MMHG | SYSTOLIC BLOOD PRESSURE: 130 MMHG | WEIGHT: 144.6 LBS | OXYGEN SATURATION: 99 % | HEART RATE: 62 BPM

## 2023-06-12 DIAGNOSIS — C81.90 HODGKIN LYMPHOMA, UNSPECIFIED HODGKIN LYMPHOMA TYPE, UNSPECIFIED BODY REGION (HCC): Primary | ICD-10-CM

## 2023-06-12 LAB
ALBUMIN SERPL-MCNC: 3.4 G/DL (ref 3.5–5)
ALBUMIN/GLOB SERPL: 0.9 (ref 1.1–2.2)
ALP SERPL-CCNC: 175 U/L (ref 45–117)
ALT SERPL-CCNC: 37 U/L (ref 12–78)
ANION GAP SERPL CALC-SCNC: 5 MMOL/L (ref 5–15)
AST SERPL-CCNC: 24 U/L (ref 15–37)
BASO+EOS+MONOS # BLD AUTO: 1 K/UL (ref 0.2–1.2)
BASO+EOS+MONOS NFR BLD AUTO: 12 % (ref 3.2–16.9)
BILIRUB SERPL-MCNC: 0.3 MG/DL (ref 0.2–1)
BUN SERPL-MCNC: 32 MG/DL (ref 6–20)
BUN/CREAT SERPL: 30 (ref 12–20)
CALCIUM SERPL-MCNC: 8.8 MG/DL (ref 8.5–10.1)
CHLORIDE SERPL-SCNC: 107 MMOL/L (ref 97–108)
CO2 SERPL-SCNC: 27 MMOL/L (ref 21–32)
CREAT SERPL-MCNC: 1.08 MG/DL (ref 0.7–1.3)
DIFFERENTIAL METHOD BLD: ABNORMAL
ERYTHROCYTE [DISTWIDTH] IN BLOOD BY AUTOMATED COUNT: 17.4 % (ref 11.8–15.8)
GLOBULIN SER CALC-MCNC: 3.6 G/DL (ref 2–4)
GLUCOSE SERPL-MCNC: 125 MG/DL (ref 65–100)
HCT VFR BLD AUTO: 28.5 % (ref 36.6–50.3)
HGB BLD-MCNC: 9.2 G/DL (ref 12.1–17)
LYMPHOCYTES # BLD: 1 K/UL (ref 0.8–3.5)
LYMPHOCYTES NFR BLD: 12 % (ref 12–49)
MCH RBC QN AUTO: 30.2 PG (ref 26–34)
MCHC RBC AUTO-ENTMCNC: 32.3 G/DL (ref 30–36.5)
MCV RBC AUTO: 93.4 FL (ref 80–99)
NEUTS SEG # BLD: 6.3 K/UL (ref 1.8–8)
NEUTS SEG NFR BLD: 76 % (ref 32–75)
PLATELET # BLD AUTO: 352 K/UL (ref 150–400)
POTASSIUM SERPL-SCNC: 3.8 MMOL/L (ref 3.5–5.1)
PROT SERPL-MCNC: 7 G/DL (ref 6.4–8.2)
RBC # BLD AUTO: 3.05 M/UL (ref 4.1–5.7)
SODIUM SERPL-SCNC: 139 MMOL/L (ref 136–145)
WBC # BLD AUTO: 8.3 K/UL (ref 4.1–11.1)

## 2023-06-12 PROCEDURE — 36415 COLL VENOUS BLD VENIPUNCTURE: CPT

## 2023-06-12 PROCEDURE — 6360000002 HC RX W HCPCS: Performed by: STUDENT IN AN ORGANIZED HEALTH CARE EDUCATION/TRAINING PROGRAM

## 2023-06-12 PROCEDURE — 85025 COMPLETE CBC W/AUTO DIFF WBC: CPT

## 2023-06-12 PROCEDURE — 96413 CHEMO IV INFUSION 1 HR: CPT

## 2023-06-12 PROCEDURE — 2580000003 HC RX 258: Performed by: STUDENT IN AN ORGANIZED HEALTH CARE EDUCATION/TRAINING PROGRAM

## 2023-06-12 PROCEDURE — 6370000000 HC RX 637 (ALT 250 FOR IP): Performed by: STUDENT IN AN ORGANIZED HEALTH CARE EDUCATION/TRAINING PROGRAM

## 2023-06-12 PROCEDURE — 80053 COMPREHEN METABOLIC PANEL: CPT

## 2023-06-12 PROCEDURE — 96375 TX/PRO/DX INJ NEW DRUG ADDON: CPT

## 2023-06-12 PROCEDURE — 82232 ASSAY OF BETA-2 PROTEIN: CPT

## 2023-06-12 RX ORDER — HEPARIN 100 UNIT/ML
500 SYRINGE INTRAVENOUS PRN
Status: DISCONTINUED | OUTPATIENT
Start: 2023-06-12 | End: 2023-06-13 | Stop reason: HOSPADM

## 2023-06-12 RX ORDER — SODIUM CHLORIDE 0.9 % (FLUSH) 0.9 %
5-40 SYRINGE (ML) INJECTION PRN
Status: DISCONTINUED | OUTPATIENT
Start: 2023-06-12 | End: 2023-06-13 | Stop reason: HOSPADM

## 2023-06-12 RX ORDER — ACETAMINOPHEN 325 MG/1
650 TABLET ORAL ONCE
Status: COMPLETED | OUTPATIENT
Start: 2023-06-12 | End: 2023-06-12

## 2023-06-12 RX ORDER — ONDANSETRON 2 MG/ML
8 INJECTION INTRAMUSCULAR; INTRAVENOUS ONCE
Status: COMPLETED | OUTPATIENT
Start: 2023-06-12 | End: 2023-06-12

## 2023-06-12 RX ORDER — DIPHENHYDRAMINE HYDROCHLORIDE 50 MG/ML
50 INJECTION INTRAMUSCULAR; INTRAVENOUS ONCE
Status: COMPLETED | OUTPATIENT
Start: 2023-06-12 | End: 2023-06-12

## 2023-06-12 RX ORDER — SODIUM CHLORIDE 9 MG/ML
5-250 INJECTION, SOLUTION INTRAVENOUS PRN
Status: DISCONTINUED | OUTPATIENT
Start: 2023-06-12 | End: 2023-06-13 | Stop reason: HOSPADM

## 2023-06-12 RX ADMIN — SODIUM CHLORIDE, PRESERVATIVE FREE 10 ML: 5 INJECTION INTRAVENOUS at 12:09

## 2023-06-12 RX ADMIN — ACETAMINOPHEN 650 MG: 325 TABLET ORAL at 10:48

## 2023-06-12 RX ADMIN — SODIUM CHLORIDE, PRESERVATIVE FREE 10 ML: 5 INJECTION INTRAVENOUS at 12:08

## 2023-06-12 RX ADMIN — BRENTUXIMAB VEDOTIN 120 MG: 50 INJECTION, POWDER, LYOPHILIZED, FOR SOLUTION INTRAVENOUS at 11:35

## 2023-06-12 RX ADMIN — DIPHENHYDRAMINE HYDROCHLORIDE 50 MG: 50 INJECTION, SOLUTION INTRAMUSCULAR; INTRAVENOUS at 10:52

## 2023-06-12 RX ADMIN — ONDANSETRON 8 MG: 2 INJECTION INTRAMUSCULAR; INTRAVENOUS at 10:49

## 2023-06-12 RX ADMIN — PEGFILGRASTIM 6 MG: KIT SUBCUTANEOUS at 11:36

## 2023-06-12 RX ADMIN — SODIUM CHLORIDE 25 ML/HR: 9 INJECTION, SOLUTION INTRAVENOUS at 10:46

## 2023-06-12 NOTE — PROGRESS NOTES
[address] [facility] Visit Note  Pamalee Skiff  1950  [id number]    Pt arrived to [facility] ambulatory in no acute distress at 0900 for Brentuximab C5D1. Assessment unremarkable . port accessed without issue and positive blood return noted. Labs obtained, as ordered. Patient sent to MD office for follow-up visit. Prior to administration of medication, labs resulted/reviewed and BSA verified. Patient denied having any symptoms of COVID-19, i.e. SOB, coughing, fever, or generally not feeling well.       Patient Vitals for the past 24 hrs:   BP Temp Temp src Pulse Resp SpO2 Weight   06/12/23 1200 130/68 -- -- 62 -- -- --   06/12/23 0900 122/62 97.5 °F (36.4 °C) Temporal 87 17 99 % 65.6 kg (144 lb 9.6 oz)         Recent Results (from the past 12 hour(s))   Comprehensive metabolic panel    Collection Time: 06/12/23  9:13 AM   Result Value Ref Range    Sodium 139 136 - 145 mmol/L    Potassium 3.8 3.5 - 5.1 mmol/L    Chloride 107 97 - 108 mmol/L    CO2 27 21 - 32 mmol/L    Anion Gap 5 5 - 15 mmol/L    Glucose 125 (H) 65 - 100 mg/dL    BUN 32 (H) 6 - 20 MG/DL    Creatinine 1.08 0.70 - 1.30 MG/DL    Bun/Cre Ratio 30 (H) 12 - 20      Est, Glom Filt Rate >60 >60 ml/min/1.73m2    Calcium 8.8 8.5 - 10.1 MG/DL    Total Bilirubin 0.3 0.2 - 1.0 MG/DL    ALT 37 12 - 78 U/L    AST 24 15 - 37 U/L    Alk Phosphatase 175 (H) 45 - 117 U/L    Total Protein 7.0 6.4 - 8.2 g/dL    Albumin 3.4 (L) 3.5 - 5.0 g/dL    Globulin 3.6 2.0 - 4.0 g/dL    Albumin/Globulin Ratio 0.9 (L) 1.1 - 2.2     CBC with Partial Differential    Collection Time: 06/12/23  9:13 AM   Result Value Ref Range    WBC 8.3 4.1 - 11.1 K/uL    RBC 3.05 (L) 4.10 - 5.70 M/uL    Hemoglobin 9.2 (L) 12.1 - 17.0 g/dL    Hematocrit 28.5 (L) 36.6 - 50.3 %    MCV 93.4 80.0 - 99.0 FL    MCH 30.2 26.0 - 34.0 PG    MCHC 32.3 30.0 - 36.5 g/dL    RDW 17.4 (H) 11.8 - 15.8 %    Platelets 525 205 - 112 K/uL    Neutrophils % 76 (H) 32 - 75 %    Mixed Cells 12 3.2 -

## 2023-06-13 LAB — B2 MICROGLOB SERPL-MCNC: 5.2 MG/L (ref 0.6–2.4)

## 2023-06-22 RX ORDER — OLANZAPINE 5 MG/1
TABLET ORAL
Qty: 30 TABLET | Refills: 2 | Status: SHIPPED | OUTPATIENT
Start: 2023-06-22

## 2023-06-22 NOTE — TELEPHONE ENCOUNTER
Last appointment: 12/23/22  Next appointment: none  Previous refill encounter(s): 3/27/23 #30 with 2 refills    Requested Prescriptions     Pending Prescriptions Disp Refills    OLANZapine (ZYPREXA) 5 MG tablet [Pharmacy Med Name: OLANZAPINE 5 MG TAB] 30 tablet 2     Sig: TAKE ONE TABLET BY MOUTH AT BEDTIME FOR PSYCHOSIS         For Pharmacy Admin Tracking Only    Program: Medication Refill  CPA in place:    Recommendation Provided To:    Intervention Detail: New Rx: 1, reason: Patient Preference  Intervention Accepted By:   Natalia Beckwith Closed?:    Time Spent (min): 5

## 2023-06-23 RX ORDER — ONDANSETRON 2 MG/ML
8 INJECTION INTRAMUSCULAR; INTRAVENOUS
OUTPATIENT
Start: 2023-07-03

## 2023-06-23 RX ORDER — EPINEPHRINE 1 MG/ML
0.3 INJECTION, SOLUTION, CONCENTRATE INTRAVENOUS PRN
OUTPATIENT
Start: 2023-07-03

## 2023-06-23 RX ORDER — SODIUM CHLORIDE 9 MG/ML
5-250 INJECTION, SOLUTION INTRAVENOUS PRN
OUTPATIENT
Start: 2023-07-03

## 2023-06-23 RX ORDER — ALBUTEROL SULFATE 90 UG/1
4 AEROSOL, METERED RESPIRATORY (INHALATION) PRN
OUTPATIENT
Start: 2023-07-03

## 2023-06-23 RX ORDER — ACETAMINOPHEN 325 MG/1
650 TABLET ORAL
OUTPATIENT
Start: 2023-07-03

## 2023-06-23 RX ORDER — MEPERIDINE HYDROCHLORIDE 25 MG/ML
12.5 INJECTION INTRAMUSCULAR; INTRAVENOUS; SUBCUTANEOUS PRN
OUTPATIENT
Start: 2023-07-03

## 2023-06-23 RX ORDER — DIPHENHYDRAMINE HYDROCHLORIDE 50 MG/ML
50 INJECTION INTRAMUSCULAR; INTRAVENOUS
OUTPATIENT
Start: 2023-07-03

## 2023-06-23 RX ORDER — SODIUM CHLORIDE 9 MG/ML
INJECTION, SOLUTION INTRAVENOUS CONTINUOUS
OUTPATIENT
Start: 2023-07-03

## 2023-06-23 RX ORDER — HEPARIN 100 UNIT/ML
500 SYRINGE INTRAVENOUS PRN
OUTPATIENT
Start: 2023-07-03

## 2023-07-03 ENCOUNTER — OFFICE VISIT (OUTPATIENT)
Age: 73
End: 2023-07-03
Payer: MEDICARE

## 2023-07-03 ENCOUNTER — HOSPITAL ENCOUNTER (OUTPATIENT)
Facility: HOSPITAL | Age: 73
Setting detail: INFUSION SERIES
End: 2023-07-03
Payer: MEDICARE

## 2023-07-03 VITALS
OXYGEN SATURATION: 98 % | SYSTOLIC BLOOD PRESSURE: 129 MMHG | TEMPERATURE: 97.8 F | BODY MASS INDEX: 24.32 KG/M2 | DIASTOLIC BLOOD PRESSURE: 71 MMHG | RESPIRATION RATE: 16 BRPM | HEIGHT: 65 IN | HEART RATE: 81 BPM | WEIGHT: 146 LBS

## 2023-07-03 VITALS
RESPIRATION RATE: 16 BRPM | SYSTOLIC BLOOD PRESSURE: 108 MMHG | TEMPERATURE: 97.8 F | BODY MASS INDEX: 24.45 KG/M2 | DIASTOLIC BLOOD PRESSURE: 59 MMHG | OXYGEN SATURATION: 98 % | HEART RATE: 71 BPM | WEIGHT: 146.9 LBS

## 2023-07-03 DIAGNOSIS — C81.70 CLASSICAL HODGKIN LYMPHOMA (HCC): Primary | ICD-10-CM

## 2023-07-03 DIAGNOSIS — C81.79 OTHER CLASSICAL HODGKIN LYMPHOMA OF EXTRANODAL SITE EXCLUDING SPLEEN AND OTHER SOLID ORGANS (HCC): ICD-10-CM

## 2023-07-03 DIAGNOSIS — C81.90 HODGKIN LYMPHOMA, UNSPECIFIED HODGKIN LYMPHOMA TYPE, UNSPECIFIED BODY REGION (HCC): Primary | ICD-10-CM

## 2023-07-03 DIAGNOSIS — Z51.11 ENCOUNTER FOR ANTINEOPLASTIC CHEMOTHERAPY: ICD-10-CM

## 2023-07-03 LAB
ALBUMIN SERPL-MCNC: 3.6 G/DL (ref 3.5–5)
ALBUMIN/GLOB SERPL: 1.1 (ref 1.1–2.2)
ALP SERPL-CCNC: 152 U/L (ref 45–117)
ALT SERPL-CCNC: 34 U/L (ref 12–78)
ANION GAP SERPL CALC-SCNC: 5 MMOL/L (ref 5–15)
AST SERPL-CCNC: 16 U/L (ref 15–37)
BASO+EOS+MONOS # BLD AUTO: 0.9 K/UL (ref 0.2–1.2)
BASO+EOS+MONOS NFR BLD AUTO: 11 % (ref 3.2–16.9)
BILIRUB SERPL-MCNC: 0.2 MG/DL (ref 0.2–1)
BUN SERPL-MCNC: 35 MG/DL (ref 6–20)
BUN/CREAT SERPL: 33 (ref 12–20)
CALCIUM SERPL-MCNC: 8.9 MG/DL (ref 8.5–10.1)
CHLORIDE SERPL-SCNC: 107 MMOL/L (ref 97–108)
CO2 SERPL-SCNC: 28 MMOL/L (ref 21–32)
CREAT SERPL-MCNC: 1.07 MG/DL (ref 0.7–1.3)
DIFFERENTIAL METHOD BLD: ABNORMAL
ERYTHROCYTE [DISTWIDTH] IN BLOOD BY AUTOMATED COUNT: 17.1 % (ref 11.8–15.8)
GLOBULIN SER CALC-MCNC: 3.3 G/DL (ref 2–4)
GLUCOSE SERPL-MCNC: 101 MG/DL (ref 65–100)
HCT VFR BLD AUTO: 28.5 % (ref 36.6–50.3)
HGB BLD-MCNC: 9.2 G/DL (ref 12.1–17)
LYMPHOCYTES # BLD: 1.1 K/UL (ref 0.8–3.5)
LYMPHOCYTES NFR BLD: 13 % (ref 12–49)
MCH RBC QN AUTO: 30.8 PG (ref 26–34)
MCHC RBC AUTO-ENTMCNC: 32.3 G/DL (ref 30–36.5)
MCV RBC AUTO: 95.3 FL (ref 80–99)
NEUTS SEG # BLD: 6.3 K/UL (ref 1.8–8)
NEUTS SEG NFR BLD: 76 % (ref 32–75)
PLATELET # BLD AUTO: 331 K/UL (ref 150–400)
POTASSIUM SERPL-SCNC: 4.1 MMOL/L (ref 3.5–5.1)
PROT SERPL-MCNC: 6.9 G/DL (ref 6.4–8.2)
RBC # BLD AUTO: 2.99 M/UL (ref 4.1–5.7)
SODIUM SERPL-SCNC: 140 MMOL/L (ref 136–145)
WBC # BLD AUTO: 8.3 K/UL (ref 4.1–11.1)

## 2023-07-03 PROCEDURE — 3078F DIAST BP <80 MM HG: CPT | Performed by: STUDENT IN AN ORGANIZED HEALTH CARE EDUCATION/TRAINING PROGRAM

## 2023-07-03 PROCEDURE — 82232 ASSAY OF BETA-2 PROTEIN: CPT

## 2023-07-03 PROCEDURE — 99215 OFFICE O/P EST HI 40 MIN: CPT | Performed by: STUDENT IN AN ORGANIZED HEALTH CARE EDUCATION/TRAINING PROGRAM

## 2023-07-03 PROCEDURE — 6370000000 HC RX 637 (ALT 250 FOR IP): Performed by: STUDENT IN AN ORGANIZED HEALTH CARE EDUCATION/TRAINING PROGRAM

## 2023-07-03 PROCEDURE — 36415 COLL VENOUS BLD VENIPUNCTURE: CPT

## 2023-07-03 PROCEDURE — 96413 CHEMO IV INFUSION 1 HR: CPT

## 2023-07-03 PROCEDURE — 2580000003 HC RX 258: Performed by: STUDENT IN AN ORGANIZED HEALTH CARE EDUCATION/TRAINING PROGRAM

## 2023-07-03 PROCEDURE — 1123F ACP DISCUSS/DSCN MKR DOCD: CPT | Performed by: STUDENT IN AN ORGANIZED HEALTH CARE EDUCATION/TRAINING PROGRAM

## 2023-07-03 PROCEDURE — 96375 TX/PRO/DX INJ NEW DRUG ADDON: CPT

## 2023-07-03 PROCEDURE — 6360000002 HC RX W HCPCS: Performed by: STUDENT IN AN ORGANIZED HEALTH CARE EDUCATION/TRAINING PROGRAM

## 2023-07-03 PROCEDURE — 80053 COMPREHEN METABOLIC PANEL: CPT

## 2023-07-03 PROCEDURE — 3074F SYST BP LT 130 MM HG: CPT | Performed by: STUDENT IN AN ORGANIZED HEALTH CARE EDUCATION/TRAINING PROGRAM

## 2023-07-03 PROCEDURE — 85025 COMPLETE CBC W/AUTO DIFF WBC: CPT

## 2023-07-03 RX ORDER — SODIUM CHLORIDE 0.9 % (FLUSH) 0.9 %
5-40 SYRINGE (ML) INJECTION PRN
OUTPATIENT
Start: 2023-07-31

## 2023-07-03 RX ORDER — ACETAMINOPHEN 325 MG/1
650 TABLET ORAL ONCE
Status: COMPLETED | OUTPATIENT
Start: 2023-07-03 | End: 2023-07-03

## 2023-07-03 RX ORDER — SODIUM CHLORIDE 9 MG/ML
5-250 INJECTION, SOLUTION INTRAVENOUS PRN
Status: DISCONTINUED | OUTPATIENT
Start: 2023-07-03 | End: 2023-07-04 | Stop reason: HOSPADM

## 2023-07-03 RX ORDER — SODIUM CHLORIDE 0.9 % (FLUSH) 0.9 %
5-40 SYRINGE (ML) INJECTION PRN
Status: DISCONTINUED | OUTPATIENT
Start: 2023-07-03 | End: 2023-07-04 | Stop reason: HOSPADM

## 2023-07-03 RX ORDER — DIPHENHYDRAMINE HYDROCHLORIDE 50 MG/ML
50 INJECTION INTRAMUSCULAR; INTRAVENOUS ONCE
Status: COMPLETED | OUTPATIENT
Start: 2023-07-03 | End: 2023-07-03

## 2023-07-03 RX ORDER — ONDANSETRON 2 MG/ML
8 INJECTION INTRAMUSCULAR; INTRAVENOUS ONCE
Status: COMPLETED | OUTPATIENT
Start: 2023-07-03 | End: 2023-07-03

## 2023-07-03 RX ORDER — HEPARIN SODIUM (PORCINE) LOCK FLUSH IV SOLN 100 UNIT/ML 100 UNIT/ML
500 SOLUTION INTRAVENOUS PRN
OUTPATIENT
Start: 2023-07-31

## 2023-07-03 RX ORDER — SODIUM CHLORIDE 9 MG/ML
25 INJECTION, SOLUTION INTRAVENOUS PRN
OUTPATIENT
Start: 2023-07-31

## 2023-07-03 RX ADMIN — ONDANSETRON 8 MG: 2 INJECTION INTRAMUSCULAR; INTRAVENOUS at 11:44

## 2023-07-03 RX ADMIN — SODIUM CHLORIDE 25 ML/HR: 9 INJECTION, SOLUTION INTRAVENOUS at 11:43

## 2023-07-03 RX ADMIN — SODIUM CHLORIDE, PRESERVATIVE FREE 10 ML: 5 INJECTION INTRAVENOUS at 13:46

## 2023-07-03 RX ADMIN — BRENTUXIMAB VEDOTIN 120 MG: 50 INJECTION, POWDER, LYOPHILIZED, FOR SOLUTION INTRAVENOUS at 13:05

## 2023-07-03 RX ADMIN — DIPHENHYDRAMINE HYDROCHLORIDE 50 MG: 50 INJECTION, SOLUTION INTRAMUSCULAR; INTRAVENOUS at 11:47

## 2023-07-03 RX ADMIN — PEGFILGRASTIM 6 MG: KIT SUBCUTANEOUS at 13:13

## 2023-07-03 RX ADMIN — ACETAMINOPHEN 650 MG: 325 TABLET ORAL at 11:42

## 2023-07-03 NOTE — PROGRESS NOTES
[facility] [place] at [address] [facility] [address] [place], [place], [place]   W: [phone] F: [phone]           Reason for Visit:     Shelton Reason is a 67 y.o. male who is seen in consultation at the request of Dr. Malina Marie for evaluation of Hodgkin lymphoma. Seen today in follow-up for chemotherapy. Hematology / Oncology Treatment History:        Hematological/Oncological Diagnosis: Classic Hodgkin lymphoma      Date of Diagnosis: 2021      Treatment course:     1/9/23: Received 1 cycle of ABVD chemotherapy inpatient     3/21/23: start brentuximab monotherapy - today is Cycle 6 Day 1          History of Present Illness:       67 y.o. with hx of hypertension, seasonal allergies, presents with worsening normocytic aneima of unclear etiology. No other cytopenias. MCV is 89. Iron studies are normal.  B12, folate are normal. Reticulocyte count is low/normal at 1.4. Creatinine is normal at 1.10. Calcium is normal at 9.6. LFTs are normal. Per notes from PCP, stool guiac we negative in July. Alkaline phosphatase is elevated. Constitutional symptoms positive for easy bruising only. No night sweats, bone pain, unintentional weight loss. Family history reviewed, non contributory   Social history reviewed, non contributory. No alcohol use      12/28/22:     Marked clinical worsening since last evaluation, patient has had anasarca, severe fatigue, weight loss, nausea, vomiting that is worsened over the last few months. The patient was evaluated by his  primary care physician who ordered CT chest abdomen pelvis that showed diffuse lymphadenopathy. Results from imaging shown below                Radiographic findings are highly suspicious for lymphoma. Patient is planned for surgical evaluation for excisional lymph node biopsy later on today.       Patient's family also reports that he has been confused intermittently over

## 2023-07-03 NOTE — PROGRESS NOTES
Gina Lara is a 67 y.o. male who presents for follow up of   Chief Complaint   Patient presents with    Follow-up     Hodgkin lymphoma       The patient reports no new clinical symptoms or new complaints since last clinic evaluation. VS stable. Patient denies pain. okay appetite. Patient denies N/V/D and constipation. Patient denies numbness and tingling. Patient denies mouth ulcers. Patient denies cough. Patient denies SOB. Patient denies falls. Pt states he feels good. Pt reports the rash has cleared up pt has not been having fluid retention. And reports mild fatigue and hair loss. Pt reports some easy bruising but overall doing good. Pt had a colon guard test done and it was negative. No interval hospitalizations reported    No interval surgery or procedures reported    No reported new medication changes reported       Medications reviewed with the patient, and chart updated to reflect changes.

## 2023-07-05 LAB — B2 MICROGLOB SERPL-MCNC: 3.6 MG/L (ref 0.6–2.4)

## 2023-07-06 ENCOUNTER — TELEPHONE (OUTPATIENT)
Age: 73
End: 2023-07-06

## 2023-07-06 NOTE — TELEPHONE ENCOUNTER
Patient's wife called and said they received a letter from [facility] stating that they will no longer be in par with [facility]. She said, if they are receiving care the insurance company is asking for ADALGISA (continuing of care plan) be sent to Cindy [facility] [employer] fax [phone], ph# [phone]. Letter was written 06/29/2023 and states goes into effect 09/30/2023. The patient is panicking, please advise.

## 2023-07-07 ENCOUNTER — APPOINTMENT (OUTPATIENT)
Facility: HOSPITAL | Age: 73
End: 2023-07-07
Payer: MEDICARE

## 2023-07-25 ENCOUNTER — APPOINTMENT (OUTPATIENT)
Facility: HOSPITAL | Age: 73
End: 2023-07-25
Payer: MEDICARE

## 2023-07-26 ENCOUNTER — TELEPHONE (OUTPATIENT)
Age: 73
End: 2023-07-26

## 2023-07-26 NOTE — TELEPHONE ENCOUNTER
Call [place] in scheduling regarding patient's Pet Scan, patient's coverage is out of network 8/1/2023. Trying to see how to proceed, patient has been called 4 x as well. They are asking how to proceed.

## 2023-07-27 NOTE — TELEPHONE ENCOUNTER
[facility] has called again and is informing that they have called patient 5x to cancel appt with no response. Appt has been cancelled due to being out of network.

## 2023-07-28 NOTE — TELEPHONE ENCOUNTER
Call placed to pt. PHI verified and HIPPA verified by two patient identifiers. Nurse spoke with pt's spouse who's asking if pt can get his scan done on 7/31.

## 2023-07-31 ENCOUNTER — HOSPITAL ENCOUNTER (OUTPATIENT)
Facility: HOSPITAL | Age: 73
Setting detail: INFUSION SERIES
End: 2023-07-31
Payer: MEDICARE

## 2023-07-31 ENCOUNTER — OFFICE VISIT (OUTPATIENT)
Age: 73
End: 2023-07-31
Payer: MEDICARE

## 2023-07-31 VITALS
TEMPERATURE: 98 F | SYSTOLIC BLOOD PRESSURE: 154 MMHG | WEIGHT: 147.3 LBS | OXYGEN SATURATION: 99 % | DIASTOLIC BLOOD PRESSURE: 70 MMHG | BODY MASS INDEX: 24.54 KG/M2 | HEIGHT: 65 IN | RESPIRATION RATE: 16 BRPM | HEART RATE: 77 BPM

## 2023-07-31 VITALS
DIASTOLIC BLOOD PRESSURE: 70 MMHG | TEMPERATURE: 98 F | HEIGHT: 65 IN | OXYGEN SATURATION: 99 % | BODY MASS INDEX: 24.49 KG/M2 | RESPIRATION RATE: 16 BRPM | SYSTOLIC BLOOD PRESSURE: 154 MMHG | HEART RATE: 77 BPM | WEIGHT: 147 LBS

## 2023-07-31 DIAGNOSIS — C81.70 CLASSICAL HODGKIN LYMPHOMA (HCC): Primary | ICD-10-CM

## 2023-07-31 LAB
ALBUMIN SERPL-MCNC: 3.8 G/DL (ref 3.5–5)
ALBUMIN/GLOB SERPL: 1.1 (ref 1.1–2.2)
ALP SERPL-CCNC: 133 U/L (ref 45–117)
ALT SERPL-CCNC: 43 U/L (ref 12–78)
ANION GAP SERPL CALC-SCNC: 4 MMOL/L (ref 5–15)
AST SERPL-CCNC: 22 U/L (ref 15–37)
BASOPHILS # BLD: 0.1 K/UL (ref 0–0.1)
BASOPHILS NFR BLD: 1 % (ref 0–1)
BILIRUB SERPL-MCNC: 0.3 MG/DL (ref 0.2–1)
BUN SERPL-MCNC: 30 MG/DL (ref 6–20)
BUN/CREAT SERPL: 29 (ref 12–20)
CALCIUM SERPL-MCNC: 9.1 MG/DL (ref 8.5–10.1)
CHLORIDE SERPL-SCNC: 110 MMOL/L (ref 97–108)
CO2 SERPL-SCNC: 26 MMOL/L (ref 21–32)
CREAT SERPL-MCNC: 1.04 MG/DL (ref 0.7–1.3)
DIFFERENTIAL METHOD BLD: ABNORMAL
EOSINOPHIL # BLD: 0.2 K/UL (ref 0–0.4)
EOSINOPHIL NFR BLD: 3 % (ref 0–7)
ERYTHROCYTE [DISTWIDTH] IN BLOOD BY AUTOMATED COUNT: 16.7 % (ref 11.5–14.5)
GLOBULIN SER CALC-MCNC: 3.4 G/DL (ref 2–4)
GLUCOSE SERPL-MCNC: 96 MG/DL (ref 65–100)
HCT VFR BLD AUTO: 29.6 % (ref 36.6–50.3)
HGB BLD-MCNC: 9.5 G/DL (ref 12.1–17)
IMM GRANULOCYTES # BLD AUTO: 0 K/UL (ref 0–0.04)
IMM GRANULOCYTES NFR BLD AUTO: 0 % (ref 0–0.5)
LDH SERPL L TO P-CCNC: 153 U/L (ref 85–241)
LYMPHOCYTES # BLD: 1.2 K/UL (ref 0.8–3.5)
LYMPHOCYTES NFR BLD: 21 % (ref 12–49)
MCH RBC QN AUTO: 30.3 PG (ref 26–34)
MCHC RBC AUTO-ENTMCNC: 32.1 G/DL (ref 30–36.5)
MCV RBC AUTO: 94.3 FL (ref 80–99)
MONOCYTES # BLD: 0.5 K/UL (ref 0–1)
MONOCYTES NFR BLD: 9 % (ref 5–13)
NEUTS SEG # BLD: 3.8 K/UL (ref 1.8–8)
NEUTS SEG NFR BLD: 66 % (ref 32–75)
NRBC # BLD: 0 K/UL (ref 0–0.01)
NRBC BLD-RTO: 0 PER 100 WBC
PLATELET # BLD AUTO: 220 K/UL (ref 150–400)
PMV BLD AUTO: 8.9 FL (ref 8.9–12.9)
POTASSIUM SERPL-SCNC: 3.5 MMOL/L (ref 3.5–5.1)
PROT SERPL-MCNC: 7.2 G/DL (ref 6.4–8.2)
RBC # BLD AUTO: 3.14 M/UL (ref 4.1–5.7)
SODIUM SERPL-SCNC: 140 MMOL/L (ref 136–145)
WBC # BLD AUTO: 5.7 K/UL (ref 4.1–11.1)

## 2023-07-31 PROCEDURE — 36415 COLL VENOUS BLD VENIPUNCTURE: CPT

## 2023-07-31 PROCEDURE — 2580000003 HC RX 258: Performed by: NURSE PRACTITIONER

## 2023-07-31 PROCEDURE — 3078F DIAST BP <80 MM HG: CPT | Performed by: STUDENT IN AN ORGANIZED HEALTH CARE EDUCATION/TRAINING PROGRAM

## 2023-07-31 PROCEDURE — 85025 COMPLETE CBC W/AUTO DIFF WBC: CPT

## 2023-07-31 PROCEDURE — 3077F SYST BP >= 140 MM HG: CPT | Performed by: STUDENT IN AN ORGANIZED HEALTH CARE EDUCATION/TRAINING PROGRAM

## 2023-07-31 PROCEDURE — 36591 DRAW BLOOD OFF VENOUS DEVICE: CPT

## 2023-07-31 PROCEDURE — 1123F ACP DISCUSS/DSCN MKR DOCD: CPT | Performed by: STUDENT IN AN ORGANIZED HEALTH CARE EDUCATION/TRAINING PROGRAM

## 2023-07-31 PROCEDURE — 99214 OFFICE O/P EST MOD 30 MIN: CPT | Performed by: STUDENT IN AN ORGANIZED HEALTH CARE EDUCATION/TRAINING PROGRAM

## 2023-07-31 PROCEDURE — 80053 COMPREHEN METABOLIC PANEL: CPT

## 2023-07-31 PROCEDURE — 82232 ASSAY OF BETA-2 PROTEIN: CPT

## 2023-07-31 PROCEDURE — 83615 LACTATE (LD) (LDH) ENZYME: CPT

## 2023-07-31 RX ORDER — SODIUM CHLORIDE 0.9 % (FLUSH) 0.9 %
5-40 SYRINGE (ML) INJECTION PRN
Status: DISCONTINUED | OUTPATIENT
Start: 2023-07-31 | End: 2023-08-01 | Stop reason: HOSPADM

## 2023-07-31 RX ORDER — SODIUM CHLORIDE 0.9 % (FLUSH) 0.9 %
5-40 SYRINGE (ML) INJECTION PRN
OUTPATIENT
Start: 2023-08-01

## 2023-07-31 RX ORDER — HEPARIN 100 UNIT/ML
500 SYRINGE INTRAVENOUS PRN
OUTPATIENT
Start: 2023-08-01

## 2023-07-31 RX ORDER — SODIUM CHLORIDE 9 MG/ML
25 INJECTION, SOLUTION INTRAVENOUS PRN
OUTPATIENT
Start: 2023-08-01

## 2023-07-31 RX ADMIN — SODIUM CHLORIDE, PRESERVATIVE FREE 20 ML: 5 INJECTION INTRAVENOUS at 08:43

## 2023-07-31 NOTE — PROGRESS NOTES
[facility] at [address] [facility] [address] [place], [place], [place]   W: [phone] F: [phone]           Reason for Visit:     Daya Bonilla is a 67 y.o. male who is seen in consultation at the request of Dr. Laila Garcia for evaluation of Hodgkin lymphoma. Seen today in follow-up for chemotherapy. Hematology / Oncology Treatment History:        Hematological/Oncological Diagnosis: Classic Hodgkin lymphoma      Date of Diagnosis: 2021      Treatment course:     1/9/23: Received 1 cycle of ABVD chemotherapy inpatient     3/21/23: start brentuximab monotherapy - today is Cycle 6 Day 1          History of Present Illness:       67 y.o. with hx of hypertension, seasonal allergies, presents with worsening normocytic aneima of unclear etiology. No other cytopenias. MCV is 89. Iron studies are normal.  B12, folate are normal. Reticulocyte count is low/normal at 1.4. Creatinine is normal at 1.10. Calcium is normal at 9.6. LFTs are normal. Per notes from PCP, stool guiac we negative in July. Alkaline phosphatase is elevated. Constitutional symptoms positive for easy bruising only. No night sweats, bone pain, unintentional weight loss. Family history reviewed, non contributory   Social history reviewed, non contributory. No alcohol use      12/28/22:     Marked clinical worsening since last evaluation, patient has had anasarca, severe fatigue, weight loss, nausea, vomiting that is worsened over the last few months. The patient was evaluated by his  primary care physician who ordered CT chest abdomen pelvis that showed diffuse lymphadenopathy. Results from imaging shown below                Radiographic findings are highly suspicious for lymphoma. Patient is planned for surgical evaluation for excisional lymph node biopsy later on today.       Patient's family also reports that he has been confused intermittently over

## 2023-07-31 NOTE — PROGRESS NOTES
Cruz Larkin is a 67 y.o. male who presents for follow up of   Chief Complaint   Patient presents with    Follow-up     Classical hodgkin lymphoma       The patient reports no new clinical symptoms or new complaints since last clinic evaluation. Pt reports some itching, hair loss, and insomnia. Pt reports his appetite is getting better than what it was. Pt doing well overall he states. No interval hospitalizations reported    No interval surgery or procedures reported    No reported new medication changes reported       Medications reviewed with the patient, and chart updated to reflect changes.

## 2023-07-31 NOTE — PROGRESS NOTES
[facility] [place] at [address] [facility] [address] [place], [place], [place]   W: [phone] F: [phone]           Reason for Visit:     Daya Bonilla is a 67 y.o. male with Hodgkin lymphoma. Seen today in follow-up. Hematology / Oncology Treatment History:        Hematological/Oncological Diagnosis: Classic Hodgkin lymphoma      Date of Diagnosis: 2021      Treatment course:     1/9/23: Received 1 cycle of ABVD chemotherapy inpatient     3/21/23: start brentuximab monotherapy - s/p 6 cycles       History of Present Illness:       67 y.o. with hx of hypertension, seasonal allergies, presents with worsening normocytic aneima of unclear etiology. No other cytopenias. MCV is 89. Iron studies are normal.  B12, folate are normal. Reticulocyte count is low/normal at 1.4. Creatinine is normal at 1.10. Calcium is normal at 9.6. LFTs are normal. Per notes from PCP, stool guiac we negative in July. Alkaline phosphatase is elevated. Constitutional symptoms positive for easy bruising only. No night sweats, bone pain, unintentional weight loss. Family history reviewed, non contributory   Social history reviewed, non contributory. No alcohol use      12/28/22:     Marked clinical worsening since last evaluation, patient has had anasarca, severe fatigue, weight loss, nausea, vomiting that is worsened over the last few months. The patient was evaluated by his  primary care physician who ordered CT chest abdomen pelvis that showed diffuse lymphadenopathy. Results from imaging shown below                Radiographic findings are highly suspicious for lymphoma. Patient is planned for surgical evaluation for excisional lymph node biopsy later on today. Patient's family also reports that he has been confused intermittently over the last few weeks. They are concerned that there might be a lymphoma infiltrating into the CNS.

## 2023-08-03 ENCOUNTER — TELEPHONE (OUTPATIENT)
Age: 73
End: 2023-08-03

## 2023-08-03 NOTE — TELEPHONE ENCOUNTER
Returned call. Spoke with patient's son. HIPAA verified by two patient identifiers. Informed him that Beta-2 microglobulin has not resulted. Will notify when lab has resulted.

## 2023-08-05 LAB — B2 MICROGLOB SERPL-MCNC: 3.2 MG/L (ref 0.6–2.4)

## 2023-08-08 NOTE — PROGRESS NOTES
-Cont home zoloft     0730: Bedside shift change report given to Miguel A Kelley (oncoming nurse) by Sopiha Perry (offgoing nurse). Report included the following information SBAR and Kardex. 4145: Pt refusing PO medications. IV infiltrated. Pt confused, refusing iv insertion; unable to give IV medications at this time - zofran, protonix, cefepime, flagyl.

## 2023-08-23 ENCOUNTER — TELEPHONE (OUTPATIENT)
Age: 73
End: 2023-08-23

## 2023-08-23 DIAGNOSIS — I10 ESSENTIAL (PRIMARY) HYPERTENSION: Primary | ICD-10-CM

## 2023-08-23 RX ORDER — METOPROLOL SUCCINATE 25 MG/1
25 TABLET, EXTENDED RELEASE ORAL DAILY
Qty: 30 TABLET | Refills: 3 | Status: SHIPPED | OUTPATIENT
Start: 2023-08-23

## 2023-08-23 NOTE — TELEPHONE ENCOUNTER
----- Message from Devon Griggs sent at 8/23/2023  9:06 AM EDT -----  Subject: Message to Provider    QUESTIONS  Information for Provider? Patients wife calling in as she was told by [employer] and by [employer], that Dr. Shannon Pulido is still taking the   [facility]. Please call to let patients wife know if they will be able   to keep Provider Shannon Pulido as a physician or if they will need to look   elsewhere as the cost of care for the patients cancer is already so high. Please call to discuss as Syd López would like to make sure. She would like to   discuss his BP medication and if he would need to be seen or if it can be   changed without sunita.  ---------------------------------------------------------------------------  --------------  Iain MOJICA  [phone]; OK to leave message on voicemail  ---------------------------------------------------------------------------  --------------  SCRIPT ANSWERS  Relationship to Patient? Spouse/Partner  Representative Name? Syd López Courtney Cope  Is the representative on the Communication Release of Information (MICHELLE)   form in Epic?  Yes

## 2023-08-23 NOTE — TELEPHONE ENCOUNTER
Patient request a phone call from Dr. Magali Moffett cancer patient when ever he goes in for treatment his blood pressure goes up. Patient was taking Amlodipine and he stop taking it. Best phone number [phone].   King Aguilar / Practice Supervisor already talked with the patient related to his insurance

## 2023-09-21 RX ORDER — OLANZAPINE 5 MG/1
TABLET ORAL
Qty: 90 TABLET | Refills: 1 | Status: SHIPPED | OUTPATIENT
Start: 2023-09-21

## 2023-09-21 NOTE — TELEPHONE ENCOUNTER
Last appointment: 12/23/23  Next appointment: none  Previous refill encounter(s): 6/22/23 #30 with 2 refills    Requested Prescriptions     Pending Prescriptions Disp Refills    OLANZapine (ZYPREXA) 5 MG tablet [Pharmacy Med Name: OLANZAPINE 5 MG TAB] 90 tablet 1     Sig: TAKE ONE TABLET BY MOUTH AT BEDTIME FOR PSYCHOSIS         For Pharmacy Admin Tracking Only    Program: Medication Refill  CPA in place:    Recommendation Provided To:    Intervention Detail: New Rx: 1, reason: Patient Preference  Intervention Accepted By:   Magalene Cockayne Closed?:    Time Spent (min): 5

## 2023-11-24 ENCOUNTER — OFFICE VISIT (OUTPATIENT)
Age: 73
End: 2023-11-24
Payer: MEDICARE

## 2023-11-24 ENCOUNTER — TELEPHONE (OUTPATIENT)
Age: 73
End: 2023-11-24

## 2023-11-24 VITALS
RESPIRATION RATE: 18 BRPM | DIASTOLIC BLOOD PRESSURE: 84 MMHG | BODY MASS INDEX: 25.33 KG/M2 | WEIGHT: 152 LBS | OXYGEN SATURATION: 97 % | SYSTOLIC BLOOD PRESSURE: 148 MMHG | HEIGHT: 65 IN | HEART RATE: 116 BPM | TEMPERATURE: 98.7 F

## 2023-11-24 DIAGNOSIS — I10 ESSENTIAL (PRIMARY) HYPERTENSION: ICD-10-CM

## 2023-11-24 DIAGNOSIS — C81.70 CLASSICAL HODGKIN LYMPHOMA (HCC): ICD-10-CM

## 2023-11-24 DIAGNOSIS — R50.9 FEBRILE ILLNESS, ACUTE: Primary | ICD-10-CM

## 2023-11-24 DIAGNOSIS — K59.00 CONSTIPATION, UNSPECIFIED CONSTIPATION TYPE: ICD-10-CM

## 2023-11-24 DIAGNOSIS — E11.9 TYPE 2 DIABETES MELLITUS WITHOUT COMPLICATION, WITHOUT LONG-TERM CURRENT USE OF INSULIN (HCC): ICD-10-CM

## 2023-11-24 LAB
ALBUMIN SERPL-MCNC: 4 G/DL (ref 3.5–5)
ALBUMIN/GLOB SERPL: 1.1 (ref 1.1–2.2)
ALP SERPL-CCNC: 134 U/L (ref 45–117)
ALT SERPL-CCNC: 30 U/L (ref 12–78)
ANION GAP SERPL CALC-SCNC: 8 MMOL/L (ref 5–15)
AST SERPL-CCNC: 18 U/L (ref 15–37)
BILIRUB SERPL-MCNC: 0.8 MG/DL (ref 0.2–1)
BILIRUBIN, URINE, POC: ABNORMAL
BLOOD URINE, POC: NEGATIVE
BUN SERPL-MCNC: 28 MG/DL (ref 6–20)
BUN/CREAT SERPL: 21 (ref 12–20)
CALCIUM SERPL-MCNC: 9.4 MG/DL (ref 8.5–10.1)
CHLORIDE SERPL-SCNC: 103 MMOL/L (ref 97–108)
CO2 SERPL-SCNC: 24 MMOL/L (ref 21–32)
CREAT SERPL-MCNC: 1.32 MG/DL (ref 0.7–1.3)
GLOBULIN SER CALC-MCNC: 3.7 G/DL (ref 2–4)
GLUCOSE SERPL-MCNC: 187 MG/DL (ref 65–100)
GLUCOSE URINE, POC: NEGATIVE
INFLUENZA A ANTIGEN, POC: NEGATIVE
INFLUENZA B ANTIGEN, POC: NEGATIVE
KETONES, URINE, POC: NEGATIVE
LEUKOCYTE ESTERASE, URINE, POC: NEGATIVE
NITRITE, URINE, POC: NEGATIVE
PH, URINE, POC: 5 (ref 4.6–8)
POTASSIUM SERPL-SCNC: 4.6 MMOL/L (ref 3.5–5.1)
PROT SERPL-MCNC: 7.7 G/DL (ref 6.4–8.2)
PROTEIN,URINE, POC: ABNORMAL
SODIUM SERPL-SCNC: 135 MMOL/L (ref 136–145)
SPECIFIC GRAVITY, URINE, POC: 5 (ref 1–1.03)
URINALYSIS CLARITY, POC: ABNORMAL
URINALYSIS COLOR, POC: ABNORMAL
UROBILINOGEN, POC: NORMAL
VALID INTERNAL CONTROL, POC: NORMAL

## 2023-11-24 PROCEDURE — 87804 INFLUENZA ASSAY W/OPTIC: CPT | Performed by: FAMILY MEDICINE

## 2023-11-24 PROCEDURE — 81002 URINALYSIS NONAUTO W/O SCOPE: CPT | Performed by: FAMILY MEDICINE

## 2023-11-24 PROCEDURE — 99214 OFFICE O/P EST MOD 30 MIN: CPT | Performed by: FAMILY MEDICINE

## 2023-11-24 RX ORDER — POLYETHYLENE GLYCOL 3350 17 G/17G
68 POWDER, FOR SOLUTION ORAL DAILY PRN
Qty: 255 G | Refills: 1
Start: 2023-11-24 | End: 2023-12-24

## 2023-11-24 RX ORDER — ATORVASTATIN CALCIUM 20 MG/1
TABLET, FILM COATED ORAL
COMMUNITY
Start: 2023-11-07

## 2023-11-24 RX ORDER — DULOXETIN HYDROCHLORIDE 30 MG/1
30 CAPSULE, DELAYED RELEASE ORAL DAILY
Qty: 30 CAPSULE | Refills: 2 | COMMUNITY
Start: 2023-11-21 | End: 2023-11-24 | Stop reason: CLARIF

## 2023-11-24 RX ORDER — PROCHLORPERAZINE MALEATE 10 MG
TABLET ORAL
COMMUNITY
Start: 2022-12-27

## 2023-11-24 RX ORDER — CARVEDILOL 3.12 MG/1
TABLET ORAL
COMMUNITY
Start: 2023-11-07

## 2023-11-24 RX ORDER — KETOCONAZOLE 20 MG/G
CREAM TOPICAL
COMMUNITY
Start: 2023-09-19 | End: 2023-11-24 | Stop reason: CLARIF

## 2023-11-24 RX ORDER — LORAZEPAM 0.5 MG/1
TABLET ORAL
COMMUNITY
Start: 2023-11-22 | End: 2023-11-24 | Stop reason: CLARIF

## 2023-11-24 SDOH — ECONOMIC STABILITY: HOUSING INSECURITY
IN THE LAST 12 MONTHS, WAS THERE A TIME WHEN YOU DID NOT HAVE A STEADY PLACE TO SLEEP OR SLEPT IN A SHELTER (INCLUDING NOW)?: NO

## 2023-11-24 SDOH — ECONOMIC STABILITY: INCOME INSECURITY: HOW HARD IS IT FOR YOU TO PAY FOR THE VERY BASICS LIKE FOOD, HOUSING, MEDICAL CARE, AND HEATING?: NOT HARD AT ALL

## 2023-11-24 SDOH — ECONOMIC STABILITY: FOOD INSECURITY: WITHIN THE PAST 12 MONTHS, YOU WORRIED THAT YOUR FOOD WOULD RUN OUT BEFORE YOU GOT MONEY TO BUY MORE.: NEVER TRUE

## 2023-11-24 SDOH — ECONOMIC STABILITY: FOOD INSECURITY: WITHIN THE PAST 12 MONTHS, THE FOOD YOU BOUGHT JUST DIDN'T LAST AND YOU DIDN'T HAVE MONEY TO GET MORE.: NEVER TRUE

## 2023-11-24 ASSESSMENT — ENCOUNTER SYMPTOMS
BELCHING: 0
DIARRHEA: 0
CONSTIPATION: 1
COUGH: 1
VOMITING: 0
SORE THROAT: 0
ABDOMINAL PAIN: 1
NAUSEA: 0
HEMATOCHEZIA: 0

## 2023-11-24 ASSESSMENT — PATIENT HEALTH QUESTIONNAIRE - PHQ9
SUM OF ALL RESPONSES TO PHQ9 QUESTIONS 1 & 2: 0
2. FEELING DOWN, DEPRESSED OR HOPELESS: 0
1. LITTLE INTEREST OR PLEASURE IN DOING THINGS: 0
SUM OF ALL RESPONSES TO PHQ QUESTIONS 1-9: 0

## 2023-11-24 NOTE — PROGRESS NOTES
Chief Complaint   Patient presents with    Follow-up     Patient is here today for constipation and a fever. 1. Have you been to the ER, urgent care clinic since your last visit? Hospitalized since your last visit? 1/17/23-2/7/23 [facility]    2. Have you seen or consulted any other health care providers outside of the [address] [place] [address] Avenue since your last visit? Include any pap smears or colon screening.  No

## 2023-11-24 NOTE — PROGRESS NOTES
Subjective:      Patient ID: Sherita Keenan is a 75 y.o. male. febrile x 1 day,worsening constipation. Sprained rt abdominal wall getting in car this pm.Has mild cough,no other gi or ug c/o. Followed at [facility] for Lymphoma,chronic generalized weakness    Fever   This is a new problem. The current episode started today. The problem has been unchanged. The maximum temperature noted was 100 to 100.9 F. The temperature was taken using an oral thermometer. Associated symptoms include abdominal pain and coughing. Pertinent negatives include no diarrhea, headaches, nausea, sore throat or vomiting. Abdominal Pain  This is a new problem. The current episode started today. The onset quality is sudden. The problem has been unchanged. The pain is located in the RUQ. The pain is at a severity of 4/10. The pain is moderate. Associated symptoms include arthralgias, constipation and a fever. Pertinent negatives include no belching, diarrhea, frequency, headaches, hematochezia, hematuria, melena, myalgias, nausea or vomiting. Review of Systems   Constitutional:  Positive for fever. Negative for activity change. HENT:  Negative for mouth sores and sore throat. Respiratory:  Positive for cough. Gastrointestinal:  Positive for abdominal pain and constipation. Negative for diarrhea, hematochezia, melena, nausea and vomiting. Genitourinary:  Negative for frequency and hematuria. Musculoskeletal:  Positive for arthralgias. Negative for myalgias. Neurological:  Negative for headaches. Objective:   Physical Exam  Constitutional:       Appearance: Normal appearance. He is normal weight. HENT:      Right Ear: Tympanic membrane normal.      Left Ear: Tympanic membrane normal.      Nose: Nose normal.      Mouth/Throat:      Mouth: Mucous membranes are moist.   Eyes:      Pupils: Pupils are equal, round, and reactive to light. Cardiovascular:      Rate and Rhythm: Normal rate and regular rhythm.       Pulses: Normal

## 2023-11-24 NOTE — TELEPHONE ENCOUNTER
Patient would like a return call regarding  her  having a hard time having a bowel movement and temp is 100.7.  please give the wife a call @ [phone]

## 2023-11-25 LAB
BASOPHILS # BLD: 0.4 K/UL (ref 0–0.1)
BASOPHILS NFR BLD: 1 % (ref 0–1)
DIFFERENTIAL METHOD BLD: ABNORMAL
EOSINOPHIL # BLD: 0.4 K/UL (ref 0–0.4)
EOSINOPHIL NFR BLD: 1 % (ref 0–7)
ERYTHROCYTE [DISTWIDTH] IN BLOOD BY AUTOMATED COUNT: 16.2 % (ref 11.5–14.5)
ESTIMATED AVERAGE GLUCOSE: NORMAL
HBA1C MFR BLD: 5.3 %
HCT VFR BLD AUTO: 36.6 % (ref 36.6–50.3)
HGB BLD-MCNC: 11.8 G/DL (ref 12.1–17)
IMM GRANULOCYTES # BLD AUTO: 0 K/UL
IMM GRANULOCYTES NFR BLD AUTO: 0 %
LYMPHOCYTES # BLD: 2.2 K/UL (ref 0.8–3.5)
LYMPHOCYTES NFR BLD: 5 % (ref 12–49)
MCH RBC QN AUTO: 30.9 PG (ref 26–34)
MCHC RBC AUTO-ENTMCNC: 32.2 G/DL (ref 30–36.5)
MCV RBC AUTO: 95.8 FL (ref 80–99)
MONOCYTES # BLD: 4.4 K/UL (ref 0–1)
MONOCYTES NFR BLD: 10 % (ref 5–13)
NEUTS BAND NFR BLD MANUAL: 5 % (ref 0–6)
NEUTS SEG # BLD: 36.5 K/UL (ref 1.8–8)
NEUTS SEG NFR BLD: 78 % (ref 32–75)
NRBC # BLD: 0 K/UL (ref 0–0.01)
NRBC BLD-RTO: 0 PER 100 WBC
PLATELET # BLD AUTO: 292 K/UL (ref 150–400)
PMV BLD AUTO: 9.9 FL (ref 8.9–12.9)
RBC # BLD AUTO: 3.82 M/UL (ref 4.1–5.7)
RBC MORPH BLD: ABNORMAL
RBC MORPH BLD: ABNORMAL
WBC # BLD AUTO: 43.9 K/UL (ref 4.1–11.1)

## 2023-12-04 ENCOUNTER — TELEPHONE (OUTPATIENT)
Age: 73
End: 2023-12-04

## 2023-12-04 NOTE — TELEPHONE ENCOUNTER
Patient wife Zena Ivan would like to get a call from [address] [place] [address] regarding her  she can be reached @ [phone]

## 2023-12-05 ENCOUNTER — OFFICE VISIT (OUTPATIENT)
Age: 73
End: 2023-12-05
Payer: MEDICARE

## 2023-12-05 VITALS
SYSTOLIC BLOOD PRESSURE: 122 MMHG | HEIGHT: 65 IN | DIASTOLIC BLOOD PRESSURE: 59 MMHG | RESPIRATION RATE: 18 BRPM | TEMPERATURE: 97.7 F | HEART RATE: 87 BPM | BODY MASS INDEX: 23.89 KG/M2 | WEIGHT: 143.4 LBS | OXYGEN SATURATION: 99 %

## 2023-12-05 DIAGNOSIS — I10 ESSENTIAL (PRIMARY) HYPERTENSION: ICD-10-CM

## 2023-12-05 DIAGNOSIS — C81.70 CLASSICAL HODGKIN LYMPHOMA (HCC): ICD-10-CM

## 2023-12-05 DIAGNOSIS — Z90.49 S/P LAPAROSCOPIC CHOLECYSTECTOMY: Primary | ICD-10-CM

## 2023-12-05 PROCEDURE — 99213 OFFICE O/P EST LOW 20 MIN: CPT | Performed by: FAMILY MEDICINE

## 2023-12-05 PROCEDURE — 3074F SYST BP LT 130 MM HG: CPT | Performed by: FAMILY MEDICINE

## 2023-12-05 PROCEDURE — 1123F ACP DISCUSS/DSCN MKR DOCD: CPT | Performed by: FAMILY MEDICINE

## 2023-12-05 PROCEDURE — 3078F DIAST BP <80 MM HG: CPT | Performed by: FAMILY MEDICINE

## 2023-12-05 RX ORDER — ONDANSETRON 4 MG/1
TABLET, FILM COATED ORAL
COMMUNITY
Start: 2023-11-28

## 2023-12-05 RX ORDER — CIPROFLOXACIN 250 MG/1
TABLET, FILM COATED ORAL
COMMUNITY
Start: 2023-11-28

## 2023-12-05 RX ORDER — METRONIDAZOLE 500 MG/1
TABLET ORAL
COMMUNITY
Start: 2023-11-28

## 2023-12-05 ASSESSMENT — PATIENT HEALTH QUESTIONNAIRE - PHQ9
SUM OF ALL RESPONSES TO PHQ QUESTIONS 1-9: 0
SUM OF ALL RESPONSES TO PHQ9 QUESTIONS 1 & 2: 0
SUM OF ALL RESPONSES TO PHQ QUESTIONS 1-9: 0
1. LITTLE INTEREST OR PLEASURE IN DOING THINGS: 0
2. FEELING DOWN, DEPRESSED OR HOPELESS: 0
SUM OF ALL RESPONSES TO PHQ QUESTIONS 1-9: 0
SUM OF ALL RESPONSES TO PHQ QUESTIONS 1-9: 0

## 2023-12-05 ASSESSMENT — ENCOUNTER SYMPTOMS
BLOOD IN STOOL: 0
CHEST TIGHTNESS: 0
CHANGE IN BOWEL HABIT: 1
DIARRHEA: 0
CONSTIPATION: 0
ABDOMINAL PAIN: 1
SHORTNESS OF BREATH: 0

## 2023-12-05 NOTE — PROGRESS NOTES
Chief Complaint   Patient presents with    Follow-up     Patient is here today for a hospital follow up. 1. Have you been to the ER, urgent care clinic since your last visit? Hospitalized since your last visit? Gallbladder removed at [facility]    2. Have you seen or consulted any other health care providers outside of the [address] [place] [address] Avenue since your last visit? Include any pap smears or colon screening.  No

## 2023-12-05 NOTE — PROGRESS NOTES
Subjective:      Patient ID: Matilde Perez is a 75 y.o. male. f/u emergency lap choley at [facility] [address] [place] last week. Had leukocytosis ,pmh hbp,dm ,Hodgkins Lymphoma. Feeling well,good appetite ,improving BMs    Abdominal Pain  This is a new problem. The current episode started 1 to 4 weeks ago. The onset quality is gradual. The problem occurs daily. The problem has been gradually improving. The pain is located in the RUQ. The pain is at a severity of 2/10. The pain is mild. The quality of the pain is aching. Pertinent negatives include no arthralgias, constipation or diarrhea. Abnormal Lab  This is a new problem. The current episode started more than 1 year ago. The problem occurs daily. Associated symptoms include abdominal pain, a change in bowel habit and fatigue. Pertinent negatives include no arthralgias or chest pain. Review of Systems   Constitutional:  Positive for fatigue. Negative for activity change. Respiratory:  Negative for chest tightness and shortness of breath. Cardiovascular:  Negative for chest pain. Gastrointestinal:  Positive for abdominal pain and change in bowel habit. Negative for blood in stool, constipation and diarrhea. Musculoskeletal:  Negative for arthralgias. Objective:   Physical Exam  Constitutional:       Appearance: Normal appearance. HENT:      Head: Normocephalic and atraumatic. Cardiovascular:      Rate and Rhythm: Normal rate and regular rhythm. Pulses: Normal pulses. Heart sounds: Normal heart sounds. Pulmonary:      Effort: Pulmonary effort is normal.      Breath sounds: Normal breath sounds. Abdominal:      Palpations: Abdomen is soft. Tenderness: There is abdominal tenderness. There is guarding. Hernia: No hernia is present. Skin:     General: Skin is warm and dry. Neurological:      Mental Status: He is alert and oriented to person, place, and time. Ana Khan was seen today for follow-up.     Diagnoses and all orders for this

## 2023-12-06 LAB
ALBUMIN SERPL-MCNC: 3.5 G/DL (ref 3.5–5)
ALBUMIN/GLOB SERPL: 1.1 (ref 1.1–2.2)
ALP SERPL-CCNC: 122 U/L (ref 45–117)
ALT SERPL-CCNC: 28 U/L (ref 12–78)
ANION GAP SERPL CALC-SCNC: 4 MMOL/L (ref 5–15)
AST SERPL-CCNC: 24 U/L (ref 15–37)
BASOPHILS # BLD: 0.1 K/UL (ref 0–0.1)
BASOPHILS NFR BLD: 1 % (ref 0–1)
BILIRUB SERPL-MCNC: 0.2 MG/DL (ref 0.2–1)
BUN SERPL-MCNC: 17 MG/DL (ref 6–20)
BUN/CREAT SERPL: 17 (ref 12–20)
CALCIUM SERPL-MCNC: 8.8 MG/DL (ref 8.5–10.1)
CHLORIDE SERPL-SCNC: 105 MMOL/L (ref 97–108)
CO2 SERPL-SCNC: 30 MMOL/L (ref 21–32)
CREAT SERPL-MCNC: 1.03 MG/DL (ref 0.7–1.3)
DIFFERENTIAL METHOD BLD: ABNORMAL
EOSINOPHIL # BLD: 0.5 K/UL (ref 0–0.4)
EOSINOPHIL NFR BLD: 5 % (ref 0–7)
ERYTHROCYTE [DISTWIDTH] IN BLOOD BY AUTOMATED COUNT: 14.9 % (ref 11.5–14.5)
GLOBULIN SER CALC-MCNC: 3.1 G/DL (ref 2–4)
GLUCOSE SERPL-MCNC: 119 MG/DL (ref 65–100)
HCT VFR BLD AUTO: 31.7 % (ref 36.6–50.3)
HGB BLD-MCNC: 9.8 G/DL (ref 12.1–17)
IMM GRANULOCYTES # BLD AUTO: 0 K/UL (ref 0–0.04)
IMM GRANULOCYTES NFR BLD AUTO: 0 % (ref 0–0.5)
LYMPHOCYTES # BLD: 1.1 K/UL (ref 0.8–3.5)
LYMPHOCYTES NFR BLD: 10 % (ref 12–49)
MCH RBC QN AUTO: 30.5 PG (ref 26–34)
MCHC RBC AUTO-ENTMCNC: 30.9 G/DL (ref 30–36.5)
MCV RBC AUTO: 98.8 FL (ref 80–99)
MONOCYTES # BLD: 0.7 K/UL (ref 0–1)
MONOCYTES NFR BLD: 6 % (ref 5–13)
NEUTS SEG # BLD: 8.7 K/UL (ref 1.8–8)
NEUTS SEG NFR BLD: 78 % (ref 32–75)
NRBC # BLD: 0 K/UL (ref 0–0.01)
NRBC BLD-RTO: 0 PER 100 WBC
PLATELET # BLD AUTO: 532 K/UL (ref 150–400)
PMV BLD AUTO: 9.2 FL (ref 8.9–12.9)
POTASSIUM SERPL-SCNC: 4.3 MMOL/L (ref 3.5–5.1)
PROT SERPL-MCNC: 6.6 G/DL (ref 6.4–8.2)
RBC # BLD AUTO: 3.21 M/UL (ref 4.1–5.7)
SODIUM SERPL-SCNC: 139 MMOL/L (ref 136–145)
WBC # BLD AUTO: 11.1 K/UL (ref 4.1–11.1)

## 2024-01-29 ENCOUNTER — OFFICE VISIT (OUTPATIENT)
Age: 74
End: 2024-01-29
Payer: MEDICARE

## 2024-01-29 VITALS
HEART RATE: 89 BPM | BODY MASS INDEX: 23.49 KG/M2 | WEIGHT: 141 LBS | OXYGEN SATURATION: 99 % | HEIGHT: 65 IN | TEMPERATURE: 98.4 F | SYSTOLIC BLOOD PRESSURE: 122 MMHG | DIASTOLIC BLOOD PRESSURE: 59 MMHG | RESPIRATION RATE: 18 BRPM

## 2024-01-29 DIAGNOSIS — I10 ESSENTIAL (PRIMARY) HYPERTENSION: ICD-10-CM

## 2024-01-29 DIAGNOSIS — C81.99 HODGKIN'S DISEASE, EXTRANODAL AND SOLID ORGAN SITES (HCC): ICD-10-CM

## 2024-01-29 DIAGNOSIS — L03.311 CELLULITIS OF ABDOMINAL WALL: Primary | ICD-10-CM

## 2024-01-29 PROCEDURE — 3074F SYST BP LT 130 MM HG: CPT | Performed by: FAMILY MEDICINE

## 2024-01-29 PROCEDURE — 99213 OFFICE O/P EST LOW 20 MIN: CPT | Performed by: FAMILY MEDICINE

## 2024-01-29 PROCEDURE — 1123F ACP DISCUSS/DSCN MKR DOCD: CPT | Performed by: FAMILY MEDICINE

## 2024-01-29 PROCEDURE — 3078F DIAST BP <80 MM HG: CPT | Performed by: FAMILY MEDICINE

## 2024-01-29 RX ORDER — DOXYCYCLINE HYCLATE 100 MG
100 TABLET ORAL 2 TIMES DAILY
Qty: 14 TABLET | Refills: 0 | Status: SHIPPED | OUTPATIENT
Start: 2024-01-29 | End: 2024-02-05

## 2024-01-29 ASSESSMENT — ENCOUNTER SYMPTOMS
ABDOMINAL PAIN: 0
ABDOMINAL DISTENTION: 0
COUGH: 0

## 2024-01-29 ASSESSMENT — PATIENT HEALTH QUESTIONNAIRE - PHQ9
SUM OF ALL RESPONSES TO PHQ9 QUESTIONS 1 & 2: 0
5. POOR APPETITE OR OVEREATING: 0
10. IF YOU CHECKED OFF ANY PROBLEMS, HOW DIFFICULT HAVE THESE PROBLEMS MADE IT FOR YOU TO DO YOUR WORK, TAKE CARE OF THINGS AT HOME, OR GET ALONG WITH OTHER PEOPLE: 0
8. MOVING OR SPEAKING SO SLOWLY THAT OTHER PEOPLE COULD HAVE NOTICED. OR THE OPPOSITE, BEING SO FIGETY OR RESTLESS THAT YOU HAVE BEEN MOVING AROUND A LOT MORE THAN USUAL: 0
SUM OF ALL RESPONSES TO PHQ QUESTIONS 1-9: 0
1. LITTLE INTEREST OR PLEASURE IN DOING THINGS: 0
3. TROUBLE FALLING OR STAYING ASLEEP: 0
4. FEELING TIRED OR HAVING LITTLE ENERGY: 0
SUM OF ALL RESPONSES TO PHQ QUESTIONS 1-9: 0
2. FEELING DOWN, DEPRESSED OR HOPELESS: 0
SUM OF ALL RESPONSES TO PHQ QUESTIONS 1-9: 0
7. TROUBLE CONCENTRATING ON THINGS, SUCH AS READING THE NEWSPAPER OR WATCHING TELEVISION: 0
9. THOUGHTS THAT YOU WOULD BE BETTER OFF DEAD, OR OF HURTING YOURSELF: 0
SUM OF ALL RESPONSES TO PHQ QUESTIONS 1-9: 0
6. FEELING BAD ABOUT YOURSELF - OR THAT YOU ARE A FAILURE OR HAVE LET YOURSELF OR YOUR FAMILY DOWN: 0

## 2024-01-29 NOTE — PROGRESS NOTES
Subjective:      Patient ID: Mateo Kendall is a 73 y.o. male.has new rash anterior upper abdominal wal in the past few days.Seems to be surrounding recent attempted abdominal  arcelia bx on 1/23/24.No fever or chills.Notes penicillin allergy.Pt recently started on New immunotherapy for progressive Hodgkins Lymphoma    Rash  This is a new problem. The current episode started in the past 7 days. The problem has been gradually worsening since onset. The affected locations include the abdomen. The rash is characterized by draining. Pertinent negatives include no anorexia, congestion or cough.     Review of Systems   Constitutional:  Negative for activity change.   HENT:  Negative for congestion.    Respiratory:  Negative for cough.    Gastrointestinal:  Negative for abdominal distention, abdominal pain and anorexia.   Skin:  Positive for rash.     Objective:   Physical Exam  Constitutional:       Appearance: Normal appearance.   HENT:      Head: Normocephalic.      Right Ear: Tympanic membrane normal.      Left Ear: Tympanic membrane normal.      Nose: Nose normal.      Mouth/Throat:      Mouth: Mucous membranes are moist.   Cardiovascular:      Rate and Rhythm: Normal rate and regular rhythm.      Heart sounds: Normal heart sounds.   Pulmonary:      Effort: Pulmonary effort is normal.      Breath sounds: Normal breath sounds.   Abdominal:      General: Bowel sounds are normal.      Palpations: Abdomen is soft.      Tenderness: There is no abdominal tenderness.   Skin:     General: Skin is warm.      Findings: Erythema and rash present.      Comments: 5x 6 cm indurated weeping crusting erythemetous dermatitis surrounding biopsy site.Cleaned with saline and dressed.Wound care  discussed.   Neurological:      Mental Status: He is alert and oriented to person, place, and time.   Psychiatric:         Mood and Affect: Mood normal.     Mateo was seen today for wound check.    Diagnoses and all orders for this

## 2024-01-29 NOTE — PROGRESS NOTES
Chief Complaint   Patient presents with    Wound Check     Patient is here today for his incision site being red and oozing.     1. Have you been to the ER, urgent care clinic since your last visit?  Hospitalized since your last visit?No    2. Have you seen or consulted any other health care providers outside of the [facility] since your last visit?  Include any pap smears or colon screening. No

## 2024-02-02 ENCOUNTER — OFFICE VISIT (OUTPATIENT)
Age: 74
End: 2024-02-02
Payer: MEDICARE

## 2024-02-02 VITALS
RESPIRATION RATE: 18 BRPM | WEIGHT: 142.8 LBS | DIASTOLIC BLOOD PRESSURE: 57 MMHG | SYSTOLIC BLOOD PRESSURE: 118 MMHG | HEIGHT: 65 IN | HEART RATE: 100 BPM | BODY MASS INDEX: 23.79 KG/M2 | OXYGEN SATURATION: 98 % | TEMPERATURE: 97.7 F

## 2024-02-02 DIAGNOSIS — C81.90 HODGKIN LYMPHOMA, UNSPECIFIED HODGKIN LYMPHOMA TYPE, UNSPECIFIED BODY REGION (HCC): ICD-10-CM

## 2024-02-02 DIAGNOSIS — L03.311 CELLULITIS OF ABDOMINAL WALL: Primary | ICD-10-CM

## 2024-02-02 DIAGNOSIS — N47.1 PHIMOSIS OF PENIS: ICD-10-CM

## 2024-02-02 PROCEDURE — 99213 OFFICE O/P EST LOW 20 MIN: CPT | Performed by: FAMILY MEDICINE

## 2024-02-02 PROCEDURE — 1123F ACP DISCUSS/DSCN MKR DOCD: CPT | Performed by: FAMILY MEDICINE

## 2024-02-02 PROCEDURE — 3074F SYST BP LT 130 MM HG: CPT | Performed by: FAMILY MEDICINE

## 2024-02-02 PROCEDURE — 3078F DIAST BP <80 MM HG: CPT | Performed by: FAMILY MEDICINE

## 2024-02-02 RX ORDER — SULFAMETHOXAZOLE AND TRIMETHOPRIM 800; 160 MG/1; MG/1
TABLET ORAL
COMMUNITY
Start: 2024-01-09

## 2024-02-02 RX ORDER — DULOXETIN HYDROCHLORIDE 30 MG/1
CAPSULE, DELAYED RELEASE ORAL
COMMUNITY
Start: 2024-01-02

## 2024-02-02 RX ORDER — CLOTRIMAZOLE 1 %
CREAM (GRAM) TOPICAL
Qty: 35 G | Refills: 1 | Status: SHIPPED | OUTPATIENT
Start: 2024-02-02 | End: 2024-02-09

## 2024-02-02 ASSESSMENT — PATIENT HEALTH QUESTIONNAIRE - PHQ9
9. THOUGHTS THAT YOU WOULD BE BETTER OFF DEAD, OR OF HURTING YOURSELF: 0
1. LITTLE INTEREST OR PLEASURE IN DOING THINGS: 0
7. TROUBLE CONCENTRATING ON THINGS, SUCH AS READING THE NEWSPAPER OR WATCHING TELEVISION: 0
4. FEELING TIRED OR HAVING LITTLE ENERGY: 0
SUM OF ALL RESPONSES TO PHQ9 QUESTIONS 1 & 2: 0
SUM OF ALL RESPONSES TO PHQ QUESTIONS 1-9: 0
SUM OF ALL RESPONSES TO PHQ QUESTIONS 1-9: 0
6. FEELING BAD ABOUT YOURSELF - OR THAT YOU ARE A FAILURE OR HAVE LET YOURSELF OR YOUR FAMILY DOWN: 0
SUM OF ALL RESPONSES TO PHQ QUESTIONS 1-9: 0
10. IF YOU CHECKED OFF ANY PROBLEMS, HOW DIFFICULT HAVE THESE PROBLEMS MADE IT FOR YOU TO DO YOUR WORK, TAKE CARE OF THINGS AT HOME, OR GET ALONG WITH OTHER PEOPLE: 0
3. TROUBLE FALLING OR STAYING ASLEEP: 0
8. MOVING OR SPEAKING SO SLOWLY THAT OTHER PEOPLE COULD HAVE NOTICED. OR THE OPPOSITE, BEING SO FIGETY OR RESTLESS THAT YOU HAVE BEEN MOVING AROUND A LOT MORE THAN USUAL: 0
SUM OF ALL RESPONSES TO PHQ QUESTIONS 1-9: 0
2. FEELING DOWN, DEPRESSED OR HOPELESS: 0

## 2024-02-02 ASSESSMENT — ENCOUNTER SYMPTOMS: COLOR CHANGE: 1

## 2024-02-02 NOTE — PROGRESS NOTES
Subjective:      Patient ID: Mateo Kendall is a 73 y.o. male.f/u cellulitis anterior abdomen,Hodgkins Lymphoma.Feeling better,rash and itching improving    Skin Problem  This is a new problem. The current episode started 1 to 4 weeks ago. The problem has been gradually improving since onset. The affected locations include the abdomen. The rash is characterized by redness. Associated symptoms include fatigue. Pertinent negatives include no fever.     Review of Systems   Constitutional:  Positive for fatigue. Negative for activity change, chills and fever.   Cardiovascular:  Negative for chest pain.   Skin:  Positive for color change and rash.     Objective:   Physical Exam  Constitutional:       Appearance: Normal appearance. He is normal weight.   Cardiovascular:      Rate and Rhythm: Normal rate and regular rhythm.      Pulses: Normal pulses.      Heart sounds: Normal heart sounds.   Pulmonary:      Effort: Pulmonary effort is normal.      Breath sounds: Normal breath sounds.   Abdominal:      Palpations: Abdomen is soft.   Skin:     General: Skin is warm and dry.      Findings: Erythema and rash present.      Comments: Fading erythemetous patch upper abdomen,much improved   Neurological:      Mental Status: He is alert and oriented to person, place, and time.   Psychiatric:         Mood and Affect: Mood normal.     Mateo was seen today for follow-up.    Diagnoses and all orders for this visit:    Cellulitis of abdominal wall,resolving continue bactroban,call prn    Hodgkin lymphoma, unspecified Hodgkin lymphoma type, unspecified body region (HCC)    Phimosis of penis  -     clotrimazole (LOTRIMIN AF) 1 % cream; Apply topically 2 times daily.              Attila Shukla MD

## 2024-02-02 NOTE — PROGRESS NOTES
Chief Complaint   Patient presents with    Follow-up     Patient is here today for a 3 day follow up.      1. Have you been to the ER, urgent care clinic since your last visit?  Hospitalized since your last visit?No    2. Have you seen or consulted any other health care providers outside of the [facility] since your last visit?  Include any pap smears or colon screening. No

## 2024-02-26 ENCOUNTER — TELEPHONE (OUTPATIENT)
Age: 74
End: 2024-02-26

## 2024-02-26 DIAGNOSIS — L03.311 ABDOMINAL WALL CELLULITIS: Primary | ICD-10-CM

## 2024-02-26 RX ORDER — DOXYCYCLINE HYCLATE 100 MG
100 TABLET ORAL 2 TIMES DAILY
Qty: 14 TABLET | Refills: 0 | Status: SHIPPED | OUTPATIENT
Start: 2024-02-26 | End: 2024-03-04

## 2024-02-26 NOTE — TELEPHONE ENCOUNTER
Patient wife Ana Rosa would like for her  to be seen today regarding having an infection please give her a call her @ [phone]  -------------------------------------------------------------------------  Pictures from my chart have been printed and placed on Dr. Shukla's desk of the pt infected rash.  Pt was offered an appointment for Tuesday at 11 am and the pt wife stated he had an appointment all day Tuesday at [facility].

## 2024-02-26 NOTE — TELEPHONE ENCOUNTER
Patient wife Ana Rosa would like for her  to be seen today regarding having an infection please give her a call her @ [phone]

## 2024-02-26 NOTE — TELEPHONE ENCOUNTER
----- Message from Mateo Kendall sent at 2/26/2024  3:09 PM EST -----  Regarding: Mateo Kendall Skin Infection/Rash  Contact: [phone]  Here are the pictures pertaining to the message left with your office.

## 2024-02-27 ENCOUNTER — TELEPHONE (OUTPATIENT)
Age: 74
End: 2024-02-27

## 2024-02-27 NOTE — TELEPHONE ENCOUNTER
----- Message from Kristan Valles sent at 2/26/2024  2:47 PM EST -----  Subject: Message to Provider    QUESTIONS  Information for Provider? Pt follows with Dr larry for cellulitis but has   developed secondary and third patch. Potentially from pet scan on friday   or paper tape covering initial site. Initial site got better but now   coming back with clear white bubbles. Pt also starting new chemo tomorrow   and needing to know what they would advise pt to do prior to appointment   if possible to ensure it is not active infection  ---------------------------------------------------------------------------  --------------  CALL BACK INFO  [phone]; OK to leave message on voicemail  ---------------------------------------------------------------------------  --------------  SCRIPT ANSWERS  Relationship to Patient? Spouse/Partner  Representative Name? Jaclyn Kendall  Is the representative on the Communication Release of Information (MICHELLE)   form in Epic? Yes

## 2024-02-28 DIAGNOSIS — L03.311 CELLULITIS OF ABDOMINAL WALL: ICD-10-CM

## 2024-02-29 NOTE — TELEPHONE ENCOUNTER
Last appointment: 2/2/24  Next appointment: 3/4/24  Previous refill encounter(s): 1/29/24 #1 with 1 refill    Requested Prescriptions     Pending Prescriptions Disp Refills    mupirocin (BACTROBAN) 2 % ointment [Pharmacy Med Name: MUPIROCIN 2% OINTMENT] 22 g 1     Sig: APPLY TO THE AFFECTED AREA(S) TOPICALLY THREE TIMES DAILY         For Pharmacy Admin Tracking Only    Program: Medication Refill  CPA in place:    Recommendation Provided To:   Intervention Detail: New Rx: 1, reason: Patient Preference  Intervention Accepted By:   Gap Closed?:    Time Spent (min): 5

## 2024-03-04 ENCOUNTER — OFFICE VISIT (OUTPATIENT)
Age: 74
End: 2024-03-04
Payer: MEDICARE

## 2024-03-04 VITALS
TEMPERATURE: 98.3 F | OXYGEN SATURATION: 98 % | HEART RATE: 65 BPM | WEIGHT: 150.8 LBS | BODY MASS INDEX: 25.12 KG/M2 | RESPIRATION RATE: 18 BRPM | SYSTOLIC BLOOD PRESSURE: 145 MMHG | HEIGHT: 65 IN | DIASTOLIC BLOOD PRESSURE: 65 MMHG

## 2024-03-04 DIAGNOSIS — L30.4 INTERTRIGO: ICD-10-CM

## 2024-03-04 DIAGNOSIS — I10 ESSENTIAL (PRIMARY) HYPERTENSION: ICD-10-CM

## 2024-03-04 DIAGNOSIS — C81.90 HODGKIN LYMPHOMA, UNSPECIFIED HODGKIN LYMPHOMA TYPE, UNSPECIFIED BODY REGION (HCC): Primary | ICD-10-CM

## 2024-03-04 PROCEDURE — 3078F DIAST BP <80 MM HG: CPT | Performed by: FAMILY MEDICINE

## 2024-03-04 PROCEDURE — 3077F SYST BP >= 140 MM HG: CPT | Performed by: FAMILY MEDICINE

## 2024-03-04 PROCEDURE — 99213 OFFICE O/P EST LOW 20 MIN: CPT | Performed by: FAMILY MEDICINE

## 2024-03-04 PROCEDURE — 1123F ACP DISCUSS/DSCN MKR DOCD: CPT | Performed by: FAMILY MEDICINE

## 2024-03-04 RX ORDER — CLOTRIMAZOLE AND BETAMETHASONE DIPROPIONATE 10; .64 MG/G; MG/G
CREAM TOPICAL
Qty: 45 G | Refills: 2 | Status: SHIPPED | OUTPATIENT
Start: 2024-03-04

## 2024-03-04 RX ORDER — LORAZEPAM 0.5 MG/1
TABLET ORAL
COMMUNITY
Start: 2024-02-13

## 2024-03-04 RX ORDER — CLOTRIMAZOLE 1 %
CREAM (GRAM) TOPICAL
COMMUNITY
Start: 2024-02-02

## 2024-03-04 ASSESSMENT — PATIENT HEALTH QUESTIONNAIRE - PHQ9
8. MOVING OR SPEAKING SO SLOWLY THAT OTHER PEOPLE COULD HAVE NOTICED. OR THE OPPOSITE, BEING SO FIGETY OR RESTLESS THAT YOU HAVE BEEN MOVING AROUND A LOT MORE THAN USUAL: 0
SUM OF ALL RESPONSES TO PHQ QUESTIONS 1-9: 0
7. TROUBLE CONCENTRATING ON THINGS, SUCH AS READING THE NEWSPAPER OR WATCHING TELEVISION: 0
9. THOUGHTS THAT YOU WOULD BE BETTER OFF DEAD, OR OF HURTING YOURSELF: 0
5. POOR APPETITE OR OVEREATING: 0
3. TROUBLE FALLING OR STAYING ASLEEP: 0
4. FEELING TIRED OR HAVING LITTLE ENERGY: 0
6. FEELING BAD ABOUT YOURSELF - OR THAT YOU ARE A FAILURE OR HAVE LET YOURSELF OR YOUR FAMILY DOWN: 0
SUM OF ALL RESPONSES TO PHQ QUESTIONS 1-9: 0
SUM OF ALL RESPONSES TO PHQ9 QUESTIONS 1 & 2: 0
2. FEELING DOWN, DEPRESSED OR HOPELESS: 0
SUM OF ALL RESPONSES TO PHQ QUESTIONS 1-9: 0
10. IF YOU CHECKED OFF ANY PROBLEMS, HOW DIFFICULT HAVE THESE PROBLEMS MADE IT FOR YOU TO DO YOUR WORK, TAKE CARE OF THINGS AT HOME, OR GET ALONG WITH OTHER PEOPLE: 0
SUM OF ALL RESPONSES TO PHQ QUESTIONS 1-9: 0
1. LITTLE INTEREST OR PLEASURE IN DOING THINGS: 0

## 2024-03-04 ASSESSMENT — ENCOUNTER SYMPTOMS: COLOR CHANGE: 1

## 2024-03-04 NOTE — PROGRESS NOTES
Chief Complaint   Patient presents with    Follow-up     Patient is here today for a 1 month follow up.      1. Have you been to the ER, urgent care clinic since your last visit?  Hospitalized since your last visit?No    2. Have you seen or consulted any other health care providers outside of the [facility] since your last visit?  Include any pap smears or colon screening. [facility] for oncology

## 2024-03-04 NOTE — PROGRESS NOTES
Subjective:      Patient ID: Mateo Kendall is a 73 y.o. male.f/u abdominal wall cellulitis,rash;being treated for Hodgkins Lymphoma at [facility],soon to start chemotherapy    Skin Problem  This is a new problem. The current episode started 1 to 4 weeks ago. The problem has been gradually worsening since onset. The affected locations include the abdomen and chest. Associated symptoms include congestion and fatigue.     Review of Systems   Constitutional:  Positive for fatigue.   HENT:  Positive for congestion.    Cardiovascular:  Positive for chest pain.   Skin:  Positive for color change and rash.   Psychiatric/Behavioral:  Negative for agitation.      Objective:   Physical Exam  Constitutional:       Appearance: Normal appearance.   HENT:      Head: Normocephalic and atraumatic.      Right Ear: Tympanic membrane normal.      Left Ear: Tympanic membrane normal.   Cardiovascular:      Rate and Rhythm: Normal rate and regular rhythm.      Pulses: Normal pulses.      Heart sounds: Normal heart sounds.   Pulmonary:      Effort: Pulmonary effort is normal.      Breath sounds: Normal breath sounds.   Abdominal:      General: Bowel sounds are normal. There is no distension.      Palpations: Abdomen is soft. There is no mass.      Tenderness: There is no abdominal tenderness.   Skin:     Comments: Mottled area in epigastrium,improving    Confluent erythrmetous patches under rt breast and lateral chest wall   Neurological:      Mental Status: He is alert.     Mateo was seen today for follow-up.    Diagnoses and all orders for this visit:    Hodgkin lymphoma, unspecified Hodgkin lymphoma type, unspecified body region (HCC)    Essential (primary) hypertension    Intertrigo  -     clotrimazole-betamethasone (LOTRISONE) 1-0.05 % cream; Apply topically 2 times daily.          Attila Shukla MD

## 2024-03-28 ENCOUNTER — OFFICE VISIT (OUTPATIENT)
Age: 74
End: 2024-03-28
Payer: MEDICARE

## 2024-03-28 DIAGNOSIS — C81.70 CLASSICAL HODGKIN LYMPHOMA (HCC): ICD-10-CM

## 2024-03-28 DIAGNOSIS — J01.00 SUBACUTE MAXILLARY SINUSITIS: Primary | ICD-10-CM

## 2024-03-28 PROCEDURE — 99213 OFFICE O/P EST LOW 20 MIN: CPT | Performed by: FAMILY MEDICINE

## 2024-03-28 PROCEDURE — 1123F ACP DISCUSS/DSCN MKR DOCD: CPT | Performed by: FAMILY MEDICINE

## 2024-03-28 RX ORDER — AZITHROMYCIN 250 MG/1
TABLET, FILM COATED ORAL
Qty: 6 TABLET | Refills: 0 | Status: SHIPPED | OUTPATIENT
Start: 2024-03-28 | End: 2024-04-07

## 2024-03-28 ASSESSMENT — ENCOUNTER SYMPTOMS
HOARSE VOICE: 1
COUGH: 1
SINUS PRESSURE: 1
SORE THROAT: 1

## 2024-03-28 NOTE — PROGRESS NOTES
Subjective:      Patient ID: Mateo Kendall is a 73 y.o. male.4 days of increased congestion ,cough and sore throat with low grade fevers.[facility]    Sinusitis  This is a new problem. The problem is unchanged. The maximum temperature recorded prior to his arrival was 100.4 - 100.9 F. Associated symptoms include congestion, coughing, headaches, a hoarse voice, sinus pressure and a sore throat.     Review of Systems   HENT:  Positive for congestion, hoarse voice, sinus pressure and sore throat.    Respiratory:  Positive for cough.    Neurological:  Positive for headaches.     Objective:   Physical Exam  Constitutional:       Appearance: Normal appearance. He is not ill-appearing.   HENT:      Head: Normocephalic and atraumatic.      Right Ear: Tympanic membrane normal.      Left Ear: Tympanic membrane normal.      Nose: Congestion and rhinorrhea present.      Mouth/Throat:      Pharynx: Posterior oropharyngeal erythema present.   Eyes:      Pupils: Pupils are equal, round, and reactive to light.   Cardiovascular:      Rate and Rhythm: Normal rate and regular rhythm.   Pulmonary:      Effort: Pulmonary effort is normal.      Breath sounds: Normal breath sounds.   Abdominal:      Palpations: Abdomen is soft.   Musculoskeletal:      Cervical back: Neck supple.   Lymphadenopathy:      Cervical: No cervical adenopathy.   Skin:     General: Skin is warm and dry.   Neurological:      Mental Status: He is alert.   Psychiatric:         Mood and Affect: Mood normal.     Diagnoses and all orders for this visit:    Subacute maxillary sinusitis,tylenol,mucinex dm,call prn  -     azithromycin (ZITHROMAX) 250 MG tablet; 500mg on day 1 followed by 250mg on days 2 - 5    Classical Hodgkin lymphoma (HCC)          Attila Shukla MD

## 2024-03-31 PROBLEM — F32.0 MAJOR DEPRESSIVE DISORDER, SINGLE EPISODE, MILD (HCC): Status: ACTIVE | Noted: 2024-03-31

## 2024-04-02 RX ORDER — OLANZAPINE 5 MG/1
TABLET ORAL
Qty: 90 TABLET | Refills: 1 | Status: SHIPPED | OUTPATIENT
Start: 2024-04-02

## 2024-04-02 NOTE — TELEPHONE ENCOUNTER
I show this was d/c on 11/24/23. Please sign if appropriate.    Last appointment: 3/28/24  Next appointment: 4/3/24  Previous refill encounter(s): 9/21/23 #90 with 1 refill    Requested Prescriptions     Pending Prescriptions Disp Refills    OLANZapine (ZYPREXA) 5 MG tablet [Pharmacy Med Name: OLANZAPINE 5 MG TAB] 90 tablet 1     Sig: TAKE ONE TABLET BY MOUTH AT BEDTIME FOR PSYCHOSIS         For Pharmacy Admin Tracking Only    Program: Medication Refill  CPA in place:    Recommendation Provided To:   Intervention Detail: New Rx: 1, reason: Patient Preference  Intervention Accepted By:   Gap Closed?:    Time Spent (min): 5

## 2024-04-03 ENCOUNTER — OFFICE VISIT (OUTPATIENT)
Age: 74
End: 2024-04-03
Payer: MEDICARE

## 2024-04-03 VITALS
HEART RATE: 99 BPM | WEIGHT: 151.8 LBS | HEIGHT: 65 IN | DIASTOLIC BLOOD PRESSURE: 62 MMHG | TEMPERATURE: 97.5 F | OXYGEN SATURATION: 99 % | BODY MASS INDEX: 25.29 KG/M2 | RESPIRATION RATE: 18 BRPM | SYSTOLIC BLOOD PRESSURE: 115 MMHG

## 2024-04-03 DIAGNOSIS — J01.00 ACUTE NON-RECURRENT MAXILLARY SINUSITIS: Primary | ICD-10-CM

## 2024-04-03 DIAGNOSIS — F32.0 MAJOR DEPRESSIVE DISORDER, SINGLE EPISODE, MILD (HCC): ICD-10-CM

## 2024-04-03 DIAGNOSIS — I10 ESSENTIAL HYPERTENSION: ICD-10-CM

## 2024-04-03 DIAGNOSIS — E11.9 TYPE 2 DIABETES MELLITUS WITHOUT COMPLICATION, WITHOUT LONG-TERM CURRENT USE OF INSULIN (HCC): ICD-10-CM

## 2024-04-03 DIAGNOSIS — C81.70 CLASSICAL HODGKIN LYMPHOMA (HCC): ICD-10-CM

## 2024-04-03 DIAGNOSIS — E43 SEVERE PROTEIN-CALORIE MALNUTRITION (HCC): ICD-10-CM

## 2024-04-03 PROCEDURE — 3078F DIAST BP <80 MM HG: CPT | Performed by: FAMILY MEDICINE

## 2024-04-03 PROCEDURE — 1123F ACP DISCUSS/DSCN MKR DOCD: CPT | Performed by: FAMILY MEDICINE

## 2024-04-03 PROCEDURE — 99213 OFFICE O/P EST LOW 20 MIN: CPT | Performed by: FAMILY MEDICINE

## 2024-04-03 PROCEDURE — 3074F SYST BP LT 130 MM HG: CPT | Performed by: FAMILY MEDICINE

## 2024-04-03 ASSESSMENT — PATIENT HEALTH QUESTIONNAIRE - PHQ9
SUM OF ALL RESPONSES TO PHQ9 QUESTIONS 1 & 2: 2
7. TROUBLE CONCENTRATING ON THINGS, SUCH AS READING THE NEWSPAPER OR WATCHING TELEVISION: NOT AT ALL
3. TROUBLE FALLING OR STAYING ASLEEP: NOT AT ALL
SUM OF ALL RESPONSES TO PHQ QUESTIONS 1-9: 2
4. FEELING TIRED OR HAVING LITTLE ENERGY: NOT AT ALL
5. POOR APPETITE OR OVEREATING: NOT AT ALL
8. MOVING OR SPEAKING SO SLOWLY THAT OTHER PEOPLE COULD HAVE NOTICED. OR THE OPPOSITE, BEING SO FIGETY OR RESTLESS THAT YOU HAVE BEEN MOVING AROUND A LOT MORE THAN USUAL: NOT AT ALL
10. IF YOU CHECKED OFF ANY PROBLEMS, HOW DIFFICULT HAVE THESE PROBLEMS MADE IT FOR YOU TO DO YOUR WORK, TAKE CARE OF THINGS AT HOME, OR GET ALONG WITH OTHER PEOPLE: NOT DIFFICULT AT ALL
6. FEELING BAD ABOUT YOURSELF - OR THAT YOU ARE A FAILURE OR HAVE LET YOURSELF OR YOUR FAMILY DOWN: NOT AT ALL
SUM OF ALL RESPONSES TO PHQ QUESTIONS 1-9: 2
1. LITTLE INTEREST OR PLEASURE IN DOING THINGS: SEVERAL DAYS
SUM OF ALL RESPONSES TO PHQ QUESTIONS 1-9: 2
SUM OF ALL RESPONSES TO PHQ QUESTIONS 1-9: 2
9. THOUGHTS THAT YOU WOULD BE BETTER OFF DEAD, OR OF HURTING YOURSELF: NOT AT ALL
2. FEELING DOWN, DEPRESSED OR HOPELESS: SEVERAL DAYS

## 2024-04-03 ASSESSMENT — ENCOUNTER SYMPTOMS
COUGH: 0
SINUS PAIN: 0
RHINORRHEA: 0

## 2024-04-03 NOTE — PROGRESS NOTES
Chief Complaint   Patient presents with    Follow-up     Patient I here today for a 1 month follow up.      1. Have you been to the ER, urgent care clinic since your last visit?  Hospitalized since your last visit?No    2. Have you seen or consulted any other health care providers outside of the [facility] since your last visit?  Include any pap smears or colon screening. [facility]

## 2024-04-03 NOTE — PROGRESS NOTES
Subjective:      Patient ID: Mateo Kendall is a 73 y.o. male.f/u recent sinusitis ,under treament for Lymphoma at [facility],for chemo later this week.Feeling well    Sinusitis  This is a new problem. The current episode started 1 to 4 weeks ago. The problem has been rapidly improving since onset. There has been no fever. The pain is mild. Associated symptoms include congestion. Pertinent negatives include no chills or coughing.     Review of Systems   Constitutional:  Negative for chills, fatigue and fever.   HENT:  Positive for congestion. Negative for postnasal drip, rhinorrhea and sinus pain.    Respiratory:  Negative for cough.      Objective:   Physical Exam  HENT:      Head: Normocephalic and atraumatic.      Right Ear: Tympanic membrane normal.      Left Ear: Tympanic membrane normal.      Nose: Congestion present.      Mouth/Throat:      Mouth: Mucous membranes are moist.   Eyes:      Pupils: Pupils are equal, round, and reactive to light.   Cardiovascular:      Rate and Rhythm: Normal rate and regular rhythm.      Pulses: Normal pulses.      Heart sounds: Normal heart sounds.   Pulmonary:      Effort: Pulmonary effort is normal.      Breath sounds: Normal breath sounds.     Mateo was seen today for follow-up.    Diagnoses and all orders for this visit:    Acute non-recurrent maxillary sinusitis,resolved    Major depressive disorder, single episode, mild (HCC),resolved    Severe protein-calorie malnutrition (HCC),resolved ,good wt gain,appetite    Type 2 diabetes mellitus without complication, without long-term current use of insulin (HCC)    Classical Hodgkin lymphoma (HCC),continue current treatment at [facility]    Essential hypertension,controlled              Attila Shukla MD

## 2024-04-16 DIAGNOSIS — L03.311 CELLULITIS OF ABDOMINAL WALL: Primary | ICD-10-CM

## 2024-04-16 NOTE — TELEPHONE ENCOUNTER
Last appointment: 4/3/24  Next appointment: 7/3/24  Previous refill encounter(s): 2/29/24 #22g with 1 refill    Requested Prescriptions     Pending Prescriptions Disp Refills    mupirocin (BACTROBAN) 2 % ointment 22 g 1     Sig: Apply topically 3 times daily Apply to the affected areas         For Pharmacy Admin Tracking Only    Program: Medication Refill  CPA in place:    Recommendation Provided To:   Intervention Detail: New Rx: 1, reason: Patient Preference  Intervention Accepted By:   Gap Closed?:    Time Spent (min): 5

## 2024-04-25 NOTE — TELEPHONE ENCOUNTER
Lucía called & left a vm stating that she received a called from lab that labs drawn on Monday was not enough for sed rate. Please massiel Dodson @ [phone].
Nurse spoke w/ pt's wife on 1/11. Pt has an appt on 1/12. Questions answered at appt.
[Negative] : Heme/Lymph

## 2024-05-27 LAB — HBA1C MFR BLD HPLC: 5.7 %

## 2024-06-12 ENCOUNTER — TELEPHONE (OUTPATIENT)
Age: 74
End: 2024-06-12

## 2024-06-12 NOTE — TELEPHONE ENCOUNTER
----- Message from Mateo Kendall sent at 6/12/2024 11:36 AM EDT -----  Regarding: Mateo Kendall Aspirin regiment for Stroke prevention   Contact: [phone]  Mateo was rushed to [facility] on 5/25 for symptoms of a possible Stroke.  Is platelet count dropped to 22,000 prohibiting an Aspirin regiment( to prevent future strokes.)  All of his tests and labs are obtainable @ your request or were forwarded to you as his PCP.  He has the earliest  possible appointment with a Neurologist in March of ‘25.  His platelet count is now 169,000.   We are understandably concerned about a new Stroke during the interim. ( His was a very minor Stroke) with no new damage.   Can an aspirin regiment began @ this time?  He was just seen by his oncologist!  His labs are done every other week.  Please let us know what you would recommend.      Thank you

## 2024-06-17 NOTE — INTERVAL H&P NOTE
Update History & Physical    The Patient's History and Physical was reviewed with the patient and I examined the patient. There was no change. The surgical site was confirmed by the patient and me. Plan:  The risk, benefits, expected outcome, and alternative to the recommended procedure have been discussed with the patient. Patient understands and wants to proceed with the procedure.     Electronically signed by Marva Ocasio MD on 1/6/2023 at 7:27 AM PAST MEDICAL HISTORY:  Hypertension     Peptic ulcer     Psychosis

## 2024-07-03 ENCOUNTER — OFFICE VISIT (OUTPATIENT)
Age: 74
End: 2024-07-03
Payer: MEDICARE

## 2024-07-03 VITALS
BODY MASS INDEX: 25.62 KG/M2 | OXYGEN SATURATION: 99 % | SYSTOLIC BLOOD PRESSURE: 114 MMHG | HEIGHT: 65 IN | TEMPERATURE: 98.7 F | DIASTOLIC BLOOD PRESSURE: 67 MMHG | RESPIRATION RATE: 18 BRPM | HEART RATE: 113 BPM | WEIGHT: 153.8 LBS

## 2024-07-03 DIAGNOSIS — E11.9 TYPE 2 DIABETES MELLITUS WITHOUT COMPLICATION, WITHOUT LONG-TERM CURRENT USE OF INSULIN (HCC): ICD-10-CM

## 2024-07-03 DIAGNOSIS — E43 SEVERE PROTEIN-CALORIE MALNUTRITION (HCC): ICD-10-CM

## 2024-07-03 DIAGNOSIS — C81.90 HODGKIN LYMPHOMA, UNSPECIFIED HODGKIN LYMPHOMA TYPE, UNSPECIFIED BODY REGION (HCC): Primary | ICD-10-CM

## 2024-07-03 DIAGNOSIS — F32.0 MAJOR DEPRESSIVE DISORDER, SINGLE EPISODE, MILD (HCC): ICD-10-CM

## 2024-07-03 DIAGNOSIS — I10 ESSENTIAL (PRIMARY) HYPERTENSION: ICD-10-CM

## 2024-07-03 DIAGNOSIS — I10 ESSENTIAL HYPERTENSION: ICD-10-CM

## 2024-07-03 LAB
CHOLEST SERPL-MCNC: 124 MG/DL
HDLC SERPL-MCNC: 52 MG/DL
HDLC SERPL: 2.4 (ref 0–5)
LDLC SERPL CALC-MCNC: 54 MG/DL (ref 0–100)
TRIGL SERPL-MCNC: 90 MG/DL
VLDLC SERPL CALC-MCNC: 18 MG/DL

## 2024-07-03 PROCEDURE — 1123F ACP DISCUSS/DSCN MKR DOCD: CPT | Performed by: FAMILY MEDICINE

## 2024-07-03 PROCEDURE — 3074F SYST BP LT 130 MM HG: CPT | Performed by: FAMILY MEDICINE

## 2024-07-03 PROCEDURE — 99213 OFFICE O/P EST LOW 20 MIN: CPT | Performed by: FAMILY MEDICINE

## 2024-07-03 PROCEDURE — 3078F DIAST BP <80 MM HG: CPT | Performed by: FAMILY MEDICINE

## 2024-07-03 RX ORDER — ASPIRIN 81 MG/1
81 TABLET ORAL DAILY
COMMUNITY

## 2024-07-03 RX ORDER — LORAZEPAM 0.5 MG/1
0.5 TABLET ORAL
COMMUNITY
Start: 2024-05-21

## 2024-07-03 RX ORDER — PANTOPRAZOLE SODIUM 40 MG/1
40 TABLET, DELAYED RELEASE ORAL DAILY
COMMUNITY
Start: 2024-04-22 | End: 2024-10-19

## 2024-07-03 ASSESSMENT — PATIENT HEALTH QUESTIONNAIRE - PHQ9
7. TROUBLE CONCENTRATING ON THINGS, SUCH AS READING THE NEWSPAPER OR WATCHING TELEVISION: NOT AT ALL
2. FEELING DOWN, DEPRESSED OR HOPELESS: SEVERAL DAYS
9. THOUGHTS THAT YOU WOULD BE BETTER OFF DEAD, OR OF HURTING YOURSELF: NOT AT ALL
SUM OF ALL RESPONSES TO PHQ QUESTIONS 1-9: 2
6. FEELING BAD ABOUT YOURSELF - OR THAT YOU ARE A FAILURE OR HAVE LET YOURSELF OR YOUR FAMILY DOWN: NOT AT ALL
1. LITTLE INTEREST OR PLEASURE IN DOING THINGS: SEVERAL DAYS
SUM OF ALL RESPONSES TO PHQ QUESTIONS 1-9: 2
4. FEELING TIRED OR HAVING LITTLE ENERGY: NOT AT ALL
5. POOR APPETITE OR OVEREATING: NOT AT ALL
8. MOVING OR SPEAKING SO SLOWLY THAT OTHER PEOPLE COULD HAVE NOTICED. OR THE OPPOSITE, BEING SO FIGETY OR RESTLESS THAT YOU HAVE BEEN MOVING AROUND A LOT MORE THAN USUAL: NOT AT ALL
3. TROUBLE FALLING OR STAYING ASLEEP: NOT AT ALL
SUM OF ALL RESPONSES TO PHQ9 QUESTIONS 1 & 2: 2
10. IF YOU CHECKED OFF ANY PROBLEMS, HOW DIFFICULT HAVE THESE PROBLEMS MADE IT FOR YOU TO DO YOUR WORK, TAKE CARE OF THINGS AT HOME, OR GET ALONG WITH OTHER PEOPLE: NOT DIFFICULT AT ALL
SUM OF ALL RESPONSES TO PHQ QUESTIONS 1-9: 2
SUM OF ALL RESPONSES TO PHQ QUESTIONS 1-9: 2

## 2024-07-03 NOTE — PROGRESS NOTES
Chief Complaint   Patient presents with    Follow-up     Patient is here today for a 3 month follow up.      1. Have you been to the ER, urgent care clinic since your last visit?  Hospitalized since your last visit? [facility] for a stroke 5/25/24    2. Have you seen or consulted any other health care providers outside of the [facility] since your last visit?  Include any pap smears or colon screening. No    
HEMOGLOBIN A1C  -     Lipid Panel; Future  -     Lipid Panel    Essential hypertension,controlled      Doing well,continue current meds and treatments      Attending Physician: Attila Shukla MD

## 2024-08-11 DIAGNOSIS — L03.311 CELLULITIS OF ABDOMINAL WALL: ICD-10-CM

## 2024-08-12 ENCOUNTER — TELEPHONE (OUTPATIENT)
Age: 74
End: 2024-08-12

## 2024-08-12 ENCOUNTER — OFFICE VISIT (OUTPATIENT)
Age: 74
End: 2024-08-12
Payer: MEDICARE

## 2024-08-12 VITALS
RESPIRATION RATE: 18 BRPM | OXYGEN SATURATION: 100 % | HEIGHT: 65 IN | DIASTOLIC BLOOD PRESSURE: 61 MMHG | BODY MASS INDEX: 25.52 KG/M2 | WEIGHT: 153.2 LBS | SYSTOLIC BLOOD PRESSURE: 115 MMHG | HEART RATE: 127 BPM | TEMPERATURE: 98 F

## 2024-08-12 DIAGNOSIS — S80.812A ABRASION OF LEFT LOWER EXTREMITY, INITIAL ENCOUNTER: Primary | ICD-10-CM

## 2024-08-12 DIAGNOSIS — C81.90 HODGKIN LYMPHOMA, UNSPECIFIED HODGKIN LYMPHOMA TYPE, UNSPECIFIED BODY REGION (HCC): ICD-10-CM

## 2024-08-12 PROCEDURE — 1123F ACP DISCUSS/DSCN MKR DOCD: CPT | Performed by: FAMILY MEDICINE

## 2024-08-12 PROCEDURE — 99213 OFFICE O/P EST LOW 20 MIN: CPT | Performed by: FAMILY MEDICINE

## 2024-08-12 PROCEDURE — 3074F SYST BP LT 130 MM HG: CPT | Performed by: FAMILY MEDICINE

## 2024-08-12 PROCEDURE — 3078F DIAST BP <80 MM HG: CPT | Performed by: FAMILY MEDICINE

## 2024-08-12 RX ORDER — LISINOPRIL 2.5 MG/1
2.5 TABLET ORAL DAILY
COMMUNITY
Start: 2024-07-16 | End: 2025-07-16

## 2024-08-12 RX ORDER — MUPIROCIN 20 MG/G
OINTMENT TOPICAL
Qty: 22 G | Refills: 2 | Status: SHIPPED | OUTPATIENT
Start: 2024-08-12 | End: 2024-08-19

## 2024-08-12 RX ORDER — AZITHROMYCIN 250 MG/1
250 TABLET, FILM COATED ORAL DAILY
COMMUNITY
Start: 2024-08-06

## 2024-08-12 ASSESSMENT — PATIENT HEALTH QUESTIONNAIRE - PHQ9
1. LITTLE INTEREST OR PLEASURE IN DOING THINGS: SEVERAL DAYS
5. POOR APPETITE OR OVEREATING: NOT AT ALL
SUM OF ALL RESPONSES TO PHQ QUESTIONS 1-9: 2
7. TROUBLE CONCENTRATING ON THINGS, SUCH AS READING THE NEWSPAPER OR WATCHING TELEVISION: NOT AT ALL
4. FEELING TIRED OR HAVING LITTLE ENERGY: NOT AT ALL
2. FEELING DOWN, DEPRESSED OR HOPELESS: SEVERAL DAYS
SUM OF ALL RESPONSES TO PHQ QUESTIONS 1-9: 2
9. THOUGHTS THAT YOU WOULD BE BETTER OFF DEAD, OR OF HURTING YOURSELF: NOT AT ALL
3. TROUBLE FALLING OR STAYING ASLEEP: NOT AT ALL
SUM OF ALL RESPONSES TO PHQ9 QUESTIONS 1 & 2: 2
6. FEELING BAD ABOUT YOURSELF - OR THAT YOU ARE A FAILURE OR HAVE LET YOURSELF OR YOUR FAMILY DOWN: NOT AT ALL
8. MOVING OR SPEAKING SO SLOWLY THAT OTHER PEOPLE COULD HAVE NOTICED. OR THE OPPOSITE, BEING SO FIGETY OR RESTLESS THAT YOU HAVE BEEN MOVING AROUND A LOT MORE THAN USUAL: NOT AT ALL
10. IF YOU CHECKED OFF ANY PROBLEMS, HOW DIFFICULT HAVE THESE PROBLEMS MADE IT FOR YOU TO DO YOUR WORK, TAKE CARE OF THINGS AT HOME, OR GET ALONG WITH OTHER PEOPLE: NOT DIFFICULT AT ALL

## 2024-08-12 NOTE — PROGRESS NOTES
NAME:  Mateo Kendall   :   1950   MRN:   [id number]     Date/Time:  2024 9:59 AM  Subjective:f/u fall 2 days ago with l legabrasion/skin avulsion.No other injuries.Doing well,completing treatment for Hodgkins Lymphoma this mo.Appetite is good,good wt gain   Wound Check  He was originally treated 2 to 3 days ago. Previous treatment included wound cleansing or irrigation. His temperature was unmeasured prior to arrival. There has been no drainage from the wound. The redness has improved. There is no swelling present. The pain has improved.    Review of Systems   Constitutional:  Negative for chills, fatigue and fever.   HENT:  Negative for congestion.    Gastrointestinal:  Negative for abdominal pain.   Musculoskeletal:  Negative for arthralgias.   Skin:  Positive for wound.   Psychiatric/Behavioral:  Negative for agitation.            Medications reviewed:  Current Outpatient Medications   Medication Sig    azithromycin (ZITHROMAX) 250 MG tablet Take 1 tablet by mouth daily    lisinopril (PRINIVIL;ZESTRIL) 2.5 MG tablet Take 1 tablet by mouth daily    mupirocin (BACTROBAN) 2 % ointment Apply topically 3 times daily.    aspirin 81 MG EC tablet Take 1 tablet by mouth daily    LORazepam (ATIVAN) 0.5 MG tablet Take 1 tablet by mouth every 3 months. For PET scan    pantoprazole (PROTONIX) 40 MG tablet Take 1 tablet by mouth daily    mupirocin (BACTROBAN) 2 % ointment Apply topically 3 times daily Apply to the affected areas    OLANZapine (ZYPREXA) 5 MG tablet TAKE ONE TABLET BY MOUTH AT BEDTIME FOR PSYCHOSIS    clotrimazole-betamethasone (LOTRISONE) 1-0.05 % cream Apply topically 2 times daily.    atorvastatin (LIPITOR) 20 MG tablet Take 1 tablet by mouth daily    carvedilol (COREG) 3.125 MG tablet Take 1 tablet by mouth 2 times daily    prochlorperazine (COMPAZINE) 10 MG tablet Take 1 tablet by mouth in the morning and at bedtime    ondansetron (ZOFRAN-ODT) 4 MG disintegrating tablet Take by mouth

## 2024-08-12 NOTE — PROGRESS NOTES
Chief Complaint   Patient presents with    Wound Check     Patient is here today for a wound on his shin.      \"Have you been to the ER, urgent care clinic since your last visit?  Hospitalized since your last visit?\"    NO    “Have you seen or consulted any other health care providers outside of [facility] since your last visit?”    [facility] for chemo        “Have you had a colorectal cancer screening such as a colonoscopy/FIT/Cologuard?    NO    No colonoscopy on file  No cologuard on file  Date of last FIT: 10/26/2022   No flexible sigmoidoscopy on file         Click Here for Release of Records Request

## 2024-08-12 NOTE — TELEPHONE ENCOUNTER
Mateo Duboisgayle MANLEY [facility] Clinical Staff (supporting Attila Shukla MD)18 hours ago (2:24 PM)       Mateo has an about 1 & 1/2 “ square skin tear on his left shin after slipping getting onto his truck on Saturday. The whole top layer of skin is gone. ( it looks very red today)!!  His last  Chemotherapy treatment was on 8/6.  I have  cleansed the area with dial gold & hydrogen peroxide & applied his topical antibiotic & a dressing.  We need a refill on the topical antibiotic.   He also started Azithromicin  on 8/11 for a concerning cough.  ( Covid test was negative.)  Will this be enough!? Does he need a different  antibiotic or treatment?     Mateo has an appointment with you Monday 8/12 @ 9:40, if needed.          Thank you  [phone]

## 2024-08-13 RX ORDER — MUPIROCIN 20 MG/G
OINTMENT TOPICAL 3 TIMES DAILY
Qty: 22 G | Refills: 1 | OUTPATIENT
Start: 2024-08-13

## 2024-10-24 RX ORDER — OLANZAPINE 5 MG/1
TABLET ORAL
Qty: 30 TABLET | Refills: 0 | Status: SHIPPED | OUTPATIENT
Start: 2024-10-24

## 2024-10-24 NOTE — TELEPHONE ENCOUNTER
Last appointment: 8/12/24  Next appointment: 12/3/24  Previous refill encounter(s): 4/2/24 #90 with 1 refill    Requested Prescriptions     Pending Prescriptions Disp Refills    OLANZapine (ZYPREXA) 5 MG tablet [Pharmacy Med Name: OLANZAPINE 5 MG TAB] 30 tablet 0     Sig: TAKE ONE TABLET BY MOUTH ONE TIME DAILY AT BEDTIME FOR PSYCHOSIS         For Pharmacy Admin Tracking Only    Program: Medication Refill  CPA in place:    Recommendation Provided To:   Intervention Detail: New Rx: 1, reason: Patient Preference  Intervention Accepted By:   Gap Closed?:    Time Spent (min): 5

## 2025-07-16 DIAGNOSIS — L30.4 INTERTRIGO: ICD-10-CM

## 2025-07-17 RX ORDER — CLOTRIMAZOLE AND BETAMETHASONE DIPROPIONATE 10; .64 MG/G; MG/G
CREAM TOPICAL
Qty: 45 G | Refills: 1 | Status: SHIPPED | OUTPATIENT
Start: 2025-07-17

## 2025-07-17 NOTE — TELEPHONE ENCOUNTER
Last appointment: 8/12/24  Next appointment: 12/3/24 pt cancelled appt  Previous refill encounter(s): 3/4/24    Requested Prescriptions     Pending Prescriptions Disp Refills    clotrimazole-betamethasone (LOTRISONE) 1-0.05 % cream [Pharmacy Med Name: CLOTRIMAZOLE- BETAMETHASON CRM] 45 g 2     Sig: APPLY TOPICALLY TWO TIMES A DAY         For Pharmacy Admin Tracking Only    Program: Medication Refill  CPA in place:    Recommendation Provided To:   Intervention Detail: New Rx: 1, reason: Patient Preference  Intervention Accepted By:   Gap Closed?:    Time Spent (min): 5

## 2025-07-18 ENCOUNTER — TELEPHONE (OUTPATIENT)
Age: 75
End: 2025-07-18

## 2025-07-18 NOTE — TELEPHONE ENCOUNTER
Appointment Request From: Mateo Kendall     With Provider: Dr. Attila Shukla MD [[facility]]     Preferred Date Range: 7/22/2025 - 7/27/2025     Preferred Times: Monday Morning, Tuesday Morning, Wednesday Morning, Thursday Morning, Friday Morning     Reason for visit: Request an Appointment     Health Maintenance Topic:      Comments:  Dental request clearance for Denture extractions!   Patient has been   Scheduled for 8/4/25  at 2 pm  with Dr. Shukla . Patient will not be able to have procedure done until cleared by PCP. Patient's wife Ana Rosa requesting  call back to see if  patient can be seen sooner. [phone].

## 2025-07-18 NOTE — TELEPHONE ENCOUNTER
Per the pt wife the dentist will fax over the forms to our office.  She was given the fax number.  Per the wife the pt will keep the 8/4/25 appointment with Dr. Shukla.

## 2025-08-04 ENCOUNTER — CLINICAL DOCUMENTATION (OUTPATIENT)
Age: 75
End: 2025-08-04

## 2025-08-04 ENCOUNTER — OFFICE VISIT (OUTPATIENT)
Age: 75
End: 2025-08-04
Payer: MEDICARE

## 2025-08-04 VITALS
BODY MASS INDEX: 28.02 KG/M2 | OXYGEN SATURATION: 98 % | TEMPERATURE: 98.8 F | SYSTOLIC BLOOD PRESSURE: 134 MMHG | DIASTOLIC BLOOD PRESSURE: 79 MMHG | WEIGHT: 168.2 LBS | RESPIRATION RATE: 18 BRPM | HEART RATE: 90 BPM | HEIGHT: 65 IN

## 2025-08-04 DIAGNOSIS — Z01.818 PRE-OP EXAM: Primary | ICD-10-CM

## 2025-08-04 DIAGNOSIS — I25.10 CORONARY ARTERY DISEASE DUE TO LIPID RICH PLAQUE: ICD-10-CM

## 2025-08-04 DIAGNOSIS — I10 ESSENTIAL (PRIMARY) HYPERTENSION: ICD-10-CM

## 2025-08-04 DIAGNOSIS — I25.83 CORONARY ARTERY DISEASE DUE TO LIPID RICH PLAQUE: ICD-10-CM

## 2025-08-04 DIAGNOSIS — I42.8 NICM (NONISCHEMIC CARDIOMYOPATHY) (HCC): ICD-10-CM

## 2025-08-04 DIAGNOSIS — C81.70 CLASSICAL HODGKIN LYMPHOMA (HCC): ICD-10-CM

## 2025-08-04 DIAGNOSIS — I67.9 CEREBROVASCULAR DISEASE: ICD-10-CM

## 2025-08-04 PROCEDURE — 3078F DIAST BP <80 MM HG: CPT | Performed by: FAMILY MEDICINE

## 2025-08-04 PROCEDURE — 99214 OFFICE O/P EST MOD 30 MIN: CPT | Performed by: FAMILY MEDICINE

## 2025-08-04 PROCEDURE — 1126F AMNT PAIN NOTED NONE PRSNT: CPT | Performed by: FAMILY MEDICINE

## 2025-08-04 PROCEDURE — 3075F SYST BP GE 130 - 139MM HG: CPT | Performed by: FAMILY MEDICINE

## 2025-08-04 PROCEDURE — 1159F MED LIST DOCD IN RCRD: CPT | Performed by: FAMILY MEDICINE

## 2025-08-04 PROCEDURE — 1123F ACP DISCUSS/DSCN MKR DOCD: CPT | Performed by: FAMILY MEDICINE

## 2025-08-04 RX ORDER — CETIRIZINE HYDROCHLORIDE 10 MG/1
10 TABLET ORAL DAILY
COMMUNITY
End: 2025-08-04 | Stop reason: ALTCHOICE

## 2025-08-04 RX ORDER — FUROSEMIDE 20 MG/1
TABLET ORAL
COMMUNITY
Start: 2024-12-02 | End: 2025-08-04 | Stop reason: ALTCHOICE

## 2025-08-04 RX ORDER — LORATADINE 10 MG/1
10 TABLET ORAL
COMMUNITY
Start: 2025-06-26

## 2025-08-04 RX ORDER — MIRTAZAPINE 7.5 MG/1
7.5 TABLET, FILM COATED ORAL
COMMUNITY
Start: 2025-01-27

## 2025-08-04 RX ORDER — AMMONIUM LACTATE 12 G/100G
LOTION TOPICAL PRN
COMMUNITY
Start: 2024-11-04 | End: 2025-11-04

## 2025-08-04 SDOH — ECONOMIC STABILITY: FOOD INSECURITY: WITHIN THE PAST 12 MONTHS, YOU WORRIED THAT YOUR FOOD WOULD RUN OUT BEFORE YOU GOT MONEY TO BUY MORE.: NEVER TRUE

## 2025-08-04 SDOH — ECONOMIC STABILITY: FOOD INSECURITY: WITHIN THE PAST 12 MONTHS, THE FOOD YOU BOUGHT JUST DIDN'T LAST AND YOU DIDN'T HAVE MONEY TO GET MORE.: NEVER TRUE

## 2025-08-04 ASSESSMENT — PATIENT HEALTH QUESTIONNAIRE - PHQ9
4. FEELING TIRED OR HAVING LITTLE ENERGY: NOT AT ALL
SUM OF ALL RESPONSES TO PHQ QUESTIONS 1-9: 2
8. MOVING OR SPEAKING SO SLOWLY THAT OTHER PEOPLE COULD HAVE NOTICED. OR THE OPPOSITE, BEING SO FIGETY OR RESTLESS THAT YOU HAVE BEEN MOVING AROUND A LOT MORE THAN USUAL: NOT AT ALL
2. FEELING DOWN, DEPRESSED OR HOPELESS: SEVERAL DAYS
3. TROUBLE FALLING OR STAYING ASLEEP: NOT AT ALL
1. LITTLE INTEREST OR PLEASURE IN DOING THINGS: SEVERAL DAYS
7. TROUBLE CONCENTRATING ON THINGS, SUCH AS READING THE NEWSPAPER OR WATCHING TELEVISION: NOT AT ALL
SUM OF ALL RESPONSES TO PHQ QUESTIONS 1-9: 2
SUM OF ALL RESPONSES TO PHQ QUESTIONS 1-9: 2
6. FEELING BAD ABOUT YOURSELF - OR THAT YOU ARE A FAILURE OR HAVE LET YOURSELF OR YOUR FAMILY DOWN: NOT AT ALL
9. THOUGHTS THAT YOU WOULD BE BETTER OFF DEAD, OR OF HURTING YOURSELF: NOT AT ALL
10. IF YOU CHECKED OFF ANY PROBLEMS, HOW DIFFICULT HAVE THESE PROBLEMS MADE IT FOR YOU TO DO YOUR WORK, TAKE CARE OF THINGS AT HOME, OR GET ALONG WITH OTHER PEOPLE: NOT DIFFICULT AT ALL
SUM OF ALL RESPONSES TO PHQ QUESTIONS 1-9: 2
5. POOR APPETITE OR OVEREATING: NOT AT ALL

## 2025-08-04 ASSESSMENT — ENCOUNTER SYMPTOMS
BACK PAIN: 0
ANAL BLEEDING: 0
CHEST TIGHTNESS: 0
BLOOD IN STOOL: 0
ABDOMINAL DISTENTION: 0
COUGH: 0
CHOKING: 0

## (undated) DEVICE — SOLUTION IRRIG 1000ML 0.9% SOD CHL USP POUR PLAS BTL

## (undated) DEVICE — SYRINGE MED 10ML LUERLOCK TIP W/O SFTY DISP

## (undated) DEVICE — SUTURE MCRYL SZ 4-0 L27IN ABSRB UD L19MM PS-2 1/2 CIR PRIM Y426H

## (undated) DEVICE — GLOVE SURG SZ 65 THK91MIL LTX FREE SYN POLYISOPRENE

## (undated) DEVICE — SHEET, T, LAPAROTOMY, STERILE: Brand: MEDLINE

## (undated) DEVICE — CLIP INT SM WIDE WECK RED TI TRNSVRS GRV CHEVRON SHP W/ PERCIS TIP 24 PER PK

## (undated) DEVICE — PREP SKN CHLRAPRP APL 26ML STR --

## (undated) DEVICE — YANKAUER,BULB TIP,W/O VENT,RIGID,STERILE: Brand: MEDLINE

## (undated) DEVICE — SOL INJ SOD CL 0.9% 500ML BG --

## (undated) DEVICE — GLOVE SURG SZ 7.5 L11.2IN THK9.8MIL STRW LTX POLYMER BEAD

## (undated) DEVICE — INSULATED BLADE ELECTRODE: Brand: EDGE

## (undated) DEVICE — TUBING, SUCTION, 1/4" X 10', STRAIGHT: Brand: MEDLINE

## (undated) DEVICE — INTENDED FOR TISSUE SEPARATION, AND OTHER PROCEDURES THAT REQUIRE A SHARP SURGICAL BLADE TO PUNCTURE OR CUT.: Brand: BARD-PARKER SAFETY BLADES SIZE 10, STERILE

## (undated) DEVICE — 3M™ IOBAN™ 2 ANTIMICROBIAL INCISE DRAPE 6648EZ: Brand: IOBAN™ 2

## (undated) DEVICE — CLIP LIG M BLU TI HRT SHP WIRE HORZ 600 PER BX

## (undated) DEVICE — ADHESIVE SKIN CLSR 0.7ML TOP DERMBND ADV

## (undated) DEVICE — SUTURE PROL SZ 2-0 L36IN NONABSORBABLE BLU SH L26MM 1/2 CIR 8523H

## (undated) DEVICE — GOWN,SIRUS,NONRNF,SETINSLV,XL,20/CS: Brand: MEDLINE

## (undated) DEVICE — INTENDED FOR TISSUE SEPARATION, AND OTHER PROCEDURES THAT REQUIRE A SHARP SURGICAL BLADE TO PUNCTURE OR CUT.: Brand: BARD-PARKER ® CARBON RIB-BACK BLADES

## (undated) DEVICE — SYRINGE IRRIG 60ML SFT PLIABLE BLB EZ TO GRP 1 HND USE W/

## (undated) DEVICE — Device: Brand: JELCO

## (undated) DEVICE — TOWEL SURG W17XL27IN STD BLU COT NONFENESTRATED PREWASHED

## (undated) DEVICE — DECANTER BAG 9": Brand: MEDLINE INDUSTRIES, INC.

## (undated) DEVICE — TRANSFER SET 3": Brand: MEDLINE INDUSTRIES, INC.

## (undated) DEVICE — REM POLYHESIVE ADULT PATIENT RETURN ELECTRODE: Brand: VALLEYLAB

## (undated) DEVICE — SUTURE ABSORBABLE BRAIDED 3-0 SHB 18 IN UD VICRYL + VCPB864D

## (undated) DEVICE — HYPODERMIC SAFETY NEEDLE: Brand: MAGELLAN

## (undated) DEVICE — 4-PORT MANIFOLD: Brand: NEPTUNE 2

## (undated) DEVICE — SYRINGE 20ML LL S/C 50

## (undated) DEVICE — C-ARM: Brand: UNBRANDED